# Patient Record
Sex: FEMALE | Race: WHITE | NOT HISPANIC OR LATINO | Employment: OTHER | URBAN - METROPOLITAN AREA
[De-identification: names, ages, dates, MRNs, and addresses within clinical notes are randomized per-mention and may not be internally consistent; named-entity substitution may affect disease eponyms.]

---

## 2017-09-18 ENCOUNTER — COMMUNICATION - HEALTHEAST (OUTPATIENT)
Dept: SURGERY | Facility: CLINIC | Age: 57
End: 2017-09-18

## 2017-09-18 DIAGNOSIS — Z72.0 TOBACCO USE: ICD-10-CM

## 2017-09-18 DIAGNOSIS — Z98.84 H/O LAPAROSCOPIC ADJUSTABLE GASTRIC BANDING: ICD-10-CM

## 2018-02-06 ENCOUNTER — COMMUNICATION - HEALTHEAST (OUTPATIENT)
Dept: INTERNAL MEDICINE | Facility: CLINIC | Age: 58
End: 2018-02-06

## 2018-02-06 ENCOUNTER — OFFICE VISIT - HEALTHEAST (OUTPATIENT)
Dept: INTERNAL MEDICINE | Facility: CLINIC | Age: 58
End: 2018-02-06

## 2018-02-06 DIAGNOSIS — R05.9 COUGH: ICD-10-CM

## 2018-02-06 DIAGNOSIS — M79.643 HAND PAIN: ICD-10-CM

## 2018-02-06 DIAGNOSIS — J44.9 COPD (CHRONIC OBSTRUCTIVE PULMONARY DISEASE) (H): ICD-10-CM

## 2018-02-06 DIAGNOSIS — Z23 IMMUNIZATION DUE: ICD-10-CM

## 2018-02-06 DIAGNOSIS — Z53.20 MAMMOGRAM DECLINED: ICD-10-CM

## 2018-02-06 DIAGNOSIS — E03.9 HYPOTHYROIDISM: ICD-10-CM

## 2018-02-06 DIAGNOSIS — Z72.0 TOBACCO ABUSE: ICD-10-CM

## 2018-02-06 DIAGNOSIS — Z91.038 HYMENOPTERA ALLERGY: ICD-10-CM

## 2018-02-06 DIAGNOSIS — I10 HTN (HYPERTENSION): ICD-10-CM

## 2018-02-06 DIAGNOSIS — Z12.11 SCREEN FOR COLON CANCER: ICD-10-CM

## 2018-02-06 DIAGNOSIS — Z53.20 PAPANICOLAOU SMEAR DECLINED: ICD-10-CM

## 2018-02-06 DIAGNOSIS — R05.3 CHRONIC COUGH: ICD-10-CM

## 2018-02-06 ASSESSMENT — MIFFLIN-ST. JEOR: SCORE: 1270.86

## 2018-02-09 ENCOUNTER — COMMUNICATION - HEALTHEAST (OUTPATIENT)
Dept: INTERNAL MEDICINE | Facility: CLINIC | Age: 58
End: 2018-02-09

## 2018-02-09 ENCOUNTER — HOSPITAL ENCOUNTER (OUTPATIENT)
Dept: RESPIRATORY THERAPY | Facility: HOSPITAL | Age: 58
Discharge: HOME | End: 2018-02-09
Attending: INTERNAL MEDICINE

## 2018-02-09 DIAGNOSIS — R05.3 CHRONIC COUGH: ICD-10-CM

## 2018-02-09 DIAGNOSIS — Z72.0 TOBACCO ABUSE: ICD-10-CM

## 2018-02-09 LAB — HGB BLD-MCNC: 15.6 G/DL (ref 12–16)

## 2018-02-11 ENCOUNTER — COMMUNICATION - HEALTHEAST (OUTPATIENT)
Dept: INTERNAL MEDICINE | Facility: CLINIC | Age: 58
End: 2018-02-11

## 2018-02-11 DIAGNOSIS — R05.3 CHRONIC COUGH: ICD-10-CM

## 2018-02-11 DIAGNOSIS — Z72.0 TOBACCO ABUSE: ICD-10-CM

## 2018-02-16 ENCOUNTER — AMBULATORY - HEALTHEAST (OUTPATIENT)
Dept: SURGERY | Facility: CLINIC | Age: 58
End: 2018-02-16

## 2018-02-16 DIAGNOSIS — Z72.0 TOBACCO USE: ICD-10-CM

## 2018-02-16 DIAGNOSIS — E78.5 HLD (HYPERLIPIDEMIA): ICD-10-CM

## 2018-02-16 DIAGNOSIS — Z46.51 ENCOUNTER FOR ADJUSTMENT OF GASTRIC LAP BAND: ICD-10-CM

## 2018-02-16 DIAGNOSIS — E55.9 VITAMIN D DEFICIENCY: ICD-10-CM

## 2018-02-16 DIAGNOSIS — Z13.1 SCREENING FOR DIABETES MELLITUS: ICD-10-CM

## 2018-02-16 DIAGNOSIS — Z98.84 HX OF LAPAROSCOPIC ADJUSTABLE GASTRIC BANDING: ICD-10-CM

## 2018-02-16 DIAGNOSIS — K21.9 GERD (GASTROESOPHAGEAL REFLUX DISEASE): ICD-10-CM

## 2018-02-16 ASSESSMENT — MIFFLIN-ST. JEOR: SCORE: 1270.86

## 2018-02-21 ENCOUNTER — AMBULATORY - HEALTHEAST (OUTPATIENT)
Dept: LAB | Facility: CLINIC | Age: 58
End: 2018-02-21

## 2018-02-21 DIAGNOSIS — I10 HTN (HYPERTENSION): ICD-10-CM

## 2018-02-21 DIAGNOSIS — Z13.1 SCREENING FOR DIABETES MELLITUS: ICD-10-CM

## 2018-02-21 DIAGNOSIS — M79.643 HAND PAIN: ICD-10-CM

## 2018-02-21 DIAGNOSIS — E03.9 HYPOTHYROIDISM: ICD-10-CM

## 2018-02-21 DIAGNOSIS — E55.9 VITAMIN D DEFICIENCY: ICD-10-CM

## 2018-02-21 DIAGNOSIS — Z98.84 HX OF LAPAROSCOPIC ADJUSTABLE GASTRIC BANDING: ICD-10-CM

## 2018-02-21 DIAGNOSIS — E78.5 HLD (HYPERLIPIDEMIA): ICD-10-CM

## 2018-02-21 LAB
ANION GAP SERPL CALCULATED.3IONS-SCNC: 8 MMOL/L (ref 5–18)
BUN SERPL-MCNC: 10 MG/DL (ref 8–22)
C REACTIVE PROTEIN LHE: 0.2 MG/DL (ref 0–0.8)
CALCIUM SERPL-MCNC: 9.4 MG/DL (ref 8.5–10.5)
CHLORIDE BLD-SCNC: 103 MMOL/L (ref 98–107)
CO2 SERPL-SCNC: 27 MMOL/L (ref 22–31)
CREAT SERPL-MCNC: 0.75 MG/DL (ref 0.6–1.1)
ERYTHROCYTE [DISTWIDTH] IN BLOOD BY AUTOMATED COUNT: 11.9 % (ref 11–14.5)
ERYTHROCYTE [SEDIMENTATION RATE] IN BLOOD BY WESTERGREN METHOD: 6 MM/HR (ref 0–20)
FASTING STATUS PATIENT QL REPORTED: YES
GFR SERPL CREATININE-BSD FRML MDRD: >60 ML/MIN/1.73M2
GLUCOSE BLD-MCNC: 100 MG/DL (ref 70–125)
HBA1C MFR BLD: 5.8 % (ref 3.5–6)
HCT VFR BLD AUTO: 43.8 % (ref 35–47)
HDLC SERPL-MCNC: 82 MG/DL
HGB BLD-MCNC: 14.7 G/DL (ref 12–16)
LDLC SERPL CALC-MCNC: 145 MG/DL
MCH RBC QN AUTO: 31.2 PG (ref 27–34)
MCHC RBC AUTO-ENTMCNC: 33.6 G/DL (ref 32–36)
MCV RBC AUTO: 93 FL (ref 80–100)
PLATELET # BLD AUTO: 184 THOU/UL (ref 140–440)
PMV BLD AUTO: 8.8 FL (ref 7–10)
POTASSIUM BLD-SCNC: 3.8 MMOL/L (ref 3.5–5)
PTH-INTACT SERPL-MCNC: 41 PG/ML (ref 10–86)
RBC # BLD AUTO: 4.72 MILL/UL (ref 3.8–5.4)
RHEUMATOID FACT SERPL-ACNC: 36.6 IU/ML (ref 0–30)
SODIUM SERPL-SCNC: 138 MMOL/L (ref 136–145)
TSH SERPL DL<=0.005 MIU/L-ACNC: 5.88 UIU/ML (ref 0.3–5)
WBC: 5.9 THOU/UL (ref 4–11)

## 2018-02-22 LAB — CCP AB SER IA-ACNC: <0.5 U/ML

## 2018-02-23 LAB
25(OH)D3 SERPL-MCNC: 28.9 NG/ML (ref 30–80)
ZINC SERPL-MCNC: 60 UG/DL (ref 60–120)

## 2018-02-26 ENCOUNTER — COMMUNICATION - HEALTHEAST (OUTPATIENT)
Dept: INTERNAL MEDICINE | Facility: CLINIC | Age: 58
End: 2018-02-26

## 2018-02-27 LAB — VIT B1 PYROPHOSHATE BLD-SCNC: 84 NMOL/L (ref 70–180)

## 2018-04-10 ENCOUNTER — AMBULATORY - HEALTHEAST (OUTPATIENT)
Dept: NURSING | Facility: CLINIC | Age: 58
End: 2018-04-10

## 2018-05-27 ENCOUNTER — RECORDS - HEALTHEAST (OUTPATIENT)
Dept: ADMINISTRATIVE | Facility: OTHER | Age: 58
End: 2018-05-27

## 2018-05-29 ENCOUNTER — OFFICE VISIT - HEALTHEAST (OUTPATIENT)
Dept: FAMILY MEDICINE | Facility: CLINIC | Age: 58
End: 2018-05-29

## 2018-05-29 DIAGNOSIS — H69.92 DYSFUNCTION OF LEFT EUSTACHIAN TUBE: ICD-10-CM

## 2018-05-29 RX ORDER — CETIRIZINE HYDROCHLORIDE 10 MG/1
10 TABLET ORAL DAILY
Qty: 30 TABLET | Refills: 0 | Status: SHIPPED | COMMUNITY
Start: 2018-05-29 | End: 2021-10-05

## 2018-09-25 ENCOUNTER — OFFICE VISIT - HEALTHEAST (OUTPATIENT)
Dept: FAMILY MEDICINE | Facility: CLINIC | Age: 58
End: 2018-09-25

## 2018-09-25 DIAGNOSIS — Z72.0 TOBACCO USE: ICD-10-CM

## 2018-09-25 DIAGNOSIS — J00 ACUTE NASOPHARYNGITIS (COMMON COLD): ICD-10-CM

## 2018-09-25 DIAGNOSIS — J02.9 SORE THROAT: ICD-10-CM

## 2018-09-25 DIAGNOSIS — R05.9 COUGH: ICD-10-CM

## 2018-09-25 DIAGNOSIS — J44.9 COPD (CHRONIC OBSTRUCTIVE PULMONARY DISEASE) (H): ICD-10-CM

## 2018-09-25 LAB — DEPRECATED S PYO AG THROAT QL EIA: NORMAL

## 2018-09-26 LAB — GROUP A STREP BY PCR: NORMAL

## 2018-11-12 ENCOUNTER — OFFICE VISIT - HEALTHEAST (OUTPATIENT)
Dept: FAMILY MEDICINE | Facility: CLINIC | Age: 58
End: 2018-11-12

## 2018-11-12 DIAGNOSIS — L08.9 WOUND INFECTION: ICD-10-CM

## 2018-11-12 DIAGNOSIS — T14.8XXA WOUND INFECTION: ICD-10-CM

## 2018-11-12 DIAGNOSIS — M79.662 PAIN OF LEFT LOWER LEG: ICD-10-CM

## 2018-11-15 ENCOUNTER — OFFICE VISIT - HEALTHEAST (OUTPATIENT)
Dept: INTERNAL MEDICINE | Facility: CLINIC | Age: 58
End: 2018-11-15

## 2018-11-15 DIAGNOSIS — S81.802D NON-HEALING WOUND OF LOWER EXTREMITY, LEFT, SUBSEQUENT ENCOUNTER: ICD-10-CM

## 2018-12-04 ENCOUNTER — OFFICE VISIT - HEALTHEAST (OUTPATIENT)
Dept: VASCULAR SURGERY | Facility: CLINIC | Age: 58
End: 2018-12-04

## 2018-12-04 DIAGNOSIS — Z72.0 TOBACCO ABUSE: ICD-10-CM

## 2018-12-04 DIAGNOSIS — E60 ZINC DEFICIENCY: ICD-10-CM

## 2018-12-04 DIAGNOSIS — E55.9 VITAMIN D DEFICIENCY: ICD-10-CM

## 2018-12-04 DIAGNOSIS — I15.9 SECONDARY HYPERTENSION: ICD-10-CM

## 2018-12-04 DIAGNOSIS — E66.811 OBESITY (BMI 30.0-34.9): ICD-10-CM

## 2018-12-04 DIAGNOSIS — L97.921 ULCER OF LEFT LOWER EXTREMITY, LIMITED TO BREAKDOWN OF SKIN (H): ICD-10-CM

## 2018-12-04 ASSESSMENT — MIFFLIN-ST. JEOR: SCORE: 1261.79

## 2018-12-08 ENCOUNTER — COMMUNICATION - HEALTHEAST (OUTPATIENT)
Dept: INTERNAL MEDICINE | Facility: CLINIC | Age: 58
End: 2018-12-08

## 2019-03-09 ENCOUNTER — COMMUNICATION - HEALTHEAST (OUTPATIENT)
Dept: SURGERY | Facility: CLINIC | Age: 59
End: 2019-03-09

## 2019-03-09 DIAGNOSIS — K21.9 GERD (GASTROESOPHAGEAL REFLUX DISEASE): ICD-10-CM

## 2019-04-17 ENCOUNTER — COMMUNICATION - HEALTHEAST (OUTPATIENT)
Dept: INTERNAL MEDICINE | Facility: CLINIC | Age: 59
End: 2019-04-17

## 2019-04-23 ENCOUNTER — COMMUNICATION - HEALTHEAST (OUTPATIENT)
Dept: SURGERY | Facility: CLINIC | Age: 59
End: 2019-04-23

## 2019-05-07 ENCOUNTER — OFFICE VISIT - HEALTHEAST (OUTPATIENT)
Dept: INTERNAL MEDICINE | Facility: CLINIC | Age: 59
End: 2019-05-07

## 2019-05-07 DIAGNOSIS — Z53.20 MAMMOGRAM DECLINED: ICD-10-CM

## 2019-05-07 DIAGNOSIS — Z53.20 PAP SMEAR OF CERVIX DECLINED: ICD-10-CM

## 2019-05-07 DIAGNOSIS — I10 ESSENTIAL HYPERTENSION: ICD-10-CM

## 2019-05-07 DIAGNOSIS — Z98.890 S/P COLONOSCOPY: ICD-10-CM

## 2019-05-07 DIAGNOSIS — Z11.59 SCREENING FOR MEASLES: ICD-10-CM

## 2019-05-07 DIAGNOSIS — Z91.038 HYMENOPTERA ALLERGY: ICD-10-CM

## 2019-05-07 DIAGNOSIS — E78.2 MIXED HYPERLIPIDEMIA: ICD-10-CM

## 2019-05-07 DIAGNOSIS — Z72.0 TOBACCO ABUSE: ICD-10-CM

## 2019-05-07 LAB
ANION GAP SERPL CALCULATED.3IONS-SCNC: 11 MMOL/L (ref 5–18)
BUN SERPL-MCNC: 10 MG/DL (ref 8–22)
CALCIUM SERPL-MCNC: 9.8 MG/DL (ref 8.5–10.5)
CHLORIDE BLD-SCNC: 104 MMOL/L (ref 98–107)
CO2 SERPL-SCNC: 25 MMOL/L (ref 22–31)
CREAT SERPL-MCNC: 0.8 MG/DL (ref 0.6–1.1)
GFR SERPL CREATININE-BSD FRML MDRD: >60 ML/MIN/1.73M2
GLUCOSE BLD-MCNC: 93 MG/DL (ref 70–125)
POTASSIUM BLD-SCNC: 4.4 MMOL/L (ref 3.5–5)
SODIUM SERPL-SCNC: 140 MMOL/L (ref 136–145)
TSH SERPL DL<=0.005 MIU/L-ACNC: 4.13 UIU/ML (ref 0.3–5)

## 2019-05-07 RX ORDER — EPINEPHRINE 0.3 MG/.3ML
0.3 INJECTION SUBCUTANEOUS ONCE
Qty: 2 | Refills: 2 | Status: SHIPPED | OUTPATIENT
Start: 2019-05-07 | End: 2019-05-07

## 2019-05-07 ASSESSMENT — MIFFLIN-ST. JEOR: SCORE: 1329.82

## 2019-05-08 ENCOUNTER — COMMUNICATION - HEALTHEAST (OUTPATIENT)
Dept: INTERNAL MEDICINE | Facility: CLINIC | Age: 59
End: 2019-05-08

## 2019-05-08 DIAGNOSIS — I10 ESSENTIAL HYPERTENSION: ICD-10-CM

## 2019-05-08 LAB — MEV IGG SER IA-ACNC: POSITIVE

## 2019-06-13 ENCOUNTER — OFFICE VISIT - HEALTHEAST (OUTPATIENT)
Dept: SURGERY | Facility: CLINIC | Age: 59
End: 2019-06-13

## 2019-06-13 ENCOUNTER — AMBULATORY - HEALTHEAST (OUTPATIENT)
Dept: LAB | Facility: CLINIC | Age: 59
End: 2019-06-13

## 2019-06-13 DIAGNOSIS — Z98.84 HX OF LAPAROSCOPIC ADJUSTABLE GASTRIC BANDING: ICD-10-CM

## 2019-06-13 DIAGNOSIS — Z78.0 POST-MENOPAUSAL: ICD-10-CM

## 2019-06-13 DIAGNOSIS — Z72.0 TOBACCO ABUSE: ICD-10-CM

## 2019-06-13 LAB
ALBUMIN SERPL-MCNC: 4 G/DL (ref 3.5–5)
ALP SERPL-CCNC: 64 U/L (ref 45–120)
ALT SERPL W P-5'-P-CCNC: 17 U/L (ref 0–45)
ANION GAP SERPL CALCULATED.3IONS-SCNC: 11 MMOL/L (ref 5–18)
AST SERPL W P-5'-P-CCNC: 24 U/L (ref 0–40)
BILIRUB SERPL-MCNC: 0.4 MG/DL (ref 0–1)
BUN SERPL-MCNC: 10 MG/DL (ref 8–22)
CALCIUM SERPL-MCNC: 10.2 MG/DL (ref 8.5–10.5)
CHLORIDE BLD-SCNC: 103 MMOL/L (ref 98–107)
CO2 SERPL-SCNC: 27 MMOL/L (ref 22–31)
CREAT SERPL-MCNC: 0.72 MG/DL (ref 0.6–1.1)
ERYTHROCYTE [DISTWIDTH] IN BLOOD BY AUTOMATED COUNT: 12 % (ref 11–14.5)
FASTING STATUS PATIENT QL REPORTED: YES
GFR SERPL CREATININE-BSD FRML MDRD: >60 ML/MIN/1.73M2
GLUCOSE BLD-MCNC: 93 MG/DL (ref 70–125)
HBA1C MFR BLD: 5.5 % (ref 3.5–6)
HCT VFR BLD AUTO: 45.5 % (ref 35–47)
HDLC SERPL-MCNC: 95 MG/DL
HGB BLD-MCNC: 15 G/DL (ref 12–16)
LDLC SERPL CALC-MCNC: 174 MG/DL
MCH RBC QN AUTO: 31.3 PG (ref 27–34)
MCHC RBC AUTO-ENTMCNC: 33 G/DL (ref 32–36)
MCV RBC AUTO: 95 FL (ref 80–100)
PLATELET # BLD AUTO: 196 THOU/UL (ref 140–440)
PMV BLD AUTO: 8.6 FL (ref 7–10)
POTASSIUM BLD-SCNC: 4.3 MMOL/L (ref 3.5–5)
PROT SERPL-MCNC: 7 G/DL (ref 6–8)
PTH-INTACT SERPL-MCNC: 42 PG/ML (ref 10–86)
RBC # BLD AUTO: 4.79 MILL/UL (ref 3.8–5.4)
SODIUM SERPL-SCNC: 141 MMOL/L (ref 136–145)
TSH SERPL DL<=0.005 MIU/L-ACNC: 3.83 UIU/ML (ref 0.3–5)
WBC: 4 THOU/UL (ref 4–11)

## 2019-06-13 ASSESSMENT — MIFFLIN-ST. JEOR: SCORE: 1311.68

## 2019-06-14 LAB — 25(OH)D3 SERPL-MCNC: 25.7 NG/ML (ref 30–80)

## 2019-06-16 LAB — VIT B1 PYROPHOSHATE BLD-SCNC: 79 NMOL/L (ref 70–180)

## 2019-07-18 ENCOUNTER — OFFICE VISIT - HEALTHEAST (OUTPATIENT)
Dept: SURGERY | Facility: CLINIC | Age: 59
End: 2019-07-18

## 2019-07-18 DIAGNOSIS — Z72.0 TOBACCO USE: ICD-10-CM

## 2019-07-18 DIAGNOSIS — R63.2 HYPERPHAGIA: ICD-10-CM

## 2019-07-18 DIAGNOSIS — Z98.84 HX OF LAPAROSCOPIC ADJUSTABLE GASTRIC BANDING: ICD-10-CM

## 2019-07-18 ASSESSMENT — MIFFLIN-ST. JEOR: SCORE: 1293.54

## 2019-08-15 ENCOUNTER — COMMUNICATION - HEALTHEAST (OUTPATIENT)
Dept: TELEHEALTH | Facility: CLINIC | Age: 59
End: 2019-08-15

## 2019-08-15 ENCOUNTER — RECORDS - HEALTHEAST (OUTPATIENT)
Dept: ADMINISTRATIVE | Facility: OTHER | Age: 59
End: 2019-08-15

## 2019-08-15 ENCOUNTER — RECORDS - HEALTHEAST (OUTPATIENT)
Dept: BONE DENSITY | Facility: CLINIC | Age: 59
End: 2019-08-15

## 2019-08-15 DIAGNOSIS — Z72.0 TOBACCO USE: ICD-10-CM

## 2019-08-15 DIAGNOSIS — Z78.0 ASYMPTOMATIC MENOPAUSAL STATE: ICD-10-CM

## 2019-08-15 DIAGNOSIS — Z98.84 BARIATRIC SURGERY STATUS: ICD-10-CM

## 2019-11-22 ENCOUNTER — COMMUNICATION - HEALTHEAST (OUTPATIENT)
Dept: SURGERY | Facility: CLINIC | Age: 59
End: 2019-11-22

## 2019-11-22 DIAGNOSIS — E78.5 HLD (HYPERLIPIDEMIA): ICD-10-CM

## 2019-12-16 ENCOUNTER — AMBULATORY - HEALTHEAST (OUTPATIENT)
Dept: LAB | Facility: CLINIC | Age: 59
End: 2019-12-16

## 2019-12-16 ENCOUNTER — AMBULATORY - HEALTHEAST (OUTPATIENT)
Dept: NURSING | Facility: CLINIC | Age: 59
End: 2019-12-16

## 2019-12-16 DIAGNOSIS — E78.5 HLD (HYPERLIPIDEMIA): ICD-10-CM

## 2019-12-16 LAB — LDLC SERPL CALC-MCNC: 165 MG/DL

## 2020-01-30 ENCOUNTER — COMMUNICATION - HEALTHEAST (OUTPATIENT)
Dept: SURGERY | Facility: CLINIC | Age: 60
End: 2020-01-30

## 2020-02-05 ENCOUNTER — OFFICE VISIT - HEALTHEAST (OUTPATIENT)
Dept: SURGERY | Facility: CLINIC | Age: 60
End: 2020-02-05

## 2020-02-05 ENCOUNTER — COMMUNICATION - HEALTHEAST (OUTPATIENT)
Dept: TELEHEALTH | Facility: CLINIC | Age: 60
End: 2020-02-05

## 2020-02-05 DIAGNOSIS — K21.9 GASTROESOPHAGEAL REFLUX DISEASE WITHOUT ESOPHAGITIS: ICD-10-CM

## 2020-02-05 DIAGNOSIS — Z98.84 BARIATRIC SURGERY STATUS: ICD-10-CM

## 2020-02-05 RX ORDER — VARENICLINE TARTRATE 1 MG/1
1 TABLET, FILM COATED ORAL 2 TIMES DAILY
Qty: 60 TABLET | Refills: 2 | Status: SHIPPED | OUTPATIENT
Start: 2020-02-05 | End: 2021-10-05

## 2020-02-07 ENCOUNTER — OFFICE VISIT - HEALTHEAST (OUTPATIENT)
Dept: FAMILY MEDICINE | Facility: CLINIC | Age: 60
End: 2020-02-07

## 2020-02-07 DIAGNOSIS — R05.9 COUGH: ICD-10-CM

## 2020-02-07 DIAGNOSIS — J20.9 ACUTE BRONCHITIS, UNSPECIFIED ORGANISM: ICD-10-CM

## 2020-04-24 ENCOUNTER — COMMUNICATION - HEALTHEAST (OUTPATIENT)
Dept: INTERNAL MEDICINE | Facility: CLINIC | Age: 60
End: 2020-04-24

## 2020-04-24 DIAGNOSIS — I10 ESSENTIAL HYPERTENSION: ICD-10-CM

## 2020-04-28 ENCOUNTER — COMMUNICATION - HEALTHEAST (OUTPATIENT)
Dept: INTERNAL MEDICINE | Facility: CLINIC | Age: 60
End: 2020-04-28

## 2020-04-30 ENCOUNTER — OFFICE VISIT - HEALTHEAST (OUTPATIENT)
Dept: INTERNAL MEDICINE | Facility: CLINIC | Age: 60
End: 2020-04-30

## 2020-04-30 DIAGNOSIS — I10 ESSENTIAL HYPERTENSION: ICD-10-CM

## 2020-04-30 DIAGNOSIS — Z98.84 HX OF LAPAROSCOPIC ADJUSTABLE GASTRIC BANDING: ICD-10-CM

## 2020-04-30 DIAGNOSIS — J44.9 CHRONIC OBSTRUCTIVE PULMONARY DISEASE, UNSPECIFIED COPD TYPE (H): ICD-10-CM

## 2020-04-30 DIAGNOSIS — Z72.0 TOBACCO ABUSE: ICD-10-CM

## 2020-07-01 ENCOUNTER — COMMUNICATION - HEALTHEAST (OUTPATIENT)
Dept: INTERNAL MEDICINE | Facility: CLINIC | Age: 60
End: 2020-07-01

## 2020-07-02 ENCOUNTER — OFFICE VISIT - HEALTHEAST (OUTPATIENT)
Dept: INTERNAL MEDICINE | Facility: CLINIC | Age: 60
End: 2020-07-02

## 2020-07-02 DIAGNOSIS — R05.9 COUGH: ICD-10-CM

## 2020-07-02 DIAGNOSIS — J02.9 SORE THROAT: ICD-10-CM

## 2020-07-02 DIAGNOSIS — M54.2 NECK PAIN: ICD-10-CM

## 2020-07-03 ENCOUNTER — AMBULATORY - HEALTHEAST (OUTPATIENT)
Dept: FAMILY MEDICINE | Facility: CLINIC | Age: 60
End: 2020-07-03

## 2020-07-03 DIAGNOSIS — R05.9 COUGH: ICD-10-CM

## 2020-07-06 ENCOUNTER — COMMUNICATION - HEALTHEAST (OUTPATIENT)
Dept: INTERNAL MEDICINE | Facility: CLINIC | Age: 60
End: 2020-07-06

## 2020-07-07 ENCOUNTER — AMBULATORY - HEALTHEAST (OUTPATIENT)
Dept: LAB | Facility: CLINIC | Age: 60
End: 2020-07-07

## 2020-07-07 ENCOUNTER — AMBULATORY - HEALTHEAST (OUTPATIENT)
Dept: INTERNAL MEDICINE | Facility: CLINIC | Age: 60
End: 2020-07-07

## 2020-07-07 DIAGNOSIS — Z01.84 IMMUNITY STATUS TESTING: ICD-10-CM

## 2020-07-09 ENCOUNTER — COMMUNICATION - HEALTHEAST (OUTPATIENT)
Dept: INTERNAL MEDICINE | Facility: CLINIC | Age: 60
End: 2020-07-09

## 2020-07-13 ENCOUNTER — COMMUNICATION - HEALTHEAST (OUTPATIENT)
Dept: INTERNAL MEDICINE | Facility: CLINIC | Age: 60
End: 2020-07-13

## 2020-07-15 ENCOUNTER — COMMUNICATION - HEALTHEAST (OUTPATIENT)
Dept: SURGERY | Facility: CLINIC | Age: 60
End: 2020-07-15

## 2020-07-15 DIAGNOSIS — K21.9 GERD (GASTROESOPHAGEAL REFLUX DISEASE): ICD-10-CM

## 2020-07-15 RX ORDER — OMEPRAZOLE 40 MG/1
CAPSULE, DELAYED RELEASE ORAL
Qty: 90 CAPSULE | Status: SHIPPED | OUTPATIENT
Start: 2020-07-15 | End: 2021-09-21

## 2020-07-28 ENCOUNTER — COMMUNICATION - HEALTHEAST (OUTPATIENT)
Dept: SURGERY | Facility: CLINIC | Age: 60
End: 2020-07-28

## 2020-07-28 ENCOUNTER — OFFICE VISIT - HEALTHEAST (OUTPATIENT)
Dept: FAMILY MEDICINE | Facility: CLINIC | Age: 60
End: 2020-07-28

## 2020-07-28 ENCOUNTER — AMBULATORY - HEALTHEAST (OUTPATIENT)
Dept: SURGERY | Facility: CLINIC | Age: 60
End: 2020-07-28

## 2020-07-28 DIAGNOSIS — Z98.84 HX OF LAPAROSCOPIC ADJUSTABLE GASTRIC BANDING: ICD-10-CM

## 2020-07-28 DIAGNOSIS — R10.13 EPIGASTRIC PAIN: ICD-10-CM

## 2020-07-28 DIAGNOSIS — Z72.0 TOBACCO USE: ICD-10-CM

## 2020-07-28 DIAGNOSIS — Z98.84 HISTORY OF LAPAROSCOPIC ADJUSTABLE GASTRIC BANDING: ICD-10-CM

## 2020-07-28 DIAGNOSIS — J02.9 SORE THROAT: ICD-10-CM

## 2020-07-28 DIAGNOSIS — Z72.0 TOBACCO ABUSE: ICD-10-CM

## 2020-07-28 LAB — DEPRECATED S PYO AG THROAT QL EIA: NORMAL

## 2020-07-28 RX ORDER — SUCRALFATE 1 G/1
1 TABLET ORAL 4 TIMES DAILY
Qty: 120 TABLET | Status: SHIPPED | OUTPATIENT
Start: 2020-07-28 | End: 2021-07-28

## 2020-07-29 LAB — GROUP A STREP BY PCR: NORMAL

## 2020-08-03 ENCOUNTER — SURGERY - HEALTHEAST (OUTPATIENT)
Dept: SURGERY | Facility: CLINIC | Age: 60
End: 2020-08-03

## 2020-08-03 DIAGNOSIS — Z72.0 TOBACCO ABUSE: ICD-10-CM

## 2020-08-03 DIAGNOSIS — Z98.84 BARIATRIC SURGERY STATUS: ICD-10-CM

## 2020-08-03 DIAGNOSIS — R10.13 ABDOMINAL PAIN, EPIGASTRIC: ICD-10-CM

## 2020-08-04 ENCOUNTER — AMBULATORY - HEALTHEAST (OUTPATIENT)
Dept: SURGERY | Facility: AMBULATORY SURGERY CENTER | Age: 60
End: 2020-08-04

## 2020-08-04 ENCOUNTER — COMMUNICATION - HEALTHEAST (OUTPATIENT)
Dept: SURGERY | Facility: CLINIC | Age: 60
End: 2020-08-04

## 2020-08-04 DIAGNOSIS — Z11.59 ENCOUNTER FOR SCREENING FOR OTHER VIRAL DISEASES: ICD-10-CM

## 2020-08-12 ENCOUNTER — AMBULATORY - HEALTHEAST (OUTPATIENT)
Dept: FAMILY MEDICINE | Facility: CLINIC | Age: 60
End: 2020-08-12

## 2020-08-12 DIAGNOSIS — Z11.59 ENCOUNTER FOR SCREENING FOR OTHER VIRAL DISEASES: ICD-10-CM

## 2020-08-13 ENCOUNTER — ANESTHESIA - HEALTHEAST (OUTPATIENT)
Dept: SURGERY | Facility: AMBULATORY SURGERY CENTER | Age: 60
End: 2020-08-13

## 2020-08-13 ASSESSMENT — MIFFLIN-ST. JEOR: SCORE: 1254.99

## 2020-08-14 ENCOUNTER — COMMUNICATION - HEALTHEAST (OUTPATIENT)
Dept: SCHEDULING | Facility: CLINIC | Age: 60
End: 2020-08-14

## 2020-08-14 ENCOUNTER — SURGERY - HEALTHEAST (OUTPATIENT)
Dept: SURGERY | Facility: AMBULATORY SURGERY CENTER | Age: 60
End: 2020-08-14

## 2020-08-14 ASSESSMENT — MIFFLIN-ST. JEOR: SCORE: 1254.99

## 2020-08-30 ENCOUNTER — COMMUNICATION - HEALTHEAST (OUTPATIENT)
Dept: INTERNAL MEDICINE | Facility: CLINIC | Age: 60
End: 2020-08-30

## 2020-08-30 DIAGNOSIS — I10 ESSENTIAL HYPERTENSION: ICD-10-CM

## 2020-09-01 RX ORDER — AMLODIPINE BESYLATE 5 MG/1
5 TABLET ORAL DAILY
Qty: 90 TABLET | Refills: 3 | Status: SHIPPED | OUTPATIENT
Start: 2020-09-01 | End: 2021-07-27

## 2021-01-04 ENCOUNTER — COMMUNICATION - HEALTHEAST (OUTPATIENT)
Dept: INTERNAL MEDICINE | Facility: CLINIC | Age: 61
End: 2021-01-04

## 2021-01-19 ENCOUNTER — RECORDS - HEALTHEAST (OUTPATIENT)
Dept: ADMINISTRATIVE | Facility: OTHER | Age: 61
End: 2021-01-19

## 2021-03-02 ENCOUNTER — COMMUNICATION - HEALTHEAST (OUTPATIENT)
Dept: INTERNAL MEDICINE | Facility: CLINIC | Age: 61
End: 2021-03-02

## 2021-05-23 ENCOUNTER — HEALTH MAINTENANCE LETTER (OUTPATIENT)
Age: 61
End: 2021-05-23

## 2021-05-25 ENCOUNTER — RECORDS - HEALTHEAST (OUTPATIENT)
Dept: ADMINISTRATIVE | Facility: CLINIC | Age: 61
End: 2021-05-25

## 2021-05-27 ENCOUNTER — OFFICE VISIT - HEALTHEAST (OUTPATIENT)
Dept: FAMILY MEDICINE | Facility: CLINIC | Age: 61
End: 2021-05-27

## 2021-05-27 VITALS
TEMPERATURE: 98.5 F | HEART RATE: 68 BPM | SYSTOLIC BLOOD PRESSURE: 152 MMHG | OXYGEN SATURATION: 98 % | RESPIRATION RATE: 14 BRPM | DIASTOLIC BLOOD PRESSURE: 84 MMHG

## 2021-05-27 DIAGNOSIS — J44.9 CHRONIC OBSTRUCTIVE PULMONARY DISEASE, UNSPECIFIED COPD TYPE (H): ICD-10-CM

## 2021-05-27 DIAGNOSIS — R05.9 COUGH: ICD-10-CM

## 2021-05-27 LAB
SARS-COV-2 PCR COMMENT: NORMAL
SARS-COV-2 RNA SPEC QL NAA+PROBE: NEGATIVE
SARS-COV-2 VIRUS SPECIMEN SOURCE: NORMAL

## 2021-05-27 RX ORDER — ALBUTEROL SULFATE 90 UG/1
2 AEROSOL, METERED RESPIRATORY (INHALATION) EVERY 6 HOURS PRN
Qty: 1 EACH | Refills: 0 | Status: SHIPPED | OUTPATIENT
Start: 2021-05-27 | End: 2021-10-05

## 2021-05-28 ENCOUNTER — COMMUNICATION - HEALTHEAST (OUTPATIENT)
Dept: SCHEDULING | Facility: CLINIC | Age: 61
End: 2021-05-28

## 2021-05-28 NOTE — TELEPHONE ENCOUNTER
PT IS OUT OF HER OMETRAZOLE 40 MG. SHE WOULD LIKE A REFILL UNTIL HER APPT ON 6/13.     [facility]

## 2021-05-28 NOTE — PROGRESS NOTES
Wt Readings from Last 20 Encounters:   05/07/19 175 lb (79.4 kg)   12/04/18 160 lb (72.6 kg)   11/15/18 163 lb (73.9 kg)   11/12/18 163 lb 6.4 oz (74.1 kg)   09/25/18 164 lb 12.8 oz (74.8 kg)   05/29/18 167 lb (75.8 kg)   02/16/18 162 lb (73.5 kg)   02/06/18 162 lb (73.5 kg)   09/15/16 180 lb (81.6 kg)   08/17/16 197 lb 8 oz (89.6 kg)   07/19/16 198 lb 8 oz (90 kg)   10/19/15 192 lb 6.4 oz (87.3 kg)   09/19/15 188 lb 9.6 oz (85.5 kg)   09/16/15 187 lb (84.8 kg)   08/09/15 150 lb (68 kg)

## 2021-05-28 NOTE — PATIENT INSTRUCTIONS - HE
The new shingles shot (Shingrix) is 2 separate shots  by 2-6 months.  It is recommended for people 50 years of age and older.    The cost out of pocket is around $390 for the 2 shots, so you might want to see if your insurance covers it or a portion of it prior to having it done.      It is estimated that any person's risk of shingles over their lifetime is around 10-20%.    The new shot is 90% effective, reducing your risk of shingles to about 1-2% over your lifetime.    Because the shot is strong, it is very common to have flu like symptoms for 2-3 days after the shot.  25-50% of patients will have fever, muscle aches or headache.    I believe that whether or not you have this vaccine is your own decision depending on your values and preferences.  The information above I give you to make an informed decision.    Ricardo Davila MD  [facility]

## 2021-05-28 NOTE — TELEPHONE ENCOUNTER
Patient Returning Call  Reason for call:  Patient is calling back  Information relayed to patient:   She is immune to measles.     Your thyroid tests are excellent.      Your kidney tests are normal.  Your electrolytes are also normal.  There is no signs of diabetes.      I would recommend adding in triamterene/hydrochlorothiazide 1 pill a day for her blood pressure.  The goal is to have this below 140/90.  This will gradually work in over 1 month.     I will send a prescription for this to   [id number] - [place] [address] AT [facility] &  [address]  [facility] [address]  Phone: [phone] Fax: [phone]      If she wants to come in for follow up on this you may schedule in 4-8 weeks, she could also just come in for a nurse only visit if needed.     If she does not want to come in, then follow up 1 year.  Patient has additional questions:  No  If YES, what are your questions/concerns:   No  Okay to leave a detailed message?: No call back needed

## 2021-05-28 NOTE — TELEPHONE ENCOUNTER
Called pt to let her know that her Omeprazole was refilled on 3/11/2019.  Pt says that she was never called by the pharmacy so I let her know that I will call to make sure it's ready for her.  Pt verbalized understanding.    Johnna Lobato RN, N  [facility]  P [phone]  F [phone]

## 2021-05-28 NOTE — PROGRESS NOTES
BP Readings from Last 20 Encounters:   05/07/19 144/90   12/04/18 (!) 148/100   11/15/18 134/80   11/12/18 136/80   09/25/18 102/88   05/29/18 138/82   04/10/18 156/80   02/16/18 172/87   02/06/18 138/86   09/15/16 (!) 168/105   08/17/16 178/78   07/19/16 122/78   10/19/15 136/80   09/19/15 126/66   09/16/15 (!) 150/98   08/09/15 152/70

## 2021-05-28 NOTE — TELEPHONE ENCOUNTER
Please call patient -    ______________________________________________________________________     Home phone:  [phone] (home)     Cell phone:   Telephone Information:   Mobile [phone]       Other contacts:  Name Home Phone Work Phone Mobile Phone Relationship Lgl GUERA Pablo [phone]   Friend      ______________________________________________________________________     She is immune to measles.    Your thyroid tests are excellent.     Your kidney tests are normal.  Your electrolytes are also normal.  There is no signs of diabetes.     I would recommend adding in triamterene/hydrochlorothiazide 1 pill a day for her blood pressure.  The goal is to have this below 140/90.  This will gradually work in over 1 month.    I will send a prescription for this to   [id number] - [place], [address] AT [facility] & [address]  [address] [place] [address]  Phone: [phone] Fax: [phone]      If she wants to come in for follow up on this you may schedule in 4-8 weeks, she could also just come in for a nurse only visit if needed.    If she does not want to come in, then follow up 1 year.    Ricardo Davila MD  [facility]  5/8/2019, 6:52 PM     ______________________________________________________________________    Recent Results (from the past 240 hour(s))   Rubeola Antibody, IgG   Result Value Ref Range    Rubeola Antibody, IgG Positive    Thyroid Stimulating Hormone (TSH)   Result Value Ref Range    TSH 4.13 0.30 - 5.00 uIU/mL   Basic Metabolic Panel   Result Value Ref Range    Sodium 140 136 - 145 mmol/L    Potassium 4.4 3.5 - 5.0 mmol/L    Chloride 104 98 - 107 mmol/L    CO2 25 22 - 31 mmol/L    Anion Gap, Calculation 11 5 - 18 mmol/L    Glucose 93 70 - 125 mg/dL    Calcium 9.8 8.5 - 10.5 mg/dL    BUN 10 8 - 22 mg/dL    Creatinine 0.80 0.60 - 1.10 mg/dL    GFR MDRD Af Amer >60 >60 mL/min/1.73m2    GFR MDRD Non Af Amer  >60 >60 mL/min/1.73m2       ______________________________________________________________________

## 2021-05-29 ENCOUNTER — RECORDS - HEALTHEAST (OUTPATIENT)
Dept: ADMINISTRATIVE | Facility: CLINIC | Age: 61
End: 2021-05-29

## 2021-05-29 NOTE — PATIENT INSTRUCTIONS - HE
[facility]  Nutritional Guidelines  Lap Band 18 Months Post Op and Beyond    General Guidelines and Helpful Hints:    Eat 3 meals per day + protein supplement(s). No snacks between meals.  o Do not skip meals.  This can cause overeating at the next meal and will prevent adequate protein and nutritional intake.    Aim for 60-80 grams of protein per day.  o Always eat your protein first. This assists with optimal nutrition and helps you stay full longer.  o Depending on your portion size, you may need to drink approved protein supplement between meals to achieve protein goals. Follow recommendations of your Dietitian.     Eat your protein first, and then follow with fiber.   o It is not necessary to count your fiber, but 15-20 grams per day is recommended.    o Add fiber by including fruits, vegetables, whole grains, and beans.     Portions should remain about 1 cup per meal. Use measuring cups to be accurate.    Continue to use saucer/salad plates, infant/toddler silverware to keep portion sizes small and take small bites.    Eat S-L-O-W-L-Y to make each meal last 20-30 minutes. Always stop eating when satisfied.    Continue to use caution with foods containing skins, peels or membranes. Chew well!    Aim for 64 oz. of calorie-free fluids daily.  o Continue to avoid caffeine and carbonation. If you choose to drink alcohol, do so in moderation.   o Remember to avoid drinking during meals, 15-30 minutes before and 30 minutes after.    Exercise is concepcion for continued weight loss and weight maintenance. Aim for 30-60 minutes of physical activity most days of the week. Include cardiovascular and strength training.    If having trouble tolerating meat, try using a crock-pot, tinfoil tent, steamer or other moist cooking method to create tender meats. Add broth or low-fat gravy to help meat stay moist.     Avoid high sugar and high fat foods to prevent high calorie intakes and a reduced rate of weight loss, or  weight regain.  o Check nutrition labels for less than 10 grams of sugar and less than 10 grams of fat per serving.    Continue Taking Vitamins/Minerals:  o 1483-1592 mcg of Sublingual B-12 daily  o 5000 IU Vitamin D3 daily    Sample Grocery List    Protein:    Fat free Greek or light yogurt (less than 10 grams sugar)    Fat free or low-fat cottage cheese    String cheese or reduced fat cheese slices    Tuna, salmon, crab, egg, or chicken salad made with light or fat free mayonnaise    Egg or Egg Substitute    Lean/extra lean turkey, beef, bison, venison (ground, sirloin, round, flank)    Pork loin or tenderloin (grilled, baked, broiled)    Fish such as salmon, tuna, trout, tilapia, etc. (grilled, baked, broiled)    Tender cuts of lean (skinless) turkey or chicken    Lean deli meats: turkey, lean ham, chicken, lean roast beef    Beans such as kidney, garbanzo, black, cooley, or low-fat/fat free refried beans    Peanut butter (natural preferred). Limit to 1 Tbsp. per day.    Low-fat meatloaf (made with lean ground beef or turkey)    Sloppy Joes made with low-sugar ketchup and lean ground beef or turkey    Soy or vegetable protein (i.e. vegan crumbles, soy/veggie burger, tofu)    Hummus    Vegetables:    Fresh: cooked or raw (as tolerated)    Frozen vegetables    Canned vegetables (low sodium or no salt added, rinse before cooking/eating)    (Ok to have skins/peels/membranes/seeds - just chew well)    Fruits:    Fresh fruit    Frozen fruit (no sugar added)    Canned fruit (packed in its own juice, NOT syrup)    (Ok to have skins/peels/membranes/seeds - just chew well)    Starch:    Unsweetened whole-grain hot cereal (or high fiber cold cereal, dry)    Toasted whole wheat bread or Coin Thins    Whole grain crackers    Baked /boiled/mashed potato/sweet potato    Cooked whole grain pasta, brown rice, or other cooked whole grains    Starchy vegetables: corn, peas, winter squash    Protein Supplement:     Ready to drink  protein shake with:  o 15-30 grams protein per serving  o Less than 10 grams total carbohydrate per serving     Protein powder mixed with:  o  Skim or 1% milk  o Low fat or fat free Lactaid milk, plain or no sugar added soymilk  o Water     Fats: (use in moderation)    1 teaspoon of soft tub margarine    1 teaspoon olive oil, canola oil, or peanut oil    1 tablespoon of low-fat mao or salad dressing     Sample Menu for 18+ months after Lap Band    You do NOT need to eat/drink the full portion sizes listed below  Always stop when you are satisfied    Breakfast   cup 1% cottage cheese     cup mixed berries   Lunch 2 oz lean roast beef on   [place] Thin with 1 tsp. light mao    small tomato, chopped, mixed with 1 tsp. light vinaigrette dressing   Supplement Approved protein supplement (if needed between meals)   Dinner 2 oz grilled salmon    cup salad greens with 1 tsp. light salad dressing and 1 tsp. ground flax seed    cup quinoa or brown rice     Breakfast   cup egg substitute with   cup sautéed chopped vegetables  2 light [place] Krisp crackers   Lunch Tuna Melt:   cup tuna mixed with 1 tsp. light mao over   [place] Thin. Top with 2-3 slices cucumber and 1 oz slice of low fat cheese   Supplement 1 cup skim milk (if needed between meals)   Dinner 3 oz  grilled, broiled, or baked seasoned skinless chicken breast    cup asparagus     Breakfast   cup plain oatmeal made with skim or 1% milk with 1 Tbsp. flavored/unflavored protein powder added  1 mozzarella string cheese   Lunch 2 oz deli turkey breast  1/3 cup salad with 1 tsp. light salad dressing, 1/8 of a whole avocado and 1 Tbsp. sunflower seeds   Dinner 3 oz. pork loin made in a crock pot, seasoned with a spice rub    cup cooked carrots   Supplement Approved protein supplement (if needed between meals)     Breakfast 1 cup breakfast casserole made with egg substitute, turkey sausage,  and steamed, chopped bell peppers   Supplement  1 cup light Greek yogurt (if  needed between meals)   Lunch 2 oz. teriyaki turkey    cup mashed sweet potato with 1-2 spritzes of spray butter (like [place])    cup fresh pineapple   Dinner 3 oz low fat meatloaf    cup roasted garlic zucchini     Breakfast   cup leftover breakfast casserole    cup no sugar added applesauce with 1 Tbsp. unflavored protein powder and a sprinkle of cinnamon    Lunch 3 oz shrimp with 1-2 Tbsp. low-sugar cocktail sauce for dipping    c. whole wheat pasta drizzled with   tsp. olive oil   Supplement 1 cup skim/1% milk with scoop of protein powder (if needed between meals)   Dinner Grilled, seasoned kebob with 2 oz lean beef and   cup vegetables     Breakfast Breakfast pizza:   [place] Thin spread with 1 Tbsp. low sugar spaghetti sauce,   cup shredded low fat cheese, melted and 1 slice of [place] walter     cup fresh fruit mixed with chopped almonds   Lunch   cup black bean soup  4-5 whole grain crackers   Dinner 3 oz  tilapia with lemon pepper seasoning    cup stewed tomatoes   Supplement 1 string cheese (if needed between meals)     Breakfast 2 hard boiled eggs (discard 1 egg yolk)    whole wheat English Muffin with 1 tsp. low sugar jelly   Lunch   cup leftover black bean soup topped with 1-2 Tbsp. low fat cheese  2-3 light [place] Krisp crackers   Supplement Approved protein supplement (if needed between meals)   Dinner 3 oz sirloin steak    cup steamed broccoli     Call [phone] to schedule your bone density scan.  Let us know if you are ready for tobacco cessation

## 2021-05-29 NOTE — PROGRESS NOTES
Bariatric Follow Up Visit with a History of Previous Bariatric Surgery     Date of visit: 6/13/2019  Physician: Julissa Espinal MD  Primary Care Provider:  Ricardo Davila MD  Marlen BURNS Piter   59 y.o.  female    Date of Surgery: 10/1/2008  Initial Weight: 295#  Initial BMI: 50.4  Today's Weight:   Wt Readings from Last 1 Encounters:   06/13/19 171 lb (77.6 kg)     Body mass index is 29.58 kg/m .      Assessment and Plan     Assessment: Marlen is a 59 y.o. year old female who is almost 11 yrs s/p  Lap Band with Dr. Rowell  Marlen KATY Dumas feels as if she has achieved the goals she hoped to accomplish through bariatric surgery and weight loss.  She has regained 9# over 15 months    Encounter Diagnoses   Name Primary?     Tobacco abuse Yes     Hx of laparoscopic adjustable gastric banding          Current Outpatient Medications:      amLODIPine (NORVASC) 5 MG tablet, Take 1 tablet (5 mg total) by mouth daily., Disp: 90 tablet, Rfl: 3     cetirizine (ZYRTEC) 10 MG tablet, Take 1 tablet (10 mg total) by mouth daily., Disp: 30 tablet, Rfl: 0     fluticasone (FLONASE ALLERGY RELIEF) 50 mcg/actuation nasal spray, 1 spray into each nostril daily., Disp: 16 g, Rfl: 0     omeprazole (PRILOSEC) 40 MG capsule, TAKE 1 CAPSULE(40 MG) BY MOUTH DAILY BEFORE BREAKFAST, Disp: 90 capsule, Rfl: PRN     triamterene-hydrochlorothiazide (DYAZIDE) 37.5-25 mg per capsule, Take 1 capsule by mouth daily., Disp: 90 capsule, Rfl: 3     EPINEPHrine (EPIPEN/ADRENACLICK/AUVI-Q) 0.3 mg/0.3 mL injection, Inject 0.3 mL (0.3 mg total) into the shoulder, thigh, or buttocks once for 1 dose., Disp: 2 Pre-filled Pen Syringe, Rfl: 2    Current Facility-Administered Medications:      lidocaine 2 % jelly (XYLOCAINE), , Topical, PRN, Lilliam, Marlen Mathew, CNP, 1 application at 12/04/18 1344    Plan:    No follow-ups on file.    Bariatric Surgery Review     Interim History/LifeChanges: Retired. Pre-contemplative on tobacco cessation. Keeping busy. Went  to [place] for 1.5 months this winter. Golfing. She has been taking B-12 and vitamin D.    Patient Concerns: follow up Would like a fill but out of marrufo needles so is OK to wait.   Appetite (1-10): OK  GERD: not as long as she takes her PPI    Medication changes: no    Vitamin Intake:   B-12   SL   MVI  none   Vitamin D  on and of 5,000 and [place] in the winter, gardening   Calcium   no     Other  no              LABS: ordered  DEXA ordered  Nausea no  Vomiting no  Constipation no  Diarrhea no  Rashes no  Hair Loss no  Calf tenderness no  Breathing difficulty no  Reactive Hypoglycemia no  Light Headedness no   Moods OK    12 point ROS as above and otherwise negative      Habits:  Alcohol: 7-14  Tobacco: 1ppd  Caffeine 2-3c/d  NSAIDS no  Exercise Routine: gardening, busy, moving, golfing a lot  3 meals/day yes  Protein first yes  60+grams/day  Water Separate from meals typically  Calorie Containing Beverages diet pepsi  Restaurant eating/wk 1-2 sit down  Sleeping ok-9-3-4  Stress low  CPAP: NA  Contraception: PM    Social History     Social History     Socioeconomic History     Marital status: Single     Spouse name: Not on file     Number of children: 0     Years of education: Not on file     Highest education level: Not on file   Occupational History     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     Employer: [employer]   Social Needs     Financial resource strain: Not on file     Food insecurity:     Worry: Not on file     Inability: Not on file     Transportation needs:     Medical: Not on file     Non-medical: Not on file   Tobacco Use     Smoking status: Current Every Day Smoker     Packs/day: 1.00     Years: 30.00     Pack years: 30.00     Types: Cigarettes     Smokeless tobacco: Never Used     Tobacco comment: age 14 to present -6 yrs    Substance and Sexual Activity     Alcohol use: Yes     Alcohol/week: 4.2 oz     Types: 7 Glasses of wine per week     Drug use: No     Sexual activity: Never     Birth control/protection:  Abstinence, Post-menopausal   Lifestyle     Physical activity:     Days per week: Not on file     Minutes per session: Not on file     Stress: Not on file   Relationships     Social connections:     Talks on phone: Not on file     Gets together: Not on file     Attends [employer]ism service: Not on file     Active member of club or organization: Not on file     Attends meetings of clubs or organizations: Not on file     Relationship status: Not on file     Intimate partner violence:     Fear of current or ex partner: Not on file     Emotionally abused: Not on file     Physically abused: Not on file     Forced sexual activity: Not on file   Other Topics Concern     Not on file   Social History Narrative    Lives with a friend.12/4/2018       Past Medical History     Past Medical History:   Diagnosis Date     COPD (chronic obstructive pulmonary disease) (H)      HLD (hyperlipidemia)      HTN (hypertension)      Hx of laparoscopic adjustable gastric banding 10/01/2008    Initial weight 295.5 BMI 50.4 Dr. Rowell     Hymenoptera allergy      Hypothyroidism      Normal colonoscopy - 2018 - Average risk 5/5/2019     Tobacco abuse      Vitamin D deficiency 8/24/2016     Problem List     Patient Active Problem List   Diagnosis     Tobacco abuse     COPD (chronic obstructive pulmonary disease) (H)     HTN (hypertension)     HLD (hyperlipidemia)     Hx of laparoscopic adjustable gastric banding     Obesity (BMI 30.0-34.9)     Zinc deficiency     Vitamin D deficiency     Normal colonoscopy - 2018 - Average risk     Hymenoptera allergy     Medications     Current Outpatient Medications   Medication Sig     amLODIPine (NORVASC) 5 MG tablet Take 1 tablet (5 mg total) by mouth daily.     cetirizine (ZYRTEC) 10 MG tablet Take 1 tablet (10 mg total) by mouth daily.     fluticasone (FLONASE ALLERGY RELIEF) 50 mcg/actuation nasal spray 1 spray into each nostril daily.     omeprazole (PRILOSEC) 40 MG capsule TAKE 1 CAPSULE(40 MG) BY MOUTH  "DAILY BEFORE BREAKFAST     triamterene-hydrochlorothiazide (DYAZIDE) 37.5-25 mg per capsule Take 1 capsule by mouth daily.     EPINEPHrine (EPIPEN/ADRENACLICK/AUVI-Q) 0.3 mg/0.3 mL injection Inject 0.3 mL (0.3 mg total) into the shoulder, thigh, or buttocks once for 1 dose.     Surgical History     Past Surgical History  She has a past surgical history that includes Laparoscopic gastric banding (2008); Lumbar disc surgery; and Foot surgery.    Objective-Exam     Constitutional:  /79   Pulse 69   Resp 18   Ht 5' 3.75\" (1.619 m)   Wt 171 lb (77.6 kg)   SpO2 96%   BMI 29.58 kg/m    Height: 5' 3.75\" (1.619 m) (6/13/2019  8:50 AM)  Weight: 171 lb (77.6 kg) (6/13/2019  8:50 AM)  BMI (Calculated): 29.6 (6/13/2019  8:50 AM)  SpO2: 96 % (6/13/2019  8:50 AM)    General:  Pleasant and in no acute distress   Eyes:  EOMI  ENT:  Airway 2+  Moist mucous membranes  Neck:  Supple, No LAD, No thyromegaly, No carotid bruits appreciated  Respiratory: Normal respiratory effort, no cough, wheezes or crackles  CV:  Regular rate and Rhythm,no murmurs, pulses 2+, no calf tenderness, no LE edema  Gastrointestinal: Abdomen NT/ND, BS+  Musculoskeletal: muscle mass WNL  Skin: color tan hair full, incisions nicely healed  Neurological: No tremor, normal gait  Psychiatric: alert and oriented X3, mood and affect normal    Counseling     We reviewed the important post op bariatric recommendations:  -eating 3 meals daily  -eating protein first, getting >60gm protein daily  -eating slowly, chewing food well  -avoiding/limiting calorie containing beverages  -drinking water 15-30 minutes before or after meals  -choosing wheat, not white with breads, crackers, pastas, dandre, bagels, tortillas, rice  -limiting restaurant or cafeteria eating to twice a week or less    We discussed the importance of restorative sleep and stress management in maintaining a healthy weight.  We discussed the National Weight Control Registry healthy weight " maintenance strategies and ways to optimize metabolism.  We discussed the importance of physical activity including cardiovascular and strength training in maintaining a healthier weight.    We discussed the importance of life-long vitamin supplementation and life-long  follow-up.    Marlen was reminded that, to avoid marginal ulcers she should avoid tobacco at all, alcohol in excess, caffeine in excess, and NSAIDS (unless indicated for cardioprotection or othewise and opposed by a PPI).    Julissa Espinal MD, [facility].  6/13/2019  9:18 AM      No images are attached to the encounter.   30 minutes spent with patient. >50% in counseling and coordination of care.

## 2021-05-30 NOTE — PROGRESS NOTES
Lap Band Follow Up  [facility]      Adjustment  Adjustment done by Johnna Lobato RN under direct supervision of Julissa Espinal M.D..  Sterile technique used for the procedure.  Patient was able to drink a full glass of water following the procedure without any issues.  1% Lidocaine, 1 ml, Exp: 11/21, Lot#: [id number] was used to numb the site prior to the procedure.      Johnna Lobato RN, CBN  [facility]  P [phone]  F [phone]

## 2021-05-30 NOTE — PROGRESS NOTES
Lap Band Fill      Date of visit: 7/18/2019  Physician: Julissa Espinal MD  Primary Care Provider:  Ricardo Davila MD  Marlen Dumas   59 y.o.  female    Date of Surgery: 11/1/2008  Initial Weight: 295#  Initial BMI: 50.4  Today's Weight:   Wt Readings from Last 1 Encounters:   07/18/19 167 lb (75.8 kg)     Body mass index is 28.89 kg/m .      Assessment and Plan     Assessment: Marlen is a 59 y.o. year old female who is 11 yrs s/p  Lap Band with Dr. Rowell  Marlen Dumas feels as if she has achieved the goals she hoped to accomplish through bariatric surgery and weight loss.  She feels well at 150#. Her weight has gone up after correction. She presented in June and no Dang needles were available.  She presents today for a fill. Last adjustment 2/2018 .5ml saline was removed d/t overfill. She takes omeprazole for alcohol, tobacco, and caffeine use.  Her June labs look good. LDL has gone up but HDL is remarkably high at 95. She has scheduled her DEXA    Encounter Diagnoses   Name Primary?     Hyperphagia Yes     Tobacco use      Hx of laparoscopic adjustable gastric banding          Current Outpatient Medications:      amLODIPine (NORVASC) 5 MG tablet, Take 1 tablet (5 mg total) by mouth daily., Disp: 90 tablet, Rfl: 3     cetirizine (ZYRTEC) 10 MG tablet, Take 1 tablet (10 mg total) by mouth daily., Disp: 30 tablet, Rfl: 0     fluticasone (FLONASE ALLERGY RELIEF) 50 mcg/actuation nasal spray, 1 spray into each nostril daily., Disp: 16 g, Rfl: 0     omeprazole (PRILOSEC) 40 MG capsule, TAKE 1 CAPSULE(40 MG) BY MOUTH DAILY BEFORE BREAKFAST, Disp: 90 capsule, Rfl: PRN     triamterene-hydrochlorothiazide (DYAZIDE) 37.5-25 mg per capsule, Take 1 capsule by mouth daily., Disp: 90 capsule, Rfl: 3     EPINEPHrine (EPIPEN/ADRENACLICK/AUVI-Q) 0.3 mg/0.3 mL injection, Inject 0.3 mL (0.3 mg total) into the shoulder, thigh, or buttocks once for 1 dose., Disp: 2 Pre-filled Pen Syringe, Rfl: 2    Current  "Facility-Administered Medications:      lidocaine 2 % jelly (XYLOCAINE), , Topical, PRN, O'Cristhian, Marlen Mathew, CNP, 1 application at 12/04/18 1344    Plan: PROCEDURE: Lap Band Adjustment  Physician: SAMANTHA Espinal MD  RN: Johnna Lobato RN     The patient's abdomen was prepped and draped in the usual sterile fashion. Her lap band port was accessed using a Dang needle and 0.25ml saline was added leaving a virtual 3.75ml saline in the lap band system. Marlen tolerated the procedure well and was advised to follow a full liquid diet for 48 hours following today's adjustment. Encouraged continued prilosec 40mg daily for alcohol, tobacco and caffeine use. Tobacco cessation encouraged.   Encouraged considering limiting to 7 alcoholic beverages weekly.  Return in about 1 month (around 3/16/2018).for lap band adjustment if needed, sooner if problems or concerns. Dietitian visit offered.    Add .25ml saline to lap band. Full liquids for 2 days following fill    Return in about 1 year (around 7/18/2020).    Bariatric Surgery Review     Interim History/LifeChanges: maintaining 130# weight loss    Patient Concerns: has gained some weight. Likes to be 150#  Appetite (1-10): up a little  GERD: no night time symptoms, no GERD. Chronic omeprazole use to oppose insults.    Medication changes: none    Vitamin Intake:   B-12   SL   MVI  none   Vitamin D  5,000   Calcium   dietary     Other                LABS: reviewed  LDL up but HDL excellent    Nausea no  Vomiting no  Constipation no  Diarrhea no  Rashes no  Hair Loss no  Calf tenderness no  Breathing difficulty no  Reactive Hypoglycemia no  Light Headedness no   Moods OK    12 point ROS as above and otherwise negative      Habits:  Alcohol: 7-14  Tobacco: 3/4 ppd  Caffeine 4c/d black  NSAIDS no  Exercise Routine: \"I just do things.\" Stocking shelves now at [employer]  3 meals/day B: ham and egg or yogurt and granola, L: grabs it on the run-hot dog yesterday D: pizza   Protein first " sometimes  ?grams/day  Water Separate from meals yes  Calorie Containing Beverages no  Restaurant eating/wk 0-1  Sleeping OK  Stress low  CPAP: NA  Contraception: PM    Social History     Social History     Socioeconomic History     Marital status: Single     Spouse name: Not on file     Number of children: 0     Years of education: Not on file     Highest education level: Not on file   Occupational History     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     Employer: [employer]   Social Needs     Financial resource strain: Not on file     Food insecurity:     Worry: Not on file     Inability: Not on file     Transportation needs:     Medical: Not on file     Non-medical: Not on file   Tobacco Use     Smoking status: Current Every Day Smoker     Packs/day: 1.00     Years: 30.00     Pack years: 30.00     Types: Cigarettes     Smokeless tobacco: Never Used     Tobacco comment: age 14 to present -6 yrs    Substance and Sexual Activity     Alcohol use: Yes     Alcohol/week: 4.2 oz     Types: 7 Glasses of wine per week     Drug use: No     Sexual activity: Never     Birth control/protection: Abstinence, Post-menopausal   Lifestyle     Physical activity:     Days per week: Not on file     Minutes per session: Not on file     Stress: Not on file   Relationships     Social connections:     Talks on phone: Not on file     Gets together: Not on file     Attends [employer] service: Not on file     Active member of club or organization: Not on file     Attends meetings of clubs or organizations: Not on file     Relationship status: Not on file     Intimate partner violence:     Fear of current or ex partner: Not on file     Emotionally abused: Not on file     Physically abused: Not on file     Forced sexual activity: Not on file   Other Topics Concern     Not on file   Social History Narrative    Lives with a friend.12/4/2018       Past Medical History     Past Medical History:   Diagnosis Date     COPD (chronic obstructive pulmonary  "disease) (H)      HLD (hyperlipidemia)      HTN (hypertension)      Hx of laparoscopic adjustable gastric banding 10/01/2008    Initial weight 295.5 BMI 50.4 Dr. Rowell     Hymenoptera allergy      Hypothyroidism      Normal colonoscopy - 2018 - Average risk 5/5/2019     Tobacco abuse      Vitamin D deficiency 8/24/2016     Problem List     Patient Active Problem List   Diagnosis     Tobacco abuse     COPD (chronic obstructive pulmonary disease) (H)     HTN (hypertension)     HLD (hyperlipidemia)     Hx of laparoscopic adjustable gastric banding     Obesity (BMI 30.0-34.9)     Zinc deficiency     Vitamin D deficiency     Normal colonoscopy - 2018 - Average risk     Hymenoptera allergy     Medications     Current Outpatient Medications   Medication Sig     amLODIPine (NORVASC) 5 MG tablet Take 1 tablet (5 mg total) by mouth daily.     cetirizine (ZYRTEC) 10 MG tablet Take 1 tablet (10 mg total) by mouth daily.     fluticasone (FLONASE ALLERGY RELIEF) 50 mcg/actuation nasal spray 1 spray into each nostril daily.     omeprazole (PRILOSEC) 40 MG capsule TAKE 1 CAPSULE(40 MG) BY MOUTH DAILY BEFORE BREAKFAST     triamterene-hydrochlorothiazide (DYAZIDE) 37.5-25 mg per capsule Take 1 capsule by mouth daily.     EPINEPHrine (EPIPEN/ADRENACLICK/AUVI-Q) 0.3 mg/0.3 mL injection Inject 0.3 mL (0.3 mg total) into the shoulder, thigh, or buttocks once for 1 dose.     Surgical History     Past Surgical History  She has a past surgical history that includes Laparoscopic gastric banding (2008); Lumbar disc surgery; and Foot surgery.    Objective-Exam     Constitutional:  /86 (Patient Site: Right Arm, Patient Position: Sitting, Cuff Size: Adult Large)   Pulse 87   Ht 5' 3.75\" (1.619 m)   Wt 167 lb (75.8 kg)   SpO2 97%   Breastfeeding? No   BMI 28.89 kg/m    Height: 5' 3.75\" (1.619 m) (7/18/2019  9:35 AM)  Weight: 167 lb (75.8 kg) (7/18/2019  9:35 AM)  BMI (Calculated): 28.9 (7/18/2019  9:35 AM)  SpO2: 97 % (7/18/2019  " 9:35 AM)    General:  Pleasant and in no acute distress   Eyes:  EOMI  ENT:  Airway 1+  Moist mucous membranes  Neck:  Supple, No LAD, No thyromegaly, No carotid bruits appreciated  Respiratory: Normal respiratory effort, no cough, wheezes or crackles, bronchitic breath sounds with forced expiration  CV:  Regular rate and Rhythm,no murmurs, pulses 2+, no calf tenderness, no LE edema  Gastrointestinal: Abdomen NT/ND, BS+. Lap band port palpable in LMQ  Musculoskeletal: muscle mass WNL  Skin: color tan hair full, incisions nicely healed  Neurological: No tremor, normal gait  Psychiatric: alert and oriented X3, mood and affect normal    Counseling     We reviewed the important post op bariatric recommendations:  -eating 3 meals daily  -eating protein first, getting >60gm protein daily  -eating slowly, chewing food well  -avoiding/limiting calorie containing beverages  -drinking water 15-30 minutes before or after meals  -choosing wheat, not white with breads, crackers, pastas, dandre, bagels, tortillas, rice  -limiting restaurant or cafeteria eating to twice a week or less    We discussed the importance of restorative sleep and stress management in maintaining a healthy weight.  We discussed the National Weight Control Registry healthy weight maintenance strategies and ways to optimize metabolism.  We discussed the importance of physical activity including cardiovascular and strength training in maintaining a healthier weight.    We discussed the importance of life-long vitamin supplementation and life-long  follow-up.    Marlen was reminded that, to avoid marginal ulcers she should avoid tobacco at all, alcohol in excess, caffeine in excess, and NSAIDS (unless indicated for cardioprotection or othewise and opposed by a PPI).    Julissa Espinal MD, FAAFP  [facility].  7/18/2019  9:52 AM      No images are attached to the encounter.   30 minutes spent with patient. >50% in counseling and  coordination of care.

## 2021-05-31 ENCOUNTER — RECORDS - HEALTHEAST (OUTPATIENT)
Dept: ADMINISTRATIVE | Facility: CLINIC | Age: 61
End: 2021-05-31

## 2021-05-31 VITALS — HEIGHT: 64 IN | WEIGHT: 162 LBS | BODY MASS INDEX: 27.66 KG/M2

## 2021-06-01 ENCOUNTER — RECORDS - HEALTHEAST (OUTPATIENT)
Dept: ADMINISTRATIVE | Facility: CLINIC | Age: 61
End: 2021-06-01

## 2021-06-01 VITALS — WEIGHT: 164.8 LBS | BODY MASS INDEX: 28.51 KG/M2

## 2021-06-01 VITALS — BODY MASS INDEX: 28.89 KG/M2 | WEIGHT: 167 LBS

## 2021-06-02 VITALS — BODY MASS INDEX: 28.27 KG/M2 | WEIGHT: 163.4 LBS

## 2021-06-02 VITALS — BODY MASS INDEX: 27.31 KG/M2 | WEIGHT: 160 LBS | HEIGHT: 64 IN

## 2021-06-02 VITALS — HEIGHT: 64 IN | WEIGHT: 175 LBS | BODY MASS INDEX: 29.88 KG/M2

## 2021-06-02 VITALS — BODY MASS INDEX: 28.2 KG/M2 | WEIGHT: 163 LBS

## 2021-06-03 VITALS — WEIGHT: 167 LBS | HEIGHT: 64 IN | BODY MASS INDEX: 28.51 KG/M2

## 2021-06-03 VITALS — HEIGHT: 64 IN | BODY MASS INDEX: 29.19 KG/M2 | WEIGHT: 171 LBS

## 2021-06-03 NOTE — TELEPHONE ENCOUNTER
Orders placed for LDL level to be drawn next month and pt called to remind her to have it done.  Pt verbalized understanding.    Johnna Lobato RN, CBN  [facility]  P [phone]  F [phone]

## 2021-06-03 NOTE — TELEPHONE ENCOUNTER
----- Message from Johnna Lobato RN sent at 6/20/2019  9:53 AM CDT -----  Regarding: Recheck LDL  Recheck in 6 mos per

## 2021-06-04 VITALS
DIASTOLIC BLOOD PRESSURE: 88 MMHG | BODY MASS INDEX: 25.02 KG/M2 | RESPIRATION RATE: 14 BRPM | OXYGEN SATURATION: 93 % | SYSTOLIC BLOOD PRESSURE: 161 MMHG | HEART RATE: 76 BPM | WEIGHT: 144.6 LBS | TEMPERATURE: 98.3 F

## 2021-06-04 VITALS — HEIGHT: 65 IN | WEIGHT: 155 LBS | BODY MASS INDEX: 25.83 KG/M2

## 2021-06-04 VITALS
DIASTOLIC BLOOD PRESSURE: 76 MMHG | BODY MASS INDEX: 25.08 KG/M2 | WEIGHT: 145 LBS | HEART RATE: 74 BPM | OXYGEN SATURATION: 94 % | SYSTOLIC BLOOD PRESSURE: 138 MMHG

## 2021-06-05 NOTE — TELEPHONE ENCOUNTER
Called pt to reassure her that we will have the needle needed to do her adjustment.  Pt verbalized understanding.    Johnna Lobato RN, CBN  [facility]  P [phone]  F [phone]

## 2021-06-05 NOTE — TELEPHONE ENCOUNTER
Pt wants to make sure there are needles for her appointment. She claims the last couple of times they weren't able to adjust lap band.    [phone]  **ok to leave detailed message

## 2021-06-05 NOTE — PROGRESS NOTES
HPI:  Primary Care Provider:  Ricardo Davila MD  Marlen Dumas   59 y.o.  female     Date of Surgery: 11/1/2008  Initial Weight: 295#  Initial BMI: 50.4  Today's Weight:       Wt Readings from Last 1 Encounters:   07/18/19 145 lb (75.8 kg)      Body mass index is 25.08 kg/m .        Assessment and Plan      Assessment: Marlen is a 59 y.o. year old female who is 11 yrs s/p  Lap Band with Dr. Rowell  Marlen Dumas feels as if she has achieved the goals she hoped to accomplish through bariatric surgery and weight loss.  She feels well at 150#. Her weight has gone up after snf. She presented in June and no Dang needles were available.  She presents today for a fill. Last adjustment 2/2018 .5ml saline was removed d/t overfill. She takes omeprazole for alcohol, tobacco, and caffeine use.      she has been doing well and  she denies any issues with vomiting or dysphagia. She has a chronic cough but she blames that on her smoking.   Is feeling too small and would like some fluid removed from her band to help her gain about 10 lbs.  .     CURRENT MEDS:      amLODIPine (NORVASC) 5 MG tablet, Take 1 tablet (5 mg total) by mouth daily., Disp: 90 tablet, Rfl: 3     cetirizine (ZYRTEC) 10 MG tablet, Take 1 tablet (10 mg total) by mouth daily., Disp: 30 tablet, Rfl: 0     fluticasone (FLONASE ALLERGY RELIEF) 50 mcg/actuation nasal spray, 1 spray into each nostril daily., Disp: 16 g, Rfl: 0     omeprazole (PRILOSEC) 40 MG capsule, TAKE 1 CAPSULE(40 MG) BY MOUTH DAILY BEFORE BREAKFAST, Disp: 90 capsule, Rfl: PRN     triamterene-hydrochlorothiazide (DYAZIDE) 37.5-25 mg per capsule, Take 1 capsule by mouth daily., Disp: 90 capsule, Rfl: 3    /76 (Patient Site: Right Arm, Patient Position: Sitting, Cuff Size: Adult Regular)   Pulse 74   Wt 145 lb (65.8 kg)   SpO2 94%   BMI 25.08 kg/m    Wt Readings from Last 3 Encounters:   02/05/20 145 lb (65.8 kg)   07/18/19 167 lb (75.8 kg)   06/13/19 171 lb (77.6 kg)      Body mass index is 25.08 kg/m .    EXAM:  GENERAL: Appears well  ABDOMEN: Nontender.  Port site OK.    Assessment/Plan: Pt with a lap adjustable band. After discussion with the her it was elected to do an adjustment. The area over the port was prepped then injected with lido with epi.  Then 0.3 cc of saline was subtracted for a new total of 3.45 cc in the band.     She is to follow up as needed        Eriberto Waller MD  [facility] Department of Surgery      Called pt to reassure her that we will have the needle needed to do her adjustment.  Pt verbalized understanding.    Eriberto Waller  [facility]  [phone]

## 2021-06-05 NOTE — PROGRESS NOTES
Chief Complaint   Patient presents with     Cough     chest congestion, coughing up yellow mucus, fatigued x few weeks          HPI    Patient is here for 3 wks of cough productive with yellow sputum. The last few days she had some shortness of breath more than usual. No fever, chills, chest pain, nasal discharge.    ROS: Pertinent ROS noted in HPI.     Allergies   Allergen Reactions     Other Environmental Allergy      Bee sting     Sulfa (Sulfonamide Antibiotics)        Patient Active Problem List   Diagnosis     Tobacco abuse     COPD (chronic obstructive pulmonary disease) (H)     HTN (hypertension)     HLD (hyperlipidemia)     Hx of laparoscopic adjustable gastric banding     Obesity (BMI 30.0-34.9)     Zinc deficiency     Vitamin D deficiency     Normal colonoscopy - 2018 - Average risk     Hymenoptera allergy       Family History   Problem Relation Age of Onset     Heart attack Mother 58     Hypertension Mother      COPD Father      Hypertension Father      Anxiety disorder Father      Hypertension Brother        Social History     Socioeconomic History     Marital status: Single     Spouse name: Not on file     Number of children: 0     Years of education: Not on file     Highest education level: Not on file   Occupational History     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     Employer: [employer]   Social Needs     Financial resource strain: Not on file     Food insecurity:     Worry: Not on file     Inability: Not on file     Transportation needs:     Medical: Not on file     Non-medical: Not on file   Tobacco Use     Smoking status: Current Every Day Smoker     Packs/day: 0.75     Years: 37.00     Pack years: 27.75     Types: Cigarettes     Smokeless tobacco: Never Used     Tobacco comment: age 14 to present -6 yrs    Substance and Sexual Activity     Alcohol use: Yes     Alcohol/week: 7.0 - 14.0 standard drinks     Types: 7 - 14 Glasses of wine per week     Drug use: No     Sexual activity: Never     Birth  control/protection: Abstinence, Post-menopausal   Lifestyle     Physical activity:     Days per week: Not on file     Minutes per session: Not on file     Stress: Not on file   Relationships     Social connections:     Talks on phone: Not on file     Gets together: Not on file     Attends Christianity service: Not on file     Active member of club or organization: Not on file     Attends meetings of clubs or organizations: Not on file     Relationship status: Not on file     Intimate partner violence:     Fear of current or ex partner: Not on file     Emotionally abused: Not on file     Physically abused: Not on file     Forced sexual activity: Not on file   Other Topics Concern     Not on file   Social History Narrative    Lives with a friend.12/4/2018         Objective:    Vitals:    02/07/20 0959   BP: 161/88   Pulse: 76   Resp: 14   Temp: 98.3  F (36.8  C)   SpO2: 93%       Gen:NAD  CV: RRR, normal S1S2, no M, R, G  Pulm: CTAB, normal effort    CXR - no focal pneumonia per my interpretation, discussed during visit.      Acute bronchitis, unspecified organism  -     predniSONE (DELTASONE) 20 MG tablet; Take 40 mg by mouth daily for 5 days.  -     azithromycin (ZITHROMAX Z-ALYSSA) 250 MG tablet; Take 2 tablets (500 mg) on  Day 1,  followed by 1 tablet (250 mg) once daily on Days 2 through 5.    Cough  -     XR Chest 2 Views

## 2021-06-07 ENCOUNTER — COMMUNICATION - HEALTHEAST (OUTPATIENT)
Dept: SCHEDULING | Facility: CLINIC | Age: 61
End: 2021-06-07

## 2021-06-07 ENCOUNTER — OFFICE VISIT - HEALTHEAST (OUTPATIENT)
Dept: INTERNAL MEDICINE | Facility: CLINIC | Age: 61
End: 2021-06-07

## 2021-06-07 DIAGNOSIS — R05.9 COUGH: ICD-10-CM

## 2021-06-07 ASSESSMENT — MIFFLIN-ST. JEOR: SCORE: 1245.91

## 2021-06-07 NOTE — PATIENT INSTRUCTIONS - HE
Please follow up if you have any further issues.    You may contact me by phone or [employer] if you are worsening or if things are not improving.    Thank you for coming in today.

## 2021-06-07 NOTE — TELEPHONE ENCOUNTER
Needs visit this THURSDAY or FRIDAY with me to discuss this and other health issues.    Please help her to schedule.

## 2021-06-07 NOTE — TELEPHONE ENCOUNTER
Pt due to be seen.    If pt would like a face to face visit please schedule the week of 5/18/20.    If the patient has a smartphone with a camera, we can schedule a video visit using DOXIMITY without having the patient signing up for AMWELL.      If the patient wants to use and IPAD, tablet or computer, then the patient should sign up for AMWELL prior to the visit.  Pt will need to dound load AW touchpoint from Google Play or [employer] store.      If patient has no device with a webcam, then please schedule a telephone visit for their follow up.     Please note TELEPHONE OR DOXIMITY OR AMWELL in the reason for visit when scheduling the appointment.    _________________________________________________________________________    Received fax from pharmacy requesting refill of Amlodipine.    Pt last seen 5/7/19    Plan:     1. Declines pap and mammogram at this time.  2. Discussed other providers who could do pap if she wishes.  3. Will likely start triamterene/hydrochlorothiazide for her blood pressure/  4. Check blood work today.  See relevant orders and diagnosis associations at the bottom of this note.   5. Refilled medications.  6. Pneumovax given today.  7. Will consider Shingrix.  8. Consider cholesterol check again next year.     Ricardo Davila MD  General Internal Medicine  [facility]      Return in about 1 year (around 5/7/2020).

## 2021-06-07 NOTE — TELEPHONE ENCOUNTER
Refill for 90 days done.  Please go ahead and schedule the patient for follow up.    Ricardo Davila MD  General Internal Medicine  [facility]  4/24/2020, 2:02 PM

## 2021-06-07 NOTE — TELEPHONE ENCOUNTER
Left message for pt to call back.    If pt calls back please inform her she is due to be seen.     If pt would like a face to face visit please schedule the week of 5/18/20.     If the patient has a smartphone with a camera, we can schedule a video visit using DOXIMITY without having the patient signing up for AMWELL.      If the patient wants to use and IPAD, tablet or computer, then the patient should sign up for AMWELL prior to the visit.  Pt will need to dound load AW touchpoint from [employer] or [employer] store.      If patient has no device with a webcam, then please schedule a telephone visit for their follow up.     Please note TELEPHONE OR DOXIMITY OR AMWELL in the reason for visit when scheduling the appointment.

## 2021-06-09 NOTE — TELEPHONE ENCOUNTER
Please schedule virtual visit with me on Friday or with another provider today or tomorrow.    She needs assessment before ordering the test to order appropriately.    Ricardo Davila MD  General Internal Medicine  [facility]  7/1/2020, 2:49 PM

## 2021-06-09 NOTE — PROGRESS NOTES
"Marlen Dumas is a 60 y.o. female who is being evaluated via a billable video visit.      The patient has been notified of following:     \"This video visit will be conducted via a call between you and your physician/provider. We have found that certain health care needs can be provided without the need for an in-person physical exam.  This service lets us provide the care you need with a video conversation.  If a prescription is necessary we can send it directly to your pharmacy.  If lab work is needed we can place an order for that and you can then stop by our lab to have the test done at a later time.    Video visits are billed at different rates depending on your insurance coverage. Please reach out to your insurance provider with any questions.    If during the course of the call the physician/provider feels a video visit is not appropriate, you will not be charged for this service.\"    Patient has given verbal consent to a Video visit? Yes  How would you like to obtain your AVS? AVS Preference: [employer].  Patient would like the video invitation sent by:[employer]   Will anyone else be joining your video visit? No        Video Start Time: 9:30am    Additional provider notes:     Internal Medicine Tele Visit  [facility]  Patient Name: Marlen Dumas  Patient Age: 60 y.o.  YOB: 1960  MRN: [id number]    Date of Visit: 2020  Reason for Tele Visit:   Chief Complaint   Patient presents with     Covid Testing     having a stiff neck and a sore throat. x 2 days. Unsure if covid or from the AC. Does have a cough, no fever or SOB. Patient is a smoker. Has been having a slight headache for the last 2 weeks.            Assessment / Plan / Medical Decision Makin. Cough  - Symptomatic COVID-19 Virus (CORONAVIRUS) PCR; Future    Given symptoms, recommend testing.  Advised patient she may complete antibody testing at a later date once symptoms resolved.     2. Neck " "pain  3. Sore throat  Recommend symptom relief with utilization of OTC pain reliever, throat lozenges.     Patient advised if symptoms persist, worsen or new symptoms arise they are to seek medical care.  All patients questions addressed. Patient verbalized understanding and agreement with plan.     Orders Placed This Encounter   Procedures     Symptomatic COVID-19 Virus (CORONAVIRUS) PCR   Followup: Return in about 1 week (around 7/9/2020). earlier if needed.    Health Maintenance Review  Health Maintenance   Topic Date Due     PREVENTIVE CARE VISIT  1960     MAMMOGRAM  1960     PAP SMEAR  05/27/1979     ZOSTER VACCINES (2 of 3) 04/03/2018     ADVANCE CARE PLANNING  02/08/2021 (Originally 5/27/1976)     INFLUENZA VACCINE RULE BASED (1) 08/01/2020     LIPID  10/19/2020     TD 18+ HE  02/06/2028     COLORECTAL CANCER SCREENING  05/27/2028     SPIROMETRY  Completed     PNEUMOCOCCAL IMMUNIZATION 19-64 MEDIUM RISK  Completed     TDAP ADULT ONE TIME DOSE  Completed     COPD ACTION PLAN  Addressed     HEPATITIS C SCREENING  Discontinued     HIV SCREENING  Discontinued         I am having Marlen Dumas maintain her fluticasone propionate, cetirizine, omeprazole, EPINEPHrine, varenicline, and amLODIPine. We will continue to administer lidocaine.      HPI:  Marlen Dumas is a 60 y.o. year old who presents to the office today with chronic conditions including COPD, HLD, HTN, hymenoptera allergy, hypothyroidism, tobacco abuse, vitamin d deficiency.    Patient present for virtual visit today requesting Covid 19 testings.       Slight headache with sore throat and pain on right side of neck.  She reports no fever or feeling flushed, but reports she has checked her temp and it is elevated from her \"normal\".  [place] who they had not been around in some time and she and her the individual whom she resides with are both considered high risk and she is concerned for Covid 19. "     She reports a cough which she feels is chronic in nature secondary to smoking,  denies any shortness of breath, wheezing.      She states they were in [place], [place] when Covid19 pandemic was first dx in the [place]. She states when she returned home she was dx with Bronchitis and was given antibiotics and did undergo chest xray and symptoms eventually resolved. She state the other individual she traveled with lost sense of smell/taste at that time. She is also wondering about antibody testing.     Review of Systems- pertinent positive in bold:  Constitutional: Fever, chills, night sweats, fainting, weight change, fatigue, dizziness, sleeping difficulties, loud snoring/pauses in breathing  Eyes: change in vision, blurred or double vision, redness/eye pain  Ears, nose, mouth, throat: change in hearing, ear pain, hoarseness, difficulty swallowing, sores in the mouth or throat  Respiratory: shortness of breath, cough, bloody sputum, wheezing  Cardiovascular: chest pain, palpitations   Gastrointestinal: abdominal pain, heartburn/indigestion, nausea/vomiting, change in appetite, change in bowel habits, constipation or diarrhea, rectal bleeding/dark stools, difficulty swallowing  Urinary: painful urination, frequent urination, urinary urgency/incontinence, blood in urine/dark urine, nocturia (new or increasing), muscle cramps, swelling of hands, feet, ankles, leg pain/redness  Skin: change in moles/freckles, rash, nodules  Hematologic/lymphatic: swollen lymph glands, abnormal bruising/bleeding  Endocrine: excessive thirst/urination, cold or heat intolerance  Neurologic/emotional: worrisome memory change, numbness/tingling, anxiety, mood swings      Current Scheduled Meds:  Outpatient Encounter Medications as of 7/2/2020   Medication Sig Dispense Refill     amLODIPine (NORVASC) 5 MG tablet Take 1 tablet (5 mg total) by mouth daily. 90 tablet 0     cetirizine (ZYRTEC) 10 MG tablet Take 1 tablet (10 mg total) by mouth daily. 30  tablet 0     omeprazole (PRILOSEC) 40 MG capsule TAKE 1 CAPSULE(40 MG) BY MOUTH DAILY BEFORE BREAKFAST 90 capsule PRN     EPINEPHrine (EPIPEN/ADRENACLICK/AUVI-Q) 0.3 mg/0.3 mL injection Inject 0.3 mL (0.3 mg total) into the shoulder, thigh, or buttocks once for 1 dose. 2 Pre-filled Pen Syringe 2     fluticasone (FLONASE ALLERGY RELIEF) 50 mcg/actuation nasal spray 1 spray into each nostril daily. 16 g 0     varenicline (CHANTIX) 1 mg tablet Take 1 tablet (1 mg total) by mouth 2 (two) times a day. 60 tablet 2     Facility-Administered Encounter Medications as of 7/2/2020   Medication Dose Route Frequency Provider Last Rate Last Dose     lidocaine 2 % jelly (XYLOCAINE)   Topical PRN Marlen Vyas, CNP   1 application at 12/04/18 1344     Past Medical History:   Diagnosis Date     COPD (chronic obstructive pulmonary disease) (H)      HLD (hyperlipidemia)      HTN (hypertension)      Hx of laparoscopic adjustable gastric banding 10/01/2008    Initial weight 295.5 BMI 50.4 Dr. Rowell     Hymenoptera allergy      Hypothyroidism      Normal colonoscopy - 2018 - Average risk 5/5/2019     Tobacco abuse      Vitamin D deficiency 8/24/2016     Past Surgical History:   Procedure Laterality Date     FOOT SURGERY      [facility]     LAPAROSCOPIC GASTRIC BANDING  2008     LUMBAR DISC SURGERY       Social History     Tobacco Use     Smoking status: Current Every Day Smoker     Packs/day: 0.75     Years: 37.00     Pack years: 27.75     Types: Cigarettes     Smokeless tobacco: Never Used     Tobacco comment: age 14 to present -6 yrs    Substance Use Topics     Alcohol use: Yes     Alcohol/week: 7.0 - 14.0 standard drinks     Types: 7 - 14 Glasses of wine per week     Drug use: No     Family History   Problem Relation Age of Onset     Heart attack Mother 58     Hypertension Mother      COPD Father      Hypertension Father      Anxiety disorder Father      Hypertension Brother      Social History     Social History  Narrative    Lives with a friend.12/4/2018       Objective / Physical Examination:  There were no vitals filed for this visit.  Wt Readings from Last 3 Encounters:   02/07/20 144 lb 9.6 oz (65.6 kg)   02/05/20 145 lb (65.8 kg)   07/18/19 167 lb (75.8 kg)     There is no height or weight on file to calculate BMI.     General Appearance: Alert and oriented, cooperative, affect appropriate, speech clear, in no apparent distress  Head: Normocephalic, atraumatic  Eyes: Conjunctivae clear and sclerae non-icteric  Neck: patient points to right sided neck pain   Lungs:  Normal inspiratory and expiratory effort  Neuro: Alert and oriented, follows commands appropriately.     No results found.  No results found for this or any previous visit (from the past 240 hour(s)).        Carla Espinoza CNP        Video-Visit Details    Type of service:  Video Visit    Video End Time (time video stopped): 9:41 AM  Originating Location (pt. Location): Home    Distant Location (provider location):  [facility]     Platform used for Video Visit: Ni Espinoza CNP

## 2021-06-10 NOTE — PATIENT INSTRUCTIONS - HE
1. Follow up with Bariatrics.   2. You will only notified of confirmatory strep test if it is positive.

## 2021-06-10 NOTE — PROGRESS NOTES
Chief Complaint   Patient presents with     Sore Throat     x1 month, not improving       HPI:  Marlen Dumas is a 60 y.o. female with PMHx lap band surgery, hypothyroidism, tobacco abuse, COPD who presents today complaining of ST x 1 month. Patient tested negative for COVID-19 on 7/7/20. Patient feels like her esopagus is also painful and she is swallowing certain foods especially spicy ones.  She has previously been on 40 mg of omeprazole, but is currently only taking 20.  She typically waxes and wanes with this dosage based on what she is eating.  In addition to throat pain she is also experienced very mild intermittent rhinorrhea or nasal congestion when she is gardening.  She has not noted any postnasal drainage.  Patient has a chronic cough, but she has not noted any changes in this.  Patient denies any fever, chills, shortness of breath, chest pain, abdominal pain, nausea, vomiting.    History obtained from the patient.    Problem List:  2019-05: Hymenoptera allergy  2019-05: Normal colonoscopy - 2018 - Average risk  2018-12: Non-healing wound of lower extremity, left, subsequent   encounter  2018-06: Encounter for screening colonoscopy  2016-08: Zinc deficiency  2016-08: Vitamin D deficiency  2016-08: Obesity (BMI 30.0-34.9)  2008-10: Hx of laparoscopic adjustable gastric banding  Hypothyroidism  Hypercholesterolemia  Nicotine Dependence  Hypertension  Transient Ischemic Attack  Swollen Glands In The Neck  Bite of nonvenomous arthropod  Tobacco abuse  COPD (chronic obstructive pulmonary disease) (H)  HTN (hypertension)  HLD (hyperlipidemia)  Hypothyroidism  Tobacco use      Past Medical History:   Diagnosis Date     COPD (chronic obstructive pulmonary disease) (H)      HLD (hyperlipidemia)      HTN (hypertension)      Hx of laparoscopic adjustable gastric banding 10/01/2008    Initial weight 295.5 BMI 50.4 Dr. Rowell     Hymenoptera allergy      Hypothyroidism      Normal colonoscopy - 2018 - Average  risk 5/5/2019     Tobacco abuse      Vitamin D deficiency 8/24/2016       Social History     Tobacco Use     Smoking status: Current Every Day Smoker     Packs/day: 0.75     Years: 37.00     Pack years: 27.75     Types: Cigarettes     Smokeless tobacco: Never Used     Tobacco comment: age 14 to present -6 yrs    Substance Use Topics     Alcohol use: Yes     Alcohol/week: 7.0 - 14.0 standard drinks     Types: 7 - 14 Glasses of wine per week       Review of Systems   Constitutional: Negative for chills and fever.   HENT: Positive for rhinorrhea and sore throat. Negative for congestion and postnasal drip.    Respiratory: Positive for cough (normal smokers cough). Negative for shortness of breath.    Cardiovascular: Negative for chest pain.   Gastrointestinal: Negative for abdominal pain, nausea and vomiting.   Musculoskeletal: Positive for neck stiffness.   Allergic/Immunologic: Positive for environmental allergies (mild, worse with air conditioning or in the garden).       Vitals:    07/28/20 1154   BP: 152/84   Pulse: 68   Resp: 14   Temp: 98.5  F (36.9  C)   TempSrc: Oral   SpO2: 98%       Physical Exam  Vitals signs and nursing note reviewed.   Constitutional:       General: She is not in acute distress.     Appearance: She is well-developed. She is not diaphoretic.   HENT:      Head: Normocephalic and atraumatic.      Right Ear: External ear normal.      Left Ear: External ear normal.      Nose: Nose normal. No congestion.      Mouth/Throat:      Mouth: Mucous membranes are moist.      Pharynx: Posterior oropharyngeal erythema present. No oropharyngeal exudate.   Eyes:      General:         Right eye: No discharge.         Left eye: No discharge.      Conjunctiva/sclera: Conjunctivae normal.   Cardiovascular:      Rate and Rhythm: Normal rate and regular rhythm.      Heart sounds: Normal heart sounds.   Pulmonary:      Effort: Pulmonary effort is normal. No respiratory distress.      Breath sounds: Normal breath  sounds. No wheezing, rhonchi or rales.   Lymphadenopathy:      Cervical: No cervical adenopathy.   Neurological:      Mental Status: She is alert.   Psychiatric:         Behavior: Behavior normal.         Thought Content: Thought content normal.         Judgment: Judgment normal.            Labs:  Recent Results (from the past 72 hour(s))   Rapid Strep A Screen-Throat swab    Specimen: Throat   Result Value Ref Range    Rapid Strep A Antigen No Group A Strep detected, presumptive negative No Group A Strep detected, presumptive negative       Clinical Decision Making:  Patient has previously had a history of esophagitis, and was previously scoped by her geriatrician.  RST was negative, confirmatory strep test pending.  Patient is not exhibiting significant number of sick symptoms.  Throat appears erythematous, which may be from GERD.  Recommend increasing omeprazole to 40 mg daily again.  Patient is agreeable to this plan.  At the end of the encounter, I discussed results, diagnosis, medications. Discussed red flags for immediate return to clinic/ER, as well as indications for follow up if no improvement. Patient understood and agreed to plan. Patient was stable for discharge.    1. Sore throat  Rapid Strep A Screen-Throat swab    Group A Strep, RNA Direct Detection, Throat         Patient Instructions   1. Follow up with Bariatrics.   2. You will only notified of confirmatory strep test if it is positive.

## 2021-06-10 NOTE — ANESTHESIA POSTPROCEDURE EVALUATION
Patient: Marlen Dumas  Procedure(s):  ESOPHAGOGASTRODUODENOSCOPY (EGD)  Anesthesia type: MAC    Patient location: B-7  Last vitals:   Vitals Value Taken Time   /70 8/14/2020 11:56 AM   Temp 36  C (96.8  F) 8/14/2020 11:56 AM   Pulse 76 8/14/2020 11:56 AM   Resp 16 8/14/2020 11:56 AM   SpO2 97 % 8/14/2020 11:56 AM     Post vital signs: stable  Level of consciousness: very alert, dressed and ready to leave  Post-anesthesia pain: pain controlled  Post-anesthesia nausea and vomiting: no  Pulmonary: room air  Cardiovascular: stable and blood pressure at baseline  Hydration: adequate  Anesthetic events: no    QCDR Measures:  ASA# 11 - Elly-op Cardiac Arrest: ASA11B - Patient did NOT experience unanticipated cardiac arrest  ASA# 12 - Elly-op Mortality Rate: ASA12B - Patient did NOT die  ASA# 13 - PACU Re-Intubation Rate: ASA13B - Patient did NOT require a new airway mgmt  ASA# 10 - Composite Anes Safety: ASA10A - No serious adverse event    Additional Notes:

## 2021-06-10 NOTE — ANESTHESIA PREPROCEDURE EVALUATION
Anesthesia Evaluation      Patient summary reviewed   No history of anesthetic complications     Airway   Mallampati: I  Neck ROM: full   Pulmonary - normal exam   (+) a smoker  (-) COPD                         Cardiovascular - normal exam  Exercise tolerance: > or = 4 METS  (+) hypertension, , hypercholesterolemia,      Neuro/Psych      Endo/Other    (-) hypothyroidism     GI/Hepatic/Renal       Other findings: Pt denies COPD and hypothyroidism. Has been off thyroid med for 10 years.  Occasionally gets bronchitis and has a smoker's cough.  TIA per H & P.  S/p lap banding.  Here due to pain with swallowing food.  Covid negative 8/12/20.      Dental - normal exam                        Anesthesia Plan  Planned anesthetic: MAC    ASA 3     Anesthetic plan and risks discussed with: patient  Anesthesia plan special considerations: antiemetics,   Post-op plan: routine recovery

## 2021-06-10 NOTE — TELEPHONE ENCOUNTER
Patient has been having a tender esophagus. Patient went to the walk in clinic and was directed to make an appointment for an upper endoscopy. Patient is wondering if that needs to be ordered and completed, and then schedule a follow up with MD Espinal, or if she should schedule with MD Espinal first and then complete the endoscopy. She is also unsure if his should be a in clinic visit or a video visit. Please reach out to the patient to discuss how she needs to be schedule and in what order. Best number is the mobile on file and it is ok to leave a detailed voicemail.     At the end of June she had ST. Coronavirus neg. Hurts to swallow. Went to [facility]. She is still smoking, drinking wine, refilled omeprazole 40mg recently from our office. No rash at port site to suggest lap band erosion. Encouraged Marlen to double her PPI to 40mg two times a day, add carafate and limit (preferably stop) the insults (tobacco, alcohol) and will set her up with endoscopy with Dr. Howard or Dr. Waller.

## 2021-06-10 NOTE — ANESTHESIA CARE TRANSFER NOTE
Last vitals:   Vitals:    08/14/20 1156   BP: (P) 116/70   Pulse: (P) 76   Resp: (P) 16   Temp: (P) 36  C (96.8  F)   SpO2: (P) 97%     Patient's level of consciousness is awake  Spontaneous respirations: yes  Maintains airway independently: yes  Dentition unchanged: yes  Oropharynx: oropharynx clear of all foreign objects    QCDR Measures:  ASA# 20 - Surgical Safety Checklist: WHO surgical safety checklist completed prior to induction    PQRS# 430 - Adult PONV Prevention: 4558F-8P - Pt did NOT receive => 2 anti-emetic agents  ASA# 8 - Peds PONV Prevention: NA - Not pediatric patient, not GA or 2 or more risk factors NOT present  PQRS# 424 - Elly-op Temp Management: 4559F - At least one body temp DOCUMENTED => 35.5C or 95.9F within required timeframe  PQRS# 426 - PACU Transfer Protocol: - Transfer of care checklist used  ASA# 14 - Acute Post-op Pain: ASA14B - Patient did NOT experience pain >= 7 out of 10   clear

## 2021-06-11 NOTE — TELEPHONE ENCOUNTER
Refill Approved    Rx renewed per Medication Renewal Policy. Medication was last renewed on 4/24/20.    Mansi Olivares, [facility] Triage/Med Refill 9/1/2020     Requested Prescriptions   Pending Prescriptions Disp Refills     amLODIPine (NORVASC) 5 MG tablet 90 tablet 0     Sig: Take 1 tablet (5 mg total) by mouth daily.       Calcium-Channel Blockers Protocol Passed - 8/30/2020  1:48 PM        Passed - PCP or prescribing provider visit in past 12 months or next 3 months     Last office visit with prescriber/PCP: 5/7/2019 Ricardo Davila MD OR same dept: Visit date not found OR same specialty: 5/7/2019 Ricardo Davila MD  Last physical: Visit date not found Last MTM visit: Visit date not found   Next visit within 3 mo: Visit date not found  Next physical within 3 mo: Visit date not found  Prescriber OR PCP: Ricardo Davila MD  Last diagnosis associated with med order: 1. Essential hypertension  - amLODIPine (NORVASC) 5 MG tablet; Take 1 tablet (5 mg total) by mouth daily.  Dispense: 90 tablet; Refill: 0    If protocol passes may refill for 12 months if within 3 months of last provider visit (or a total of 15 months).             Passed - Blood pressure filed in past 12 months     BP Readings from Last 1 Encounters:   08/14/20 116/70

## 2021-06-15 NOTE — PROGRESS NOTES
BP Readings from Last 20 Encounters:   02/06/18 (!) 158/96   09/15/16 (!) 168/105   08/17/16 178/78   07/19/16 122/78   10/19/15 136/80   09/19/15 126/66   09/16/15 (!) 150/98   08/09/15 152/70

## 2021-06-15 NOTE — PROGRESS NOTES
RESPIRATORY CARE NOTE     Patient Name: Marlen Dumas  Today's Date: 2/9/2018       Complete Pulmonary Function test performed by pt with good effort. Albuterol 2.5 mg neb given. Pt left in no distress. Results were scanned into Epic.      Marlen Maria

## 2021-06-16 ENCOUNTER — COMMUNICATION - HEALTHEAST (OUTPATIENT)
Dept: INTERNAL MEDICINE | Facility: CLINIC | Age: 61
End: 2021-06-16

## 2021-06-16 DIAGNOSIS — R05.9 COUGH: ICD-10-CM

## 2021-06-16 PROBLEM — R10.13 ABDOMINAL PAIN, EPIGASTRIC: Status: ACTIVE | Noted: 2020-08-04

## 2021-06-16 PROBLEM — Z98.84 BARIATRIC SURGERY STATUS: Status: ACTIVE | Noted: 2020-08-04

## 2021-06-16 PROBLEM — Z91.038 HYMENOPTERA ALLERGY: Status: ACTIVE | Noted: 2019-05-08

## 2021-06-16 PROBLEM — Z98.890 S/P COLONOSCOPY: Status: ACTIVE | Noted: 2019-05-05

## 2021-06-16 NOTE — PROGRESS NOTES
Bariatric Follow Up Visit with a History of Previous Bariatric Surgery     Date of visit: 2/16/2018  Physician: Julissa Espinal MD  Primary Care Provider:  Ricardo Davila MD  Marlen BURNS Piter   57 y.o.  female    Date of Surgery: 10/2008  Initial Weight: 295#  Initial BMI: 50.4  Today's Weight:   Wt Readings from Last 1 Encounters:   02/16/18 162 lb (73.5 kg)     Body mass index is 28.03 kg/(m^2).      Assessment and Plan     Assessment: Marlen is a 57 y.o. year old female who is 9 1/2 yrs s/p  Lap Band with Dr. Rowell  Marlen BURNS Piter feels as if she has achieved the goals she hoped to accomplish through bariatric surgery and weight loss.    Encounter Diagnoses   Name Primary?     Hx of laparoscopic adjustable gastric banding Yes     Tobacco use      Encounter for adjustment of gastric lap band      Screening for diabetes mellitus      HLD (hyperlipidemia)      GERD (gastroesophageal reflux disease)      Vitamin D deficiency          Current Outpatient Prescriptions:      albuterol (PROVENTIL HFA;VENTOLIN HFA) 90 mcg/actuation inhaler, Inhale 2 puffs every 4 (four) hours as needed for wheezing., Disp: 1 Inhaler, Rfl: 1     nicotine (NICODERM CQ) 14 mg/24 hr, Place 1 patch on the skin daily. Then start 7 mg patch., Disp: 60 patch, Rfl: 0     nicotine (NICODERM CQ) 21 mg/24 hr, Place 1 patch on the skin daily. Then start 14 mg patch., Disp: 60 patch, Rfl: 0     nicotine (NICODERM CQ) 7 mg/24 hr, Place 1 patch on the skin daily. Then stop, Disp: 60 patch, Rfl: 0     omeprazole (PRILOSEC) 20 MG capsule, TAKE 1 CAPSULE BY MOUTH DAILY., Disp: 90 capsule, Rfl: 3     varenicline (CHANTIX) 1 mg tablet, Take 1 tablet (1 mg total) by mouth 2 (two) times a day. Take 0.5 tab a day for 3 days, then 0.5 mg BID for 4 days before starting full dose, Disp: 60 tablet, Rfl: 4     amLODIPine (NORVASC) 5 MG tablet, Take 1 tablet (5 mg total) by mouth daily., Disp: 90 tablet, Rfl: 3     cholecalciferol, vitamin D3, (VITAMIN D3)  "5,000 unit Tab, Take 1 tablet (5,000 Units total) by mouth daily., Disp: 90 tablet, Rfl: prn     EPINEPHrine (EPIPEN) 0.3 mg/0.3 mL atIn, Inject 0.3 mL (0.3 mg total) into the shoulder, thigh, or buttocks once for 1 dose., Disp: 4 Pre-filled Pen Syringe, Rfl: 2     omeprazole (PRILOSEC) 40 MG capsule, Take 1 capsule (40 mg total) by mouth daily before breakfast., Disp: 90 capsule, Rfl: prn    Plan: Removal of 0.5ml Lap band fluid  in APS with 10ml capacity. Full liquid diet for 48 hours. Support smoking cessation efforts and encouraged to follow Blood pressures outside of clinic.  PROCEDURE: Lap Band Adjustment  Physician: SAMANTHA Espinal MD  RN: Johnna Lobato RN    The patient's abdomen was prepped and draped in the usual sterile fashion. Her lap band port was accessed using a Dang needle and 0.5ml saline was removed leaving a virtual 3.5ml saline in the lap band system. Marlen tolerated the procedure well and was advised to follow a full liquid diet for 48 hours following today's adjustment. RX for prilosec 40mg daily was faxed to her pharmacy. Annual labs were ordered. She declined \"call it quits\" plan support. She was encouraged to follow her blood pressure outside of clinic and to follow up with Dr. Davila.  Return in about 1 month (around 3/16/2018).for lap band adjustment if needed, sooner if problems or concerns. Dietitian visit offered.    Bariatric Surgery Review     Interim History/LifeChanges: zinc was low in 2016 and she remains on zinc supplement, B-12 SL, and vitamin D. She does not tolerate MVIs. Going to [place] in March. Has Chantix for tobacco cessation.  Recent diagnosis of COPD. BP is high. She was prescribed amlodipine but hasn't started it yet. She will start it on the weekend d/t not wanting to start a new med as she takes the bus to work. Last fill 2013 virtual 4.0ml. Presented in 2016 with port pain. Her AXR showed normal lap band position. Recent CXR for cough shows lap band and port to be unchanged " "from 2016 X-ray.  She needs BID PPI so would like a RX for 40mg instead of taking 20mg 2/d. She is ready to quit smoking but not until after she returns from [place].  She will be leaving for [place] in March and staying 2 weeks.    Patient Concerns: would like some fluid removed. She is taking 20mg omeprazole and is needing BID dosing for the past couple of months. Recent CXR shows band in same position as 2016.    Medication changes: none    Vitamin Intake:   B-12   SL   MVI  none   Vitamin D  5,000U   Calcium   no     Other                LABS: ordered    Nausea no  Vomiting no  Constipation no  Diarrhea no  Rashes no  Hair Loss no  Calf tenderness no  Breathing difficulty no  Reactive Hypoglycemia no  Light Headedness no   Moods OK    12 point ROS as above and otherwise negative      Habits:  Alcohol: 7  Tobacco: 1 ppd  Caffeine 1 pot  NSAIDS ibuprofen prn 2X/wk  Exercise Routine: just busy-\"walks a lot\"  3 meals/day B: eggs and walter L: soup D: pizza or meat, rice, veggie  Protein first typically  60 grams/day  Water Separate from meals yes  Calorie Containing Beverages no  Restaurant eating/wk 1  Sleeping 9-3:30-6.5 hours  Stress OK  CPAP: NA  Contraception: NA/PM    Social History     Social History     Social History     Marital status: Single     Spouse name: N/A     Number of children: 0     Years of education: N/A     Occupational History     /IT [employer]     Social History Main Topics     Smoking status: Current Every Day Smoker     Packs/day: 1.00     Years: 30.00     Types: Cigarettes     Smokeless tobacco: Never Used      Comment: age 14 to present -6 yrs      Alcohol use 4.2 oz/week     7 Glasses of wine per week     Drug use: No     Sexual activity: No     Other Topics Concern     Not on file     Social History Narrative       Past Medical History     Past Medical History:   Diagnosis Date     COPD (chronic obstructive pulmonary disease)      HLD (hyperlipidemia)      HTN " "(hypertension)      Hx of laparoscopic adjustable gastric banding 10/01/2008    Initial weight 295.5 BMI 50.4 Dr. Rowell     Hymenoptera allergy      Hypothyroidism      Tobacco abuse      Tobacco use      Vitamin D deficiency 8/24/2016     Problem List     Patient Active Problem List   Diagnosis     Tobacco abuse     COPD (chronic obstructive pulmonary disease)     HTN (hypertension)     HLD (hyperlipidemia)     Hx of laparoscopic adjustable gastric banding     Obesity (BMI 30.0-34.9)     Zinc deficiency     Vitamin D deficiency     Tobacco use     Medications     Current Outpatient Prescriptions   Medication Sig     albuterol (PROVENTIL HFA;VENTOLIN HFA) 90 mcg/actuation inhaler Inhale 2 puffs every 4 (four) hours as needed for wheezing.     nicotine (NICODERM CQ) 14 mg/24 hr Place 1 patch on the skin daily. Then start 7 mg patch.     nicotine (NICODERM CQ) 21 mg/24 hr Place 1 patch on the skin daily. Then start 14 mg patch.     nicotine (NICODERM CQ) 7 mg/24 hr Place 1 patch on the skin daily. Then stop     omeprazole (PRILOSEC) 20 MG capsule TAKE 1 CAPSULE BY MOUTH DAILY.     varenicline (CHANTIX) 1 mg tablet Take 1 tablet (1 mg total) by mouth 2 (two) times a day. Take 0.5 tab a day for 3 days, then 0.5 mg BID for 4 days before starting full dose     amLODIPine (NORVASC) 5 MG tablet Take 1 tablet (5 mg total) by mouth daily.     cholecalciferol, vitamin D3, (VITAMIN D3) 5,000 unit Tab Take 1 tablet (5,000 Units total) by mouth daily.     EPINEPHrine (EPIPEN) 0.3 mg/0.3 mL atIn Inject 0.3 mL (0.3 mg total) into the shoulder, thigh, or buttocks once for 1 dose.     omeprazole (PRILOSEC) 40 MG capsule Take 1 capsule (40 mg total) by mouth daily before breakfast.     Surgical History     Past Surgical History  She has a past surgical history that includes Laparoscopic gastric banding (2008); Lumbar disc surgery; and Foot surgery.    Objective-Exam     Constitutional:  /87  Pulse 73  Resp 18  Ht 5' 3.75\" " "(1.619 m)  Wt 162 lb (73.5 kg)  SpO2 100%  BMI 28.03 kg/m2  Height: 5' 3.75\" (1.619 m) (2/16/2018 10:32 AM)  Weight: 162 lb (73.5 kg) (2/16/2018 10:32 AM)  BMI (Calculated): 28 (2/16/2018 10:32 AM)  SpO2: 100 % (2/16/2018 10:32 AM)  General:  Pleasant and in no acute distress   Eyes:  EOMI  ENT:  Airway +  Moist mucous membranes  Neck:  Supple, No LAD, No thyromegaly, No carotid bruits appreciated  Respiratory: Normal respiratory effort, no cough, wheezes or crackles  CV:  Regular rate and Rhythm,1/6 murmurs, pulses 2+, no calf tenderness, no LE edema  Gastrointestinal: Abdomen NT/ND, BS+  Musculoskeletal: muscle mass WNL  Skin: color tan hair full, incisions nicely healed  Neurological: No tremor, normal gait  Psychiatric: alert and oriented X3, mood and affect normal    Counseling     We reviewed the important post op bariatric recommendations:  -eating 3 meals daily  -eating protein first, getting >60gm protein daily  -eating slowly, chewing food well  -avoiding/limiting calorie containing beverages  -drinking water 15-30 minutes before or after meals  -choosing wheat, not white with breads, crackers, pastas, dandre, bagels, tortillas, rice  -limiting restaurant or cafeteria eating to twice a week or less    We discussed the importance of restorative sleep and stress management in maintaining a healthy weight.  We discussed the National Weight Control Registry healthy weight maintenance strategies and ways to optimize metabolism.  We discussed the importance of physical activity including cardiovascular and strength training in maintaining a healthier weight.    We discussed the importance of life-long vitamin supplementation and life-long  follow-up.    Marlen was reminded that, to avoid marginal ulcers she should avoid tobacco at all, alcohol in excess, caffeine in excess, and NSAIDS (unless indicated for cardioprotection or othewise and opposed by a PPI).    Julissa Espinal MD, FAAFP  [facility].  2/16/2018  10:52 AM      No images are attached to the encounter.   30 minutes spent with patient. >50% in counseling and coordination of care.

## 2021-06-18 ENCOUNTER — COMMUNICATION - HEALTHEAST (OUTPATIENT)
Dept: FAMILY MEDICINE | Facility: CLINIC | Age: 61
End: 2021-06-18

## 2021-06-18 DIAGNOSIS — J44.1 COPD EXACERBATION (H): ICD-10-CM

## 2021-06-19 NOTE — LETTER
Letter by Ricardo Davila MD at      Author: Ricardo Davila MD Service: -- Author Type: --    Filed:  Encounter Date: 4/17/2019 Status: (Other)             Marlen Dumas  [address]  [facility] [address]      April 17, 2019      Dear Marlen,    As a valued [facility] patient, your healthcare needs are our priority.  Your health care team has determined that you are due for an appointment regarding your Blood Pressure.    To help prevent delays in your care, please call the [facility] at [phone].    We look forward to partnering with you to achieve optimal health and wellbeing.    Sincerely,    Your care team at [facility]   [address] [address]

## 2021-06-19 NOTE — LETTER
Letter by Ricardo Davila MD at      Author: Ricardo Davila MD Service: -- Author Type: --    Filed:  Encounter Date: 4/17/2019 Status: (Other)             Marlen Dumas  [address]  [facility] [address]      April 17, 2019      Dear Marlen,    As a valued [facility] patient, your healthcare needs are our priority.  Your health care team has determined that you are due for an appointment regarding your mammogram.    To help prevent delays in your care, please call the [facility] at [phone].    We look forward to partnering with you to achieve optimal health and wellbeing.    Sincerely,  Your care team at [facility]   [address] [address]

## 2021-06-20 NOTE — LETTER
Letter by Severson, Tammie F, LPN at      Author: Severson, Tammie F, LPN Service: -- Author Type: --    Filed:  Encounter Date: 7/9/2020 Status: (Other)       7/9/2020        Marlen Dumas  [address]  [facility] [address]    This letter provides a written record that you were tested for COVID-19 on 7/3/.     Your result was negative. This means that we didnt find the virus that causes COVID-19 in your sample. A test may show negative when you do actually have the virus. This can happen when the virus is in the early stages of infection, before you feel illness symptoms.    If you have symptoms   Stay home and away from others (self-isolate) until you meet ALL of the guidelines below:    Youve had no fever--and no medicine that reduces fever--for 3 full days (72 hours). And ?    Your other symptoms have gotten better. For example, your cough or breathing has improved. And?    At least 10 days have passed since your symptoms started.    During this time:    Stay home. Dont go to work, school or anywhere else.     Stay in your own room, including for meals. Use your own bathroom if you can.    Stay away from others in your home. No hugging, kissing or shaking hands. No visitors.    Clean high touch surfaces often (doorknobs, counters, handles, etc.). Use a household cleaning spray or wipes. You can find a full list on the EPA website at www.epa.gov/pesticide-registration/list-n-disinfectants-use-against-sars-cov-2.    Cover your mouth and nose with a mask, tissue or washcloth to avoid spreading germs.    Wash your hands and face often with soap and water.    Going back to work  Check with your employer for any guidelines to follow for going back to work.    Employers: This document serves as formal notice that your employee tested negative for COVID-19, as of the testing date shown above.

## 2021-06-20 NOTE — LETTER
Letter by Ricardo Davila MD at      Author: Ricardo Davila MD Service: -- Author Type: --    Filed:  Encounter Date: 2020 Status: (Other)         2020    Marlen Dumas   [address] [facility] [address]         To whom it may concern:    I am sending you this letter in relationship to a patient of mine, Marlen Dumas ( 1960).    Marlen Dumas has been a patient of mine for 5 years.    Due to her medical history which includes a history of lung disease, it is my medical opinion that the patient is a high risk for subsequent complications if she were to get coronavirus.    I have recommended at this time that she remain off of work at [employer] from , through 2020.    If you have any questions regarding the above, feel free to contact me at [phone].         Sincerely,     Ricardo Davila MD  General Internal Medicine  [facility]

## 2021-06-20 NOTE — LETTER
Letter by Severson, Tammie F, LPN at      Author: Severson, Tammie F, LPN Service: -- Author Type: --    Filed:  Encounter Date: 7/13/2020 Status: (Other)       7/13/2020        Marlen Dumas  [address]  [facility] [address]    COVID-19 Antibody Screen   Date Value Ref Range Status   07/07/2020 Negative  Final     Comment:     No COVID-19 antibodies detected.  Patients within 10 days of symptom onset for  COVID-19 may not produce sufficient levels of detectable antibodies.  Immunocompromised COVID-19 patients may take longer to develop antibodies.     COVID-19 IgG Titer   Date Value Ref Range Status   07/07/2020 Not Applicable  Final     Comment:     Qualitative screen for total antibodies to COVID-19 (SARS-CoV-2) with  semi-quantitative measurement of IgG COVID-19 antibodies by endpoint titer.  COVID-19 antibodies may be elevated due to a past or current infection.  Negative results do not rule out COVID-19 infection.  Results from antibody  testing should not be used as the sole basis to diagnose or exclude SARS-CoV-2  infection or to inform infection status.  COVID-19 PCR test should be ordered  if current infection is suspected.  False positive results may occur in rare  cases due to cross-reacting antibodies.  This test was developed and its performance characteristics determined by the  [facility] ([facility]),  which is regulated under CLIA as qualified to perform high-complexity testing.  This test has not been reviewed by the FDA.  Testing performed by [facility], [facility], [address], [address], [place] [address]       No results found for: [id number]    You have tested NEGATIVE for COVID-19 antibodies. This suggests you have not had or been exposed to COVID-19. But it does not mean that for sure.    The test finds antibodies in most people 10 days after they get sick. For some people,  it takes longer than 10 days for antibodies to show up. Others may never show antibodies against COVID-19, especially if they have weak immune systems.    If you have COVID-19 symptoms now, please stay home and away from others.     Your current symptoms may or may not be COVID-19.     What is antibody testing?  This is a kind of blood test. We take a small sample of your blood, and then test it for something called antibodies.   Your body makes antibodies to fight infection. If your blood has antibodies for a certain germ, it means youve been infected with that germ in the past.   Sometimes, antibodies stay in your body for years after youve had the infection. They can be there even if the germ didnt make you sick. They are a sign that your body fought off the infection.  Will this test find antibodies in everyone whos had COVID-19?  No. The test finds antibodies in most people 10 days after they get sick. For some people, it takes longer than 10 days for antibodies to show up. Others may never show antibodies against COVID-19, especially if they have weak immune systems.  What are the signs of COVID-19?  Signs of COVID-19 can appear from 2 to 14 days (up to 2 weeks) after youre infected. Some people have no symptoms or only mild symptoms. Others get very sick. The most common symptoms are:      Cough    Shortness of breath or trouble breathing    Or at least 2 of these symptoms:      Fever    Chills    Repeated shaking with chills    Muscle pain    Headache    Sore throat    Losing your sense of taste or smell    You may have other symptoms. Please contact your doctor or clinic for any symptoms that worry you.    Where can I get more information?     To learn the [facility] guidelines for staying home, please visit the [facility] of Health website at [email or web address]    To learn more about COVID-19 and how to care for yourself at home, please visit the CDC  website at https://www.cdc.gov/coronavirus/2019-ncov/about/steps-when-sick.html    For more options for care at [facility], please visit our website at [email or web address]    [place] [facility] ([facility]) COVID-19 Hotline:  [phone]

## 2021-06-20 NOTE — PROGRESS NOTES
Chief Complaint   Patient presents with     Sore Throat     4 days         HPI      Patient is here for 4 days of productive cough, with nasal discharge and moderate sore throat. No fever, chills, chest pain, shortness of breath.     ROS: Pertinent ROS noted in HPI.     Allergies   Allergen Reactions     Other Environmental Allergy      Bee sting     Sulfa (Sulfonamide Antibiotics)        Patient Active Problem List   Diagnosis     Tobacco abuse     COPD (chronic obstructive pulmonary disease) (H)     HTN (hypertension)     HLD (hyperlipidemia)     Hx of laparoscopic adjustable gastric banding     Obesity (BMI 30.0-34.9)     Zinc deficiency     Vitamin D deficiency     Tobacco use       Family History   Problem Relation Age of Onset     Heart attack Mother 58     Hypertension Mother      COPD Father      Hypertension Father      Anxiety disorder Father      Hypertension Brother        Social History     Social History     Marital status: Single     Spouse name: N/A     Number of children: 0     Years of education: N/A     Occupational History     /IT [employer]     Social History Main Topics     Smoking status: Current Every Day Smoker     Packs/day: 1.00     Years: 30.00     Types: Cigarettes     Smokeless tobacco: Never Used      Comment: age 14 to present -6 yrs      Alcohol use 4.2 oz/week     7 Glasses of wine per week     Drug use: No     Sexual activity: No     Other Topics Concern     Not on file     Social History Narrative         Objective:    Vitals:    09/25/18 0721   BP: 102/88   Pulse: 72   Resp: 16   Temp: 98  F (36.7  C)   SpO2: 97%       Gen:NAD  Throat: mild erythema of soft palate, tonsils normal   Ears: TMs clear, ear canals normal bilaterally  Nose: no discharge  Neck: NAD  CV: RRR, normal S1S2, no M, R, G  Pulm: CTAB, normal effort      Recent Results (from the past 24 hour(s))   Rapid Strep A Screen-Throat   Result Value Ref Range    Rapid Strep A Antigen No Group A Strep  detected, presumptive negative No Group A Strep detected, presumptive negative         Acute nasopharyngitis (common cold)  -     azithromycin (ZITHROMAX Z-ALYSSA) 250 MG tablet; Take 2 tablets (500 mg) on  Day 1,  followed by 1 tablet (250 mg) once daily on Days 2 through 5.    Sore throat  -     Rapid Strep A Screen-Throat  -     Group A Strep, RNA Direct Detection, Throat    COPD (chronic obstructive pulmonary disease) (H)  -     azithromycin (ZITHROMAX Z-ALYSSA) 250 MG tablet; Take 2 tablets (500 mg) on  Day 1,  followed by 1 tablet (250 mg) once daily on Days 2 through 5.    Tobacco use    Cough  -     benzonatate (TESSALON) 200 MG capsule; Take 1 capsule (200 mg total) by mouth 3 (three) times a day as needed for cough.  -     azithromycin (ZITHROMAX Z-ALYSSA) 250 MG tablet; Take 2 tablets (500 mg) on  Day 1,  followed by 1 tablet (250 mg) once daily on Days 2 through 5.      Currently exam overall is reassuring, and I think now she just has a viral process going on. Patient appeared concerned, however, and said that she has COPD with hx of recurrent bronchitis needing two courses of Z-ALYSSA, and that she is going out of town in a few days. Z-ALYSSA given to be taken only if symptoms fail to improve I 5-7 days or rapidly escalating.

## 2021-06-21 NOTE — LETTER
Letter by Ricardo Davila MD at      Author: Ricardo Davila MD Service: -- Author Type: --    Filed:  Encounter Date: 3/2/2021 Status: (Other)         Marlen Dumas   [address]  [facility] [address]         Dear Marlen,    I am sending you this letter to remind you that you are due for a follow up visit in April or May.    Please call to schedule this visit as soon as possible as our schedule fills up quickly.    If you already have an appointment scheduled with me for around this time, please ignore this notification.    I look forward to seeing you soon.      If you have any questions regarding the above, please feel free to contact me at [phone].      Sincerely,       Ricardo Davila MD  General Internal Medicine  [facility]

## 2021-06-21 NOTE — PROGRESS NOTES
Internal Medicine Office Visit  [facility]  Patient Name: Marlen Dumas  Patient Age: 58 y.o.  YOB: 1960  MRN: [id number]    Date of Visit: 11/15/2018  Reason for Office Visit:   Chief Complaint   Patient presents with     Follow-up           Assessment / Plan / Medical Decision Makin. Non-healing wound of lower extremity, left, subsequent encounter  - Complete antibiotic but I suspect that she will need debridement for wound to heal  - Monitor for signs of cellulitis, symptoms reviewed today   - Ambulatory referral to Wound Clinic        Health Maintenance Review  Health Maintenance   Topic Date Due     ZOSTER VACCINES (2 of 3) 2018     INFLUENZA VACCINE RULE BASED (1) 2018     MAMMOGRAM  2019 (Originally 1960)     PAP SMEAR  2019 (Originally 1988)     ADVANCE DIRECTIVES DISCUSSED WITH PATIENT  2021 (Originally 1976)     TD 18+ HE  2028     COLONOSCOPY  2028     TDAP ADULT ONE TIME DOSE  Completed         I am having Marlen Dumas maintain her albuterol, omeprazole, varenicline, nicotine, nicotine, nicotine, EPINEPHrine, amLODIPine, cholecalciferol (vitamin D3), fluticasone, cetirizine, and cephalexin.      HPI:  Marlen Dumas is a 58 y.o. year old who presents to the office today for follow up of a [facility] visit for a wound on the left shin which she sustained after tripping into [facility] on Oct. 20. She was treated for a cellulitis skin infection. It does not seem to have changed- no improvement or worsening. 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       Review of Systems- pertinent positive in bold:  Negative for fevers, chills, malaise     Current Scheduled Meds:  Outpatient Encounter Medications as of 11/15/2018   Medication Sig Dispense Refill     albuterol (PROVENTIL HFA;VENTOLIN HFA) 90 mcg/actuation  inhaler Inhale 2 puffs every 4 (four) hours as needed for wheezing. 1 Inhaler 1     amLODIPine (NORVASC) 5 MG tablet Take 1 tablet (5 mg total) by mouth daily. 90 tablet 3     cephalexin 500 mg tablet Take 500 mg by mouth 4 (four) times a day for 10 days. 40 tablet 0     cetirizine (ZYRTEC) 10 MG tablet Take 1 tablet (10 mg total) by mouth daily. 30 tablet 0     cholecalciferol, vitamin D3, (VITAMIN D3) 5,000 unit Tab Take 1 tablet (5,000 Units total) by mouth daily. 90 tablet prn     fluticasone (FLONASE ALLERGY RELIEF) 50 mcg/actuation nasal spray 1 spray into each nostril daily. 16 g 0     omeprazole (PRILOSEC) 20 MG capsule TAKE 1 CAPSULE BY MOUTH DAILY. 90 capsule 3     varenicline (CHANTIX) 1 mg tablet Take 1 tablet (1 mg total) by mouth 2 (two) times a day. Take 0.5 tab a day for 3 days, then 0.5 mg BID for 4 days before starting full dose 60 tablet 4     EPINEPHrine (EPIPEN) 0.3 mg/0.3 mL atIn Inject 0.3 mL (0.3 mg total) into the shoulder, thigh, or buttocks once for 1 dose. 4 Pre-filled Pen Syringe 2     nicotine (NICODERM CQ) 14 mg/24 hr Place 1 patch on the skin daily. Then start 7 mg patch. 60 patch 0     nicotine (NICODERM CQ) 21 mg/24 hr Place 1 patch on the skin daily. Then start 14 mg patch. 60 patch 0     nicotine (NICODERM CQ) 7 mg/24 hr Place 1 patch on the skin daily. Then stop 60 patch 0     No facility-administered encounter medications on file as of 11/15/2018.      Past Medical History:   Diagnosis Date     COPD (chronic obstructive pulmonary disease) (H)      HLD (hyperlipidemia)      HTN (hypertension)      Hx of laparoscopic adjustable gastric banding 10/01/2008    Initial weight 295.5 BMI 50.4 Dr. Rowell     Hymenoptera allergy      Hypothyroidism      Tobacco abuse      Tobacco use      Vitamin D deficiency 8/24/2016     Past Surgical History:   Procedure Laterality Date     FOOT SURGERY      Sting ray stacia     LAPAROSCOPIC GASTRIC BANDING  2008     LUMBAR DISC SURGERY       Social  History     Tobacco Use     Smoking status: Current Every Day Smoker     Packs/day: 1.00     Years: 30.00     Pack years: 30.00     Types: Cigarettes     Smokeless tobacco: Never Used     Tobacco comment: age 14 to present -6 yrs    Substance Use Topics     Alcohol use: Yes     Alcohol/week: 4.2 oz     Types: 7 Glasses of wine per week     Drug use: No       Objective / Physical Examination:  Vitals:    11/15/18 1335   BP: 134/80   Pulse: 76   Weight: 163 lb (73.9 kg)     Wt Readings from Last 3 Encounters:   11/15/18 163 lb (73.9 kg)   11/12/18 163 lb 6.4 oz (74.1 kg)   09/25/18 164 lb 12.8 oz (74.8 kg)     Body mass index is 28.2 kg/m .     General Appearance: Alert and oriented, cooperative, affect appropriate, speech clear, in no apparent distress  Extremities: No edema.  Integumentary: Warm and dry. Left mid-shin area with a 1.5 cm x 1.5 cm dry scabbed wound. There is a scant amount of serosanguinous blood expressed from the center with pressure. No surrounding erythema or warmth.     Orders Placed This Encounter   Procedures     Ambulatory referral to Wound Clinic   Followup: Return if symptoms worsen or fail to improve. earlier if needed.        Vivi Corrales, CNP

## 2021-06-22 NOTE — PROGRESS NOTES
[facility] Consult Note    Date of Service:  12/4/2018    Requesting Provider: Vivi Corrales FNP    History of Present Illness:     Vivi Corrales FNP referred Marlen Dumas for left leg ulcer.   The patient presents to clinic alone.   She sustained a left leg ulcer after tripping into scaffolding on Oct. 20.  She was treated for an infection, but it is not improving.  The last couple of days she is not putting any bandage on it because it stopped draining.  She denies any pain in the ulcer and is not having any drainage.      Review of Symptoms:    GI: Appetite is good.    Weight:  no change  Hx of Lap band surgery 8 years ago.    Cardiovascular:  denies Chest pain or discomfort;         Edema: There is no edema noted in bilateral LE.      Respiratory:  denies unusual shortness of breath    Bowels: No concerns     : No concerns.     MSK: Patient is ambulatory; does not use an assistive device :      Neurological:   denies numbness in left leg from below knee secondary to a significant leg injury    Endocrine: is not diabetic     All other systems were reviewed are not pertinent to the presenting problem.    Past Medical History:    Past Medical History:   Diagnosis Date     COPD (chronic obstructive pulmonary disease) (H)      HLD (hyperlipidemia)      HTN (hypertension)      Hx of laparoscopic adjustable gastric banding 10/01/2008    Initial weight 295.5 BMI 50.4 Dr. Rowell     Hymenoptera allergy      Hypothyroidism      Tobacco abuse      Tobacco use      Vitamin D deficiency 8/24/2016        Surgical History:   Past Surgical History:   Procedure Laterality Date     FOOT SURGERY      Sting burton palomo     LAPAROSCOPIC GASTRIC BANDING  2008     LUMBAR DISC SURGERY         Allergies:   Allergies   Allergen Reactions     Other Environmental Allergy      Bee sting     Sulfa (Sulfonamide Antibiotics)           Medications:    Current Outpatient Medications:      amLODIPine (NORVASC) 5  MG tablet, Take 1 tablet (5 mg total) by mouth daily., Disp: 90 tablet, Rfl: 3     cetirizine (ZYRTEC) 10 MG tablet, Take 1 tablet (10 mg total) by mouth daily., Disp: 30 tablet, Rfl: 0     cholecalciferol, vitamin D3, (VITAMIN D3) 5,000 unit Tab, Take 1 tablet (5,000 Units total) by mouth daily., Disp: 90 tablet, Rfl: prn     fluticasone (FLONASE ALLERGY RELIEF) 50 mcg/actuation nasal spray, 1 spray into each nostril daily., Disp: 16 g, Rfl: 0     omeprazole (PRILOSEC) 20 MG capsule, TAKE 1 CAPSULE BY MOUTH DAILY., Disp: 90 capsule, Rfl: 3     EPINEPHrine (EPIPEN) 0.3 mg/0.3 mL atIn, Inject 0.3 mL (0.3 mg total) into the shoulder, thigh, or buttocks once for 1 dose., Disp: 4 Pre-filled Pen Syringe, Rfl: 2     nicotine (NICODERM CQ) 14 mg/24 hr, Place 1 patch on the skin daily. Then start 7 mg patch., Disp: 60 patch, Rfl: 0     nicotine (NICODERM CQ) 21 mg/24 hr, Place 1 patch on the skin daily. Then start 14 mg patch., Disp: 60 patch, Rfl: 0     nicotine (NICODERM CQ) 7 mg/24 hr, Place 1 patch on the skin daily. Then stop, Disp: 60 patch, Rfl: 0     varenicline (CHANTIX) 1 mg tablet, Take 1 tablet (1 mg total) by mouth 2 (two) times a day. Take 0.5 tab a day for 3 days, then 0.5 mg BID for 4 days before starting full dose, Disp: 60 tablet, Rfl: 4    Current Facility-Administered Medications:      lidocaine 2 % jelly (XYLOCAINE), , Topical, PRN, Marlen Vyas, CNP, 1 application at 12/04/18 1344    Family history:   Family History   Problem Relation Age of Onset     Heart attack Mother 58     Hypertension Mother      COPD Father      Hypertension Father      Anxiety disorder Father      Hypertension Brother          Social History:   Social History     Socioeconomic History     Marital status: Single     Spouse name: Not on file     Number of children: 0     Years of education: Not on file     Highest education level: Not on file   Social Needs     Financial resource strain: Not on file     Food insecurity -  "worry: Not on file     Food insecurity - inability: Not on file     Transportation needs - medical: Not on file     Transportation needs - non-medical: Not on file   Occupational History     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     Employer: [employer]   Tobacco Use     Smoking status: Current Every Day Smoker     Packs/day: 1.00     Years: 30.00     Pack years: 30.00     Types: Cigarettes     Smokeless tobacco: Never Used     Tobacco comment: age 14 to present -6 yrs    Substance and Sexual Activity     Alcohol use: Yes     Alcohol/week: 4.2 oz     Types: 7 Glasses of wine per week     Drug use: No     Sexual activity: No     Birth control/protection: Abstinence, Post-menopausal   Other Topics Concern     Not on file   Social History Narrative    Lives with a friend.12/4/2018        Physical Exam    General: This is a 58 y.o. female who appears their reported age, not in acute distress    Constitution:  Vital Signs: BP (!) 148/100   Pulse 80   Temp 97.7  F (36.5  C) (Oral)   Resp 16   Ht 5' 3.75\" (1.619 m) Comment: per pt report  Wt 160 lb (72.6 kg) Comment: per pt report  BMI 27.68 kg/m   Body mass index is 27.68 kg/m .    Psychiatric: Alert and oriented X 3, in no apparent distress. Calm and cooperative throughout exam    Head/Neck:  Normocephalic, atraumatic    Cardiovascular:  Rate and Rhythm is regular    Respiratory: wheezing noted bilateral    Abdomen:  Normal bowel sounds. Soft, symmetric, no guarding or rigidity, non tender with palpation.  No organomegaly or masses palpated.     Integumentary/Hair/Nails:  Turgor and texture are normal.  Nails are normal.    MSK    Neurological:  Grossly intact.     Vascular:    Arterial:    Left PT and DP: palpable and strong.          Venous:  There is no venous distention on theBilateral leg(s).  There is telangiectasias on the Bilateral lower extremity    There is no  edema noted in bilateral LE.  Stemmers sign negative.      Ulceration(s)/Wound(s):     Left leg " Ulceration(s):     Wound bed: %100 eschar          Undermining no, Tunneling no   Wound Edge: attached   Periwound:  There is no sean wound erythema, induration, maceration, denudement fluctuance or warmth surrounding the ulcer(s).  Exudate: none     Odor:  absent   Wound Shape irregular    VASC Wound 12/04/18 left leg (Active)   Pre Size Length 0.5 12/4/2018  1:00 PM   Pre Size Width 0.4 12/4/2018  1:00 PM   Pre Total Sq cm 0.2 12/4/2018  1:00 PM   Post Size Length 0.2 12/4/2018  1:00 PM   Post Size Width 0.2 12/4/2018  1:00 PM   Post Size Depth 0 12/4/2018  1:00 PM   Description scab 12/4/2018  1:00 PM       Photo  None taken    Laboratory studies:     Lab Results   Component Value Date    WBC 5.9 02/21/2018    HGB 14.7 02/21/2018    HCT 43.8 02/21/2018    MCV 93 02/21/2018     02/21/2018     Lab Results   Component Value Date    CREATININE 0.75 02/21/2018     Lab Results   Component Value Date    BUN 10 02/21/2018     Lab Results   Component Value Date    CRP 0.2 02/21/2018     Lab Results   Component Value Date    SEDRATE 6 02/21/2018     Lab Results   Component Value Date    ALBUMIN 4.0 09/20/2012     Lab Results   Component Value Date    HGBA1C 5.8 02/21/2018       Lab Results   Component Value Date    TSH 5.88 (H) 02/21/2018         No results found for: BNP  Results for orders placed or performed during the hospital encounter of 09/04/12   Comprehensive Metabolic Panel   Result Value Ref Range    Glucose 80 70 - 125 mg/dL    BUN 12 8 - 22 mg/dL    Creatinine 0.76 0.60 - 1.10 mg/dL    GFR MDRD Non Af Amer >60 >60 ml/min/1.73m2    GFR MDRD Af Amer >60 >60 ml/min/1.73m2    Bilirubin, Total 0.7 <1.1 mg/dL    AST 22 <41 IU/L    ALT 20 <46 IU/L    Alkaline Phosphatase 51 45 - 120 IU/L    Sodium 142 136 - 145 mmol/L    Potassium 3.9 3.5 - 5.0 mmol/L    CO2 27 22 - 31 mmol/L    Chloride 105 98 - 107 mmol/L    Anion Gap, Calculation 9 5 - 18 mmol/L    Calcium 9.7 8.5 - 10.5 mg/dL    Protein, Total 6.9 6.0 -  8.0 g/dL    Albumin 4.0 3.5 - 5.0 g/dL    Globulin 3.0 1.5 - 3.5 g/dL    A/G Ratio 1.3 1.0 - 2.2     Vitamin D, Total (25-Hydroxy)   Date Value Ref Range Status   02/21/2018 28.9 (L) 30.0 - 80.0 ng/mL Final       Imaging:  None pertinent     Assessment:  1. Ulcer of left lower extremity, limited to breakdown of skin (H)     2. Vitamin D deficiency  Change dressing   3. Zinc deficiency     4. Obesity (BMI 30.0-34.9)     5. Tobacco abuse     6. Secondary hypertension         Assessment/Plan:  1.  Debridement of the left leg ulcer was recommended.  After consent was obtained and topical 2% Xylocaine was applied under clean conditions, and using a #15 blade,the devitalized dermal tissue was debrided for a total square centimeters of .2. Following debridement, there was a decrease in the nonviable tissue. The patient tolerated the procedure without any difficulty.     2.  Left leg ulcer, almost healed.    3.  Hypertension, she has taken her BP med today and refuses a recheck. She plans to go home after her visit to take her medication.    3. Treatment: Wound treatment will include irrigation and dressings to promote autolytic debridement.   Will apply Tegaderm + pad and change it twice per week.     4.   Patient will follow up with me in 4 weeks  for reevaluation.         Marlen Vyas, APRN, CNP, [facility]  [phone]

## 2021-06-25 NOTE — TELEPHONE ENCOUNTER
Please call patient -    ______________________________________________________________________     Home phone:  [phone] (home)     Cell phone:   Telephone Information:   Mobile [phone]       Other contacts:  Name Home Phone Work Phone Mobile Phone Relationship Lgl Mariella   GUERA MAIER [phone]   Friend      ______________________________________________________________________     We got a request for refills of the prednisone and the levofloxacin (Levaquin).    If she is not improving, she should be scheduled for a follow up.  Dr. Cueto today still has openings.    Ricardo Davila MD  [facility]  6/16/2021, 9:02 AM

## 2021-06-25 NOTE — TELEPHONE ENCOUNTER
Marlen reports ongoing wheezing and cough > 2 weeks.  Cough has somehow improved but now has brownish sputum.    Seen on 5/27 at the clinic and completed doxycycline and prednisone.    No shortness of breath.    Says inhalers help a little, but would prefer nebulizers in the future.    PLAN:  - clinic visit today per protocol.  - care advice reviewed  - call back for further concerns  - patient verbalized understanding  - transferred to     Lisa Dawson RN/[place] Nurse Advisor            Reason for Disposition    [1] MILD difficulty breathing  (e.g., minimal/no SOB at rest, SOB with walking) AND [2] worse than normal    Coughing up minor-colored (reddish-brown) sputum    Additional Information    Negative: SEVERE difficulty breathing (e.g., struggling for each breath, speaks in single words, pulse > 120)    Negative: Bluish (or gray) lips or face now    Negative: Difficult to awaken or acting confused (e.g., disoriented, slurred speech)    Negative: Sounds like a life-threatening emergency to the triager    Negative: [1] MODERATE difficulty breathing (e.g., speaks in phrases, SOB even at rest) AND [2] worse than normal    Negative: Patient sounds very sick or weak to the triager    Negative: Fever > 100.4 F (38.0 C)    Negative: Nurse judgment    Negative: [1] Monitoring oxygen level (e.g., pulse oximetry) AND [2] fall in oxygen level 4% or more (below known patient baseline, while awake and resting) AND [3] new difficulty breathing or worse than when discharged from hospital    Negative: [1] Monitoring respiratory rate AND [2] rise in respiratory rate 4 or more breaths per minute (above known patient baseline, while awake and resting) AND [3] new difficulty breathing or worse than when discharged from hospital    Negative: [1] Monitoring heart rate AND [2] heart rate > 120 beats per minute    Negative: [1] Caller has URGENT question AND [2] triager unable to answer question    Protocols used: COPD  OXYGEN MONITORING AND [id number]

## 2021-06-25 NOTE — TELEPHONE ENCOUNTER
RN cannot approve Refill Request    RN can NOT refill this medication med is not covered by policy/route to provider. Last office visit: 6/7/2021 Oumar Morin MD Last Physical: Visit date not found Last MTM visit: Visit date not found Last visit same specialty: 6/7/2021 Oumar Morin MD.  Next visit within 3 mo: Visit date not found  Next physical within 3 mo: Visit date not found      Rachana Correia, [facility] Triage/Med Refill 6/16/2021    Requested Prescriptions   Pending Prescriptions Disp Refills     levoFLOXacin (LEVAQUIN) 750 MG tablet 5 tablet 0     Sig: Take 1 tablet (750 mg total) by mouth daily for 5 days.       There is no refill protocol information for this order        predniSONE (DELTASONE) 20 MG tablet 10 tablet 0     Sig: Take 40 mg by mouth daily for 5 days.       There is no refill protocol information for this order

## 2021-06-26 NOTE — PROGRESS NOTES
Progress Notes by Ricardo Davila MD at 2/6/2018  2:15 PM     Author: Ricardo Davila MD Service: -- Author Type: Physician    Filed: 2/8/2018  3:14 PM Encounter Date: 2/6/2018 Status: Signed    : Ricardo Davila MD (Physician)              [facility]    Comprehensive and complex medical care - Chronic disease management - Shared decision making - Care coordination - Compassionate care    Patient advocacy - Rational deprescribing - Minimally disruptive medicine - Ethical focus - Customized care    ______________________________________________________________________     Date of Service: 2/6/2018  Primary Provider: Ricardo Davila MD    Patient Care Team:  Ricardo Davila MD as PCP - General (Internal Medicine)     ______________________________________________________________________     Patient's Pharmacy:    [id number] - [place], [place] - [address] AT [facility] [address]  [address] [facility] [address]  Phone: [phone] Fax: [phone]     Patient's Contacts:  Name Home Phone Work Phone Mobile Phone Relationship Lgl GUERA Pablo [phone]   Friend      Patient's Insurance:    Payor: PREFERRED ONE / Plan: PREFERED ONE INDIVIDUAL PLANS / Product Type: ACO /     ______________________________________________________________________     Marlen BURNS Skmarisa is 57 y.o. female who comes in today for:     Chief Complaint   Patient presents with   ? Nicotine Dependence     chantex, patches   ? Immunizations     pneumo, zoster   ? Referral     colonscopy   ? Follow-up     has start of COPD and would like chest x-ray has been coughing more       Patient Active Problem List   Diagnosis   ? Tobacco abuse   ? COPD (chronic obstructive pulmonary disease)   ? HTN (hypertension)   ? HLD (hyperlipidemia)   ? Hx of laparoscopic adjustable gastric banding   ? Obesity (BMI 30.0-34.9)   ? Zinc deficiency   ? Vitamin D  deficiency     Past Medical History:   Diagnosis Date   ? COPD (chronic obstructive pulmonary disease)    ? HLD (hyperlipidemia)    ? HTN (hypertension)    ? Hx of laparoscopic adjustable gastric banding 10/01/2008    Initial weight 295.5 BMI 50.4 Dr. Rowell   ? Hymenoptera allergy    ? Hypothyroidism    ? Tobacco abuse    ? Vitamin D deficiency 8/24/2016      Current Outpatient Prescriptions   Medication Sig   ? albuterol (PROVENTIL HFA;VENTOLIN HFA) 90 mcg/actuation inhaler Inhale 2 puffs every 4 (four) hours as needed for wheezing.   ? EPINEPHrine (EPIPEN) 0.3 mg/0.3 mL atIn Inject 0.3 mL (0.3 mg total) into the shoulder, thigh, or buttocks once for 1 dose.   ? omeprazole (PRILOSEC) 20 MG capsule TAKE 1 CAPSULE BY MOUTH DAILY.   ? nicotine (NICODERM CQ) 14 mg/24 hr Place 1 patch on the skin daily. Then start 7 mg patch.   ? nicotine (NICODERM CQ) 21 mg/24 hr Place 1 patch on the skin daily. Then start 14 mg patch.   ? nicotine (NICODERM CQ) 7 mg/24 hr Place 1 patch on the skin daily. Then stop   ? triamterene-hydrochlorothiazide (DYAZIDE) 37.5-25 mg per capsule Take 1 capsule by mouth daily.   ? varenicline (CHANTIX) 1 mg tablet Take 1 tablet (1 mg total) by mouth 2 (two) times a day. Take 0.5 tab a day for 3 days, then 0.5 mg BID for 4 days before starting full dose     Social History     Social History   ? Marital status: Single     Spouse name: N/A   ? Number of children: 0   ? Years of education: N/A     Occupational History   ? /IT [employer]     Social History Main Topics   ? Smoking status: Current Every Day Smoker     Packs/day: 1.00     Types: Cigarettes   ? Smokeless tobacco: Never Used   ? Alcohol use None   ? Drug use: None   ? Sexual activity: Not Asked     Other Topics Concern   ? None     Social History Narrative     Family History   Problem Relation Age of Onset   ? Heart attack Mother 58   ? Hypertension Mother    ? COPD Father    ? Hypertension Father    ? Anxiety disorder  "Father    ? Hypertension Brother       Family history is otherwise noncontributory.    ______________________________________________________________________     History of present illness:    Have not seen the patient for over 2 years.  Lots of ground to cover.    Wishes to go forward with colonoscopy.  Declines mammo and pap.   Wants to update immunizations.    Has had elevated blood pressure.  Multiple times angela when seeing dentist.  No alysa or SOB with this.  No chest pain.  Would like to go ahead on medication.    Wants to stop smoking at this time and would like to try chantix and patches.    Chronic cough worsening in the last year.  Has previously been told she has some early COPD.  This is why she wants to stop smoking.  No severe SOB.  Discussed that we can be aggressive in looking at this.  No gastroesophageal reflux disease and no PND.    Having joint pain in the MCPs and PIPs of the hands but mostly the mcps.  Worse with use rather than with rest.  Seems to be worse in weather change.    Reviewed the patient's thyroid and she has no current concerns in relationship to this. Off medication and TSH okay.    We reviewed her other issues noted in the assessment but not specifically addressed in the HPI above.     We reviewed her other issues noted in the assessment but not specifically addressed in the HPI above.     10 point review of systems is negative unless noted above.  Weight loss is intentional according to patient.  ______________________________________________________________________     Exam:    Wt Readings from Last 3 Encounters:   02/06/18 162 lb (73.5 kg)   09/15/16 180 lb (81.6 kg)   08/17/16 197 lb 8 oz (89.6 kg)     BP Readings from Last 3 Encounters:   02/06/18 138/86   09/15/16 (!) 168/105   08/17/16 178/78      /86  Pulse 97  Ht 5' 3.75\" (1.619 m)  Wt 162 lb (73.5 kg)  SpO2 98%  BMI 28.03 kg/m2   The patient is comfortable, no acute distress.  Mood good.  Insight is good.  No " skin lesions or nodules of concern.  Ears clear.  Eyes are nonicteric.  Pupils equal and reactive.  Throat is clear.  Neck is supple without mass, no thyromegaly.  Carotids are clear.  No cervical or epitrochlear adenopathy.  Heart regular rate and rhythm.  Lungs clear to auscultation bilaterally.  Respiratory effort good.  Abdomen soft and nontender.  No hepatosplenomegaly.  Extremities show no edema.      ______________________________________________________________________    Diagnostics:    Results for orders placed or performed in visit on 08/17/16   Vitamin D, Total (25-Hydroxy)   Result Value Ref Range    Vitamin D, Total (25-Hydroxy) 17.1 (L) 30.0 - 80.0 ng/mL   Vitamin A   Result Value Ref Range    Free Retinol (Vit A) 40.0 32.5 - 78.0 mcg/dL   Zinc, Serum   Result Value Ref Range    Zinc 0.55 (L) 0.66 - 1.10 mcg/mL   Parathyroid Hormone Intact   Result Value Ref Range    PTH 67 10 - 86 pg/mL   Thiamin (Vitamin B1), Whole Blood   Result Value Ref Range    Thiamin (Vitamin B1), Whole Blood 80 70 - 180 nmol/L   HM2(CBC w/o Differential)   Result Value Ref Range    WBC 5.2 4.0 - 11.0 thou/uL    RBC 4.51 3.80 - 5.40 mill/uL    Hemoglobin 14.1 12.0 - 16.0 g/dL    Hematocrit 42.6 35.0 - 47.0 %    MCV 94 80 - 100 fL    MCH 31.2 27.0 - 34.0 pg    MCHC 33.1 32.0 - 36.0 g/dL    RDW 14.5 11.0 - 14.5 %    Platelets 162 140 - 440 thou/uL    MPV 9.4 7.0 - 10.0 fL   Ferritin   Result Value Ref Range    Ferritin 158 (H) 10 - 130 ng/mL   Thyroid Stimulating Hormone (TSH)   Result Value Ref Range    TSH 3.65 0.30 - 5.00 uIU/mL      ______________________________________________________________________    Pertinent radiology for this visit includes the following:    Xr Chest 2 Views    Result Date: 2/6/2018  EXAM DATE:         02/06/2018 [facility] X-RAY CHEST, 2 VIEWS, FRONTAL AND LATERAL 2/6/2018 4:15 PM INDICATION: Bilateral hand pain COMPARISON: Cough. FINDINGS: The lungs are clear. The heart size and  pulmonary vascularity are normal. There is no pneumothorax. There is minimal blunting of the posterior costophrenic angles no large pleural effusion. This may represent pleural scarring. There is a lap band in place over the stomach and left upper quadrant of the abdomen.     Xr Hands Bilateral Pa Vw    Result Date: 2/6/2018  EXAM DATE:         02/06/2018 [facility] X-RAY HANDS BILATERAL, TWO VIEWS 2/6/2018 3:45 PM INDICATION: Bilateral hand pain COMPARISON: None. FINDINGS: Frontal view of the hands show polyarticular degenerative changes involving the MCP and IP joints bilaterally, right greater than left. No fracture or dislocation. No erosive change.       ______________________________________________________________________    ______________________________________________________________________     Assessment:    1. Cough    2. Immunization due    3. Tobacco abuse    4. Hand pain    5. Screen for colon cancer    6. Mammogram declined    7. HTN (hypertension)    8. COPD (chronic obstructive pulmonary disease)    9. Hypothyroidism    10. Papanicolaou smear declined       ______________________________________________________________________     PHQ-2 Total Score: 0 (2/6/2018  2:00 PM)  No Data Recorded   ______________________________________________________________________     Plan:    1. Check XRAYS today.  BW in the near future.  2. Immunizations done.  Pneumovax in 1 year.  3. Consider PFTs and HRCT.  4. Refilled epi.  5. Order colonoscopy.  6. Start of triamterene/hydrochlorothiazide for blood pressure.  7. Chantix and Patches for smoking cessation.    Ricardo Davila MD  General Internal Medicine  [facility]    Return in about 1 year (around 2/6/2019), or if symptoms worsen or fail to improve.    Future Appointments  Date Time Provider Department Center   2/9/2018 10:30 AM JN PFT RM 1 JN RT JN   2/16/2018 10:30 AM Julissa Espinal MD [facility]

## 2021-06-26 NOTE — TELEPHONE ENCOUNTER
Medication pended from fax copy:         Medication: prednisone   Last appointment: 6.17.21  Last 3 blood pressures:  BP Readings from Last 3 Encounters:   06/17/21 120/74   06/07/21 138/76   05/27/21 150/86     Pharmacy left message stating the prescription should be for 30 tablets instead of 27 tabs.     Shahid'd up medication as 30 tablets.

## 2021-06-26 NOTE — PROGRESS NOTES
"Progress Notes by Cyndi Eugene CNP at 11/12/2018  7:00 AM     Author: Cyndi Eugene CNP Service: -- Author Type: Nurse Practitioner    Filed: 11/12/2018  1:59 PM Encounter Date: 11/12/2018 Status: Signed    : Cyndi Eugene CNP (Nurse Practitioner)       Subjective:      Patient ID: Marlen Dumas is a 58 y.o. female.    Chief Complaint:   Chief Complaint   Patient presents with   ? sore on front of left shin x 1 month      hit leg on some scaffeling.  Scabbed and not healing.         HPI : States knocked left shin onto scaffolding at home and had a \"deep\" wound lt shin on Oct 20th. Was putting neosporin on it, now is dry. Overlies tibia on shin.  Bone started hurting approximately 2 inches above and below wound approx 1 week ago.      Was draining pus at one point, but not now.  \"I think it went all the way to the bone, but after it happened, I went on vacation to [place] and forgot about it for awhile.\" Has not been evaluated for this nor had treatment for this.     No fever/chills.     Tdap was done in 2018.    Past Medical History:   Diagnosis Date   ? COPD (chronic obstructive pulmonary disease) (H)    ? HLD (hyperlipidemia)    ? HTN (hypertension)    ? Hx of laparoscopic adjustable gastric banding 10/01/2008    Initial weight 295.5 BMI 50.4 Dr. Rowell   ? Hymenoptera allergy    ? Hypothyroidism    ? Tobacco abuse    ? Tobacco use    ? Vitamin D deficiency 8/24/2016       Past Surgical History:   Procedure Laterality Date   ? FOOT SURGERY      Sting ray stacia   ? LAPAROSCOPIC GASTRIC BANDING  2008   ? LUMBAR DISC SURGERY         Family History   Problem Relation Age of Onset   ? Heart attack Mother 58   ? Hypertension Mother    ? COPD Father    ? Hypertension Father    ? Anxiety disorder Father    ? Hypertension Brother        Social History     Tobacco Use   ? Smoking status: Current Every Day Smoker     Packs/day: 1.00     Years: 30.00     Pack years: 30.00     Types: Cigarettes   ? Smokeless " tobacco: Never Used   ? Tobacco comment: age 14 to present -8 yrs    Substance Use Topics   ? Alcohol use: Yes     Alcohol/week: 4.2 oz     Types: 7 Glasses of wine per week   ? Drug use: No       Review of Systems   Constitutional: Negative for chills and fever.   Cardiovascular: Negative for leg swelling.   Musculoskeletal: Negative for gait problem.       Objective:     /80 (Patient Site: Left Arm, Patient Position: Sitting, Cuff Size: Adult Regular)   Pulse 68   Temp 98.1  F (36.7  C) (Oral)   Resp 20   Wt 163 lb 6.4 oz (74.1 kg)   SpO2 96%   BMI 28.27 kg/m      Physical Exam   Constitutional: She is oriented to person, place, and time. She appears well-developed and well-nourished.   Eyes: Conjunctivae are normal.   Cardiovascular: Intact distal pulses.   Pulmonary/Chest: Effort normal.   Musculoskeletal: She exhibits no edema or tenderness (Area of pain on bone).   Normal gait    Neurological: She is alert and oriented to person, place, and time.   Skin: Skin is warm and dry.   Dry, scabbed wound located left mid-shin 1.5x1.5cm overlying tibia. Has central, pencil-eraser sized central deep red scabbing that appears to have been deeper.  No drainage.  No noticeable soft tissue swelling nor erythema expanding.   Psychiatric: She has a normal mood and affect. Her behavior is normal. Judgment and thought content normal.     No results found for this or any previous visit (from the past 24 hour(s)).  Xr Tibia And Fibula Left    Result Date: 11/12/2018  EXAM DATE:         11/12/2018 [facility] X-RAY TIBIA FIBULA LEFT, AP AND LATERAL 11/12/2018 7:30 AM INDICATION: Wound left shin, bone pain.  check for osteomyelitis. COMPARISON: None. FINDINGS: Negative left tibia and fibula. No fracture. No evidence of osteomyelitis. Few small calcifications along the anterior margin of the distal tibia.           Assessment:     Procedures    The primary encounter diagnosis was Pain of left lower leg.  A diagnosis of Wound infection was also pertinent to this visit.    Plan:     Diagnoses and all orders for this visit:    Pain of left lower leg  -     XR Tibia and Fibula Left; Future; Expected date: 11/12/2018  -     XR Tibia and Fibula Left    Wound infection    Other orders  -     cephalexin 500 mg tablet; Take 500 mg by mouth 4 (four) times a day for 10 days.  Dispense: 40 tablet; Refill: 0    Differential includes osteomyelitis, fracture, soft tissue infection that is spreading from point of initial wound.  Concern for osteomyelitis due to patient statement that she believes the initial wound went down to the bone and was never treated.  Currently wound is scabbed over, there is a obvious central area of overall wound that appears like it was deeper.    We will treat as a increasing soft tissue infection even though there are no other signs of cellulitis noted as there is no fracture or osteomyelitis noted on x-ray.  We will not further pursue diagnostic imaging for osteomyelitis at this time given lack of systemic symptoms.      At the end of the encounter, I discussed results, diagnosis, medications. Discussed red flags for immediate return to clinic/ER, as well as indications for follow up if no improvement. Patient and/or caregiver  understood and agreed to plan. Patient was stable for discharge.    Patient Instructions   We will treat this as if there is an expanding skin infection from the point of the initial wound.  No evidence of bone infection on your x-ray.      You can take Tylenol 1000 mg 3-4 times daily for pain if needed.    Recheck 3-4 days, return to walk-in clinic if wound appearance visibly worsens.    Patient Education     Wound Check, Infection  You have a wound that has become infected. The wound will not heal properly unless the infection is cleared. Infection in a wound may also spread if it is not treated. In most cases, antibiotic medicines are prescribed to treat a wound  infection.   Symptoms of a wound infection include:    Redness or swelling around the wound    Warmth coming from the wound    New or worsening pain    Red streaks around the wound    Draining pus    Fever  Home care  Follow all directions you are given to treat the infection.  Medicines  Take all medicines as prescribed.     If you were given antibiotics, take them until they are gone or your healthcare provider tells you to stop. It is vital to finish the antibiotics even if you feel better. If you do not finish them, the infection may come back and be harder to treat.    If your infection is not responding to the medicines you are taking, you may be prescribed new medicines.    Take medicine for pain as directed by your healthcare provider.  Wound care  Care for your wound as directed by your healthcare provider.    Apply a warm compress (clean cloth soaked in hot water) to the infected area for about 5 to 10 minutes at a time. Be very careful not to burn yourself. Test the cloth on a non-infected area to make sure it is not too hot.    Continue to change the dressing daily. If it becomes wet, stained with wound fluid, or dirty, change it sooner. To change it:  ? Wash your hands with soap and water before changing the dressing.  ? Carefully remove the dressing and tape. If it sticks to the wound, you may need to wet it a little to remove it. (Do not do this if your healthcare provider has told you not to.)  ? Gently clean the wound with clean water (or saline) using gauze, a clean washcloth, or cotton swab.  ? Do not use soap, alcohol, peroxide or other cleansers.  ? If you were told to dry the wound before putting on a new dressing, gently pat. Do not rub.  ? Throw out the old dressing.  ? Wash your hands again before opening the new, clean dressing.  ? Wash your hands again when you are done.  Follow-up care  Follow up with your healthcare provider as advised. If a culture was done, you will be notified if your  treatment needs to change. Call as directed for the results.  When to seek medical advice  Call your health care provider right away if any of these occur:    Symptoms of infection don't start to improve within 2 days of starting antibiotics    Symptoms of infection get worse    New symptoms, such as red streaks around the wound    Fever of 100.4 F (38.0 C) or higher for more than 2 days after starting the antibiotics  Date Last Reviewed: 8/10/2015    [id number] [employer]. [address] [place], [place] [id number]. All rights reserved. This information is not intended as a substitute for professional medical care. Always follow your healthcare professional's instructions.

## 2021-06-26 NOTE — PROGRESS NOTES
"    Assessment & Plan     Cough secondary to COPD smoking-related 40+ pack years.  Check CT scan of the chest today plus prednisone 40 mg daily for 5 days plus levofloxacin 750 mg daily for 5 days plus stop smoking plus see PCP Dr. FAJARDO.    Review of external notes as documented in note  25 minutes spent on the date of the encounter doing chart review, review of test results, interpretation of tests, patient visit and documentation        Tobacco Cessation:   reports that she has been smoking cigarettes. She has a 27.75 pack-year smoking history. She has never used smokeless tobacco.  I have had an Advance Directives discussion with the patient.  BMI:   Estimated body mass index is 25.66 kg/m  as calculated from the following:    Height as of this encounter: 5' 4.75\" (1.645 m).    Weight as of this encounter: 153 lb (69.4 kg).   I have had an Advance Directives discussion with the patient.    No follow-ups on file.    Oumar Morin MD  [facility]    Subjective   Marlen Dumas is 61 y.o. and presents today for the following health issues   HPI         Smoked for a long time quit for 8 years.  Cough is persistent.  Trying to stop smoking smokes now three-quarter pack to 1 pack/day.    Wheezes brownish sputum that is yellow at x2 weeks duration lack of energy.    [facility] those reports and records reviewed in detail.  Says she is slightly across the phobic but with MRI.  CT in the past okay.    Review of Systems  No blood in sputum no blood in stool or urine medical [place] list reviewed.  Smokes three-quarter pack to a pack per day no excess alcohol.      Objective    /76 (Patient Site: Right Arm, Patient Position: Sitting)   Pulse 80   Temp 97.4  F (36.3  C)   Ht 5' 4.75\" (1.645 m)   Wt 153 lb (69.4 kg)   SpO2 95%   BMI 25.66 kg/m    Body mass index is 25.66 kg/m .  Physical Exam  Scattered musical rales rhonchi posteriorly diminished breath " sounds heart tones distant abdomen benign extremities free of edema neck veins nondistended no thyromegaly no scleral icterus no clubbing or cyanosis not toxic.  O2 sats 95% respiratory rate 20 pulse 80 afebrile at 97.4.    Not in acute distress not toxic no central acrocyanosis no tachypnea.  Does not appear acutely or chronically ill.  Short cropped hair pleasant and wears glasses.

## 2021-06-26 NOTE — PROGRESS NOTES
"Progress Notes by Monroe Garcia PA-C at 5/29/2018  9:20 AM     Author: Monroe Garcia PA-C Service: -- Author Type: Physician Assistant    Filed: 5/29/2018 10:00 AM Encounter Date: 5/29/2018 Status: Signed    : Monroe Garcia PA-C (Physician Assistant)       Subjective:      Patient ID: Marlen Dumas is a 58 y.o. female.    Chief Complaint:    HPI  Marlen Dumas is a 58 y.o. female smoker who presents today complaining of painful left plugged ear.  Patient is a current smoker.  She has had no otorrhea balance but has had decreased hearing on the left side but no overt hearing loss.  She does not have any rhinorrhea vomiting diarrhea skin rash abdominal pain or urinary symptoms.  She does have a dry nonproductive cough and she is a current smoker.  He has tried over-the-counter Sudafed for decongestion that she says usually fixes it in a \"day or 2\" has not had relief.  She is also in known hypertensive and her blood pressure is 138/82 in the office today.      Past Medical History:   Diagnosis Date   ? COPD (chronic obstructive pulmonary disease)    ? HLD (hyperlipidemia)    ? HTN (hypertension)    ? Hx of laparoscopic adjustable gastric banding 10/01/2008    Initial weight 295.5 BMI 50.4 Dr. Rowell   ? Hymenoptera allergy    ? Hypothyroidism    ? Tobacco abuse    ? Tobacco use    ? Vitamin D deficiency 8/24/2016       Past Surgical History:   Procedure Laterality Date   ? FOOT SURGERY      Sting ray stacia   ? LAPAROSCOPIC GASTRIC BANDING  2008   ? LUMBAR DISC SURGERY         Family History   Problem Relation Age of Onset   ? Heart attack Mother 58   ? Hypertension Mother    ? COPD Father    ? Hypertension Father    ? Anxiety disorder Father    ? Hypertension Brother        Social History   Substance Use Topics   ? Smoking status: Current Every Day Smoker     Packs/day: 1.00     Years: 30.00     Types: Cigarettes   ? Smokeless tobacco: Never Used      Comment: age 14 to present -8 yrs    ? Alcohol use 4.2 " oz/week     7 Glasses of wine per week       Review of Systems  As above in HPI, otherwise negative.    Objective:     /82 (Patient Site: Right Arm, Patient Position: Sitting, Cuff Size: Adult Regular)  Pulse 73  Temp 98.3  F (36.8  C) (Oral)   Resp 16  Wt 167 lb (75.8 kg)  SpO2 96%  BMI 28.89 kg/m2    Physical Exam  General: Patient is resting comfortably no acute distress is afebrile  HEENT: Head is normocephalic atraumatic eyes are PERRL EOMI sclera anicteric   TMs are with fluid in the middle ear and dull cone of light reflex bilaterally  Throat is with mild pharyngeal wall erythema and mild exudate  No cervical lymphadenopathy present  Lungs: Clear to auscultation bilaterally  Heart: Regular rate and rhythm  Skin: Without rash non-diaphoretic      Assessment:     Procedures    The encounter diagnosis was Dysfunction of left eustachian tube.    Plan:     1. Dysfunction of left eustachian tube  fluticasone (FLONASE ALLERGY RELIEF) 50 mcg/actuation nasal spray    cetirizine (ZYRTEC) 10 MG tablet       I had a conversation with the patient regarding over-the-counter decongestant such as Sudafed and hypertension as well as cardiovascular risk. Advised her that there is increased risk with less benefit of treatment, I would like her to discontinue the Sudafed and supplant that with Zyrtec and Flonase as written.  She is also told that this can persist for several weeks.  Follow-up with primary care provider if not getting good resolution or if other complications or new symptoms present.    Patient Instructions     1) Increase fluids and rest  2) Try Mucinex and Neti pot over the counter for congestion  3) Continue taking Tylenol for fever relief  4) Return to primary care provider for reevaluation treatment if any complication or sequela should present.      As a result of our visit today, here are the action plans for you:    1. Medication(s) to stop: There are no discontinued medications.    2.  Medication(s) to start or change:   Medications Ordered   Medications   ? cetirizine (ZYRTEC) 10 MG tablet     Sig: Take 1 tablet (10 mg total) by mouth daily.     Dispense:  30 tablet     Refill:  0   ? fluticasone (FLONASE ALLERGY RELIEF) 50 mcg/actuation nasal spray     Si spray into each nostril daily.     Dispense:  16 g     Refill:  0       3. Other instructions: Yes         Earache, No Infection (Adult)  Earaches can happen without an infection. This occurs when air and fluid build up behind the eardrum causing a feeling of fullness and discomfort and reduced hearing. This is called otitis media with effusion (OME) or serous otitis media. It means there is fluid in the middle ear. It is not the same as acute otitis media, which is typically from infection.  OME can happen when you have a cold if congestion blocks the passage that drains the middle ear. This passage is called the eustachian tube. OME may also occur with nasal allergies or after a bacterial middle ear infection.    The pain or discomfort may come and go. You may hear clicking or popping sounds when you chew or swallow. You may feel that your balance is off. Or you may hear ringing in the ear.  It often takes from several weeks up to 3 months for the fluid to clear on its own. Oral pain relievers and ear drops help if there is pain. Decongestants and antihistamines sometimes help. Antibiotics don't help since there is no infection. Your doctor may prescribe a nasal spray to help reduce swelling in the nose and eustachian tube. This can allow the ear to drain.  If your OME doesn't improve after 3 months, surgery may be used to drain the fluid and insert a small tube in the eardrum to allow continued drainage.  Because the middle ear fluid can become infected, it is important to watch for signs of an ear infection which may develop later. These signs include increased ear pain, fever, or drainage from the ear.  Home care  The following  guidelines will help you care for yourself at home:    You may use over-the-counter medicine as directed to control pain, unless another medicine was prescribed. If you have chronic liver or kidney disease or ever had a stomach ulcer or GI bleeding, talk with your doctor before using these medicines. Aspirin should never be used in anyone under 18 years of age who is ill with a fever. It may cause severe liver damage.    You may use over-the-counter decongestants such as phenylephrine or pseudoephedrine. But they are not always helpful. Don't use nasal spray decongestants more than 3 days. Longer use can make congestion worse. Prescription nasal sprays from your doctor don't typically have those restrictions.    Antihistamines may help if you are also having allergy symptoms.    You may use medicines such as guaifenesin to thin mucus and promote drainage.  Follow-up care  Follow up with your healthcare provider or as advised if you are not feeling better after 3 days.  When to seek medical advice  Call your healthcare provider right away if any of the following occur:    Your ear pain gets worse or does not start to improve     Fever of 100.4 F (38 C) or higher, or as directed by your healthcare provider    Fluid or blood draining from the ear    Headache or sinus pain    Stiff neck    Unusual drowsiness or confusion  Date Last Reviewed: 10/1/2016    5050-6628 [employer]. [address] [place] [address], [place] [address]. All rights reserved. This information is not intended as a substitute for professional medical care. Always follow your healthcare professional's instructions.

## 2021-06-27 NOTE — PROGRESS NOTES
Progress Notes by Ricardo Davila MD at 5/7/2019  3:05 PM     Author: Ricardo Davila MD Service: -- Author Type: Physician    Filed: 5/8/2019  7:27 PM Encounter Date: 5/7/2019 Status: Signed    : Ricardo Davila MD (Physician)              [facility]    Comprehensive and complex medical care - Chronic disease management - Shared decision making - Care coordination - Compassionate care    Patient advocacy - Rational deprescribing - Minimally disruptive medicine - Ethical focus - Customized care    ______________________________________________________________________     Date of Service: 5/7/2019  Primary Provider: Ricardo Davila MD    Patient Care Team:  Ricardo Davila MD as PCP - General (Internal Medicine)  Julissa Hager MD as Physician (Family Medicine)     ______________________________________________________________________     Patient's Pharmacy:    [id number] - [facility] [address] AT [facility] &  [address] [facility] [address]  Phone: [phone] Fax: [phone]     Patient's Contacts:  Name Home Phone Work Phone Mobile Phone Relationship Lgl GUERA Pablo [phone]   Friend      Patient's Insurance:    Payor: PREFERRED ONE / Plan: PREFERED ONE INDIVIDUAL PLANS / Product Type: ACO /     ______________________________________________________________________     Marlen Dumas is 58 y.o. female who comes in today for:    Chief Complaint   Patient presents with   ? Medication Management     BP       Active Problem List:  Problem List as of 5/7/2019 Reviewed: 5/7/2019  5:15 PM by Ricardo Davila MD       High    Tobacco abuse    COPD (chronic obstructive pulmonary disease) (H)       Medium    HTN (hypertension)    Hx of laparoscopic adjustable gastric banding       Low    HLD (hyperlipidemia)    Obesity (BMI 30.0-34.9)       Unprioritized    Non-healing wound of lower  extremity, left, subsequent encounter    Normal colonoscopy - 2018 - Average risk    Vitamin D deficiency    Zinc deficiency           Current Outpatient Medications   Medication Sig   ? cetirizine (ZYRTEC) 10 MG tablet Take 1 tablet (10 mg total) by mouth daily.   ? fluticasone (FLONASE ALLERGY RELIEF) 50 mcg/actuation nasal spray 1 spray into each nostril daily.   ? omeprazole (PRILOSEC) 40 MG capsule TAKE 1 CAPSULE(40 MG) BY MOUTH DAILY BEFORE BREAKFAST   ? amLODIPine (NORVASC) 5 MG tablet Take 1 tablet (5 mg total) by mouth daily.   ? EPINEPHrine (EPIPEN/ADRENACLICK/AUVI-Q) 0.3 mg/0.3 mL injection Inject 0.3 mL (0.3 mg total) into the shoulder, thigh, or buttocks once for 1 dose.   ? triamterene-hydrochlorothiazide (DYAZIDE) 37.5-25 mg per capsule Take 1 capsule by mouth daily.     Social History     Social History Narrative    Lives with a friend.12/4/2018     ______________________________________________________________________     History of present illness:    In for follow up multiple issues.    Concerned about measles immunity due to works at [employer] and exposed to multiple people from all walks of life.  Would like a check for immunity.    Reviewed her hypertension today.  Blood pressure has NOT been in the goal range.  Denies any excessive dizziness from the medication with this.  I disucssed adding an additonal medication for this and she would consider this.    Reviewed chronic obstructive lung disease and there are no new issues with this.     Needs EPI pen for hymenoptera allergy.    Reviewed cholesterol as well.    Patient will get pneumovax and consider Shingrix.  Not interested in pap nor mammogram at this time.    We reviewed her other issues noted in the assessment but not specifically addressed in the HPI above.     On review of systems, the patient denies any chest pain or shortness of breath.    ______________________________________________________________________    Exam:    Wt  "Readings from Last 3 Encounters:   05/07/19 175 lb (79.4 kg)   12/04/18 160 lb (72.6 kg)   11/15/18 163 lb (73.9 kg)     BP Readings from Last 3 Encounters:   05/07/19 144/90   12/04/18 (!) 148/100   11/15/18 134/80     /90   Pulse 84   Ht 5' 3.75\" (1.619 m)   Wt 175 lb (79.4 kg)   BMI 30.27 kg/m     The patient is comfortable, no acute distress.  Mood good.  Insight good.  Eyes are nonicteric.  Neck is supple without mass.  No cervical adenopathy.  No thyromegaly. Heart regular rate and rhythm.  Lungs clear to auscultation bilaterally.  Respiratory effort is good.  Abdomen soft and nontender.  No hepatosplenomegaly.  Extremities no edema.    ______________________________________________________________________    Diagnostics:    Results for orders placed or performed in visit on 05/07/19   Rubeola Antibody, IgG   Result Value Ref Range    Rubeola Antibody, IgG Positive    Thyroid Stimulating Hormone (TSH)   Result Value Ref Range    TSH 4.13 0.30 - 5.00 uIU/mL   Basic Metabolic Panel   Result Value Ref Range    Sodium 140 136 - 145 mmol/L    Potassium 4.4 3.5 - 5.0 mmol/L    Chloride 104 98 - 107 mmol/L    CO2 25 22 - 31 mmol/L    Anion Gap, Calculation 11 5 - 18 mmol/L    Glucose 93 70 - 125 mg/dL    Calcium 9.8 8.5 - 10.5 mg/dL    BUN 10 8 - 22 mg/dL    Creatinine 0.80 0.60 - 1.10 mg/dL    GFR MDRD Af Amer >60 >60 mL/min/1.73m2    GFR MDRD Non Af Amer >60 >60 mL/min/1.73m2     ______________________________________________________________________    Assessment:    1. Essential hypertension    2. Normal colonoscopy - 2018 - Average risk    3. Hymenoptera allergy    4. Screening for measles    5. Tobacco abuse    6. Mixed hyperlipidemia    7. Mammogram declined    8. Pap smear of cervix declined      ______________________________________________________________________     PHQ-2 Total Score: 0 (5/7/2019  3:26 PM)    No data " recorded  ______________________________________________________________________    Plan:    1. Declines pap and mammogram at this time.  2. Discussed other providers who could do pap if she wishes.  3. Will likely start triamterene/hydrochlorothiazide for her blood pressure/  4. Check blood work today.  See relevant orders and diagnosis associations at the bottom of this note.   5. Refilled medications.  6. Pneumovax given today.  7. Will consider Shingrix.  8. Consider cholesterol check again next year.    Ricardo Davila MD  General Internal Medicine  [facility]     Return in about 1 year (around 5/7/2020).     Future Appointments   Date Time Provider Department Center   6/13/2019  9:00 AM Julissa Hager MD [facility]         ______________________________________________________________________     Relevant ICD-10 codes and order associations:      ICD-10-CM    1. Essential hypertension I10 amLODIPine (NORVASC) 5 MG tablet     Thyroid Stimulating Hormone (TSH)     Basic Metabolic Panel   2. Normal colonoscopy - 2018 - Average risk Z98.890    3. Hymenoptera allergy Z91.030 EPINEPHrine (EPIPEN/ADRENACLICK/AUVI-Q) 0.3 mg/0.3 mL injection   4. Screening for measles Z11.59 Rubeola Antibody, IgG   5. Tobacco abuse Z72.0    6. Mixed hyperlipidemia E78.2    7. Mammogram declined Z53.20    8. Pap smear of cervix declined Z53.20

## 2021-06-29 NOTE — PROGRESS NOTES
"Progress Notes by Ricardo Davila MD at 4/30/2020  9:00 AM     Author: Ricardo Davila MD Service: -- Author Type: Physician    Filed: 4/30/2020 10:11 AM Encounter Date: 4/30/2020 Status: Signed    : Ricardo Davila MD (Physician)            [facility]     TELEPHONE VISIT     Comprehensive and complex medical care - Chronic disease management - Shared decision making - Care coordination - Compassionate care    Patient advocacy - Rational deprescribing - Minimally disruptive medicine - Ethical focus - Customized care         Marlen Dumas is a 59 y.o. female who is being evaluated via a billable telephone visit.      The patient has been notified of following:     \"This telephone visit will be conducted via a call between you and your physician/provider. We have found that certain health care needs can be provided without the need for a physical exam.  This service lets us provide the care you need with a short phone conversation.  If a prescription is necessary we can send it directly to your pharmacy.  If lab work is needed we can place an order for that and you can then stop by our lab to have the test done at a later time.    If during the course of the call the physician/provider feels a telephone visit is not appropriate, you will not be charged for this service.\"          Active Problem List:  Problem List as of 4/30/2020 Reviewed: 4/30/2020 10:06 AM by Ricardo Davila MD       High    Tobacco abuse    COPD (chronic obstructive pulmonary disease) (H)       Medium    HTN (hypertension)    Hx of laparoscopic adjustable gastric banding       Low    HLD (hyperlipidemia)    Obesity (BMI 30.0-34.9)       Unprioritized    Hymenoptera allergy    Normal colonoscopy - 2018 - Average risk    Vitamin D deficiency    Zinc deficiency           Current Outpatient Medications   Medication Sig   ? amLODIPine (NORVASC) 5 MG tablet Take 1 tablet (5 mg total) by mouth " daily.   ? cetirizine (ZYRTEC) 10 MG tablet Take 1 tablet (10 mg total) by mouth daily.   ? fluticasone (FLONASE ALLERGY RELIEF) 50 mcg/actuation nasal spray 1 spray into each nostril daily.   ? omeprazole (PRILOSEC) 40 MG capsule TAKE 1 CAPSULE(40 MG) BY MOUTH DAILY BEFORE BREAKFAST   ? varenicline (CHANTIX) 1 mg tablet Take 1 tablet (1 mg total) by mouth 2 (two) times a day.   ? EPINEPHrine (EPIPEN/ADRENACLICK/AUVI-Q) 0.3 mg/0.3 mL injection Inject 0.3 mL (0.3 mg total) into the shoulder, thigh, or buttocks once for 1 dose.       Subjective:     Marlen Dumas presents with:      Chief Complaint   Patient presents with   ? Letter for School/Work     FOR WORK DUE TO COVID     The patient has a phone visit today which is done in light of the current coronavirus outbreak.    Patient comes in today for follow-up of a few issues.    We first reviewed her blood pressure.  Her blood pressure has been doing well without issues.  She has no major concerns.  Her blood pressure generally is in the 120s over 60s.  She takes the amlodipine regularly.  She is not taking the triamterene hydrochlorothiazide at this point.    Reviewed her COPD.  She did have a respiratory infection in [place] which required antibiotics (VIGNESH-Jaden).  She is doing well at this time.  Her PFTs in 2012 were good overall.    Reviewed her tobacco abuse and she is working on stopping smoking.  She is taking Chantix still at this time and this seems to be helping her.    She needs a note for work.  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  She is concerned about her risk for coronavirus.  She has been on leave of absence since March 19.  We talked about reasonable options related to this.  She has had bad lung issues in the past.    We reviewed her other issues noted in the assessment but not specifically addressed in the HPI above.     On ROS, the patient denies any chest pain or pressure.  No shortness of breath.      Objective:     Limited phone exam reveals:  Patient in no distress.  Mood is good.  Insight is good.  Her speech is fluent throughout the visit and there is no signs of respiratory distress.    Diagnostics:     Results for orders placed or performed in visit on 12/16/19   LDL Cholesterol, Direct   Result Value Ref Range    Direct  (H) <=129 mg/dl       Quality review:     PHQ-2 Total Score: 0 (4/30/2020  9:00 AM)      No data recorded  ______________________________________________________________________     BMI Readings from Last 1 Encounters:   02/07/20 25.02 kg/m        Assessment:     1. Chronic obstructive pulmonary disease, unspecified COPD type (H)    2. Essential hypertension    3. Tobacco abuse    4. Hx of laparoscopic adjustable gastric banding        Plan:     1. She will get a letter for work to be off at this point.  This was through the end of June.  2. She is not interested in updating her preventive health as noted in the past.  This was discussed with her today.  3. She will continue amlodipine.  4. She will work on stopping smoking.  5. She will follow-up sooner if issues.  6. If she wants to do a phone visit in the future, we can send a link via her email according to the patient.    Phone call duration:  12 minutes       Ricardo Davila MD  General Internal Medicine  [facility]       Return in about 1 year (around 4/30/2021), or if symptoms worsen or fail to improve, for visit and blood work.     No future appointments.

## 2021-07-01 ENCOUNTER — COMMUNICATION - HEALTHEAST (OUTPATIENT)
Dept: SURGERY | Facility: CLINIC | Age: 61
End: 2021-07-01

## 2021-07-01 DIAGNOSIS — E78.5 HLD (HYPERLIPIDEMIA): ICD-10-CM

## 2021-07-01 DIAGNOSIS — Z98.84 HISTORY OF LAPAROSCOPIC ADJUSTABLE GASTRIC BANDING: ICD-10-CM

## 2021-07-03 NOTE — ADDENDUM NOTE
Addendum Note by Trudy Hall MD at 2/16/2018 12:21 PM     Author: Trudy Hall MD Service: -- Author Type: Physician    Filed: 2/16/2018 12:21 PM Encounter Date: 2/11/2018 Status: Signed    : Trudy Hall MD (Physician)    Addended by: TRUDY HALL on: 2/16/2018 12:21 PM        Modules accepted: Orders

## 2021-07-04 NOTE — PROGRESS NOTES
Progress Notes by Cyndi Eugene CNP at 5/27/2021 10:10 AM     Author: Cyndi Eugene CNP Service: -- Author Type: Nurse Practitioner    Filed: 5/27/2021 10:42 AM Encounter Date: 5/27/2021 Status: Signed    : Cyndi Eugene CNP (Nurse Practitioner)       Chief Complaint   Patient presents with   ? Cough     X3 DAYS. NO FEVERS. CHEST CONGESTION W/MUCUS. HX OF BRONCHITIS       ASSESSMENT & PLAN:   Diagnoses and all orders for this visit:    Chronic obstructive pulmonary disease, unspecified COPD type (H)  -     albuterol (PROAIR HFA;PROVENTIL HFA;VENTOLIN HFA) 90 mcg/actuation inhaler; Inhale 2 puffs every 6 (six) hours as needed for wheezing.  Dispense: 1 each; Refill: 0  -     predniSONE (DELTASONE) 20 MG tablet; Take 40 mg by mouth daily for 5 days.  Dispense: 10 tablet; Refill: 0  -     doxycycline (VIBRA-TABS) 100 MG tablet; Take 1 tablet (100 mg total) by mouth 2 (two) times a day for 7 days.  Dispense: 14 tablet; Refill: 0  -     Symptomatic COVID-19 Virus (CORONAVIRUS) PCR    Cough  -     Symptomatic COVID-19 Virus (CORONAVIRUS) PCR      Patient with questionable diagnosis of COPD with longstanding smoking history with diffuse coarse wheezing today associated with change in sputum color and amount.  We will treat as if she does have COPD.    Patient is otherwise not visibly in distress, no history of fevers nor myalgias with this.  Normal vital signs including oxygen saturation 96% on room air.    Patient amenable to Covid screening.  Is fully vaccinated.    Patient is going out of town and she will follow-up soon after she returns in about 8 to 9 days.  She will come back with fevers, worsening shortness of breath.    Educated on above medications.    Recommended and discussed low-dose chest CT lung cancer screenings and to discuss with PCP when she is rechecked.    Supportive care discussed.  See discharge instructions below for specific recommendations given.    At the end of the encounter, I  "discussed results, diagnosis, medications with the patient and/or caregivers. Discussed red flags for immediate return to clinic/ER, as well as indications for follow up if no improvement. Patient and/or caregiver understood and agreed to plan. Patient was stable for discharge.    20 minutes spent on the date of the encounter doing chart review, review of outside records, patient visit and documentation       SUBJECTIVE    HPI:  HPI  Marlen Dumas presents to the walk-in clinic with   Chief Complaint   Patient presents with   ? Cough     X3 DAYS. NO FEVERS. CHEST CONGESTION W/MUCUS. HX OF BRONCHITIS     Patient with history of COPD presents to walk-in clinic with cough for 3 days, congestion.  Patient states she got this dx related to a dx of bronchitis.  Had breathing tests that were \"okay\" after this.  Not getting tx for COPD.      Smokes 1 PPD.  Will occasionally cough up yellow sputum, but sputum amount in color has changed.      Does not have any medications at home related to COPD nor asthma including inhalers.    Symptoms started: Worsening cough about 3 days ago.    Denies: Fevers, chills, myalgias, Covid exposure.  Is fully vaccinated.    See ROS for additional symptoms and/or pertinent negatives.       History obtained from the patient.      Review of Systems   Constitutional: Negative for chills and fever.   HENT: Positive for congestion (mild ). Negative for ear pain and sore throat.    Respiratory: Positive for cough, chest tightness, shortness of breath and wheezing.    Musculoskeletal: Negative for myalgias.       OBJECTIVE    Vitals:    05/27/21 1006   BP: 150/86   Patient Site: Right Arm   Patient Position: Sitting   Cuff Size: Adult Regular   Pulse: 78   Resp: 18   Temp: 98  F (36.7  C)   TempSrc: Oral   SpO2: 96%       Physical Exam  Constitutional:       General: She is not in acute distress.     Appearance: She is well-developed.   HENT:      Right Ear: External ear normal.      Left Ear: " External ear normal.   Eyes:      General:         Right eye: No discharge.         Left eye: No discharge.      Conjunctiva/sclera: Conjunctivae normal.   Cardiovascular:      Rate and Rhythm: Normal rate and regular rhythm.   Pulmonary:      Effort: Pulmonary effort is normal.      Breath sounds: Wheezing (coarse expiratory wheezing ) present.   Musculoskeletal: Normal range of motion.   Skin:     General: Skin is warm and dry.      Capillary Refill: Capillary refill takes less than 2 seconds.   Neurological:      Mental Status: She is alert and oriented to person, place, and time.   Psychiatric:         Behavior: Behavior normal.         Thought Content: Thought content normal.         Judgment: Judgment normal.         Labs/EKG:          Radiology:      PATIENT INSTRUCTIONS:   Patient Instructions   See Dr. Davila next week Friday or following Monday for recheck     Suggest highly the chest CT for screening for lung cancer.     Maybe recheck breathings tests.      Reduce smoking     Patient Education     COPD Flare    You have had a flare-up of your COPD.  COPD, or chronic obstructive pulmonary disease, is a common lung disease. It causes your airways to become irritated and narrower. This makes it harder for you to breathe. Emphysema and chronic bronchitis are both types of COPD. This is a chronic condition, which means you always have it. Sometimes it gets worse. When this happens, it is called a flare-up.  Symptoms of COPD  People with COPD may have symptoms most of the time. In a flare-up, your symptoms get worse. These symptoms may mean you are having a flare-up:    Shortness of breath, shallow or rapid breathing, or wheezing that gets worse    Lung infection    Cough that gets worse    More mucus, thicker mucus or mucus of a different color    Tiredness, decreased energy, or trouble doing your usual activities    Fever    Chest tightness    Your symptoms dont get better even when you use your usual  medicines, inhalers, and nebulizer    Trouble talking    You feel confused  Causes of flare-ups  Unfortunately, a flare-up can happen even though you did everything right, and you followed your doctors instructions. Some causes of flare-ups are:    Smoking or secondhand smoke    Colds, the flu, or respiratory infections    Air pollution    Sudden change in the weather    Dust, irritating chemicals, or strong fumes    Not taking your medicines as prescribed  Home care  Here are some things you can do at home to treat a flare-up:    Try not to panic. This makes it harder to breathe, and keeps you from doing the right things.    Dont smoke or be around others who are smoking.    Try to drink more fluids than usual during a flare-up, unless your doctor has told you not to because of heart and kidney problems. More fluids can help loosen the mucus.    Use your inhalers and nebulizer, if you have one, as you have been told to.    If you were given antibiotics, take them until they are used up or your doctor tells you to stop. Its important to finish the antibiotics, even though you feel better. This will make sure the infection has cleared.    If you were given prednisone or another steroid, finish it even if you feel better.  Preventing a flare-up  Even though flare-ups happen, the best way to treat one is to prevent it before it starts. Here are some pointers:    Dont smoke or be around others who are smoking.    Take your medicines as you have been told.    Talk with your doctor about getting a flu shot every year. Also find out if you need a pneumonia shot.    If there is a weather advisory warning to stay indoors, try to stay inside when possible.    Try to eat healthy and get plenty of sleep.    Try to avoid things that usually set you off, like dust, chemical fumes, hairsprays, or strong perfumes.  Follow-up care  Follow up with your healthcare provider, or as advised.  If a culture was done, you will be told if  your treatment needs to be changed. You can call as directed for the results.  If X-rays were done, you will be notified of any new findings that may affect your care.  Call 911  Call 911 if any of these occur:    You have trouble breathing    You feel confused or its difficult to wake you up    You faint or lose consciousness    You have a rapid heart rate    You have new pain in your chest, arm, shoulder, neck or upper back  When to seek medical advice  Call your healthcare provider right away if any of these occur:    Wheezing or shortness of breath gets worse    You need to use your inhalers more often than usual without relief    Fever of 100.4 F (38 C) or higher, or as directed by your healthcare provider    Coughing up lots of dark-colored or bloody mucus (sputum)    Chest pain with each breath    You do not start to get better within 24 hours    Swelling of your ankles gets worse    Dizziness or weakness  Date Last Reviewed: 9/1/2016 2000-2017 The "[employer] [address] [place] [address], [place] [address]. All rights reserved. This information is not intended as a substitute for professional medical care. Always follow your healthcare professional's instructions.

## 2021-07-04 NOTE — PATIENT INSTRUCTIONS - HE
Patient Instructions by Cyndi Eugene CNP at 5/27/2021 10:10 AM     Author: Cyndi Euegne CNP Service: -- Author Type: Nurse Practitioner    Filed: 5/27/2021 10:34 AM Encounter Date: 5/27/2021 Status: Signed    : Cyndi Eugene CNP (Nurse Practitioner)       See Dr. Davila next week Friday or following Monday for recheck     Suggest highly the chest CT for screening for lung cancer.     Maybe recheck breathings tests.      Reduce smoking     Patient Education     COPD Flare    You have had a flare-up of your COPD.  COPD, or chronic obstructive pulmonary disease, is a common lung disease. It causes your airways to become irritated and narrower. This makes it harder for you to breathe. Emphysema and chronic bronchitis are both types of COPD. This is a chronic condition, which means you always have it. Sometimes it gets worse. When this happens, it is called a flare-up.  Symptoms of COPD  People with COPD may have symptoms most of the time. In a flare-up, your symptoms get worse. These symptoms may mean you are having a flare-up:    Shortness of breath, shallow or rapid breathing, or wheezing that gets worse    Lung infection    Cough that gets worse    More mucus, thicker mucus or mucus of a different color    Tiredness, decreased energy, or trouble doing your usual activities    Fever    Chest tightness    Your symptoms dont get better even when you use your usual medicines, inhalers, and nebulizer    Trouble talking    You feel confused  Causes of flare-ups  Unfortunately, a flare-up can happen even though you did everything right, and you followed your doctors instructions. Some causes of flare-ups are:    Smoking or secondhand smoke    Colds, the flu, or respiratory infections    Air pollution    Sudden change in the weather    Dust, irritating chemicals, or strong fumes    Not taking your medicines as prescribed  Home care  Here are some things you can do at home to treat a flare-up:    Try not  to panic. This makes it harder to breathe, and keeps you from doing the right things.    Dont smoke or be around others who are smoking.    Try to drink more fluids than usual during a flare-up, unless your doctor has told you not to because of heart and kidney problems. More fluids can help loosen the mucus.    Use your inhalers and nebulizer, if you have one, as you have been told to.    If you were given antibiotics, take them until they are used up or your doctor tells you to stop. Its important to finish the antibiotics, even though you feel better. This will make sure the infection has cleared.    If you were given prednisone or another steroid, finish it even if you feel better.  Preventing a flare-up  Even though flare-ups happen, the best way to treat one is to prevent it before it starts. Here are some pointers:    Dont smoke or be around others who are smoking.    Take your medicines as you have been told.    Talk with your doctor about getting a flu shot every year. Also find out if you need a pneumonia shot.    If there is a weather advisory warning to stay indoors, try to stay inside when possible.    Try to eat healthy and get plenty of sleep.    Try to avoid things that usually set you off, like dust, chemical fumes, hairsprays, or strong perfumes.  Follow-up care  Follow up with your healthcare provider, or as advised.  If a culture was done, you will be told if your treatment needs to be changed. You can call as directed for the results.  If X-rays were done, you will be notified of any new findings that may affect your care.  Call 911  Call 911 if any of these occur:    You have trouble breathing    You feel confused or its difficult to wake you up    You faint or lose consciousness    You have a rapid heart rate    You have new pain in your chest, arm, shoulder, neck or upper back  When to seek medical advice  Call your healthcare provider right away if any of these occur:    Wheezing or  shortness of breath gets worse    You need to use your inhalers more often than usual without relief    Fever of 100.4 F (38 C) or higher, or as directed by your healthcare provider    Coughing up lots of dark-colored or bloody mucus (sputum)    Chest pain with each breath    You do not start to get better within 24 hours    Swelling of your ankles gets worse    Dizziness or weakness  Date Last Reviewed: 9/1/2016 2000-2017 [employer]. [address] [place] [address], [place] [id number]. All rights reserved. This information is not intended as a substitute for professional medical care. Always follow your healthcare professional's instructions.

## 2021-07-04 NOTE — ADDENDUM NOTE
Addendum Note by Quin Mann MD at 6/7/2021 11:40 AM     Author: Quin Mann MD Service: -- Author Type: Physician    Filed: 6/7/2021 11:46 AM Encounter Date: 6/7/2021 Status: Signed    : Quin Mann MD (Physician)    Addended by: QUIN MANN on: 6/7/2021 11:46 AM        Modules accepted: Orders

## 2021-07-06 VITALS
OXYGEN SATURATION: 95 % | HEIGHT: 65 IN | SYSTOLIC BLOOD PRESSURE: 138 MMHG | HEART RATE: 80 BPM | BODY MASS INDEX: 25.49 KG/M2 | WEIGHT: 153 LBS | DIASTOLIC BLOOD PRESSURE: 76 MMHG | TEMPERATURE: 97.4 F

## 2021-07-06 VITALS
HEART RATE: 78 BPM | DIASTOLIC BLOOD PRESSURE: 86 MMHG | SYSTOLIC BLOOD PRESSURE: 150 MMHG | OXYGEN SATURATION: 96 % | RESPIRATION RATE: 18 BRPM | TEMPERATURE: 98 F

## 2021-07-13 ENCOUNTER — LAB (OUTPATIENT)
Dept: LAB | Facility: CLINIC | Age: 61
End: 2021-07-13
Payer: COMMERCIAL

## 2021-07-13 DIAGNOSIS — Z98.84 BARIATRIC SURGERY STATUS: ICD-10-CM

## 2021-07-13 DIAGNOSIS — Z98.84 HISTORY OF LAPAROSCOPIC ADJUSTABLE GASTRIC BANDING: ICD-10-CM

## 2021-07-13 DIAGNOSIS — K21.9 GASTROESOPHAGEAL REFLUX DISEASE WITHOUT ESOPHAGITIS: ICD-10-CM

## 2021-07-13 DIAGNOSIS — E55.9 VITAMIN D DEFICIENCY: ICD-10-CM

## 2021-07-13 DIAGNOSIS — I10 ESSENTIAL HYPERTENSION: ICD-10-CM

## 2021-07-13 DIAGNOSIS — Z13.220 LIPID SCREENING: ICD-10-CM

## 2021-07-13 LAB
ERYTHROCYTE [DISTWIDTH] IN BLOOD BY AUTOMATED COUNT: 14.4 % (ref 10–15)
FOLATE SERPL-MCNC: 7.3 NG/ML
HBA1C MFR BLD: 5.9 % (ref 0–5.6)
HCT VFR BLD AUTO: 44.5 % (ref 35–47)
HGB BLD-MCNC: 14.6 G/DL (ref 11.7–15.7)
MCH RBC QN AUTO: 31.2 PG (ref 26.5–33)
MCHC RBC AUTO-ENTMCNC: 32.8 G/DL (ref 31.5–36.5)
MCV RBC AUTO: 95 FL (ref 78–100)
PLATELET # BLD AUTO: 212 10E3/UL (ref 150–450)
PTH-INTACT SERPL-MCNC: 52 PG/ML (ref 10–86)
RBC # BLD AUTO: 4.68 10E6/UL (ref 3.8–5.2)
WBC # BLD AUTO: 5.2 10E3/UL (ref 4–11)

## 2021-07-13 PROCEDURE — 82746 ASSAY OF FOLIC ACID SERUM: CPT

## 2021-07-13 PROCEDURE — 84425 ASSAY OF VITAMIN B-1: CPT | Mod: 90

## 2021-07-13 PROCEDURE — 83970 ASSAY OF PARATHORMONE: CPT

## 2021-07-13 PROCEDURE — 80053 COMPREHEN METABOLIC PANEL: CPT

## 2021-07-13 PROCEDURE — 83540 ASSAY OF IRON: CPT

## 2021-07-13 PROCEDURE — 99000 SPECIMEN HANDLING OFFICE-LAB: CPT

## 2021-07-13 PROCEDURE — 84590 ASSAY OF VITAMIN A: CPT | Mod: 90

## 2021-07-13 PROCEDURE — 85027 COMPLETE CBC AUTOMATED: CPT

## 2021-07-13 PROCEDURE — 84466 ASSAY OF TRANSFERRIN: CPT

## 2021-07-13 PROCEDURE — 83036 HEMOGLOBIN GLYCOSYLATED A1C: CPT

## 2021-07-13 PROCEDURE — 84630 ASSAY OF ZINC: CPT | Mod: 90

## 2021-07-13 PROCEDURE — 82728 ASSAY OF FERRITIN: CPT

## 2021-07-13 PROCEDURE — 80061 LIPID PANEL: CPT

## 2021-07-13 PROCEDURE — 82607 VITAMIN B-12: CPT

## 2021-07-13 PROCEDURE — 36415 COLL VENOUS BLD VENIPUNCTURE: CPT

## 2021-07-13 PROCEDURE — 82306 VITAMIN D 25 HYDROXY: CPT

## 2021-07-13 PROCEDURE — 82248 BILIRUBIN DIRECT: CPT

## 2021-07-14 LAB
ALBUMIN SERPL-MCNC: 3.7 G/DL (ref 3.5–5)
ALP SERPL-CCNC: 75 U/L (ref 45–120)
ALT SERPL W P-5'-P-CCNC: 15 U/L (ref 0–45)
ANION GAP SERPL CALCULATED.3IONS-SCNC: 19 MMOL/L (ref 5–18)
AST SERPL W P-5'-P-CCNC: 22 U/L (ref 0–40)
BILIRUB DIRECT SERPL-MCNC: 0.2 MG/DL
BILIRUB SERPL-MCNC: 0.3 MG/DL (ref 0–1)
BUN SERPL-MCNC: 9 MG/DL (ref 8–22)
CALCIUM SERPL-MCNC: 10 MG/DL (ref 8.5–10.5)
CHLORIDE BLD-SCNC: 105 MMOL/L (ref 98–107)
CHOLEST SERPL-MCNC: 269 MG/DL
CO2 SERPL-SCNC: 18 MMOL/L (ref 22–31)
CREAT SERPL-MCNC: 0.81 MG/DL (ref 0.6–1.1)
DEPRECATED CALCIDIOL+CALCIFEROL SERPL-MC: 36 UG/L (ref 30–80)
FASTING STATUS PATIENT QL REPORTED: YES
FERRITIN SERPL-MCNC: 165 NG/ML (ref 10–130)
GFR SERPL CREATININE-BSD FRML MDRD: 78 ML/MIN/1.73M2
GLUCOSE BLD-MCNC: 86 MG/DL (ref 70–125)
HDLC SERPL-MCNC: 94 MG/DL
IRON SATN MFR SERPL: 31 % (ref 20–50)
IRON SERPL-MCNC: 89 UG/DL (ref 42–175)
LDLC SERPL CALC-MCNC: 163 MG/DL
POTASSIUM BLD-SCNC: 4.8 MMOL/L (ref 3.5–5)
PROT SERPL-MCNC: 7.1 G/DL (ref 6–8)
SODIUM SERPL-SCNC: 142 MMOL/L (ref 136–145)
TIBC SERPL-MCNC: 286 UG/DL (ref 313–563)
TRANSFERRIN SERPL-MCNC: 229 MG/DL (ref 212–360)
TRIGL SERPL-MCNC: 59 MG/DL
VIT B12 SERPL-MCNC: 1063 PG/ML (ref 213–816)

## 2021-07-16 LAB — ZINC SERPL-MCNC: 73.7 UG/DL

## 2021-07-17 LAB
ANNOTATION COMMENT IMP: NORMAL
RETINYL PALMITATE SERPL-MCNC: <0.02 MG/L
VIT A SERPL-MCNC: 0.55 MG/L

## 2021-07-19 LAB — VIT B1 PYROPHOSHATE BLD-SCNC: 98 NMOL/L

## 2021-07-27 DIAGNOSIS — I10 ESSENTIAL HYPERTENSION: ICD-10-CM

## 2021-07-27 RX ORDER — AMLODIPINE BESYLATE 5 MG/1
TABLET ORAL
Qty: 90 TABLET | Refills: 3 | Status: SHIPPED | OUTPATIENT
Start: 2021-07-27 | End: 2022-07-14

## 2021-09-12 ENCOUNTER — HEALTH MAINTENANCE LETTER (OUTPATIENT)
Age: 61
End: 2021-09-12

## 2021-09-21 DIAGNOSIS — K21.9 GERD (GASTROESOPHAGEAL REFLUX DISEASE): ICD-10-CM

## 2021-09-21 RX ORDER — OMEPRAZOLE 40 MG/1
CAPSULE, DELAYED RELEASE ORAL
Qty: 90 CAPSULE | Status: SHIPPED | OUTPATIENT
Start: 2021-09-21 | End: 2022-09-02

## 2021-09-28 ENCOUNTER — OFFICE VISIT (OUTPATIENT)
Dept: INTERNAL MEDICINE | Facility: CLINIC | Age: 61
End: 2021-09-28
Payer: COMMERCIAL

## 2021-09-28 VITALS
BODY MASS INDEX: 25.27 KG/M2 | WEIGHT: 148 LBS | OXYGEN SATURATION: 97 % | HEART RATE: 79 BPM | HEIGHT: 64 IN | DIASTOLIC BLOOD PRESSURE: 74 MMHG | SYSTOLIC BLOOD PRESSURE: 138 MMHG

## 2021-09-28 DIAGNOSIS — M54.16 LUMBAR RADICULOPATHY: Primary | ICD-10-CM

## 2021-09-28 DIAGNOSIS — M25.461 EFFUSION OF RIGHT KNEE: ICD-10-CM

## 2021-09-28 PROCEDURE — 99213 OFFICE O/P EST LOW 20 MIN: CPT | Performed by: NURSE PRACTITIONER

## 2021-09-28 ASSESSMENT — MIFFLIN-ST. JEOR: SCORE: 1203.38

## 2021-09-28 NOTE — PROGRESS NOTES
Internal Medicine Office Visit  [facility]   Patient Name: Marlen Dumas  Patient Age: 61 year old  YOB: 1960  MRN: [id number]    Date of Visit: 9/28/2021  Patient presents with:  Mass: right knee noticed lump x2 week now lower leg and foot feel numb           Assessment / Plan / Medical Decision Making:    Problem List Items Addressed This Visit        Nervous and Auditory    Lumbar radiculopathy - Primary     - L5/S1 pattern. She will be referred to PT.  If not making any progress or worsening, consider MRI. I can order this for her, she would need an anxiolytic due to claustrophobia  - Follow up with PCP if worsening or no improvement          Relevant Orders    Physical Therapy Referral      Other Visit Diagnoses     Effusion of right knee        Start using elastic knee brace. NSAIDs contraindicated due to h/o gastric bypass. Could be seen for cortisone injection if painful              I am having Marlen Dumas maintain her cetirizine, varenicline, albuterol, EPINEPHrine, inhalational spacing device (E-Z SPACER) Spcr, nicotine, amLODIPine, and omeprazole.          Orders Placed This Encounter   Procedures     Physical Therapy Referral   Followup: Return in about 6 weeks (around 11/9/2021), or if symptoms worsen or fail to improve, for Follow up. earlier if needed.        Vivi Corrales, GORDON, CNP        HPI:  Marlen Dumas is a 61 year old year old who presents to the office today for right knee pain. She noticed a bump on the medial aspect of the knee a couple weeks and intermittent numbness that extends from the right lateral lower leg to digits 3-5 which occurs when she sits for a long period.         Health Maintenance Review  Health Maintenance   Topic Date Due     PREVENTIVE CARE VISIT  Never done     ANNUAL REVIEW OF HM ORDERS  Never done     COPD ACTION PLAN  Never done     MAMMO SCREENING  Never done     PAP  Never done     ZOSTER IMMUNIZATION (2 of 3)  04/03/2018     INFLUENZA VACCINE (1) 09/01/2021     Pneumococcal Vaccine: Pediatrics (0 to 5 Years) and At-Risk Patients (6 to 64 Years) (2 of 2 - PPSV23) 05/07/2024     ADVANCE CARE PLANNING  06/07/2026     LIPID  07/13/2026     DTAP/TDAP/TD IMMUNIZATION (2 - Td or Tdap) 02/06/2028     COLORECTAL CANCER SCREENING  05/27/2028     SPIROMETRY  Completed     PHQ-2  Completed     COVID-19 Vaccine  Completed     IPV IMMUNIZATION  Aged Out     MENINGITIS IMMUNIZATION  Aged Out     HEPATITIS B IMMUNIZATION  Aged Out     HEPATITIS C SCREENING  Discontinued     HIV SCREENING  Discontinued       Current Scheduled Meds:  Outpatient Encounter Medications as of 9/28/2021   Medication Sig Dispense Refill     amLODIPine (NORVASC) 5 MG tablet TAKE 1 TABLET(5 MG) BY MOUTH DAILY 90 tablet 3     omeprazole (PRILOSEC) 40 MG DR capsule TAKE 1 CAPSULE(40 MG) BY MOUTH DAILY BEFORE BREAKFAST 90 capsule PRN     albuterol (PROAIR HFA;PROVENTIL HFA;VENTOLIN HFA) 90 mcg/actuation inhaler [ALBUTEROL (PROAIR HFA;PROVENTIL HFA;VENTOLIN HFA) 90 MCG/ACTUATION INHALER] Inhale 2 puffs every 6 (six) hours as needed for wheezing. (Patient not taking: Reported on 9/28/2021) 1 each 0     cetirizine (ZYRTEC) 10 MG tablet [CETIRIZINE (ZYRTEC) 10 MG TABLET] Take 1 tablet (10 mg total) by mouth daily. (Patient not taking: Reported on 9/28/2021) 30 tablet 0     EPINEPHrine (EPIPEN/ADRENACLICK/AUVI-Q) 0.3 mg/0.3 mL injection [EPINEPHRINE (EPIPEN/ADRENACLICK/AUVI-Q) 0.3 MG/0.3 ML INJECTION] Inject 0.3 mL (0.3 mg total) into the shoulder, thigh, or buttocks once for 1 dose. 2 2     inhalational spacing device (E-Z SPACER) Spcr [INHALATIONAL SPACING DEVICE (E-Z SPACER) SPCR] One spacer for the albuterol inhaler.  Instruct on use. 1 each 0     nicotine (NICODERM CQ) 7 mg/24 hr [NICOTINE (NICODERM CQ) 7 MG/24 HR] Place 1 patch on the skin daily. Start after the 14 mg dose (Patient not taking: Reported on 9/28/2021) 30 patch 3     varenicline (CHANTIX) 1 mg tablet  "[VARENICLINE (CHANTIX) 1 MG TABLET] Take 1 tablet (1 mg total) by mouth 2 (two) times a day. (Patient not taking: Reported on 9/28/2021) 60 tablet 2     [DISCONTINUED] albuterol (PROAIR HFA;PROVENTIL HFA;VENTOLIN HFA) 90 mcg/actuation inhaler [ALBUTEROL (PROAIR HFA;PROVENTIL HFA;VENTOLIN HFA) 90 MCG/ACTUATION INHALER] Inhale 2 puffs every 6 (six) hours as needed for wheezing. 1 each 5     [DISCONTINUED] varenicline (CHANTIX) 1 mg tablet [VARENICLINE (CHANTIX) 1 MG TABLET] Take 1 tablet (1 mg total) by mouth 2 (two) times a day. 60 tablet 5     No facility-administered encounter medications on file as of 9/28/2021.         Objective / Physical Examination:  Vitals:    09/28/21 1327   BP: 138/74   BP Location: Right arm   Patient Position: Sitting   Pulse: 79   SpO2: 97%   Weight: 67.1 kg (148 lb)   Height: 1.613 m (5' 3.5\")     Wt Readings from Last 3 Encounters:   09/28/21 67.1 kg (148 lb)   07/02/21 67.1 kg (148 lb)   06/17/21 69.4 kg (152 lb 14.4 oz)     Body mass index is 25.81 kg/m .     Constitutional: In no apparent distress  MSk: +right straight leg raise. Knee effusion apparent, no warmth or redness. Right knee crepitus with extension. No joint laxity.     "

## 2021-09-28 NOTE — H&P (VIEW-ONLY)
Internal Medicine Office Visit  [facility]   Patient Name: Marlen Dumas  Patient Age: 61 year old  YOB: 1960  MRN: [id number]    Date of Visit: 9/28/2021  Patient presents with:  Mass: right knee noticed lump x2 week now lower leg and foot feel numb           Assessment / Plan / Medical Decision Making:    Problem List Items Addressed This Visit        Nervous and Auditory    Lumbar radiculopathy - Primary     - L5/S1 pattern. She will be referred to PT.  If not making any progress or worsening, consider MRI. I can order this for her, she would need an anxiolytic due to claustrophobia  - Follow up with PCP if worsening or no improvement          Relevant Orders    Physical Therapy Referral      Other Visit Diagnoses     Effusion of right knee        Start using elastic knee brace. NSAIDs contraindicated due to h/o gastric bypass. Could be seen for cortisone injection if painful              I am having Marlen Dumas maintain her cetirizine, varenicline, albuterol, EPINEPHrine, inhalational spacing device (E-Z SPACER) Spcr, nicotine, amLODIPine, and omeprazole.          Orders Placed This Encounter   Procedures     Physical Therapy Referral   Followup: Return in about 6 weeks (around 11/9/2021), or if symptoms worsen or fail to improve, for Follow up. earlier if needed.        Vivi Corrales, GORDON, CNP        HPI:  Marlen Dumas is a 61 year old year old who presents to the office today for right knee pain. She noticed a bump on the medial aspect of the knee a couple weeks and intermittent numbness that extends from the right lateral lower leg to digits 3-5 which occurs when she sits for a long period.         Health Maintenance Review  Health Maintenance   Topic Date Due     PREVENTIVE CARE VISIT  Never done     ANNUAL REVIEW OF HM ORDERS  Never done     COPD ACTION PLAN  Never done     MAMMO SCREENING  Never done     PAP  Never done     ZOSTER IMMUNIZATION (2 of 3)  04/03/2018     INFLUENZA VACCINE (1) 09/01/2021     Pneumococcal Vaccine: Pediatrics (0 to 5 Years) and At-Risk Patients (6 to 64 Years) (2 of 2 - PPSV23) 05/07/2024     ADVANCE CARE PLANNING  06/07/2026     LIPID  07/13/2026     DTAP/TDAP/TD IMMUNIZATION (2 - Td or Tdap) 02/06/2028     COLORECTAL CANCER SCREENING  05/27/2028     SPIROMETRY  Completed     PHQ-2  Completed     COVID-19 Vaccine  Completed     IPV IMMUNIZATION  Aged Out     MENINGITIS IMMUNIZATION  Aged Out     HEPATITIS B IMMUNIZATION  Aged Out     HEPATITIS C SCREENING  Discontinued     HIV SCREENING  Discontinued       Current Scheduled Meds:  Outpatient Encounter Medications as of 9/28/2021   Medication Sig Dispense Refill     amLODIPine (NORVASC) 5 MG tablet TAKE 1 TABLET(5 MG) BY MOUTH DAILY 90 tablet 3     omeprazole (PRILOSEC) 40 MG DR capsule TAKE 1 CAPSULE(40 MG) BY MOUTH DAILY BEFORE BREAKFAST 90 capsule PRN     albuterol (PROAIR HFA;PROVENTIL HFA;VENTOLIN HFA) 90 mcg/actuation inhaler [ALBUTEROL (PROAIR HFA;PROVENTIL HFA;VENTOLIN HFA) 90 MCG/ACTUATION INHALER] Inhale 2 puffs every 6 (six) hours as needed for wheezing. (Patient not taking: Reported on 9/28/2021) 1 each 0     cetirizine (ZYRTEC) 10 MG tablet [CETIRIZINE (ZYRTEC) 10 MG TABLET] Take 1 tablet (10 mg total) by mouth daily. (Patient not taking: Reported on 9/28/2021) 30 tablet 0     EPINEPHrine (EPIPEN/ADRENACLICK/AUVI-Q) 0.3 mg/0.3 mL injection [EPINEPHRINE (EPIPEN/ADRENACLICK/AUVI-Q) 0.3 MG/0.3 ML INJECTION] Inject 0.3 mL (0.3 mg total) into the shoulder, thigh, or buttocks once for 1 dose. 2 2     inhalational spacing device (E-Z SPACER) Spcr [INHALATIONAL SPACING DEVICE (E-Z SPACER) SPCR] One spacer for the albuterol inhaler.  Instruct on use. 1 each 0     nicotine (NICODERM CQ) 7 mg/24 hr [NICOTINE (NICODERM CQ) 7 MG/24 HR] Place 1 patch on the skin daily. Start after the 14 mg dose (Patient not taking: Reported on 9/28/2021) 30 patch 3     varenicline (CHANTIX) 1 mg tablet  "[VARENICLINE (CHANTIX) 1 MG TABLET] Take 1 tablet (1 mg total) by mouth 2 (two) times a day. (Patient not taking: Reported on 9/28/2021) 60 tablet 2     [DISCONTINUED] albuterol (PROAIR HFA;PROVENTIL HFA;VENTOLIN HFA) 90 mcg/actuation inhaler [ALBUTEROL (PROAIR HFA;PROVENTIL HFA;VENTOLIN HFA) 90 MCG/ACTUATION INHALER] Inhale 2 puffs every 6 (six) hours as needed for wheezing. 1 each 5     [DISCONTINUED] varenicline (CHANTIX) 1 mg tablet [VARENICLINE (CHANTIX) 1 MG TABLET] Take 1 tablet (1 mg total) by mouth 2 (two) times a day. 60 tablet 5     No facility-administered encounter medications on file as of 9/28/2021.         Objective / Physical Examination:  Vitals:    09/28/21 1327   BP: 138/74   BP Location: Right arm   Patient Position: Sitting   Pulse: 79   SpO2: 97%   Weight: 67.1 kg (148 lb)   Height: 1.613 m (5' 3.5\")     Wt Readings from Last 3 Encounters:   09/28/21 67.1 kg (148 lb)   07/02/21 67.1 kg (148 lb)   06/17/21 69.4 kg (152 lb 14.4 oz)     Body mass index is 25.81 kg/m .     Constitutional: In no apparent distress  MSk: +right straight leg raise. Knee effusion apparent, no warmth or redness. Right knee crepitus with extension. No joint laxity.     "

## 2021-09-30 ENCOUNTER — TELEPHONE (OUTPATIENT)
Dept: INTERNAL MEDICINE | Facility: CLINIC | Age: 61
End: 2021-09-30

## 2021-09-30 NOTE — ASSESSMENT & PLAN NOTE
- L5/S1 pattern. She will be referred to PT.  If not making any progress or worsening, consider MRI. I can order this for her, she would need an anxiolytic due to claustrophobia  - Follow up with PCP if worsening or no improvement

## 2021-09-30 NOTE — TELEPHONE ENCOUNTER
Please schedule this patient for an appointment with me on:    ______________________________________________________________________     Monday, October 4th  Time of appointment:  2:45 pm    Reason for appointment:  Back pain issues?    ______________________________________________________________________     Patient already notified.  No need to notify the patient.    Thank you.    Ricardo Davila MD  General Internal Medicine  [facility]   9/30/2021 3:35 PM

## 2021-10-04 ENCOUNTER — OFFICE VISIT (OUTPATIENT)
Dept: INTERNAL MEDICINE | Facility: CLINIC | Age: 61
End: 2021-10-04
Payer: COMMERCIAL

## 2021-10-04 VITALS
BODY MASS INDEX: 26.15 KG/M2 | SYSTOLIC BLOOD PRESSURE: 134 MMHG | HEART RATE: 76 BPM | WEIGHT: 150 LBS | DIASTOLIC BLOOD PRESSURE: 72 MMHG | OXYGEN SATURATION: 97 %

## 2021-10-04 DIAGNOSIS — R20.0 RIGHT LEG NUMBNESS: ICD-10-CM

## 2021-10-04 DIAGNOSIS — G89.29 CHRONIC RIGHT-SIDED LOW BACK PAIN WITH RIGHT-SIDED SCIATICA: ICD-10-CM

## 2021-10-04 DIAGNOSIS — M54.41 CHRONIC RIGHT-SIDED LOW BACK PAIN WITH RIGHT-SIDED SCIATICA: ICD-10-CM

## 2021-10-04 DIAGNOSIS — M25.461 EFFUSION OF RIGHT KNEE: ICD-10-CM

## 2021-10-04 DIAGNOSIS — Z98.890 H/O LUMBOSACRAL SPINE SURGERY: ICD-10-CM

## 2021-10-04 DIAGNOSIS — Z01.818 PREOPERATIVE EXAMINATION: Primary | ICD-10-CM

## 2021-10-04 PROCEDURE — 99214 OFFICE O/P EST MOD 30 MIN: CPT | Mod: 25 | Performed by: INTERNAL MEDICINE

## 2021-10-04 PROCEDURE — 90471 IMMUNIZATION ADMIN: CPT | Performed by: INTERNAL MEDICINE

## 2021-10-04 PROCEDURE — 90682 RIV4 VACC RECOMBINANT DNA IM: CPT | Performed by: INTERNAL MEDICINE

## 2021-10-04 PROCEDURE — 0003A COVID-19,PF,PFIZER (12+ YRS): CPT | Performed by: INTERNAL MEDICINE

## 2021-10-04 PROCEDURE — 91300 COVID-19,PF,PFIZER (12+ YRS): CPT | Performed by: INTERNAL MEDICINE

## 2021-10-04 NOTE — PROGRESS NOTES
[facility]    Comprehensive and complex medical care - Chronic disease management - Shared decision making - Care coordination - Compassionate care    Patient advocacy - Rational deprescribing - Minimally disruptive medicine - Ethical focus - Customized care         Date of Service: 10/4/2021  Primary Provider: Ricardo Davila    Patient Care Team:  Ricardo Davila MD as PCP - General (Internal Medicine)  Ricardo Davila MD as Assigned PCP  Julissa Hager MD as MD (Family Medicine)          Patient's Pharmacy:    [id number] - [place], [place] - [address] AT [facility] [address]  [address]  [facility] [address]  Phone: [phone] Fax: [phone]     Patient's Contacts:  Name Home Phone Work Phone Mobile Phone Relationship Lgl GUERA Pablo [phone]   Friend      Patient's Insurance:    Payor: PREFERREDONE / Plan: PREFERREDONE HMO / Product Type: PPO /            Active Problem List:  Problem List as of 10/4/2021 Reviewed: 9/30/2021  8:48 AM by Vivi Corrales NP       Other    Tobacco abuse    HTN (hypertension)    COPD (chronic obstructive pulmonary disease) (H)    HLD (hyperlipidemia)    Hx of laparoscopic adjustable gastric banding    Obesity (BMI 30.0-34.9)    Zinc deficiency    Vitamin D deficiency    S/P colonoscopy    Hymenoptera allergy    Bariatric surgery status    Abdominal pain, epigastric       Other    Lumbar radiculopathy    Last Assessment & Plan 9/28/2021 Office Visit Written 9/30/2021  8:48 AM by Vivi Corrales NP     - L5/S1 pattern. She will be referred to PT.  If not making any progress or worsening, consider MRI. I can order this for her, she would need an anxiolytic due to claustrophobia  - Follow up with PCP if worsening or no improvement                 Current Outpatient Medications   Medication Instructions     amLODIPine (NORVASC) 5 MG tablet TAKE 1 TABLET(5 MG) BY MOUTH  DAILY     EPINEPHrine (ANY BX GENERIC EQUIV) 0.3 mg, Intramuscular, ONCE     inhalational spacing device (E-Z SPACER) Spcr [INHALATIONAL SPACING DEVICE (E-Z SPACER) SPCR] One spacer for the albuterol inhaler.  Instruct on use.     omeprazole (PRILOSEC) 40 MG DR capsule TAKE 1 CAPSULE(40 MG) BY MOUTH DAILY BEFORE BREAKFAST      Social History     Social History Narrative    Lives with a friend.12/4/2018       Allergies   Allergen Reactions     Other Environmental Allergy Unknown     Bee sting     Sulfa (Sulfonamide Antibiotics) [Sulfa Drugs] Unknown      Subjective:     Marlen Dumas is a 61 year old female who comes in today for:    Chief Complaint   Patient presents with     RECHECK     back pain, right leg and foot is numb      Patient comes in today with a few issues.    She first comes in today for swelling in her right knee.  She noticed this for about 4 to 5 weeks.  She occasionally notes some mild discomfort in the medial knee.  Does not bother her much.  She did have this evaluated in part by Vivi Corrales recently.    She would like to move forward with the Covid booster and the flu shot.  We reviewed shingles vaccination with her and standing order was placed.    Her main reason to come in is numbness over the lower leg from the knee down to the top of the foot.  The top of the foot feels cold to her as well.  Its been going on for about 5 weeks.  She has had previous lumbar surgery in 2002.  She had pain going down this leg at that time.  She has not noticed a lot of pain in this leg.  She has not noticed the leg giving out or any obvious strength affect.  She does get leg cramps at night.  It is not getting better and she feels it is a little bit worse as of late.  We reviewed this with her.    We reviewed her other issues noted in the assessment but not specifically addressed in the HPI above.     Objective:     Wt Readings from Last 3 Encounters:   10/04/21 68 kg (150 lb)   09/28/21 67.1 kg (148 lb)    07/02/21 67.1 kg (148 lb)     BP Readings from Last 3 Encounters:   10/04/21 134/72   09/28/21 138/74   07/04/21 136/80     /72   Pulse 76   Wt 68 kg (150 lb)   SpO2 97%   BMI 26.15 kg/m     The patient is comfortable, no acute distress.  Mood good.  Insight good.  Eyes are nonicteric.  Neck is supple without mass.  No cervical adenopathy.  No thyromegaly. Heart regular rate and rhythm.  Lungs clear to auscultation bilaterally.  Respiratory effort is good.  Extremities no edema.  There is a mild effusion in the medial joint line without significant tenderness.  Her straight leg raise is negative.  Her strength in the right lower extremity is good and her gait is good overall.      Diagnostics:     Lab on 07/13/2021   Component Date Value Ref Range Status     Iron 07/13/2021 89  42 - 175 ug/dL Final     Transferrin 07/13/2021 229  212 - 360 mg/dL Final     Transferrin Saturation, Calculated 07/13/2021 31  20 - 50 % Final     Transferrin IBC, Calculated 07/13/2021 286* 313 - 563 ug/dL Final     Vitamin B12 07/13/2021 1,063* 213 - 816 pg/mL Final     WBC Count 07/13/2021 5.2  4.0 - 11.0 10e3/uL Final     RBC Count 07/13/2021 4.68  3.80 - 5.20 10e6/uL Final     Hemoglobin 07/13/2021 14.6  11.7 - 15.7 g/dL Final     Hematocrit 07/13/2021 44.5  35.0 - 47.0 % Final     MCV 07/13/2021 95  78 - 100 fL Final     MCH 07/13/2021 31.2  26.5 - 33.0 pg Final     MCHC 07/13/2021 32.8  31.5 - 36.5 g/dL Final     RDW 07/13/2021 14.4  10.0 - 15.0 % Final     Platelet Count 07/13/2021 212  150 - 450 10e3/uL Final     Vitamin D, Total (25-Hydroxy) 07/13/2021 36  30 - 80 ug/L Final     Vitamin A 07/13/2021 0.55  0.30 - 1.20 mg/L Final     Retinol Palmitate 07/13/2021 <0.02  0.00 - 0.10 mg/L Final     Vitamin A Interp 07/13/2021 Normal   Final      This test was developed and its performance characteristics   determined by [facility]. It has not been cleared or   approved by the US Food and Drug Administration. This  test   was performed in a CLIA certified laboratory and is   intended for clinical purposes.     Folic Acid 07/13/2021 7.3  >=3.5 ng/mL Final    Bariatric patients do NOT need to fast for ANY vit     Zinc, Serum/Plasma 07/13/2021 73.7  60.0 - 120.0 ug/dL Final    INTERPRETIVE INFORMATION: Zinc, Serum or Plasma    Elevated results may be due to skin or collection-related   contamination, including the use of a noncertified   metal-free collection/transport tube. If contamination   concerns exist due to elevated levels of serum/plasma zinc,   confirmation with a second specimen collected in a   certified metal-free tube is recommended.    Circulating zinc concentrations are dependent on albumin   status and are depressed with malnutrition.  Zinc may also   be lowered with infection, inflammation, stress, oral   contraceptives, and pregnancy.  Zinc may be elevated with   zinc supplementation or fasting.  Elevated zinc   concentrations may interfere with copper absorption.     This test was developed and its performance characteristics   determined by [facility]. It has not been cleared or   approved by the US Food and Drug Administration. This test   was performed in a CLIA certified laboratory and is   intended for clinical purposes.     Parathyroid Hormone Intact 07/13/2021 52  10 - 86 pg/mL Final    Bariatric patients do NOT need to fast for ANY vit     Ferritin 07/13/2021 165* 10 - 130 ng/mL Final    Bariatric patients do NOT need to fast for ANY vit     Vitamin B1 Whole Blood Level 07/13/2021 98  70 - 180 nmol/L Final    INTERPRETIVE INFORMATION: Vitamin B1, Whole Blood    This assay measures the concentration of thiamine   diphosphate (TDP), the primary active form of vitamin B1.   Approximately 90 percent of vitamin B1 present in whole   blood is TDP. Thiamine and thiamine monophosphate, which   comprise the remaining 10 percent, are not measured.    This test was developed and its performance  characteristics   determined by [facility]. It has not been cleared or   approved by the US Food and Drug Administration. This test   was performed in a CLIA certified laboratory and is   intended for clinical purposes.     Cholesterol 07/13/2021 269* <=199 mg/dL Final     Triglycerides 07/13/2021 59  <=149 mg/dL Final     Direct Measure HDL 07/13/2021 94  >=50 mg/dL Final    HDL Cholesterol Reference Range:     0-2 years:   No reference ranges established for patients under 2 years old  at [facility] for lipid analytes.    2-8 years:  Greater than 45 mg/dL     18 years and older:   Female: Greater than or equal to 50 mg/dL   Male:   Greater than or equal to 40 mg/dL     LDL Cholesterol Calculated 07/13/2021 163* <=129 mg/dL Final     Patient Fasting > 8hrs? 07/13/2021 Yes   Final     Sodium 07/13/2021 142  136 - 145 mmol/L Final     Potassium 07/13/2021 4.8  3.5 - 5.0 mmol/L Final     Chloride 07/13/2021 105  98 - 107 mmol/L Final     Carbon Dioxide (CO2) 07/13/2021 18* 22 - 31 mmol/L Final     Anion Gap 07/13/2021 19* 5 - 18 mmol/L Final     Urea Nitrogen 07/13/2021 9  8 - 22 mg/dL Final     Creatinine 07/13/2021 0.81  0.60 - 1.10 mg/dL Final     Calcium 07/13/2021 10.0  8.5 - 10.5 mg/dL Final     Glucose 07/13/2021 86  70 - 125 mg/dL Final     GFR Estimate 07/13/2021 78  >60 mL/min/1.73m2 Final    As of July 11, 2021, eGFR is calculated by the CKD-EPI creatinine equation, without race adjustment. eGFR can be influenced by muscle mass, exercise, and diet. The reported eGFR is an estimation only and is only applicable if the renal function is stable.     Hemoglobin A1C 07/13/2021 5.9* 0.0 - 5.6 % Final    Normal <5.7%   Prediabetes 5.7-6.4%    Diabetes 6.5% or higher     Note: Adopted from ADA consensus guidelines.     Bilirubin Total 07/13/2021 0.3  0.0 - 1.0 mg/dL Final     Bilirubin Direct 07/13/2021 0.2  <=0.5 mg/dL Final     Protein Total 07/13/2021 7.1  6.0 - 8.0 g/dL Final     Albumin  07/13/2021 3.7  3.5 - 5.0 g/dL Final     Alkaline Phosphatase 07/13/2021 75  45 - 120 U/L Final     AST 07/13/2021 22  0 - 40 U/L Final     ALT 07/13/2021 15  0 - 45 U/L Final       Results for orders placed or performed in visit on 10/04/21   XR Knee Right 3 Views     Status: None    Narrative    EXAM DATE:         10/04/2021    EXAM: X-RAY KNEE, RIGHT, 3 VIEWS  LOCATION: [facility]  DATE/TIME: 10/4/2021 4:00 PM    INDICATION: Right knee effusion, knee pain.  COMPARISON: None.    IMPRESSION: No fracture or joint effusion. There are moderate tricompartmental degenerative osteoarthritic changes present.                  Assessment:     1. Preoperative examination    2. Chronic right-sided low back pain with right-sided sciatica    3. Effusion of right knee    4. Right leg numbness    5. H/O lumbosacral spine surgery        Plan:     1. Patient had x-ray of her knee today which looked pretty good overall.  I reviewed them myself.  2. MRI of the lumbosacral spine to evaluate the right leg numbness and change in neurologic function.  She has had previous surgery.  This was ordered today.  3. Okay for conscious sedation by RN in relationship to her claustrophobia with MRI scans.  4. Can take usual blood pressure medications on the am of the MRI scan.  5. Standing order for shingles vaccine.  6. Covid vaccine and flu shot done today.  7. Continue current medications otherwise.  8. Follow up sooner if issues.    35 minutes or greater was spent today on the patient's care on the day of service.      This includes time for chart preparation, reviewing medical tests done before or during the visit, talking with the patient, review of quality indicators, required documentation, and other elements of care.        Ricardo Davila MD  General Internal Medicine  [facility]    Return in about 9 months (around 7/4/2022), or if symptoms worsen or fail to improve, for  physical.     Future Appointments   Date Time Provider Department Center   10/28/2021  9:00 AM Glenda Rose Pt, PT [facility]       Wt Readings from Last 20 Encounters:   10/04/21 68 kg (150 lb)   09/28/21 67.1 kg (148 lb)   07/02/21 67.1 kg (148 lb)   06/17/21 69.4 kg (152 lb 14.4 oz)   06/07/21 69.4 kg (153 lb)   08/14/20 70.3 kg (155 lb)   02/07/20 65.6 kg (144 lb 9.6 oz)   02/05/20 65.8 kg (145 lb)   07/18/19 75.8 kg (167 lb)   06/13/19 77.6 kg (171 lb)   05/07/19 79.4 kg (175 lb)   12/04/18 72.6 kg (160 lb)   11/15/18 73.9 kg (163 lb)   11/12/18 74.1 kg (163 lb 6.4 oz)   09/25/18 74.8 kg (164 lb 12.8 oz)   05/29/18 75.8 kg (167 lb)   02/16/18 73.5 kg (162 lb)   02/06/18 73.5 kg (162 lb)   08/17/16 89.6 kg (197 lb 8 oz)   07/19/16 90 kg (198 lb 8 oz)     BP Readings from Last 20 Encounters:   10/04/21 134/72   09/28/21 138/74   07/04/21 136/80   06/17/21 120/74   06/07/21 138/76   05/27/21 (!) 150/86   07/28/20 (!) 152/84   02/07/20 (!) 161/88   02/05/20 138/76      Pulse Readings from Last 20 Encounters:   10/04/21 76   09/28/21 79   07/02/21 77   06/17/21 82   06/07/21 80   05/27/21 78   07/28/20 68   02/07/20 76   02/05/20 74     SpO2 Readings from Last 20 Encounters:   10/04/21 97%   09/28/21 97%   07/02/21 98%   06/17/21 96%   06/07/21 95%   05/27/21 96%   07/28/20 98%   02/07/20 93%   02/05/20 94%

## 2021-10-05 PROBLEM — R10.13 ABDOMINAL PAIN, EPIGASTRIC: Status: RESOLVED | Noted: 2020-08-04 | Resolved: 2021-10-05

## 2021-10-05 PROBLEM — Z98.84 BARIATRIC SURGERY STATUS: Status: RESOLVED | Noted: 2020-08-04 | Resolved: 2021-10-05

## 2021-10-05 NOTE — PATIENT INSTRUCTIONS
Please follow up if you have any further issues.    You may contact me by phone or MyChart if you are worsening or if things are not improving.    ______________________________________________________________________     Please remember that you can call [phone] to schedule an appointment.     You can schedule appointments 24 hours a day, 7 days a week.  Sometimes the best time to schedule an appointment is after clinic hours when less people are calling in.  Weekends are another option for calling in to schedule appointments.

## 2021-10-07 DIAGNOSIS — Z01.812 ENCOUNTER FOR PREPROCEDURE SCREENING LABORATORY TESTING FOR COVID-19: Primary | ICD-10-CM

## 2021-10-07 DIAGNOSIS — Z11.52 ENCOUNTER FOR PREPROCEDURE SCREENING LABORATORY TESTING FOR COVID-19: Primary | ICD-10-CM

## 2021-10-12 ENCOUNTER — TELEPHONE (OUTPATIENT)
Dept: INTERVENTIONAL RADIOLOGY/VASCULAR | Facility: CLINIC | Age: 61
End: 2021-10-12

## 2021-10-14 ENCOUNTER — LAB (OUTPATIENT)
Dept: LAB | Facility: CLINIC | Age: 61
End: 2021-10-14
Payer: COMMERCIAL

## 2021-10-14 DIAGNOSIS — Z11.52 ENCOUNTER FOR PREPROCEDURE SCREENING LABORATORY TESTING FOR COVID-19: ICD-10-CM

## 2021-10-14 DIAGNOSIS — Z01.812 ENCOUNTER FOR PREPROCEDURE SCREENING LABORATORY TESTING FOR COVID-19: ICD-10-CM

## 2021-10-14 LAB — SARS-COV-2 RNA RESP QL NAA+PROBE: NEGATIVE

## 2021-10-14 PROCEDURE — U0003 INFECTIOUS AGENT DETECTION BY NUCLEIC ACID (DNA OR RNA); SEVERE ACUTE RESPIRATORY SYNDROME CORONAVIRUS 2 (SARS-COV-2) (CORONAVIRUS DISEASE [COVID-19]), AMPLIFIED PROBE TECHNIQUE, MAKING USE OF HIGH THROUGHPUT TECHNOLOGIES AS DESCRIBED BY CMS-2020-01-R: HCPCS

## 2021-10-14 PROCEDURE — U0005 INFEC AGEN DETEC AMPLI PROBE: HCPCS

## 2021-10-18 ENCOUNTER — HOSPITAL ENCOUNTER (OUTPATIENT)
Dept: MRI IMAGING | Facility: HOSPITAL | Age: 61
Discharge: HOME | End: 2021-10-18
Attending: INTERNAL MEDICINE | Admitting: INTERNAL MEDICINE
Payer: COMMERCIAL

## 2021-10-18 VITALS
TEMPERATURE: 98.5 F | HEART RATE: 68 BPM | DIASTOLIC BLOOD PRESSURE: 74 MMHG | SYSTOLIC BLOOD PRESSURE: 128 MMHG | RESPIRATION RATE: 20 BRPM | OXYGEN SATURATION: 96 %

## 2021-10-18 DIAGNOSIS — R20.0 RIGHT LEG NUMBNESS: ICD-10-CM

## 2021-10-18 DIAGNOSIS — M54.41 CHRONIC RIGHT-SIDED LOW BACK PAIN WITH RIGHT-SIDED SCIATICA: ICD-10-CM

## 2021-10-18 DIAGNOSIS — Z98.890 H/O LUMBOSACRAL SPINE SURGERY: ICD-10-CM

## 2021-10-18 DIAGNOSIS — G89.29 CHRONIC RIGHT-SIDED LOW BACK PAIN WITH RIGHT-SIDED SCIATICA: ICD-10-CM

## 2021-10-18 PROCEDURE — 72148 MRI LUMBAR SPINE W/O DYE: CPT

## 2021-10-18 PROCEDURE — 250N000011 HC RX IP 250 OP 636: Performed by: RADIOLOGY

## 2021-10-18 RX ORDER — FENTANYL CITRATE 50 UG/ML
25-50 INJECTION, SOLUTION INTRAMUSCULAR; INTRAVENOUS EVERY 5 MIN PRN
Status: DISCONTINUED | OUTPATIENT
Start: 2021-10-18 | End: 2021-10-19 | Stop reason: HOSPADM

## 2021-10-18 RX ORDER — NALOXONE HYDROCHLORIDE 0.4 MG/ML
0.2 INJECTION, SOLUTION INTRAMUSCULAR; INTRAVENOUS; SUBCUTANEOUS
Status: DISCONTINUED | OUTPATIENT
Start: 2021-10-18 | End: 2021-10-19 | Stop reason: HOSPADM

## 2021-10-18 RX ORDER — FLUMAZENIL 0.1 MG/ML
0.2 INJECTION, SOLUTION INTRAVENOUS
Status: DISCONTINUED | OUTPATIENT
Start: 2021-10-18 | End: 2021-10-19 | Stop reason: HOSPADM

## 2021-10-18 RX ORDER — NALOXONE HYDROCHLORIDE 0.4 MG/ML
0.4 INJECTION, SOLUTION INTRAMUSCULAR; INTRAVENOUS; SUBCUTANEOUS
Status: DISCONTINUED | OUTPATIENT
Start: 2021-10-18 | End: 2021-10-19 | Stop reason: HOSPADM

## 2021-10-18 RX ORDER — DIAZEPAM 5 MG
5 TABLET ORAL EVERY 30 MIN PRN
Status: DISCONTINUED | OUTPATIENT
Start: 2021-10-18 | End: 2021-10-19 | Stop reason: HOSPADM

## 2021-10-18 RX ADMIN — MIDAZOLAM HYDROCHLORIDE 0.5 MG: 1 INJECTION, SOLUTION INTRAMUSCULAR; INTRAVENOUS at 08:28

## 2021-10-18 RX ADMIN — FENTANYL CITRATE 25 MCG: 50 INJECTION, SOLUTION INTRAMUSCULAR; INTRAVENOUS at 08:30

## 2021-10-18 RX ADMIN — MIDAZOLAM HYDROCHLORIDE 0.5 MG: 1 INJECTION, SOLUTION INTRAMUSCULAR; INTRAVENOUS at 08:38

## 2021-10-18 RX ADMIN — FENTANYL CITRATE 50 MCG: 50 INJECTION, SOLUTION INTRAMUSCULAR; INTRAVENOUS at 08:20

## 2021-10-18 RX ADMIN — FENTANYL CITRATE 25 MCG: 50 INJECTION, SOLUTION INTRAMUSCULAR; INTRAVENOUS at 08:40

## 2021-10-18 RX ADMIN — MIDAZOLAM HYDROCHLORIDE 1 MG: 1 INJECTION, SOLUTION INTRAMUSCULAR; INTRAVENOUS at 08:18

## 2021-10-18 NOTE — IP AVS SNAPSHOT
After Visit Summary Template Not Found    This Print Group is only intended to be used in the After Visit Summary and can only be used in a report that uses a released After Visit Summary Template.                       MRN:[id number]                      After Visit Summary   10/18/2021    Marlen Dumas    MRN: [id number]           Visit Information        Provider Department      10/18/2021  8:00 AM [facility] RADIOLOGIST 2; [facility]IOLOGY NURSE; [facility] MR 1 [facility]           Review of your medicines      UNREVIEWED medicines. Ask your doctor about these medicines       Dose / Directions   amLODIPine 5 MG tablet  Commonly known as: NORVASC  Used for: Essential hypertension      TAKE 1 TABLET(5 MG) BY MOUTH DAILY  Quantity: 90 tablet  Refills: 3     EPINEPHrine 0.3 MG/0.3ML injection 2-pack  Commonly known as: ANY BX GENERIC EQUIV  Used for: Hymenoptera allergy      Dose: 0.3 mg  [EPINEPHRINE (EPIPEN/ADRENACLICK/AUVI-Q) 0.3 MG/0.3 ML INJECTION] Inject 0.3 mL (0.3 mg total) into the shoulder, thigh, or buttocks once for 1 dose.  Quantity: 2  Refills: 2     omeprazole 40 MG DR capsule  Commonly known as: priLOSEC  Used for: GERD (gastroesophageal reflux disease)      TAKE 1 CAPSULE(40 MG) BY MOUTH DAILY BEFORE BREAKFAST  Quantity: 90 capsule  Refills: PRN     UNABLE TO FIND  Used for: Chronic obstructive pulmonary disease, unspecified COPD type (H)      [INHALATIONAL SPACING DEVICE (E-Z SPACER) SPCR] One spacer for the albuterol inhaler.  Instruct on use.  Quantity: 1 each  Refills: 0              Protect others around you: Learn how to safely use, store and throw away your medicines at www.disposemymeds.org.       Follow-ups after your visit       Your next 10 appointments already scheduled    Oct 28, 2021  9:00 AM  Andrez Meeks with Glenda Rose PT  [facility] ([facility] ) [address] [place] [address] [phone]          Care Instructions       Additional Information About Your Visit       [facility] Information    [facility] gives you secure access to your electronic health record. If you see a primary care provider, you can also send messages to your care team and make appointments. If you have questions, please call your primary care clinic.  If you do not have a primary care provider, please call [phone] and they will assist you.       Care EveryWhere ID    This is your Care EveryWhere ID. This could be used by other organizations to access your [facility] medical records  [id number]       Your Vitals Were  Most recent update: 10/18/2021  9:16 AM    Blood Pressure   139/63 (BP Location: Left arm)    Pulse   63    Temperature   98.5  F (36.9  C) (Oral)    Respirations   16    Pulse Oximetry   97%          Primary Care Provider Office Phone # Fax #    Ricardo Davila [phone]      Equal Access to Services    [facility]: Hadii aad ku hadasho Soomaali, waaxda luqadaha, qaybta kaalmada adeegyada, jerrod wakefield . So [facility] [phone].    ATENCIÓN: Si habla español, tiene a mulligan disposición servicios gratuitos de asistencia lingüística. Izzy al [phone].    We comply with applicable federal and state civil rights laws, including the [place] Human Rights Act. We do not discriminate on the basis of race, color, creed, Congregational, national origin, marital status, age, disability, sex, sexual orientation, or gender identity.    If you would like an itemization of your charges they will now be available in [facility] 30 days after discharge. To access the itemized statements in [employer] go to billing/billing summary. From there select view account. There will be multiple tabs showing an overview of your account, detail, payments, and communications. From the communications tab you can see your monthly statements, your itemized statements, and any billing letters generated for your account. If  you do not have a [employer] account and need help getting access please contact [employer] support at [phone].  If you would prefer to have your itemized statements mailed please contact our automated itemized bill request line at [phone] option  2.       Thank you!    Thank you for choosing [facility] for your care. Our goal is always to provide you with excellent care. Hearing back from our patients is one way we can continue to improve our services. Please take a few minutes to complete the written survey that you may receive in the mail after you visit with us. Thank you!            Medication List      ASK your doctor about these medications          Morning Afternoon Evening Bedtime As Needed    amLODIPine 5 MG tablet  Also known as: NORVASC  INSTRUCTIONS: TAKE 1 TABLET(5 MG) BY MOUTH DAILY                     EPINEPHrine 0.3 MG/0.3ML injection 2-pack  Also known as: ANY BX GENERIC EQUIV  INSTRUCTIONS: [EPINEPHRINE (EPIPEN/ADRENACLICK/AUVI-Q) 0.3 MG/0.3 ML INJECTION] Inject 0.3 mL (0.3 mg total) into the shoulder, thigh, or buttocks once for 1 dose.  Doctor's comments: Please dispense product with lowest cost to patient                     omeprazole 40 MG DR capsule  Also known as: priLOSEC  INSTRUCTIONS: TAKE 1 CAPSULE(40 MG) BY MOUTH DAILY BEFORE BREAKFAST                     UNABLE TO FIND  INSTRUCTIONS: [INHALATIONAL SPACING DEVICE (E-Z SPACER) SPCR] One spacer for the albuterol inhaler.  Instruct on use.

## 2021-10-18 NOTE — IP AVS SNAPSHOT
[address] [address]  Phone: [phone]  Fax: [phone]                                    After Visit Summary   10/18/2021    Marlen Dumas    MRN: [id number]           After Visit Summary Signature Page    I have received my discharge instructions, and my questions have been answered. I have discussed any challenges I see with this plan with the nurse or doctor.    ..........................................................................................................................................  Patient/Patient Representative Signature      ..........................................................................................................................................  Patient Representative Print Name and Relationship to Patient    ..................................................               ................................................  Date                                   Time    ..........................................................................................................................................  Reviewed by Signature/Title    ...................................................              ..............................................  Date                                               Time          22EPIC Rev 08/18

## 2021-10-18 NOTE — PRE-PROCEDURE
GENERAL PRE-PROCEDURE:   Procedure:  MRI with moderate sedation  Date/Time:  10/18/2021 7:50 AM    Risks and benefits: Risks, benefits and alternatives were discussed    Consent given by:  Parent  Patient states understanding of procedure being performed: Yes    Patient's understanding of procedure matches consent: Yes    Procedure consent matches procedure scheduled: Yes    Expected level of sedation:  Moderate  Appropriately NPO:  Yes  ASA Class:  2  Lungs:  Lungs clear with good breath sounds bilaterally  Heart:  Normal heart sounds and rate  History & Physical reviewed:  History and physical reviewed and no updates needed  Statement of review:  I have reviewed the lab findings, diagnostic data, medications, and the plan for sedation

## 2021-10-18 NOTE — PROGRESS NOTES
Discharging ambulatory to home per friend Mariaa's transportation after receiving both verbal and written discharge instructions.

## 2021-10-20 ENCOUNTER — MYC MEDICAL ADVICE (OUTPATIENT)
Dept: INTERNAL MEDICINE | Facility: CLINIC | Age: 61
End: 2021-10-20

## 2021-10-20 DIAGNOSIS — R20.0 NUMBNESS AND TINGLING OF RIGHT LEG: Primary | ICD-10-CM

## 2021-10-20 DIAGNOSIS — R20.2 NUMBNESS AND TINGLING OF RIGHT LEG: Primary | ICD-10-CM

## 2021-11-04 ENCOUNTER — OFFICE VISIT (OUTPATIENT)
Dept: PHYSICAL MEDICINE AND REHAB | Facility: CLINIC | Age: 61
End: 2021-11-04
Attending: INTERNAL MEDICINE
Payer: COMMERCIAL

## 2021-11-04 DIAGNOSIS — R20.0 NUMBNESS AND TINGLING OF RIGHT LEG: ICD-10-CM

## 2021-11-04 DIAGNOSIS — R20.2 NUMBNESS AND TINGLING OF RIGHT LEG: ICD-10-CM

## 2021-11-04 PROCEDURE — 95886 MUSC TEST DONE W/N TEST COMP: CPT | Mod: RT | Performed by: PHYSICAL MEDICINE & REHABILITATION

## 2021-11-04 PROCEDURE — 95909 NRV CNDJ TST 5-6 STUDIES: CPT | Performed by: PHYSICAL MEDICINE & REHABILITATION

## 2021-11-04 NOTE — PROGRESS NOTES
Patient presents at the request of Dr. Ricardo Davila for a right lower extremity EMG.  She has a 2-month history of right lateral thigh anterior shin to dorsal right foot numbness and tingling intermittently worse with prolonged sitting.  No weakness.  Has had some minimal low back pain with sitting.  On exam she has slight decrease sensation to light touch right dorsal first webspace of the right lower extremity intact throughout the remainder of the lower extremities.  Normal patellar and Achilles reflexes with downgoing toes and normal strength throughout the major muscle groups of the lower extremities.    EMG/NCS  results: Please see scanned full report.    Comment NCS: Normal study  1.  Borderline bilateral sural SNAP amplitudes normal for patient's age.  2.  Normal right peroneal and tibial CMAPs and symmetric tibial H reflexes.    Comment EMG: Normal study  1.  Normal needle EMG right lower extremity.    Interpretation: Normal study    1. There is no electrodiagnostic evidence of lumbosacral radiculopathy, lumbosacral plexopathy, or focal neuropathy in the right lower extremity.  Specifically, there is no electrodiagnostic evidence of a right L5 radiculopathy.    The testing was completed in its entirety by the physician.      It was our pleasure caring for your patient today, if there any questions or concerns please do not hesitate to contact us.

## 2021-11-04 NOTE — LETTER
11/4/2021         RE: Marlen Dumas  [address] [facility] [address]        Dear Colleague,    Thank you for referring your patient, Marlen Dumas, to the [facility] [place]. Please see a copy of my visit note below.    Patient presents at the request of Dr. Ricardo Davila for a right lower extremity EMG.  She has a 2-month history of right lateral thigh anterior shin to dorsal right foot numbness and tingling intermittently worse with prolonged sitting.  No weakness.  Has had some minimal low back pain with sitting.  On exam she has slight decrease sensation to light touch right dorsal first webspace of the right lower extremity intact throughout the remainder of the lower extremities.  Normal patellar and Achilles reflexes with downgoing toes and normal strength throughout the major muscle groups of the lower extremities.    EMG/NCS  results: Please see scanned full report.    Comment NCS: Normal study  1.  Borderline bilateral sural SNAP amplitudes normal for patient's age.  2.  Normal right peroneal and tibial CMAPs and symmetric tibial H reflexes.    Comment EMG: Normal study  1.  Normal needle EMG right lower extremity.    Interpretation: Normal study    1. There is no electrodiagnostic evidence of lumbosacral radiculopathy, lumbosacral plexopathy, or focal neuropathy in the right lower extremity.  Specifically, there is no electrodiagnostic evidence of a right L5 radiculopathy.    The testing was completed in its entirety by the physician.      It was our pleasure caring for your patient today, if there any questions or concerns please do not hesitate to contact us.            Again, thank you for allowing me to participate in the care of your patient.        Sincerely,        Wesley Rios, DO

## 2021-11-04 NOTE — PATIENT INSTRUCTIONS
Thank you for choosing the [facility] for your EMG testing.    The ordering provider will receive your final EMG results within the next few days.  Please follow up with your provider for the results and further treatment recommendations.

## 2022-01-18 ENCOUNTER — MYC MEDICAL ADVICE (OUTPATIENT)
Dept: INTERNAL MEDICINE | Facility: CLINIC | Age: 62
End: 2022-01-18
Payer: COMMERCIAL

## 2022-01-18 DIAGNOSIS — K21.9 GASTROESOPHAGEAL REFLUX DISEASE WITHOUT ESOPHAGITIS: ICD-10-CM

## 2022-01-18 DIAGNOSIS — T75.3XXA SEA SICKNESS, INITIAL ENCOUNTER: ICD-10-CM

## 2022-01-18 DIAGNOSIS — Z98.84 BARIATRIC SURGERY STATUS: ICD-10-CM

## 2022-01-18 DIAGNOSIS — Z72.0 TOBACCO ABUSE: ICD-10-CM

## 2022-01-18 DIAGNOSIS — J44.9 CHRONIC OBSTRUCTIVE PULMONARY DISEASE, UNSPECIFIED COPD TYPE (H): Primary | ICD-10-CM

## 2022-01-18 RX ORDER — VARENICLINE TARTRATE 1 MG/1
1 TABLET, FILM COATED ORAL 2 TIMES DAILY
Qty: 60 TABLET | Refills: 5 | Status: SHIPPED | OUTPATIENT
Start: 2022-01-18 | End: 2022-09-02

## 2022-01-18 RX ORDER — AZITHROMYCIN 250 MG/1
TABLET, FILM COATED ORAL
Qty: 6 TABLET | Refills: 0 | Status: SHIPPED | OUTPATIENT
Start: 2022-01-18 | End: 2022-01-23

## 2022-01-18 RX ORDER — NICOTINE 21 MG/24HR
1 PATCH, TRANSDERMAL 24 HOURS TRANSDERMAL EVERY 24 HOURS
Qty: 30 PATCH | Refills: 1 | Status: SHIPPED | OUTPATIENT
Start: 2022-01-18 | End: 2022-09-02

## 2022-01-18 RX ORDER — SCOLOPAMINE TRANSDERMAL SYSTEM 1 MG/1
1 PATCH, EXTENDED RELEASE TRANSDERMAL
Qty: 4 PATCH | Refills: 0 | Status: SHIPPED | OUTPATIENT
Start: 2022-01-18 | End: 2022-09-02

## 2022-06-19 ENCOUNTER — HEALTH MAINTENANCE LETTER (OUTPATIENT)
Age: 62
End: 2022-06-19

## 2022-06-20 ENCOUNTER — MYC MEDICAL ADVICE (OUTPATIENT)
Dept: SURGERY | Facility: CLINIC | Age: 62
End: 2022-06-20
Payer: COMMERCIAL

## 2022-06-21 DIAGNOSIS — Z98.84 HX OF LAPAROSCOPIC ADJUSTABLE GASTRIC BANDING: ICD-10-CM

## 2022-06-21 DIAGNOSIS — Z72.0 TOBACCO ABUSE: Primary | ICD-10-CM

## 2022-06-21 RX ORDER — SUCRALFATE 1 G/1
1 TABLET ORAL 4 TIMES DAILY
Qty: 120 TABLET | Refills: 1 | Status: SHIPPED | OUTPATIENT
Start: 2022-06-21 | End: 2022-06-22

## 2022-06-22 DIAGNOSIS — Z72.0 TOBACCO ABUSE: ICD-10-CM

## 2022-06-22 DIAGNOSIS — Z98.84 HX OF LAPAROSCOPIC ADJUSTABLE GASTRIC BANDING: ICD-10-CM

## 2022-06-22 RX ORDER — SUCRALFATE 1 G/1
TABLET ORAL
Qty: 360 TABLET | Refills: 1 | Status: SHIPPED | OUTPATIENT
Start: 2022-06-22 | End: 2023-11-30

## 2022-07-05 ENCOUNTER — TRANSFERRED RECORDS (OUTPATIENT)
Dept: HEALTH INFORMATION MANAGEMENT | Facility: CLINIC | Age: 62
End: 2022-07-05

## 2022-07-14 DIAGNOSIS — I10 ESSENTIAL HYPERTENSION: ICD-10-CM

## 2022-07-14 RX ORDER — AMLODIPINE BESYLATE 5 MG/1
TABLET ORAL
Qty: 90 TABLET | Refills: 0 | Status: SHIPPED | OUTPATIENT
Start: 2022-07-14 | End: 2022-09-02

## 2022-07-14 NOTE — LETTER
Marlen BURNS Skronyny   [address]  [facility] [address]         Dear Marlen,    I am sending you this letter to remind you that you are due for a follow up visit in September or October.    Please call to schedule this visit as soon as possible as our schedule fills up quickly.  This could be scheduled a physical if you wish or a follow up office visit.    This is to follow up on your blood pressure and other health issues.    If you already have an appointment scheduled with me for around this time, please ignore this notification.    I look forward to seeing you soon.      If you have any questions regarding the above, please feel free to contact me at [phone].        Sincerely,       Ricardo Davila MD  General Internal Medicine  [facility]

## 2022-07-14 NOTE — TELEPHONE ENCOUNTER
"Routing refill request to provider for review/approval because:  Labs not current:  Creatinine    Last Written Prescription Date:  7/27/21  Last Fill Quantity: 90,  # refills: 3   Last office visit provider:  10/4/21     Requested Prescriptions   Pending Prescriptions Disp Refills     amLODIPine (NORVASC) 5 MG tablet [Pharmacy Med Name: AMLODIPINE BESYLATE 5MG TABLETS] 90 tablet 3     Sig: TAKE 1 TABLET(5 MG) BY MOUTH DAILY       Calcium Channel Blockers Protocol  Failed - 7/14/2022  1:30 PM        Failed - Normal serum creatinine on file in past 12 months     Recent Labs   Lab Test 07/13/21  0722   CR 0.81       Ok to refill medication if creatinine is low          Passed - Blood pressure under 140/90 in past 12 months     BP Readings from Last 3 Encounters:   10/18/21 128/74   10/04/21 134/72   09/28/21 138/74                 Passed - Recent (12 mo) or future (30 days) visit within the authorizing provider's specialty     Patient has had an office visit with the authorizing provider or a provider within the authorizing providers department within the previous 12 mos or has a future within next 30 days. See \"Patient Info\" tab in inbasket, or \"Choose Columns\" in Meds & Orders section of the refill encounter.              Passed - Medication is active on med list        Passed - Patient is age 18 or older        Passed - No active pregnancy on record        Passed - No positive pregnancy test in past 12 months             Harpreet Mason RN 07/14/22 1:30 PM  "

## 2022-09-02 ENCOUNTER — OFFICE VISIT (OUTPATIENT)
Dept: INTERNAL MEDICINE | Facility: CLINIC | Age: 62
End: 2022-09-02
Payer: COMMERCIAL

## 2022-09-02 VITALS
HEART RATE: 77 BPM | BODY MASS INDEX: 25.61 KG/M2 | OXYGEN SATURATION: 96 % | RESPIRATION RATE: 18 BRPM | TEMPERATURE: 98.5 F | DIASTOLIC BLOOD PRESSURE: 70 MMHG | HEIGHT: 64 IN | WEIGHT: 150 LBS | SYSTOLIC BLOOD PRESSURE: 110 MMHG

## 2022-09-02 DIAGNOSIS — J44.9 CHRONIC OBSTRUCTIVE PULMONARY DISEASE, UNSPECIFIED COPD TYPE (H): Primary | ICD-10-CM

## 2022-09-02 DIAGNOSIS — Z98.84 HX OF LAPAROSCOPIC ADJUSTABLE GASTRIC BANDING: ICD-10-CM

## 2022-09-02 DIAGNOSIS — Z12.2 SCREENING FOR LUNG CANCER: ICD-10-CM

## 2022-09-02 DIAGNOSIS — T75.3XXA SEA SICKNESS, INITIAL ENCOUNTER: ICD-10-CM

## 2022-09-02 DIAGNOSIS — Z91.038 HYMENOPTERA ALLERGY: ICD-10-CM

## 2022-09-02 DIAGNOSIS — K21.9 GASTROESOPHAGEAL REFLUX DISEASE WITHOUT ESOPHAGITIS: ICD-10-CM

## 2022-09-02 DIAGNOSIS — K21.9 GASTROESOPHAGEAL REFLUX DISEASE, UNSPECIFIED WHETHER ESOPHAGITIS PRESENT: ICD-10-CM

## 2022-09-02 DIAGNOSIS — I10 PRIMARY HYPERTENSION: Chronic | ICD-10-CM

## 2022-09-02 DIAGNOSIS — I10 ESSENTIAL HYPERTENSION: ICD-10-CM

## 2022-09-02 DIAGNOSIS — Z72.0 TOBACCO ABUSE: ICD-10-CM

## 2022-09-02 DIAGNOSIS — Z98.84 BARIATRIC SURGERY STATUS: ICD-10-CM

## 2022-09-02 PROCEDURE — 99214 OFFICE O/P EST MOD 30 MIN: CPT | Performed by: INTERNAL MEDICINE

## 2022-09-02 RX ORDER — NICOTINE 21 MG/24HR
1 PATCH, TRANSDERMAL 24 HOURS TRANSDERMAL EVERY 24 HOURS
Qty: 30 PATCH | Refills: 3 | Status: SHIPPED | OUTPATIENT
Start: 2022-09-02 | End: 2023-04-27

## 2022-09-02 RX ORDER — EPINEPHRINE 0.3 MG/.3ML
0.3 INJECTION SUBCUTANEOUS ONCE
Qty: 2 EACH | Refills: 2 | Status: ON HOLD | OUTPATIENT
Start: 2022-09-02 | End: 2022-10-12

## 2022-09-02 RX ORDER — SCOLOPAMINE TRANSDERMAL SYSTEM 1 MG/1
1 PATCH, EXTENDED RELEASE TRANSDERMAL
Qty: 4 PATCH | Refills: 3 | Status: SHIPPED | OUTPATIENT
Start: 2022-09-02 | End: 2022-11-02

## 2022-09-02 RX ORDER — OMEPRAZOLE 40 MG/1
40 CAPSULE, DELAYED RELEASE ORAL DAILY
Qty: 90 CAPSULE | Refills: 3 | Status: SHIPPED | OUTPATIENT
Start: 2022-09-02 | End: 2023-01-02

## 2022-09-02 RX ORDER — SUCRALFATE 1 G/1
TABLET ORAL
Qty: 360 TABLET | Refills: 1 | Status: CANCELLED | OUTPATIENT
Start: 2022-09-02

## 2022-09-02 RX ORDER — AMLODIPINE BESYLATE 5 MG/1
TABLET ORAL
Qty: 90 TABLET | Refills: 3 | Status: SHIPPED | OUTPATIENT
Start: 2022-09-02 | End: 2023-10-03

## 2022-09-02 RX ORDER — VARENICLINE TARTRATE 1 MG/1
1 TABLET, FILM COATED ORAL 2 TIMES DAILY
Qty: 60 TABLET | Refills: 5 | Status: SHIPPED | OUTPATIENT
Start: 2022-09-02 | End: 2023-11-30

## 2022-09-02 RX ORDER — NICOTINE 21 MG/24HR
1 PATCH, TRANSDERMAL 24 HOURS TRANSDERMAL EVERY 24 HOURS
Qty: 30 PATCH | Refills: 3 | Status: SHIPPED | OUTPATIENT
Start: 2022-09-02 | End: 2023-07-23

## 2022-09-02 ASSESSMENT — PAIN SCALES - GENERAL: PAINLEVEL: NO PAIN (0)

## 2022-09-02 NOTE — PROGRESS NOTES
[facility] - Primary [facility] Specialists    Comprehensive and complex medical care - Chronic disease management - Shared decision making - Care coordination - Compassionate care    Patient advocacy - Rational deprescribing - Minimally disruptive medicine - Ethical focus - Customized care         Date of Service: 9/2/2022  Primary Provider: Ricardo Davila    Patient Care Team:  Ricardo Davila MD as PCP - General (Internal Medicine)  Ricardo Davila MD as Assigned PCP  Julissa Hager MD as MD (Family Medicine)  Wesley Rios DO as Assigned Neuroscience Provider          Patient's Pharmacy:    [id number] - [place], [place] - [address] AT [facility] [address]  [address]  [facility] [address]  Phone: [phone] Fax: [phone]     Patient's Contacts:  Name Home Phone Work Phone Mobile Phone Relationship Lgl GUERA Pablo [phone]   Friend      Patient's Insurance:    Payor: PREFERREDONE / Plan: PREFERREDONE HMO / Product Type: PPO /            Active Problem List:  Problem List as of 9/2/2022 Reviewed: 10/5/2021  4:33 PM by Ricardo Davila MD       High    Tobacco abuse    COPD (chronic obstructive pulmonary disease) (H)       Unprioritized    Lumbar radiculopathy    Last Assessment & Plan 9/28/2021 Office Visit Written 9/30/2021  8:48 AM by Vivi Corrales NP     - L5/S1 pattern. She will be referred to PT.  If not making any progress or worsening, consider MRI. I can order this for her, she would need an anxiolytic due to claustrophobia  - Follow up with PCP if worsening or no improvement               Low    HLD (hyperlipidemia)    Obesity (BMI 30.0-34.9)    Zinc deficiency    Vitamin D deficiency    Normal colonoscopy - 2018 - Average risk    Hymenoptera allergy       Other    HTN (hypertension)    Hx of laparoscopic adjustable gastric banding           Current Outpatient Medications   Medication Instructions     amLODIPine  (NORVASC) 5 MG tablet TAKE 1 TABLET(5 MG) BY MOUTH DAILY     EPINEPHrine (ANY BX GENERIC EQUIV) 0.3 mg, Intramuscular, ONCE     inhalational spacing device (E-Z SPACER) Spcr [INHALATIONAL SPACING DEVICE (E-Z SPACER) SPCR] One spacer for the albuterol inhaler.  Instruct on use.     nicotine (NICODERM CQ) 14 MG/24HR 24 hr patch 1 patch, Transdermal, EVERY 24 HOURS     nicotine (NICODERM CQ) 21 MG/24HR 24 hr patch 1 patch, Transdermal, EVERY 24 HOURS     nicotine (NICODERM CQ) 7 MG/24HR 24 hr patch 1 patch, Transdermal, EVERY 24 HOURS     omeprazole (PRILOSEC) 40 mg, Oral, DAILY     scopolamine (TRANSDERM) 1 MG/3DAYS 72 hr patch 1 patch, Transdermal, EVERY 72 HOURS     sucralfate (CARAFATE) 1 GM tablet TAKE 1 TABLET(1 GRAM) BY MOUTH FOUR TIMES DAILY     varenicline (CHANTIX) 1 mg, Oral, 2 TIMES DAILY     Social History     Social History Narrative    Lives with a friend.12/4/2018         Subjective:     Marlen Dumas is a 62 year old female who comes in today for:    Chief Complaint   Patient presents with     Recheck Medication     Needs refills on everything, would like a refill of the 21 mg patch, no longer on her list.       Generally doing well without issues.    Does continue to smoke and this was reviewed.  She does still want to work on stopping.  Did stop for 8 years previous.  Would like lung cancer screening.    Reviewed her hypertension today.  Blood pressure has been in the goal range.  Denies any excessive dizziness from the medication with this.     Reviewed chronic obstructive lung disease and there are no new issues with this.     Will be following up with bariatrics in the future.    Back issues are doing well.    Refills done today.    Will be going deep sea fishing in [place] again.      We reviewed her other issues noted in the assessment but not specifically addressed in the HPI above.     Objective:     Wt Readings from Last 3 Encounters:   09/02/22 68 kg (150 lb)   10/04/21 68 kg (150 lb)  "  09/28/21 67.1 kg (148 lb)     BP Readings from Last 3 Encounters:   09/02/22 110/70   10/18/21 128/74   10/04/21 134/72     /70 (BP Location: Right arm, Patient Position: Sitting, Cuff Size: Adult Regular)   Pulse 77   Temp 98.5  F (36.9  C)   Resp 18   Ht 1.613 m (5' 3.5\")   Wt 68 kg (150 lb)   SpO2 96%   Breastfeeding No   BMI 26.15 kg/m     The patient is comfortable, no acute distress.  Mood good.  Insight good.  Eyes are nonicteric.  Neck is supple without mass.  No cervical adenopathy.  No thyromegaly. Heart regular rate and rhythm.  Lungs clear to auscultation bilaterally.  Respiratory effort is good.  Extremities no edema.    Diagnostics:     Lab on 10/14/2021   Component Date Value Ref Range Status     SARS CoV2 PCR 10/14/2021 Negative  Negative Final    NEGATIVE: SARS-CoV-2 (COVID-19) RNA not detected, presumed negative.        No results found for any visits on 09/02/22.    Assessment:     1. Chronic obstructive pulmonary disease, unspecified COPD type (H)     - - Continue to monitor - - no change in treatment.  Work on stopping smoking.    2. Essential hypertension    3. Hymenoptera allergy    4. Tobacco abuse    5. Gastroesophageal reflux disease, unspecified whether esophagitis present    6. Sea sickness, initial encounter    7. Hx of laparoscopic adjustable gastric banding    8. Bariatric surgery status    9. Gastroesophageal reflux disease without esophagitis    10. Visit for screening mammogram    11. Cervical cancer screening    12. Screening for lung cancer    13. Primary hypertension         Plan:     1. Blood work to be done through bariatric clinic.  2. Work on stopping smoking.  3. Screening for lung cancer desired.  Ordered.  4. Declined pap or mammogram.  Vaccination otherwise refused.  5. Continue current medications otherwise.  6. Follow up sooner if issues.    Orders Placed This Encounter   Procedures     CT Chest Lung Cancer Scrn Low Dose darryl Davila, " MD  General Internal Medicine  [facility]    Return in about 1 year (around 9/2/2023), or if symptoms worsen or fail to improve, for follow up visit, annual wellness visit.     No future appointments.      Lung Cancer Screening Shared Decision Making Visit     Marlen Dumas is eligible for lung cancer screening on the basis of:   has not not experienced symptoms suggestive of lung cancer.   born on 1960, 62 year old years old.   smoked 1 packs of cigarettes for 42 years for a total of 42 pack-years   has not quit smoking is currently smoking.      I have discussed with patient the risks and benefits of screening for lung cancer with low-dose CT.     The risks include:   radiation exposure    false positives     over-diagnosis    The benefit of early detection of lung cancer is contingent upon adherence to annual screening or more frequent follow up if indicated.     Furthermore, reaping the benefits of screening requires Marlen Dumas to be willing and able to undergo diagnostic procedures, if indicated.     We did discuss that the only way to prevent lung cancer is to not smoke. Smoking cessation assistance was offered.

## 2022-09-03 NOTE — PATIENT INSTRUCTIONS
Please follow up if you have any further issues.    You may contact me by phone or MyChart if you are worsening or if things are not improving.    ______________________________________________________________________     You can call [phone] during weekday hours to get a hold of our team coordinators if you need to get a message to me.    There are 3 people in our building taking phone calls for me.  Be sure to listen to the prompts to get a hold of them.    You first press 1 for English (press another number for a different language) and then press 2 to get the .    They can then take your message and forward it onto me.    ______________________________________________________________________     Please remember that you can call [phone] ANYTIME to schedule an appointment.     You can schedule appointments 24 hours a day, 7 days a week.      Sometimes the best time to schedule an appointment is after clinic hours when less people are calling in.      Weekends are another option for calling in to schedule appointments.

## 2022-09-09 ENCOUNTER — HOSPITAL ENCOUNTER (OUTPATIENT)
Dept: CT IMAGING | Facility: CLINIC | Age: 62
Discharge: HOME | End: 2022-09-09
Attending: INTERNAL MEDICINE | Admitting: INTERNAL MEDICINE
Payer: COMMERCIAL

## 2022-09-09 DIAGNOSIS — Z12.2 SCREENING FOR LUNG CANCER: ICD-10-CM

## 2022-09-09 PROCEDURE — 71271 CT THORAX LUNG CANCER SCR C-: CPT

## 2022-09-12 LAB — RADIOLOGIST FLAGS: ABNORMAL

## 2022-09-13 ENCOUNTER — TELEPHONE (OUTPATIENT)
Dept: INTERNAL MEDICINE | Facility: CLINIC | Age: 62
End: 2022-09-13

## 2022-09-13 DIAGNOSIS — J44.9 CHRONIC OBSTRUCTIVE PULMONARY DISEASE, UNSPECIFIED COPD TYPE (H): ICD-10-CM

## 2022-09-13 DIAGNOSIS — Z72.0 TOBACCO ABUSE: ICD-10-CM

## 2022-09-13 DIAGNOSIS — R91.8 ABNORMAL CT LUNG SCREENING: ICD-10-CM

## 2022-09-13 DIAGNOSIS — R91.1 RIGHT UPPER LOBE PULMONARY NODULE: Primary | ICD-10-CM

## 2022-09-13 NOTE — TELEPHONE ENCOUNTER
Please call patient -    ______________________________________________________________________     Home phone:  [phone] (home)     Cell phone:   Telephone Information:   Mobile [phone]       Other contacts:  Name Home Phone Work Phone Mobile Phone Relationship Lgl GUERA Pablo [phone]   Friend      ______________________________________________________________________     She has seen the results in MyChart.    There is a concerning lung nodule in the right lung concerning for potential lung cancer.  The esophagus is also abnormal likely related to her gastric band surgery.    I am recommending that she see a pulmonary specialist as a next step to address this.  Please see if she agrees to proceed and I will place a referral for her to be seen as soon as possible.    She could be scheduled with me for a virtual visit or FACE TO FACE visit in the near future to review this further if she wishes.    Ricardo Davila MD  [facility]  9/13/2022, 8:29 AM     ______________________________________________________________________    Recent Results (from the past 240 hour(s))   CT Chest Lung Cancer Scrn Low Dose wo    Collection Time: 09/09/22  6:39 AM   Result Value Ref Range    Radiologist flags Positive lung cancer screening study (Urgent)       ______________________________________________________________________  ______________________________________________________________________     Pertinent radiology for this visit includes the following:    CT Chest Lung Cancer Scrn Low Dose wo  Narrative: EXAM: LOW DOSE LUNG CANCER SCREENING CT CHEST  LOCATION: [facility]  DATE/TIME: 9/9/2022 6:39 AM    INDICATION: Lung cancer screening. History of smoking. High risk patient.  COMPARISON: CT chest without contrast 06/07/2021    TECHNIQUE: Low-dose lung cancer screening non-contrast CT chest. Dose reduction techniques were used.     FINDINGS:  NODULES:  There is a new cavitary nodule with lobulated borders in the right apex measuring 1.5 x 2.9 cm (series 5, image 62). There is associated nodular thickening along bronchovascular structures around the lesion and multiple satellite nodules are   present most of which measure less than a centimeter. A representative nodule measures 6 x 11 mm (series 5, image 70).    A solid nodule has developed with polygonal borders in the right middle lobe near the major fissure measuring 4 mm (series 5, image 150). A small cluster of nodules is also developed within the posterior basal left lower lobe each measuring less than 5   mm (series 5, image 157).    LUNGS AND PLEURA: Central airways are patent. Variant segmentation of the right upper lobe. The apical and anterior segmental branches arise at the right tracheobronchial angle with posterior segmental branch arising 1.6 cm distally. Segmentation of the   left lung bronchi is normal. No lung consolidation. No pleural effusion.    MEDIASTINUM: Cardiac chambers are normal in size. No pericardial effusion. No thoracic aortic aneurysm. Mild aortic and great vessel atheromatous calcifications.    Diffusely abnormal esophagus which is dilated and filled with endoluminal material to the level of the thoracic inlet.    CORONARY ARTERY CALCIFICATION: Moderate.    LIMITED UPPER ABDOMEN: There is a gastric band in the left upper quadrant. Unchanged position of the gastric band with mild dilation of the stomach proximal to the band.    MUSCULOSKELETAL: Diffuse thoracic spine degenerative disc disease with marginal osteophytes and loss of disc space height. No aggressive or destructive bone lesions.  Impression: IMPRESSION:    1.  New nodular cavity in the right apex with multiple surrounding satellite nodules most of which measure under a centimeter. Additional new cluster of 4 mm nodules in the left lower lobe and a single new nodule in the right middle lobe adjacent to the   major  fissure. While neoplasm is possible, the differential diagnosis granulomatous infection can also produce this pattern.  2.  Gastric band with mild dilation of the proximal stomach above the band and diffuse, mild dilation of the esophagus to the thoracic inlet. This pattern of findings can predispose to aspiration events.    LungRADS CATEGORY: 4B: Suspicious. (>15% risk of malignancy)    RADIOLOGIST RECOMMENDATION: Pulmonary medicine referral. PET/CT and/or (bronchoscopic) tissue sampling depending on the probability of malignancy and comorbidities. Lesions are of sufficient size for assessment with PET/CT, but will not help distinguish   malignancy from inflammatory nodules.    [Access Center: Positive lung cancer screening study]    This report will be copied to the [facility] to ensure a provider acknowledges the finding. [facility] Center is available Monday through Friday 8am-3:30 pm.        ______________________________________________________________________

## 2022-09-13 NOTE — TELEPHONE ENCOUNTER
Contacted pt.     She would like to see the pulmonary specialist. Of note, she is seeing [facility] on 9/15 and asked about esophagus interventions from them. Writer stated that she can bring up esophagus concern at that appt if she would like and they will have access to the imaging results.

## 2022-09-14 ENCOUNTER — OFFICE VISIT (OUTPATIENT)
Dept: PULMONOLOGY | Facility: OTHER | Age: 62
End: 2022-09-14
Payer: COMMERCIAL

## 2022-09-14 VITALS
SYSTOLIC BLOOD PRESSURE: 122 MMHG | OXYGEN SATURATION: 98 % | HEIGHT: 64 IN | DIASTOLIC BLOOD PRESSURE: 68 MMHG | HEART RATE: 72 BPM | WEIGHT: 156.4 LBS | BODY MASS INDEX: 26.7 KG/M2

## 2022-09-14 DIAGNOSIS — Z72.0 TOBACCO ABUSE: ICD-10-CM

## 2022-09-14 DIAGNOSIS — R91.1 RIGHT UPPER LOBE PULMONARY NODULE: ICD-10-CM

## 2022-09-14 DIAGNOSIS — J44.9 CHRONIC OBSTRUCTIVE PULMONARY DISEASE, UNSPECIFIED COPD TYPE (H): ICD-10-CM

## 2022-09-14 DIAGNOSIS — R91.8 ABNORMAL CT LUNG SCREENING: ICD-10-CM

## 2022-09-14 PROCEDURE — 99204 OFFICE O/P NEW MOD 45 MIN: CPT | Performed by: INTERNAL MEDICINE

## 2022-09-14 NOTE — PATIENT INSTRUCTIONS
COPD is short for chronic obstructive pulmonary disease and is a condition in which air becomes stuck in your lungs.   There are 2 different types of abnormalities that are described. You can have one or the other, OR both.   Abnormal pictures--->Emphysema (lung tissue is destroyed which causes the air sacs to stretch out and appears as holes on your CT scan).  Abnormal symptoms--->Chronic cough (which is referred to as chronic bronchitis).    The medications used to treat this condition help to relax your airways and help air escape your lungs.  Please use your Arnuity inhaler once a day every day whether or not you are short of breath, this is not a rescue inhaler so do not use this if you are acutely short of breath.  Please be sure to gargle your mouth after using your Arnuity inhaler.  Please use your albuterol inhaler 2 puffs up to 6 times a day for rescue. If you are not short of breath, you do not need to use this.

## 2022-09-14 NOTE — PROGRESS NOTES
CCx:RUL pulmonary nodules    HPI:Ms. Montez is a 62 year old lady with a past medical history significant for significant ongoing tobacco abuse, bronchiectasis, hyperlipidemia, hypertension, hypothyroidism and a history of morbid obesity status post lap band procedure in 2008 who presents to clinic for the aforementioned chief complaint.  She underwent a routine low-dose lung cancer screening CT scan last week which revealed multiple abnormalities.  She tells me today that for the last 3 or 4 months she has been experiencing an increasing cough that is yellow/green in color and worse in the morning and sometimes awakens her at night also.  While she does not have any chest tightness she does experience wheezing in her chest every now and then and in the last year has been prescribed prednisone and antibiotics for presumed bronchitis flares 2-3 times.  She is also been prescribed short acting bronchodilator which she uses 2-3 times every few weeks.  She denies episodes of hemoptysis and does state that she has been losing a little bit of weight but has also previously undergone a lap band surgery and is not sure if her weight loss is related to this.  She finds that her breathing is harder in the cold and humid weather but has no dyspnea on exertion.    ROS:  Pertinent positives alluded to in the HPI. Remainder of 10 point ROS is negative.     PMH:  1. Ongoing tobacco abuse  2. Bronchiectasis  3. Hyperlipidemia  4. HTN  5. Hypothyroidism    PSH:  1. Laparoscopic gastric banding  2. Lumbar disc surgery    Allergies:  Reviewed in epic.    Family HX:  1. Mother: CAD, obesity  2. Father: COPD, tobacco abuse    Social Hx:  Marital status: Single and lives with a roommate  Number of children: 0  Resides in a house built in the 1970's, no concern for mold.  Occupational history: Retired  for [employer]   service: No  Pets: No  Smoking history: 40+ pack year history, currently smokes 1 pack a  day  Alcohol use: 4-5 drinks per week  Recreational drug use: No  Hobbies: Gardening, carpentry  Recent Travel: None. Was in [place] in February earlier this year    Current Meds:  Current Outpatient Medications   Medication Sig Dispense Refill     amLODIPine (NORVASC) 5 MG tablet TAKE 1 TABLET(5 MG) BY MOUTH DAILY 90 tablet 3     fluticasone (ARNUITY ELLIPTA) 100 MCG/ACT inhaler Inhale 1 puff into the lungs daily 1 each 11     inhalational spacing device (E-Z SPACER) Spcr [INHALATIONAL SPACING DEVICE (E-Z SPACER) SPCR] One spacer for the albuterol inhaler.  Instruct on use. 1 each 0     nicotine (NICODERM CQ) 14 MG/24HR 24 hr patch Place 1 patch onto the skin every 24 hours 30 patch 3     nicotine (NICODERM CQ) 21 MG/24HR 24 hr patch Place 1 patch onto the skin every 24 hours 30 patch 3     nicotine (NICODERM CQ) 7 MG/24HR 24 hr patch Place 1 patch onto the skin every 24 hours 30 patch 3     omeprazole (PRILOSEC) 40 MG DR capsule Take 1 capsule (40 mg) by mouth daily 90 capsule 3     scopolamine (TRANSDERM) 1 MG/3DAYS 72 hr patch Place 1 patch onto the skin every 72 hours 4 patch 3     sucralfate (CARAFATE) 1 GM tablet TAKE 1 TABLET(1 GRAM) BY MOUTH FOUR TIMES DAILY 360 tablet 1     varenicline (CHANTIX) 1 MG tablet Take 1 tablet (1 mg) by mouth 2 times daily 60 tablet 5     EPINEPHrine (ANY BX GENERIC EQUIV) 0.3 MG/0.3ML injection 2-pack Inject 0.3 mLs (0.3 mg) into the muscle once for 1 dose 2 each 2     Labs:  Reviewed.     Imaging studies:  LDCT 9/9/22:  1.  New nodular cavity in the right apex with multiple surrounding satellite nodules most of which measure under a centimeter. Additional new cluster of 4 mm nodules in the left lower lobe and a single new nodule in the right middle lobe adjacent to the   major fissure. While neoplasm is possible, the differential diagnosis granulomatous infection can also produce this pattern.  2.  Gastric band with mild dilation of the proximal stomach above the band and  "diffuse, mild dilation of the esophagus to the thoracic inlet. This pattern of findings can predispose to aspiration events.       PFT's 2/9/18  FEV1/FVC is 70% and is normal.  FEV1 is (89%) predicted and is normal.  FVC is (99%) predicted and normal.  There was improvement in spirometry after a single inhaled dose of bronchodilator.  TLC is 104% predicted and is normal.  RV is (110%) predicted and is normal.  DLCO is (128%) predicted and is normal when it is corrected for hemoglobin.    Impression:  Full Pulmonary Function Test is abnormal.  PFTs are consistent with no obstructive disease.  Spirometry is consistent with reversibility.  There is no hyperinflation.  There is no air-trapping.  Diffusion capacity when corrected for hemoglobin is normal.    Physical Exam:  /68 (BP Location: Left arm)   Pulse 72   Ht 1.613 m (5' 3.5\")   Wt 70.9 kg (156 lb 6.4 oz)   SpO2 98%   BMI 27.27 kg/m    Physical Exam  Constitutional:       Appearance: Normal appearance.   HENT:      Head: Normocephalic.      Mouth/Throat:      Mouth: Mucous membranes are moist.   Cardiovascular:      Rate and Rhythm: Normal rate and regular rhythm.      Pulses: Normal pulses.      Heart sounds: Normal heart sounds.   Pulmonary:      Effort: Pulmonary effort is normal.      Breath sounds: Normal breath sounds.   Abdominal:      General: Abdomen is flat.      Palpations: Abdomen is soft.   Skin:     General: Skin is warm and dry.   Neurological:      General: No focal deficit present.      Mental Status: She is alert.         Assessment and Plan:Marlen Dumas is a 62 year old with a past medical history significant for ongoing tobacco abuse, bronchiectasis, hyperlipidemia, hypertension, hypothyroidism and a history of morbid obesity status post lap band procedure who presents to clinic today for establishment of care for a 4B low-dose lung cancer screening CT scan and has previously undergone pulmonary function testing that has revealed " reversibility in her spirometry.  Additionally, her CT scan also demonstrates evidence of bronchiectasis. For the present we would recommend;    1. Asthma/COPD (chronic bronchitis) overlap syndrome: As noted based on her pulmonary function testing and symptom complex.  She is also noted to have bronchiectasis on her imaging with some traction evident in her mid zones.  Her present symptom complex appears to be consistent with poorly controlled airway obstruction.    Initiate Arnuity Ellipta 1 puff daily.  Patient was instructed to rinse her mouth after using this.    As needed albuterol for rescue up to 4 times a day.    Counseled the patient about the importance of discontinuing tobacco products.    Repeat pulmonary function testing.  2. Right upper lobe pulmonary nodules: Most concerning for malignant process especially given the patient's significant history of tobacco abuse.  We did discuss this at length and options for evaluation.    We will proceed with a PET CT scan I have explained to her that in the absence of any lymphadenopathy in the chest, we may proceed with resection of her right upper lobe for both diagnosis and treatment.  I will however discuss her case with our tumor board once her PET scan has been completed.  3. HCM: Counseled her about tobacco cessation for more than 5 and less than 10 minutes.  We will address immunizations at the next clinic visit.  4. Follow-up: Dependent on the results of her PET scan.      Loni MARTINEZ [facility]  Pulmonary and Critical Care  [id number]

## 2022-09-15 ENCOUNTER — LAB (OUTPATIENT)
Dept: LAB | Facility: CLINIC | Age: 62
End: 2022-09-15
Payer: COMMERCIAL

## 2022-09-15 ENCOUNTER — OFFICE VISIT (OUTPATIENT)
Dept: SURGERY | Facility: CLINIC | Age: 62
End: 2022-09-15

## 2022-09-15 VITALS
DIASTOLIC BLOOD PRESSURE: 78 MMHG | BODY MASS INDEX: 26.46 KG/M2 | HEIGHT: 64 IN | SYSTOLIC BLOOD PRESSURE: 124 MMHG | WEIGHT: 155 LBS

## 2022-09-15 DIAGNOSIS — Z98.84 HISTORY OF LAPAROSCOPIC ADJUSTABLE GASTRIC BANDING: ICD-10-CM

## 2022-09-15 DIAGNOSIS — R63.4 WEIGHT LOSS: ICD-10-CM

## 2022-09-15 DIAGNOSIS — K21.9 GASTROESOPHAGEAL REFLUX DISEASE WITHOUT ESOPHAGITIS: ICD-10-CM

## 2022-09-15 DIAGNOSIS — Z46.51 ENCOUNTER FOR ADJUSTMENT OF GASTRIC LAP BAND: Primary | ICD-10-CM

## 2022-09-15 LAB
ALBUMIN SERPL BCG-MCNC: 4.1 G/DL (ref 3.5–5.2)
ALP SERPL-CCNC: 73 U/L (ref 35–104)
ALT SERPL W P-5'-P-CCNC: 11 U/L (ref 10–35)
ANION GAP SERPL CALCULATED.3IONS-SCNC: 10 MMOL/L (ref 7–15)
AST SERPL W P-5'-P-CCNC: 25 U/L (ref 10–35)
BILIRUB SERPL-MCNC: 0.3 MG/DL
BUN SERPL-MCNC: 11.4 MG/DL (ref 8–23)
CALCIUM SERPL-MCNC: 9.8 MG/DL (ref 8.8–10.2)
CHLORIDE SERPL-SCNC: 99 MMOL/L (ref 98–107)
CREAT SERPL-MCNC: 0.91 MG/DL (ref 0.51–0.95)
DEPRECATED HCO3 PLAS-SCNC: 28 MMOL/L (ref 22–29)
ERYTHROCYTE [DISTWIDTH] IN BLOOD BY AUTOMATED COUNT: 13.9 % (ref 10–15)
FERRITIN SERPL-MCNC: 114 NG/ML (ref 11–328)
FOLATE SERPL-MCNC: 6.9 NG/ML (ref 4.6–34.8)
GFR SERPL CREATININE-BSD FRML MDRD: 70 ML/MIN/1.73M2
GLUCOSE SERPL-MCNC: 105 MG/DL (ref 70–99)
HBA1C MFR BLD: 5.8 % (ref 0–5.6)
HCT VFR BLD AUTO: 43.3 % (ref 35–47)
HDLC SERPL-MCNC: 86 MG/DL
HGB BLD-MCNC: 14.1 G/DL (ref 11.7–15.7)
LDLC SERPL DIRECT ASSAY-MCNC: 135 MG/DL
MCH RBC QN AUTO: 30.3 PG (ref 26.5–33)
MCHC RBC AUTO-ENTMCNC: 32.6 G/DL (ref 31.5–36.5)
MCV RBC AUTO: 93 FL (ref 78–100)
PLATELET # BLD AUTO: 202 10E3/UL (ref 150–450)
POTASSIUM SERPL-SCNC: 3.7 MMOL/L (ref 3.4–5.3)
PROT SERPL-MCNC: 7.1 G/DL (ref 6.4–8.3)
PTH-INTACT SERPL-MCNC: 31 PG/ML (ref 15–65)
RBC # BLD AUTO: 4.66 10E6/UL (ref 3.8–5.2)
SODIUM SERPL-SCNC: 137 MMOL/L (ref 136–145)
TSH SERPL DL<=0.005 MIU/L-ACNC: 3.91 UIU/ML (ref 0.3–4.2)
VIT B12 SERPL-MCNC: 654 PG/ML (ref 232–1245)
WBC # BLD AUTO: 8.1 10E3/UL (ref 4–11)

## 2022-09-15 PROCEDURE — 82746 ASSAY OF FOLIC ACID SERUM: CPT

## 2022-09-15 PROCEDURE — 83721 ASSAY OF BLOOD LIPOPROTEIN: CPT

## 2022-09-15 PROCEDURE — 83036 HEMOGLOBIN GLYCOSYLATED A1C: CPT

## 2022-09-15 PROCEDURE — 83970 ASSAY OF PARATHORMONE: CPT

## 2022-09-15 PROCEDURE — 82607 VITAMIN B-12: CPT

## 2022-09-15 PROCEDURE — 84590 ASSAY OF VITAMIN A: CPT | Mod: 90

## 2022-09-15 PROCEDURE — 84425 ASSAY OF VITAMIN B-1: CPT | Mod: 90

## 2022-09-15 PROCEDURE — S2083 ADJUSTMENT GASTRIC BAND: HCPCS | Performed by: FAMILY MEDICINE

## 2022-09-15 PROCEDURE — 36415 COLL VENOUS BLD VENIPUNCTURE: CPT

## 2022-09-15 PROCEDURE — 99000 SPECIMEN HANDLING OFFICE-LAB: CPT

## 2022-09-15 PROCEDURE — 85027 COMPLETE CBC AUTOMATED: CPT

## 2022-09-15 PROCEDURE — 83718 ASSAY OF LIPOPROTEIN: CPT

## 2022-09-15 PROCEDURE — 84443 ASSAY THYROID STIM HORMONE: CPT

## 2022-09-15 PROCEDURE — 82728 ASSAY OF FERRITIN: CPT

## 2022-09-15 PROCEDURE — 82306 VITAMIN D 25 HYDROXY: CPT

## 2022-09-15 PROCEDURE — 84630 ASSAY OF ZINC: CPT | Mod: 90

## 2022-09-15 PROCEDURE — 80053 COMPREHEN METABOLIC PANEL: CPT

## 2022-09-15 NOTE — PROGRESS NOTES
Lap Band adjustment for APR band placed 10/1/2008 by Dr. Rowell     Date of visit: 9/15/2022  Physician: Julissa Espinal MD, MD  Primary Care Provider:  Ricardo Davila  Marlen Dumas   62 year old  female    Date of Surgery: 10/1/1008  Initial Weight: 295#  Initial BMI: 50.4  Today's Weight:   Wt Readings from Last 1 Encounters:   09/15/22 70.3 kg (155 lb)     Body mass index is 27.03 kg/m .      Assessment and Plan     Assessment: Marlen is a 62 year old year old female who is 14 yrs s/p  Lap Band with Dr. Rowell  Marlen Dumas feels as if she has achieved the goals she hoped to accomplish through bariatric surgery and weight loss.  She has had recent unintentional weight loss, GERD symptoms and CT showed esophageal dilatation above the lap band.    Encounter Diagnoses   Name Primary?     Encounter for adjustment of gastric lap band Yes     History of laparoscopic adjustable gastric banding      Weight loss      Gastroesophageal reflux disease without esophagitis        Current Outpatient Medications:      amLODIPine (NORVASC) 5 MG tablet, TAKE 1 TABLET(5 MG) BY MOUTH DAILY, Disp: 90 tablet, Rfl: 3     EPINEPHrine (ANY BX GENERIC EQUIV) 0.3 MG/0.3ML injection 2-pack, Inject 0.3 mLs (0.3 mg) into the muscle once for 1 dose, Disp: 2 each, Rfl: 2     fluticasone (ARNUITY ELLIPTA) 100 MCG/ACT inhaler, Inhale 1 puff into the lungs daily, Disp: 1 each, Rfl: 11     inhalational spacing device (E-Z SPACER) Spcr, [INHALATIONAL SPACING DEVICE (E-Z SPACER) SPCR] One spacer for the albuterol inhaler.  Instruct on use., Disp: 1 each, Rfl: 0     nicotine (NICODERM CQ) 14 MG/24HR 24 hr patch, Place 1 patch onto the skin every 24 hours, Disp: 30 patch, Rfl: 3     nicotine (NICODERM CQ) 21 MG/24HR 24 hr patch, Place 1 patch onto the skin every 24 hours, Disp: 30 patch, Rfl: 3     nicotine (NICODERM CQ) 7 MG/24HR 24 hr patch, Place 1 patch onto the skin every 24 hours, Disp: 30 patch, Rfl: 3     omeprazole (PRILOSEC)  40 MG DR capsule, Take 1 capsule (40 mg) by mouth daily, Disp: 90 capsule, Rfl: 3     scopolamine (TRANSDERM) 1 MG/3DAYS 72 hr patch, Place 1 patch onto the skin every 72 hours, Disp: 4 patch, Rfl: 3     sucralfate (CARAFATE) 1 GM tablet, TAKE 1 TABLET(1 GRAM) BY MOUTH FOUR TIMES DAILY, Disp: 360 tablet, Rfl: 1     varenicline (CHANTIX) 1 MG tablet, Take 1 tablet (1 mg) by mouth 2 times daily, Disp: 60 tablet, Rfl: 5     Plan: Removal of all fluid from lap band.   Provider: SAMANTHA Espinal MD  PROCEDURE: LAP BAND ADJUSTMENT-REMOVAL OF ALL SALINE FROM LAP BAND  Area above lap band port was cleansed and 1.0% lidocaine with epi (LOT -DK) exp 1May 2023 (9/29) for local anesthesia used. Lap Band Port easily accessed with marrufo needle and 2.2ml fluid removed and bandaid applied. Marlen tolerated the procedure well and will follow a full liquid diet for 48 hours following today's adjustment. She will have her routine annual labs drawn today.    Fluid in lap band before adjustment: 2.2ml  Fluid in lap band after adjustment: 0 ml      Return in about 1 year (around 9/15/2023). Sooner if problems or concerns.    Bariatric Surgery Review     Interim History/LifeChanges: Marlen has had some unintentional weight loss and made this appointment to have some fluid removed from her lap band system. In the interim, she developed a cough that became worse and a CT scan was done and showed lung nodules that weren't there on a previous CT. She has quit smoking with the help of Chantix and the patch (just starting Chantix). She has a PET scan ordered. Her recent CT also showed esophageal dilatation. She would like to do annual post op bariatric labs. She takes her omeprazole with consistency. She takes carafate PRN. Last year's labs looked good though her LDL and A1c were increasing. (5.9 and 163).    Patient Concerns: weight loss, annual labs  Appetite (1-10): down  GERD: yes    Medication changes: chantix and nicotine patch    Vitamin  Intake:   B-12     MVI     Vitamin D     Calcium        Other                LABS: ordered    Nausea no  Vomiting no  Constipation no  Diarrhea no  Rashes no  Hair Loss no  Calf tenderness no  Breathing difficulty coughing more  Reactive Hypoglycemia no  Light Headedness no   Moods euthymic    12 point ROS as above and otherwise negative      Habits:  Alcohol: no  Tobacco: quit 3 days ago  Caffeine   NSAIDS no  Exercise Routine: Active and ADLs  3 meals/day tries  Protein first yes  60 when ablegrams/day  Water Separate from meals yes  Calorie Containing Beverages no  Restaurant eating/wk   Sleeping   Stress typically low/moderate  CPAP: NA  Contraception: PM  DEXA:normal 2019. Due this year    Social History     Social History     Socioeconomic History     Marital status: Single     Spouse name: Not on file     Number of children: 0     Years of education: Not on file     Highest education level: Not on file   Occupational History     Not on file   Tobacco Use     Smoking status: Former Smoker     Packs/day: 0.50     Years: 37.00     Pack years: 18.50     Types: Cigarettes     Quit date: 9/12/2022     Smokeless tobacco: Never Used     Tobacco comment: quit 9/12/22   Vaping Use     Vaping Use: Never used   Substance and Sexual Activity     Alcohol use: Yes     Alcohol/week: 4.0 standard drinks     Types: 4 Standard drinks or equivalent per week     Comment: 4-5 beer/wine weekly     Drug use: Never     Sexual activity: Never     Birth control/protection: Abstinence, Post-menopausal   Other Topics Concern     Parent/sibling w/ CABG, MI or angioplasty before 65F 55M? Not Asked   Social History Narrative    Lives with a friend.12/4/2018  Retired after working for [employer].     Social Determinants of Health     Financial Resource Strain: Not on file   Food Insecurity: Not on file   Transportation Needs: Not on file   Physical Activity: Not on file   Stress: Not on file   Social Connections: Not on file   Intimate Partner  Violence: Not on file   Housing Stability: Not on file       Past Medical History     Past Medical History:   Diagnosis Date     COPD (chronic obstructive pulmonary disease) (H)      HLD (hyperlipidemia)      HTN (hypertension)      Hx of laparoscopic adjustable gastric banding 10/01/2008    Initial weight 295.5 BMI 50.4 Dr. Rowell     Hymenoptera allergy      Hypothyroidism      S/P colonoscopy 5/5/2019     Tobacco abuse      Vitamin D deficiency 8/24/2016     Problem List     Patient Active Problem List   Diagnosis     Tobacco abuse     COPD (chronic obstructive pulmonary disease) (H)     HTN (hypertension)     HLD (hyperlipidemia)     Hx of laparoscopic adjustable gastric banding     Obesity (BMI 30.0-34.9)     Zinc deficiency     Vitamin D deficiency     Normal colonoscopy - 2018 - Average risk     Hymenoptera allergy     Lumbar radiculopathy     Medications       Current Outpatient Medications:      amLODIPine (NORVASC) 5 MG tablet, TAKE 1 TABLET(5 MG) BY MOUTH DAILY, Disp: 90 tablet, Rfl: 3     EPINEPHrine (ANY BX GENERIC EQUIV) 0.3 MG/0.3ML injection 2-pack, Inject 0.3 mLs (0.3 mg) into the muscle once for 1 dose, Disp: 2 each, Rfl: 2     fluticasone (ARNUITY ELLIPTA) 100 MCG/ACT inhaler, Inhale 1 puff into the lungs daily, Disp: 1 each, Rfl: 11     inhalational spacing device (E-Z SPACER) Spcr, [INHALATIONAL SPACING DEVICE (E-Z SPACER) SPCR] One spacer for the albuterol inhaler.  Instruct on use., Disp: 1 each, Rfl: 0     nicotine (NICODERM CQ) 14 MG/24HR 24 hr patch, Place 1 patch onto the skin every 24 hours, Disp: 30 patch, Rfl: 3     nicotine (NICODERM CQ) 21 MG/24HR 24 hr patch, Place 1 patch onto the skin every 24 hours, Disp: 30 patch, Rfl: 3     nicotine (NICODERM CQ) 7 MG/24HR 24 hr patch, Place 1 patch onto the skin every 24 hours, Disp: 30 patch, Rfl: 3     omeprazole (PRILOSEC) 40 MG DR capsule, Take 1 capsule (40 mg) by mouth daily, Disp: 90 capsule, Rfl: 3     scopolamine (TRANSDERM) 1  "MG/3DAYS 72 hr patch, Place 1 patch onto the skin every 72 hours, Disp: 4 patch, Rfl: 3     sucralfate (CARAFATE) 1 GM tablet, TAKE 1 TABLET(1 GRAM) BY MOUTH FOUR TIMES DAILY, Disp: 360 tablet, Rfl: 1     varenicline (CHANTIX) 1 MG tablet, Take 1 tablet (1 mg) by mouth 2 times daily, Disp: 60 tablet, Rfl: 5   Surgical History     Past Surgical History  She has a past surgical history that includes Laparoscopic Gastric Banding (2008); Lumbar Disc Surgery; Foot surgery; and Pr Esophagogastroduodenoscopy Transoral Diagnostic (N/A, 8/14/2020).    Objective-Exam     Constitutional:  /78   Ht 1.613 m (5' 3.5\")   Wt 70.3 kg (155 lb)   BMI 27.03 kg/m      General:  Pleasant and in no acute distress   Eyes:  EOMI  ENT:  Airway 1+  Moist mucous membranes  Neck:  Supple, No LAD, No thyromegaly, No carotid bruits appreciated  Respiratory: Normal respiratory effort, no cough, wheezes or crackles  CV:  Regular rate and Rhythm,no murmurs, pulses 2+, no calf tenderness, no LE edema  Gastrointestinal: Abdomen NT/ND, BS+, lap band port easily palpaged in Lmid abdomen. Tilted slightly infero/medial  Musculoskeletal: muscle mass WNL  Skin: color tan hair full, incisions nicely healed  Neurological: No tremor, normal gait  Psychiatric: alert and oriented X3, mood and affect normal    Counseling     We reviewed the important post op bariatric recommendations:  -eating 3 meals daily  -eating protein first, getting >60gm protein daily  -eating slowly, chewing food well  -avoiding/limiting calorie containing beverages  -drinking water 15-30 minutes before or after meals  -choosing wheat, not white with breads, crackers, pastas, dandre, bagels, tortillas, rice  -limiting restaurant or cafeteria eating to twice a week or less    We discussed the importance of restorative sleep and stress management in maintaining a healthy weight.  We discussed the National Weight Control Registry healthy weight maintenance strategies and ways to " optimize metabolism.  We discussed the importance of physical activity including cardiovascular and strength training in maintaining a healthier weight.    We discussed the importance of life-long vitamin supplementation and life-long  follow-up.    Marlen was reminded that, to avoid marginal ulcers she should avoid tobacco at all, alcohol in excess, caffeine in excess, and NSAIDS (unless indicated for cardioprotection or othewise and opposed by a PPI).    Julissa Espinal MD, MD, [facility].  9/15/2022  10:42 AM      No images are attached to the encounter.  Total time spent on the date of this encounter doing: chart review, review of test results, patient visit, physical exam, education, counseling, developing plan of care, and documenting = 25 minutes.

## 2022-09-15 NOTE — LETTER
9/15/2022         RE: Marlen Dumas  [address]  [place] [place] [address]        Dear Colleague,    Thank you for referring your patient, Marlen Dumas, to the [facility] [place]. Please see a copy of my visit note below.    Lap Band adjustment for APR band placed 10/1/2008 by Dr. Rowell     Date of visit: 9/15/2022  Physician: Julissa Espinal MD, MD  Primary Care Provider:  Ricardo Davila  Marlen Dumas   62 year old  female    Date of Surgery: 10/1/1008  Initial Weight: 295#  Initial BMI: 50.4  Today's Weight:   Wt Readings from Last 1 Encounters:   09/15/22 70.3 kg (155 lb)     Body mass index is 27.03 kg/m .      Assessment and Plan     Assessment: aMrlen is a 62 year old year old female who is 14 yrs s/p  Lap Band with Dr. Rowell  Marlen Dumas feels as if she has achieved the goals she hoped to accomplish through bariatric surgery and weight loss.  She has had recent unintentional weight loss, GERD symptoms and CT showed esophageal dilatation above the lap band.    Encounter Diagnoses   Name Primary?     Encounter for adjustment of gastric lap band Yes     History of laparoscopic adjustable gastric banding      Weight loss      Gastroesophageal reflux disease without esophagitis        Current Outpatient Medications:      amLODIPine (NORVASC) 5 MG tablet, TAKE 1 TABLET(5 MG) BY MOUTH DAILY, Disp: 90 tablet, Rfl: 3     EPINEPHrine (ANY BX GENERIC EQUIV) 0.3 MG/0.3ML injection 2-pack, Inject 0.3 mLs (0.3 mg) into the muscle once for 1 dose, Disp: 2 each, Rfl: 2     fluticasone (ARNUITY ELLIPTA) 100 MCG/ACT inhaler, Inhale 1 puff into the lungs daily, Disp: 1 each, Rfl: 11     inhalational spacing device (E-Z SPACER) Spcr, [INHALATIONAL SPACING DEVICE (E-Z SPACER) SPCR] One spacer for the albuterol inhaler.  Instruct on use., Disp: 1 each, Rfl: 0     nicotine (NICODERM CQ) 14 MG/24HR 24 hr patch, Place 1 patch onto the skin every 24 hours, Disp:  30 patch, Rfl: 3     nicotine (NICODERM CQ) 21 MG/24HR 24 hr patch, Place 1 patch onto the skin every 24 hours, Disp: 30 patch, Rfl: 3     nicotine (NICODERM CQ) 7 MG/24HR 24 hr patch, Place 1 patch onto the skin every 24 hours, Disp: 30 patch, Rfl: 3     omeprazole (PRILOSEC) 40 MG DR capsule, Take 1 capsule (40 mg) by mouth daily, Disp: 90 capsule, Rfl: 3     scopolamine (TRANSDERM) 1 MG/3DAYS 72 hr patch, Place 1 patch onto the skin every 72 hours, Disp: 4 patch, Rfl: 3     sucralfate (CARAFATE) 1 GM tablet, TAKE 1 TABLET(1 GRAM) BY MOUTH FOUR TIMES DAILY, Disp: 360 tablet, Rfl: 1     varenicline (CHANTIX) 1 MG tablet, Take 1 tablet (1 mg) by mouth 2 times daily, Disp: 60 tablet, Rfl: 5     Plan: Removal of all fluid from lap band.   Provider: SAMANTHA Espinal MD  PROCEDURE: LAP BAND ADJUSTMENT-REMOVAL OF ALL SALINE FROM LAP BAND  Area above lap band port was cleansed and 1.0% lidocaine with epi (LOT -DK) exp 1May 2023 (9/29) for local anesthesia used. Lap Band Port easily accessed with marrufo needle and 2.2ml fluid removed and bandaid applied. Marlen tolerated the procedure well and will follow a full liquid diet for 48 hours following today's adjustment. She will have her routine annual labs drawn today.    Fluid in lap band before adjustment: 2.2ml  Fluid in lap band after adjustment: 0 ml      Return in about 1 year (around 9/15/2023). Sooner if problems or concerns.    Bariatric Surgery Review     Interim History/LifeChanges: Marlen has had some unintentional weight loss and made this appointment to have some fluid removed from her lap band system. In the interim, she developed a cough that became worse and a CT scan was done and showed lung nodules that weren't there on a previous CT. She has quit smoking with the help of Chantix and the patch (just starting Chantix). She has a PET scan ordered. Her recent CT also showed esophageal dilatation. She would like to do annual post op bariatric labs. She takes her  omeprazole with consistency. She takes carafate PRN. Last year's labs looked good though her LDL and A1c were increasing. (5.9 and 163).    Patient Concerns: weight loss, annual labs  Appetite (1-10): down  GERD: yes    Medication changes: chantix and nicotine patch    Vitamin Intake:   B-12     MVI     Vitamin D     Calcium        Other                LABS: ordered    Nausea no  Vomiting no  Constipation no  Diarrhea no  Rashes no  Hair Loss no  Calf tenderness no  Breathing difficulty coughing more  Reactive Hypoglycemia no  Light Headedness no   Moods euthymic    12 point ROS as above and otherwise negative      Habits:  Alcohol: no  Tobacco: quit 3 days ago  Caffeine   NSAIDS no  Exercise Routine: Active and ADLs  3 meals/day tries  Protein first yes  60 when ablegrams/day  Water Separate from meals yes  Calorie Containing Beverages no  Restaurant eating/wk   Sleeping   Stress typically low/moderate  CPAP: NA  Contraception: PM  DEXA:normal 2019. Due this year    Social History     Social History     Socioeconomic History     Marital status: Single     Spouse name: Not on file     Number of children: 0     Years of education: Not on file     Highest education level: Not on file   Occupational History     Not on file   Tobacco Use     Smoking status: Former Smoker     Packs/day: 0.50     Years: 37.00     Pack years: 18.50     Types: Cigarettes     Quit date: 9/12/2022     Smokeless tobacco: Never Used     Tobacco comment: quit 9/12/22   Vaping Use     Vaping Use: Never used   Substance and Sexual Activity     Alcohol use: Yes     Alcohol/week: 4.0 standard drinks     Types: 4 Standard drinks or equivalent per week     Comment: 4-5 beer/wine weekly     Drug use: Never     Sexual activity: Never     Birth control/protection: Abstinence, Post-menopausal   Other Topics Concern     Parent/sibling w/ CABG, MI or angioplasty before 65F 55M? Not Asked   Social History Narrative    Lives with a friend.12/4/2018  Retired  after working for [employer].     Social Determinants of Health     Financial Resource Strain: Not on file   Food Insecurity: Not on file   Transportation Needs: Not on file   Physical Activity: Not on file   Stress: Not on file   Social Connections: Not on file   Intimate Partner Violence: Not on file   Housing Stability: Not on file       Past Medical History     Past Medical History:   Diagnosis Date     COPD (chronic obstructive pulmonary disease) (H)      HLD (hyperlipidemia)      HTN (hypertension)      Hx of laparoscopic adjustable gastric banding 10/01/2008    Initial weight 295.5 BMI 50.4 Dr. Rowell     Hymenoptera allergy      Hypothyroidism      S/P colonoscopy 5/5/2019     Tobacco abuse      Vitamin D deficiency 8/24/2016     Problem List     Patient Active Problem List   Diagnosis     Tobacco abuse     COPD (chronic obstructive pulmonary disease) (H)     HTN (hypertension)     HLD (hyperlipidemia)     Hx of laparoscopic adjustable gastric banding     Obesity (BMI 30.0-34.9)     Zinc deficiency     Vitamin D deficiency     Normal colonoscopy - 2018 - Average risk     Hymenoptera allergy     Lumbar radiculopathy     Medications       Current Outpatient Medications:      amLODIPine (NORVASC) 5 MG tablet, TAKE 1 TABLET(5 MG) BY MOUTH DAILY, Disp: 90 tablet, Rfl: 3     EPINEPHrine (ANY BX GENERIC EQUIV) 0.3 MG/0.3ML injection 2-pack, Inject 0.3 mLs (0.3 mg) into the muscle once for 1 dose, Disp: 2 each, Rfl: 2     fluticasone (ARNUITY ELLIPTA) 100 MCG/ACT inhaler, Inhale 1 puff into the lungs daily, Disp: 1 each, Rfl: 11     inhalational spacing device (E-Z SPACER) Spcr, [INHALATIONAL SPACING DEVICE (E-Z SPACER) SPCR] One spacer for the albuterol inhaler.  Instruct on use., Disp: 1 each, Rfl: 0     nicotine (NICODERM CQ) 14 MG/24HR 24 hr patch, Place 1 patch onto the skin every 24 hours, Disp: 30 patch, Rfl: 3     nicotine (NICODERM CQ) 21 MG/24HR 24 hr patch, Place 1 patch onto the skin every 24 hours, Disp:  "30 patch, Rfl: 3     nicotine (NICODERM CQ) 7 MG/24HR 24 hr patch, Place 1 patch onto the skin every 24 hours, Disp: 30 patch, Rfl: 3     omeprazole (PRILOSEC) 40 MG DR capsule, Take 1 capsule (40 mg) by mouth daily, Disp: 90 capsule, Rfl: 3     scopolamine (TRANSDERM) 1 MG/3DAYS 72 hr patch, Place 1 patch onto the skin every 72 hours, Disp: 4 patch, Rfl: 3     sucralfate (CARAFATE) 1 GM tablet, TAKE 1 TABLET(1 GRAM) BY MOUTH FOUR TIMES DAILY, Disp: 360 tablet, Rfl: 1     varenicline (CHANTIX) 1 MG tablet, Take 1 tablet (1 mg) by mouth 2 times daily, Disp: 60 tablet, Rfl: 5   Surgical History     Past Surgical History  She has a past surgical history that includes Laparoscopic Gastric Banding (2008); Lumbar Disc Surgery; Foot surgery; and Pr Esophagogastroduodenoscopy Transoral Diagnostic (N/A, 8/14/2020).    Objective-Exam     Constitutional:  /78   Ht 1.613 m (5' 3.5\")   Wt 70.3 kg (155 lb)   BMI 27.03 kg/m      General:  Pleasant and in no acute distress   Eyes:  EOMI  ENT:  Airway 1+  Moist mucous membranes  Neck:  Supple, No LAD, No thyromegaly, No carotid bruits appreciated  Respiratory: Normal respiratory effort, no cough, wheezes or crackles  CV:  Regular rate and Rhythm,no murmurs, pulses 2+, no calf tenderness, no LE edema  Gastrointestinal: Abdomen NT/ND, BS+, lap band port easily palpaged in Lmid abdomen. Tilted slightly infero/medial  Musculoskeletal: muscle mass WNL  Skin: color tan hair full, incisions nicely healed  Neurological: No tremor, normal gait  Psychiatric: alert and oriented X3, mood and affect normal    Counseling     We reviewed the important post op bariatric recommendations:  -eating 3 meals daily  -eating protein first, getting >60gm protein daily  -eating slowly, chewing food well  -avoiding/limiting calorie containing beverages  -drinking water 15-30 minutes before or after meals  -choosing wheat, not white with breads, crackers, pastas, dandre, bagels, tortillas, " rice  -limiting restaurant or cafeteria eating to twice a week or less    We discussed the importance of restorative sleep and stress management in maintaining a healthy weight.  We discussed the National Weight Control Registry healthy weight maintenance strategies and ways to optimize metabolism.  We discussed the importance of physical activity including cardiovascular and strength training in maintaining a healthier weight.    We discussed the importance of life-long vitamin supplementation and life-long  follow-up.    Marlen was reminded that, to avoid marginal ulcers she should avoid tobacco at all, alcohol in excess, caffeine in excess, and NSAIDS (unless indicated for cardioprotection or othewise and opposed by a PPI).    Julissa Espinal MD, MD, [facility].  9/15/2022  10:42 AM      No images are attached to the encounter.  Total time spent on the date of this encounter doing: chart review, review of test results, patient visit, physical exam, education, counseling, developing plan of care, and documenting = 25 minutes.      Again, thank you for allowing me to participate in the care of your patient.        Sincerely,        Julissa Espinal MD

## 2022-09-16 ENCOUNTER — HOSPITAL ENCOUNTER (OUTPATIENT)
Dept: RESPIRATORY THERAPY | Facility: CLINIC | Age: 62
Discharge: HOME | End: 2022-09-16
Admitting: INTERNAL MEDICINE
Payer: COMMERCIAL

## 2022-09-16 DIAGNOSIS — R91.1 RIGHT UPPER LOBE PULMONARY NODULE: ICD-10-CM

## 2022-09-16 DIAGNOSIS — Z72.0 TOBACCO ABUSE: ICD-10-CM

## 2022-09-16 LAB — DEPRECATED CALCIDIOL+CALCIFEROL SERPL-MC: 46 UG/L (ref 20–75)

## 2022-09-16 PROCEDURE — 94726 PLETHYSMOGRAPHY LUNG VOLUMES: CPT | Performed by: INTERNAL MEDICINE

## 2022-09-16 PROCEDURE — 94726 PLETHYSMOGRAPHY LUNG VOLUMES: CPT

## 2022-09-16 PROCEDURE — 94060 EVALUATION OF WHEEZING: CPT | Performed by: INTERNAL MEDICINE

## 2022-09-16 PROCEDURE — 94060 EVALUATION OF WHEEZING: CPT

## 2022-09-16 PROCEDURE — 94729 DIFFUSING CAPACITY: CPT

## 2022-09-16 PROCEDURE — 94640 AIRWAY INHALATION TREATMENT: CPT | Mod: 76

## 2022-09-16 PROCEDURE — 999N000157 HC STATISTIC RCP TIME EA 10 MIN

## 2022-09-16 PROCEDURE — 94729 DIFFUSING CAPACITY: CPT | Performed by: INTERNAL MEDICINE

## 2022-09-16 PROCEDURE — 250N000009 HC RX 250: Performed by: INTERNAL MEDICINE

## 2022-09-16 RX ORDER — ALBUTEROL SULFATE 0.83 MG/ML
2.5 SOLUTION RESPIRATORY (INHALATION) ONCE
Status: COMPLETED | OUTPATIENT
Start: 2022-09-16 | End: 2022-09-16

## 2022-09-16 RX ADMIN — ALBUTEROL SULFATE 2.5 MG: 2.5 SOLUTION RESPIRATORY (INHALATION) at 13:38

## 2022-09-16 NOTE — PROGRESS NOTES
RESPIRATORY CARE NOTE    Complete Pulmonary Function Test completed by patient.  Good patient effort and cooperation. Albuterol 2.5 mg neb given for bronchodilation.  The results of this test meet the ATS standards for acceptability and repeatability. PT returned to baseline and left in no distress.    Jes Jessica, RT  9/16/2022

## 2022-09-17 LAB
ANNOTATION COMMENT IMP: NORMAL
RETINYL PALMITATE SERPL-MCNC: <0.02 MG/L
VIT A SERPL-MCNC: 0.5 MG/L
ZINC SERPL-MCNC: 57.6 UG/DL

## 2022-09-20 ENCOUNTER — HOSPITAL ENCOUNTER (OUTPATIENT)
Dept: PET IMAGING | Facility: HOSPITAL | Age: 62
Discharge: HOME | End: 2022-09-20
Attending: INTERNAL MEDICINE
Payer: COMMERCIAL

## 2022-09-20 DIAGNOSIS — R91.1 RIGHT UPPER LOBE PULMONARY NODULE: ICD-10-CM

## 2022-09-20 LAB
GLUCOSE BLDC GLUCOMTR-MCNC: 92 MG/DL (ref 70–99)
VIT B1 PYROPHOSHATE BLD-SCNC: 90 NMOL/L

## 2022-09-20 PROCEDURE — 78815 PET IMAGE W/CT SKULL-THIGH: CPT | Mod: PI

## 2022-09-20 PROCEDURE — 343N000001 HC RX 343: Performed by: INTERNAL MEDICINE

## 2022-09-20 PROCEDURE — 74177 CT ABD & PELVIS W/CONTRAST: CPT

## 2022-09-20 PROCEDURE — A9552 F18 FDG: HCPCS | Performed by: INTERNAL MEDICINE

## 2022-09-20 PROCEDURE — 250N000011 HC RX IP 250 OP 636: Performed by: INTERNAL MEDICINE

## 2022-09-20 PROCEDURE — 82962 GLUCOSE BLOOD TEST: CPT

## 2022-09-20 RX ORDER — IOPAMIDOL 755 MG/ML
76 INJECTION, SOLUTION INTRAVASCULAR ONCE
Status: COMPLETED | OUTPATIENT
Start: 2022-09-20 | End: 2022-09-20

## 2022-09-20 RX ADMIN — IOPAMIDOL 76 ML: 755 INJECTION, SOLUTION INTRAVENOUS at 08:14

## 2022-09-20 RX ADMIN — FLUDEOXYGLUCOSE F-18 10.18 MCI.: 500 INJECTION, SOLUTION INTRAVENOUS at 07:12

## 2022-09-21 DIAGNOSIS — E60 LOW ZINC LEVEL: Primary | ICD-10-CM

## 2022-09-21 RX ORDER — ZINC GLUCONATE 50 MG
50 TABLET ORAL DAILY
Qty: 90 TABLET | Refills: 1 | Status: SHIPPED | OUTPATIENT
Start: 2022-09-21 | End: 2023-07-23

## 2022-09-22 LAB
DLCOCOR-%PRED-PRE: 109 %
DLCOCOR-PRE: 21.53 ML/MIN/MMHG
DLCOUNC-%PRED-PRE: 111 %
DLCOUNC-PRE: 21.98 ML/MIN/MMHG
DLCOUNC-PRED: 19.74 ML/MIN/MMHG
ERV-%PRED-PRE: 124 %
ERV-PRE: 0.86 L
ERV-PRED: 0.69 L
EXPTIME-PRE: 6.62 SEC
FEF2575-%PRED-POST: 85 %
FEF2575-%PRED-PRE: 73 %
FEF2575-POST: 1.79 L/SEC
FEF2575-PRE: 1.53 L/SEC
FEF2575-PRED: 2.09 L/SEC
FEFMAX-%PRED-PRE: 71 %
FEFMAX-PRE: 4.3 L/SEC
FEFMAX-PRED: 6.02 L/SEC
FEV1-%PRED-PRE: 88 %
FEV1-PRE: 2.08 L
FEV1FEV6-PRE: 73 %
FEV1FEV6-PRED: 80 %
FEV1FVC-PRE: 73 %
FEV1FVC-PRED: 79 %
FEV1SVC-PRE: 79 %
FEV1SVC-PRED: 75 %
FIFMAX-PRE: 3.61 L/SEC
FRCPLETH-%PRED-PRE: 131 %
FRCPLETH-PRE: 3.54 L
FRCPLETH-PRED: 2.69 L
FVC-%PRED-PRE: 95 %
FVC-PRE: 2.87 L
FVC-PRED: 3.02 L
IC-%PRED-PRE: 71 %
IC-PRE: 1.77 L
IC-PRED: 2.46 L
RVPLETH-%PRED-PRE: 137 %
RVPLETH-PRE: 2.68 L
RVPLETH-PRED: 1.95 L
TLCPLETH-%PRED-PRE: 108 %
TLCPLETH-PRE: 5.31 L
TLCPLETH-PRED: 4.9 L
VA-%PRED-PRE: 88 %
VA-PRE: 4.18 L
VC-%PRED-PRE: 83 %
VC-PRE: 2.63 L
VC-PRED: 3.16 L

## 2022-09-27 DIAGNOSIS — Z72.0 TOBACCO ABUSE: ICD-10-CM

## 2022-09-27 DIAGNOSIS — R91.1 RIGHT UPPER LOBE PULMONARY NODULE: Primary | ICD-10-CM

## 2022-09-28 NOTE — CONFIDENTIAL NOTE
I called Ms. Dumas to review the results of her PET scan and our next steps. I have ordered a CT guided biopsy per the recommendations and will followup with the results. If this is non-diagnostic, she understands that we would pursue a more invasive biopsy; if negative, I will continue radiographic surveillance.    She knows to call Zenaida Sharpe RN at [phone] if she does not hear from radiology in the next two days.    Loni Jaquez MD  Pulmonary and Critical Care  (P) [phone]

## 2022-10-03 ENCOUNTER — MYC MEDICAL ADVICE (OUTPATIENT)
Dept: INTERVENTIONAL RADIOLOGY/VASCULAR | Facility: CLINIC | Age: 62
End: 2022-10-03

## 2022-10-07 ENCOUNTER — TELEPHONE (OUTPATIENT)
Dept: MEDSURG UNIT | Facility: CLINIC | Age: 62
End: 2022-10-07

## 2022-10-07 NOTE — PROGRESS NOTES
I called Ms. Dumas to review the results of her PET scan and our next steps. I have ordered a CT guided biopsy per the recommendations and will followup with the results. If this is non-diagnostic, she understands that we would pursue a more invasive biopsy; if negative, I will continue radiographic surveillance.    She knows to call Zenaida Sharpe RN at [phone] if she does not hear from radiology in the next two days.    Loni Jaquez MD  Pulmonary and Critical Care  (P) [phone]

## 2022-10-07 NOTE — PROGRESS NOTES
Chart reviewed for upcoming procedure.  H&P from 9-14-22 sufficient.  COVID testing will be done at home.

## 2022-10-12 ENCOUNTER — APPOINTMENT (OUTPATIENT)
Dept: CT IMAGING | Facility: CLINIC | Age: 62
End: 2022-10-12
Attending: INTERNAL MEDICINE
Payer: COMMERCIAL

## 2022-10-12 ENCOUNTER — APPOINTMENT (OUTPATIENT)
Dept: GENERAL RADIOLOGY | Facility: CLINIC | Age: 62
End: 2022-10-12
Attending: RADIOLOGY
Payer: COMMERCIAL

## 2022-10-12 ENCOUNTER — HOSPITAL ENCOUNTER (OUTPATIENT)
Facility: CLINIC | Age: 62
Discharge: HOME | End: 2022-10-12
Admitting: RADIOLOGY
Payer: COMMERCIAL

## 2022-10-12 VITALS
RESPIRATION RATE: 16 BRPM | OXYGEN SATURATION: 98 % | SYSTOLIC BLOOD PRESSURE: 153 MMHG | HEART RATE: 62 BPM | DIASTOLIC BLOOD PRESSURE: 77 MMHG | TEMPERATURE: 97.4 F

## 2022-10-12 DIAGNOSIS — R91.1 RIGHT UPPER LOBE PULMONARY NODULE: ICD-10-CM

## 2022-10-12 DIAGNOSIS — Z72.0 TOBACCO ABUSE: ICD-10-CM

## 2022-10-12 LAB — INR PPP: 0.97 (ref 0.85–1.15)

## 2022-10-12 PROCEDURE — 272N000431 CT LUNG MEDIASTINUM BIOPSY

## 2022-10-12 PROCEDURE — 36591 DRAW BLOOD OFF VENOUS DEVICE: CPT

## 2022-10-12 PROCEDURE — 88342 IMHCHEM/IMCYTCHM 1ST ANTB: CPT | Mod: 26 | Performed by: PATHOLOGY

## 2022-10-12 PROCEDURE — 96375 TX/PRO/DX INJ NEW DRUG ADDON: CPT

## 2022-10-12 PROCEDURE — 99152 MOD SED SAME PHYS/QHP 5/>YRS: CPT

## 2022-10-12 PROCEDURE — 85610 PROTHROMBIN TIME: CPT | Performed by: RADIOLOGY

## 2022-10-12 PROCEDURE — 272N000221 CT LUNG MEDIASTINUM BIOPSY

## 2022-10-12 PROCEDURE — 250N000011 HC RX IP 250 OP 636: Performed by: NURSE PRACTITIONER

## 2022-10-12 PROCEDURE — 96374 THER/PROPH/DIAG INJ IV PUSH: CPT

## 2022-10-12 PROCEDURE — 999N000065 XR CHEST 1 VIEW

## 2022-10-12 PROCEDURE — 88305 TISSUE EXAM BY PATHOLOGIST: CPT | Mod: 26 | Performed by: PATHOLOGY

## 2022-10-12 PROCEDURE — 88312 SPECIAL STAINS GROUP 1: CPT | Mod: 26 | Performed by: PATHOLOGY

## 2022-10-12 PROCEDURE — 88305 TISSUE EXAM BY PATHOLOGIST: CPT | Mod: TC | Performed by: INTERNAL MEDICINE

## 2022-10-12 PROCEDURE — 32408 CORE NDL BX LNG/MED PERQ: CPT

## 2022-10-12 PROCEDURE — 36415 COLL VENOUS BLD VENIPUNCTURE: CPT | Performed by: RADIOLOGY

## 2022-10-12 PROCEDURE — 999N000154 HC STATISTIC RADIOLOGY XRAY, US, CT, MAR, NM

## 2022-10-12 RX ORDER — ACETAMINOPHEN 325 MG/1
650 TABLET ORAL EVERY 4 HOURS PRN
Status: DISCONTINUED | OUTPATIENT
Start: 2022-10-12 | End: 2022-10-12 | Stop reason: HOSPADM

## 2022-10-12 RX ORDER — NALOXONE HYDROCHLORIDE 0.4 MG/ML
0.4 INJECTION, SOLUTION INTRAMUSCULAR; INTRAVENOUS; SUBCUTANEOUS
Status: DISCONTINUED | OUTPATIENT
Start: 2022-10-12 | End: 2022-10-12 | Stop reason: HOSPADM

## 2022-10-12 RX ORDER — FLUMAZENIL 0.1 MG/ML
0.2 INJECTION, SOLUTION INTRAVENOUS
Status: DISCONTINUED | OUTPATIENT
Start: 2022-10-12 | End: 2022-10-12 | Stop reason: HOSPADM

## 2022-10-12 RX ORDER — NALOXONE HYDROCHLORIDE 0.4 MG/ML
0.2 INJECTION, SOLUTION INTRAMUSCULAR; INTRAVENOUS; SUBCUTANEOUS
Status: DISCONTINUED | OUTPATIENT
Start: 2022-10-12 | End: 2022-10-12 | Stop reason: HOSPADM

## 2022-10-12 RX ORDER — FENTANYL CITRATE 50 UG/ML
25-50 INJECTION, SOLUTION INTRAMUSCULAR; INTRAVENOUS EVERY 5 MIN PRN
Status: DISCONTINUED | OUTPATIENT
Start: 2022-10-12 | End: 2022-10-12 | Stop reason: HOSPADM

## 2022-10-12 RX ORDER — IBUPROFEN 200 MG
400 TABLET ORAL EVERY 6 HOURS PRN
Status: DISCONTINUED | OUTPATIENT
Start: 2022-10-12 | End: 2022-10-12 | Stop reason: HOSPADM

## 2022-10-12 RX ADMIN — MIDAZOLAM HYDROCHLORIDE 1 MG: 1 INJECTION, SOLUTION INTRAMUSCULAR; INTRAVENOUS at 09:37

## 2022-10-12 RX ADMIN — FENTANYL CITRATE 50 MCG: 50 INJECTION, SOLUTION INTRAMUSCULAR; INTRAVENOUS at 09:37

## 2022-10-12 ASSESSMENT — ACTIVITIES OF DAILY LIVING (ADL)
ADLS_ACUITY_SCORE: 35
ADLS_ACUITY_SCORE: 35

## 2022-10-12 NOTE — PROGRESS NOTES
RADIOLOGY PROCEDURE NOTE  Patient name: Marlen Dumas  MRN: [id number]  : 1960    Pre-procedure diagnosis: RUL nodules  Post-procedure diagnosis: Same    Procedure Date/Time: 2022  9:56 AM  Procedure: Biopsy.  Estimated blood loss: None  Specimen(s) collected with description: Core biopsy samples.  The patient tolerated the procedure well with no immediate complications.  I determined this patient to be an appropriate candidate for the planned sedation and procedure and reassessed the patient IMMEDIATELY PRIOR to sedation and procedure.    See imaging dictation for procedural details and findings.    Provider name: Monroe Thibodeaux MD  Assistant(s):None

## 2022-10-12 NOTE — PROGRESS NOTES
Care Suites Post Procedure Note    Patient Information  Name: Marlen Dumas  Age: 62 year old    Post Procedure  Time patient returned to Care Suites: 1015  Concerns/abnormal assessment: none  If abnormal assessment, provider notified: N/A  Plan/Other: CXR at 1115    Kd Krueger RN

## 2022-10-12 NOTE — PROGRESS NOTES
Care Suites Discharge Nursing Note    Patient Information  Name: Marlen Dumas  Age: 62 year old    Discharge Education:  Discharge instructions reviewed: Yes  Additional education/resources provided: no  Patient/patient representative verbalizes understanding: Yes  Patient discharging on new medications: No  Medication education completed: N/A    Discharge Plans:   Discharge location: home  Discharge ride contacted: Yes  Approximate discharge time: 1145    Discharge Criteria:  Discharge criteria met and vital signs stable: Yes    Patient Belongs:  Patient belongings returned to patient: Yes    Kd Krueger RN

## 2022-10-12 NOTE — PROGRESS NOTES
Care Suites Admission Nursing Note    Patient Information  Name: Marlen Dumas  Age: 62 year old  Reason for admission: lung biopsy  Care Suites arrival time: 0810    Patient Admission/Assessment   Pre-procedure assessment complete: Yes  If abnormal assessment/labs, provider notified: Yes  NPO: Yes  Medications held per instructions/orders: Yes  Consent: deferred  If applicable, pregnancy test status: deferred  Patient oriented to room: Yes  Education/questions answered: Yes  Plan/other: lung biopsy    Discharge Planning  Discharge name/phone number: flavio [phone]   Overnight post sedation caregiver: flavio   Discharge location: [place]    Kd Krueger RN

## 2022-10-12 NOTE — DISCHARGE INSTRUCTIONS
Lung Biopsy Discharge Instructions     After you go home:    You may resume your normal diet  Have an adult stay with you for 6 hours if you received sedation       For 24 hours - due to the sedation you received:  Relax and take it easy  Do NOT make any important or legal decisions  Do NOT drive or operate machines at home or at work  Do NOT drink alcohol    Care of Puncture Site:    For the first 48 hrs, check your puncture site every couple hours while you are awake   You may remove/change the bandaid tomorrow  You may shower tomorrow  No tub baths, whirlpools or swimming until your puncture site has fully healed    Activity     You may go back to normal activity in 24 hours   Wait 48 hours before lifting, straining, exercise or other strenuous activity    Medicines:    You may resume all medications  Resume your Warfarin/Coumadin at your regular dose today. Follow up with your provider to have your INR rechecked  Resume your Platelet Inhibitors and Aspirin tomorrow at your regular dose  For minor pain, you may take Acetaminophen (Tylenol) or Ibuprofen (Advil)            Call the provider who ordered this test if:    Increased pain or a large or growing hard lump around the site  Blood or fluid is draining from the site  The site is red, swollen, hot or tender  Chills or a fever greater than 101 F (38 C)  Pain that is getting worse  Any questions or concerns    Call  911 or go to the Emergency Room if:    Severe pain or trouble breathing  Bleeding that you cannot control        If you have questions call:          [facility] @ [phone]      The provider who performed your procedure was _________________.

## 2022-10-14 LAB
PATH REPORT.COMMENTS IMP SPEC: NORMAL
PATH REPORT.COMMENTS IMP SPEC: NORMAL
PATH REPORT.FINAL DX SPEC: NORMAL
PATH REPORT.GROSS SPEC: NORMAL
PATH REPORT.MICROSCOPIC SPEC OTHER STN: NORMAL
PATH REPORT.RELEVANT HX SPEC: NORMAL
PHOTO IMAGE: NORMAL

## 2022-10-17 ENCOUNTER — TELEPHONE (OUTPATIENT)
Dept: PULMONOLOGY | Facility: OTHER | Age: 62
End: 2022-10-17

## 2022-10-17 NOTE — TELEPHONE ENCOUNTER
Marlen called today to request biopsy results. She is requesting a call from Dr. Jaquez at her earliest convenience or a [employer] message if possible.

## 2022-10-27 ENCOUNTER — TELEPHONE (OUTPATIENT)
Dept: PULMONOLOGY | Facility: OTHER | Age: 62
End: 2022-10-27

## 2022-10-27 DIAGNOSIS — Z72.0 TOBACCO ABUSE: ICD-10-CM

## 2022-10-27 DIAGNOSIS — R91.1 RIGHT UPPER LOBE PULMONARY NODULE: Primary | ICD-10-CM

## 2022-10-27 RX ORDER — LIDOCAINE 40 MG/G
CREAM TOPICAL
Status: CANCELLED | OUTPATIENT
Start: 2022-10-27

## 2022-10-27 NOTE — TELEPHONE ENCOUNTER
Called patient to go over Bronchoscopy education. Informed patient that Bronchoscopy procedure is scheduled for 11/2 at 8am at [facility].  Instructed to arrive at 6am.  An IV will be placed and IV sedation will be given.  Instructed to have nothing to eat after midnight, OK to have clear liquids up until 5 am.   Medication instructions: Hold meds morning of, OK to take BP meds  Stop Aspirin date:  Does not take  Stop Ibuprofen, NSAIDS, coxibs (ie:celebrex):  Does not take  Stop blood thinner date: n/a  Patient understands that they will need a ride home after the procedure and someone to stay with them at home after the procedure.  Patient had no further questions and is agreeable to procedure.  Phone number given for questions. Patient understands they are required to have COVID testing done prior to procedure.     COVID test scheduled , pt will take at home test.

## 2022-10-27 NOTE — CONFIDENTIAL NOTE
Called the patient to let her know that the recommendation from IP was to proceed with a bronchoscopy with a BAL to rule out infection. We will schedule her for this and also set her up for a Follow-up CT in 3 months from the original.    Loni Jaquez MD  Pulmonary and Critical Care  P) [phone]

## 2022-11-02 ENCOUNTER — HOSPITAL ENCOUNTER (OUTPATIENT)
Dept: CARDIOLOGY | Facility: HOSPITAL | Age: 62
Discharge: HOME | End: 2022-11-02
Attending: INTERNAL MEDICINE | Admitting: INTERNAL MEDICINE
Payer: COMMERCIAL

## 2022-11-02 VITALS
DIASTOLIC BLOOD PRESSURE: 78 MMHG | TEMPERATURE: 98.4 F | OXYGEN SATURATION: 95 % | HEART RATE: 66 BPM | SYSTOLIC BLOOD PRESSURE: 148 MMHG | RESPIRATION RATE: 16 BRPM

## 2022-11-02 DIAGNOSIS — Z72.0 TOBACCO ABUSE: ICD-10-CM

## 2022-11-02 DIAGNOSIS — R91.1 RIGHT UPPER LOBE PULMONARY NODULE: ICD-10-CM

## 2022-11-02 LAB
APPEARANCE FLD: ABNORMAL
C PNEUM DNA SPEC QL NAA+PROBE: NOT DETECTED
CELL COUNT BODY FLUID SOURCE: ABNORMAL
COLOR FLD: COLORLESS
EOSINOPHIL NFR FLD MANUAL: 1 %
FLUAV H1 2009 PAND RNA SPEC QL NAA+PROBE: NOT DETECTED
FLUAV H1 RNA SPEC QL NAA+PROBE: NOT DETECTED
FLUAV H3 RNA SPEC QL NAA+PROBE: NOT DETECTED
FLUAV RNA SPEC QL NAA+PROBE: NOT DETECTED
FLUBV RNA SPEC QL NAA+PROBE: NOT DETECTED
GRAM STAIN RESULT: NORMAL
GRAM STAIN RESULT: NORMAL
HADV DNA SPEC QL NAA+PROBE: NOT DETECTED
HCOV PNL SPEC NAA+PROBE: NOT DETECTED
HMPV RNA SPEC QL NAA+PROBE: NOT DETECTED
HPIV1 RNA SPEC QL NAA+PROBE: NOT DETECTED
HPIV2 RNA SPEC QL NAA+PROBE: NOT DETECTED
HPIV3 RNA SPEC QL NAA+PROBE: NOT DETECTED
HPIV4 RNA SPEC QL NAA+PROBE: NOT DETECTED
KOH PREPARATION: NORMAL
KOH PREPARATION: NORMAL
LYMPHOCYTES NFR FLD MANUAL: 36 %
M PNEUMO DNA SPEC QL NAA+PROBE: NOT DETECTED
MONOS+MACROS NFR FLD MANUAL: 54 %
NEUTS BAND NFR FLD MANUAL: 9 %
RBC # FLD: 19 /UL
RSV RNA SPEC QL NAA+PROBE: NOT DETECTED
RSV RNA SPEC QL NAA+PROBE: NOT DETECTED
RV+EV RNA SPEC QL NAA+PROBE: NOT DETECTED
WBC # FLD AUTO: 58 /UL

## 2022-11-02 PROCEDURE — 250N000011 HC RX IP 250 OP 636: Performed by: INTERNAL MEDICINE

## 2022-11-02 PROCEDURE — 87385 HISTOPLASMA CAPSUL AG IA: CPT | Performed by: INTERNAL MEDICINE

## 2022-11-02 PROCEDURE — 99153 MOD SED SAME PHYS/QHP EA: CPT | Performed by: INTERNAL MEDICINE

## 2022-11-02 PROCEDURE — 87116 MYCOBACTERIA CULTURE: CPT | Performed by: INTERNAL MEDICINE

## 2022-11-02 PROCEDURE — 87102 FUNGUS ISOLATION CULTURE: CPT | Mod: XS | Performed by: INTERNAL MEDICINE

## 2022-11-02 PROCEDURE — 87633 RESP VIRUS 12-25 TARGETS: CPT | Performed by: INTERNAL MEDICINE

## 2022-11-02 PROCEDURE — 87015 SPECIMEN INFECT AGNT CONCNTJ: CPT | Performed by: INTERNAL MEDICINE

## 2022-11-02 PROCEDURE — 89051 BODY FLUID CELL COUNT: CPT | Performed by: INTERNAL MEDICINE

## 2022-11-02 PROCEDURE — 88312 SPECIAL STAINS GROUP 1: CPT | Mod: 26 | Performed by: PATHOLOGY

## 2022-11-02 PROCEDURE — 87486 CHLMYD PNEUM DNA AMP PROBE: CPT | Performed by: INTERNAL MEDICINE

## 2022-11-02 PROCEDURE — 87210 SMEAR WET MOUNT SALINE/INK: CPT | Performed by: INTERNAL MEDICINE

## 2022-11-02 PROCEDURE — 250N000009 HC RX 250: Performed by: INTERNAL MEDICINE

## 2022-11-02 PROCEDURE — 87206 SMEAR FLUORESCENT/ACID STAI: CPT | Performed by: INTERNAL MEDICINE

## 2022-11-02 PROCEDURE — 88312 SPECIAL STAINS GROUP 1: CPT | Mod: TC | Performed by: INTERNAL MEDICINE

## 2022-11-02 PROCEDURE — 87102 FUNGUS ISOLATION CULTURE: CPT | Performed by: INTERNAL MEDICINE

## 2022-11-02 PROCEDURE — 87081 CULTURE SCREEN ONLY: CPT | Performed by: INTERNAL MEDICINE

## 2022-11-02 PROCEDURE — 87305 ASPERGILLUS AG IA: CPT | Performed by: INTERNAL MEDICINE

## 2022-11-02 PROCEDURE — 999N000157 HC STATISTIC RCP TIME EA 10 MIN

## 2022-11-02 PROCEDURE — 31624 DX BRONCHOSCOPE/LAVAGE: CPT | Performed by: INTERNAL MEDICINE

## 2022-11-02 PROCEDURE — 99152 MOD SED SAME PHYS/QHP 5/>YRS: CPT | Performed by: INTERNAL MEDICINE

## 2022-11-02 PROCEDURE — 88108 CYTOPATH CONCENTRATE TECH: CPT | Mod: 26 | Performed by: PATHOLOGY

## 2022-11-02 PROCEDURE — 87158 CULTURE TYPING ADDED METHOD: CPT | Performed by: INTERNAL MEDICINE

## 2022-11-02 PROCEDURE — 31624 DX BRONCHOSCOPE/LAVAGE: CPT

## 2022-11-02 PROCEDURE — 99214 OFFICE O/P EST MOD 30 MIN: CPT | Mod: 25 | Performed by: INTERNAL MEDICINE

## 2022-11-02 PROCEDURE — 87070 CULTURE OTHR SPECIMN AEROBIC: CPT | Performed by: INTERNAL MEDICINE

## 2022-11-02 PROCEDURE — 87205 SMEAR GRAM STAIN: CPT | Performed by: INTERNAL MEDICINE

## 2022-11-02 RX ORDER — LIDOCAINE 40 MG/G
CREAM TOPICAL
Status: DISCONTINUED | OUTPATIENT
Start: 2022-11-02 | End: 2022-11-03 | Stop reason: HOSPADM

## 2022-11-02 RX ORDER — ONDANSETRON 2 MG/ML
4 INJECTION INTRAMUSCULAR; INTRAVENOUS EVERY 6 HOURS PRN
Status: DISCONTINUED | OUTPATIENT
Start: 2022-11-02 | End: 2022-11-03 | Stop reason: HOSPADM

## 2022-11-02 RX ORDER — NALOXONE HYDROCHLORIDE 0.4 MG/ML
0.2 INJECTION, SOLUTION INTRAMUSCULAR; INTRAVENOUS; SUBCUTANEOUS
Status: DISCONTINUED | OUTPATIENT
Start: 2022-11-02 | End: 2022-11-03 | Stop reason: HOSPADM

## 2022-11-02 RX ORDER — NALOXONE HYDROCHLORIDE 0.4 MG/ML
0.4 INJECTION, SOLUTION INTRAMUSCULAR; INTRAVENOUS; SUBCUTANEOUS
Status: DISCONTINUED | OUTPATIENT
Start: 2022-11-02 | End: 2022-11-03 | Stop reason: HOSPADM

## 2022-11-02 RX ORDER — LIDOCAINE HYDROCHLORIDE 20 MG/ML
SOLUTION OROPHARYNGEAL
Status: COMPLETED | OUTPATIENT
Start: 2022-11-02 | End: 2022-11-02

## 2022-11-02 RX ORDER — SODIUM CHLORIDE 9 MG/ML
INJECTION, SOLUTION INTRAVENOUS CONTINUOUS
Status: DISCONTINUED | OUTPATIENT
Start: 2022-11-02 | End: 2022-11-03 | Stop reason: HOSPADM

## 2022-11-02 RX ORDER — LIDOCAINE HYDROCHLORIDE 20 MG/ML
10 SOLUTION OROPHARYNGEAL
Status: DISCONTINUED | OUTPATIENT
Start: 2022-11-02 | End: 2022-11-03 | Stop reason: HOSPADM

## 2022-11-02 RX ORDER — ONDANSETRON 4 MG/1
4 TABLET, ORALLY DISINTEGRATING ORAL EVERY 6 HOURS PRN
Status: DISCONTINUED | OUTPATIENT
Start: 2022-11-02 | End: 2022-11-03 | Stop reason: HOSPADM

## 2022-11-02 RX ORDER — LIDOCAINE HYDROCHLORIDE 40 MG/ML
INJECTION, SOLUTION RETROBULBAR
Status: COMPLETED | OUTPATIENT
Start: 2022-11-02 | End: 2022-11-02

## 2022-11-02 RX ORDER — FENTANYL CITRATE 50 UG/ML
INJECTION, SOLUTION INTRAMUSCULAR; INTRAVENOUS
Status: COMPLETED | OUTPATIENT
Start: 2022-11-02 | End: 2022-11-02

## 2022-11-02 RX ORDER — FLUMAZENIL 0.1 MG/ML
0.2 INJECTION, SOLUTION INTRAVENOUS
Status: ACTIVE | OUTPATIENT
Start: 2022-11-02 | End: 2022-11-02

## 2022-11-02 RX ADMIN — LIDOCAINE HYDROCHLORIDE 6 ML: 10 INJECTION, SOLUTION INFILTRATION; PERINEURAL at 08:12

## 2022-11-02 RX ADMIN — LIDOCAINE HYDROCHLORIDE 15 ML: 40 INJECTION, SOLUTION RETROBULBAR; TOPICAL at 07:50

## 2022-11-02 RX ADMIN — FENTANYL CITRATE 25 MCG: 50 INJECTION, SOLUTION INTRAMUSCULAR; INTRAVENOUS at 07:57

## 2022-11-02 RX ADMIN — FENTANYL CITRATE 25 MCG: 50 INJECTION, SOLUTION INTRAMUSCULAR; INTRAVENOUS at 08:09

## 2022-11-02 RX ADMIN — MIDAZOLAM 1 MG: 1 INJECTION INTRAMUSCULAR; INTRAVENOUS at 08:11

## 2022-11-02 RX ADMIN — FENTANYL CITRATE 25 MCG: 50 INJECTION, SOLUTION INTRAMUSCULAR; INTRAVENOUS at 08:11

## 2022-11-02 RX ADMIN — MIDAZOLAM 1 MG: 1 INJECTION INTRAMUSCULAR; INTRAVENOUS at 07:59

## 2022-11-02 RX ADMIN — LIDOCAINE HYDROCHLORIDE 2 ML: 40 INJECTION, SOLUTION RETROBULBAR; TOPICAL at 08:09

## 2022-11-02 RX ADMIN — FENTANYL CITRATE 25 MCG: 50 INJECTION, SOLUTION INTRAMUSCULAR; INTRAVENOUS at 08:02

## 2022-11-02 RX ADMIN — LIDOCAINE HYDROCHLORIDE 10 ML: 20 SOLUTION ORAL; TOPICAL at 08:02

## 2022-11-02 NOTE — PRE-PROCEDURE
GENERAL PRE-PROCEDURE:     Verbal consent obtained?: Yes    Written consent obtained?: Yes    Risks and benefits: Risks, benefits and alternatives were discussed    Consent given by:  Patient  Patient states understanding of procedure being performed: Yes    Patient's understanding of procedure matches consent: Yes    Procedure consent matches procedure scheduled: Yes    Expected level of sedation:  Moderate  Appropriately NPO:  Yes  Mallampati  :  Grade 1- soft palate, uvula, tonsillar pillars, and posterior pharyngeal wall visible  Lungs:  Lungs clear with good breath sounds bilaterally  Heart:  Normal heart sounds and rate  History & Physical reviewed:  History and physical reviewed and no updates needed  Statement of review:  I have reviewed the lab findings, diagnostic data, medications, and the plan for sedation

## 2022-11-02 NOTE — PROGRESS NOTES
Respiratory Therapy Procedure Note  Marlen Dumas is a 62 year old female     Pre-Procedure Vitals:  SpO2: 99% on RA  HR: 61  RR: 18  BS: clear  BP: 158/74      Summary of Care during Procedure:   Assisted Dr. Swenson during Bronchoscopy on 11/2/2022.  4cc of 4% lido neb given  Viscous lido x1 given  2cc 4% lido given x1 above cords  2cc 1% lido given x3 below cords  Bronchoscopy procedure was done successfully without any complication.  BAL of RUL was collected. Patient on RA pre-procedure, patient left the procedure room on RA.    Post-Procedure Vitals:  SpO2: 92% on RA  HR: 66  RR: 18  BS: clear  BP: 128/67      Beatris Lynn, RT 11/2/2022 10:48 AM

## 2022-11-02 NOTE — PROCEDURES
[facility]    Procedure: Bronchoscopy diagnostic ([facility] Pulmonology Only)    Date/Time: 11/2/2022 8:34 AM  Performed by: Tay Swenson MD  Authorized by: Loni Jaquez MD       UNIVERSAL PROTOCOL   Site Marked: NA  Prior Images Obtained and Reviewed:  Yes  Required items: Required blood products, implants, devices and special equipment available    Patient identity confirmed:  Verbally with patient, arm band, provided demographic data and hospital-assigned identification number  Patient was reevaluated immediately before administering moderate or deep sedation or anesthesia  Confirmation Checklist:  Patient's identity using two indicators, relevant allergies, procedure was appropriate and matched the consent or emergent situation and correct equipment/implants were available  Time out: Immediately prior to the procedure a time out was called    Universal Protocol: the Joint Commission Universal Protocol was followed    Preparation: Patient was prepped and draped in usual sterile fashion       ANESTHESIA    Anesthesia: Local infiltration  Local Anesthetic:  Lidocaine 1% without epinephrine      SEDATION  Patient Sedated: Yes    Sedation Type:  Moderate (conscious) sedation  Sedation:  Midazolam and fentanyl (2mg midazolam, 100mcg fentanyl)  Vital signs: Vital signs monitored during sedation      PROCEDURE  Describe Procedure: Vocal cords normal in appearance.  The main koki was normal.  There were no endobronchial masses or lesions seen in any of the airways inspected, to the level of the subsegmental bronchi of the right and left lungs.  There were scattered thin secretions that were suctioned easily.  No bleeding was seen.    A BAL was performed in the RUL, with 150cc NS instilled and approximately 47cc of slightly cloudy, alveolar fluid return.   Patient Tolerance:  Patient tolerated the procedure well with no immediate complications  Length of time physician/provider present for 1:1  monitoring during sedation: 20   Uncomplicated bronchoscopy with BAL of RUL. The patient tolerated the procedure well.  Associated diagnosis: RUL cavitary lesion.

## 2022-11-02 NOTE — H&P
PULMONARY MEDICINE PRE-OP history and physical for bronchoscopy with BAL  11/2/2022      Admit Date: 11/2/2022  CODE: No Order    Reason for Consult: pre-op evaluation for bronchoscopy with BAL      Assessment/Plan:   62 year old lady with a history of tobacco use, bronchiectasis, HLD, HTN, presents for evaluation of RUL cavitary lung lesion. This was previously biopsied using a CT-guided approach, and was negative for malignancy. The history and imaging was reviewed. The patient is appropriate to proceed with diagnostic bronchoscopy of the RUL with BAL.  All questions answered.    Plan:  - diagnostic bronchoscopy with BAL of the RUL  - she will follow up with Dr. Jaquez for results.    Tay Swenson MD (Avi)  [facility] Pulmonary & Critical Care  Pager [phone]  Clinic [phone]  Fax [phone]                                                                                                                                                         HPI:   CCx: RUL cavitary lung lesion. Smoker.    HPI: 62 year old lady with a history of tobacco use, bronchiectasis, HLD, HTN, presents for evaluation of RUL cavitary lung lesion. This was previously biopsied with CT guided approach and was negative for malignancy. Last seen by Dr. Jaquez in clinic on 9/14 and BAL was recommended to rule out infection.  She reports chronic productive cough which she attributes to smoking. No hemoptysis, fevers, chills, night sweats, chest pain or chest pressure.  NPO since before midnight. Does not use blood thinners.                                                                                                                                                         Past Medical History:  Past Medical History:   Diagnosis Date     COPD (chronic obstructive pulmonary disease) (H)      HLD (hyperlipidemia)      HTN (hypertension)      Hx of laparoscopic adjustable gastric banding 10/01/2008    Initial weight  295.5 BMI 50.4 Dr. Rowell     Hymenoptera allergy      Hypothyroidism      S/P colonoscopy 2019     Tobacco abuse      Vitamin D deficiency 2016       Past Surgical History  Past Surgical History:   Procedure Laterality Date     FOOT SURGERY      Sting [place] [facility]     LAPAROSCOPIC GASTRIC BANDING  2008     LUMBAR DISC SURGERY       NM ESOPHAGOGASTRODUODENOSCOPY TRANSORAL DIAGNOSTIC N/A 2020    Procedure: ESOPHAGOGASTRODUODENOSCOPY (EGD);  Surgeon: Jared Howard MD;  Location: [facility];  Service: General       Allergies:  Allergies   Allergen Reactions     Other Environmental Allergy Unknown     Bee sting     Sulfa (Sulfonamide Antibiotics) [Sulfa Drugs] Unknown       PTA medications:  (Not in a hospital admission)      Family Hx:  Family History   Problem Relation Age of Onset     Coronary Artery Disease Mother 58.00     Hypertension Mother      Chronic Obstructive Pulmonary Disease Father      Hypertension Father      Anxiety Disorder Father      Hypertension Brother        Social Hx:  Social History     Socioeconomic History     Marital status: Single     Spouse name: Not on file     Number of children: 0     Years of education: Not on file     Highest education level: Not on file   Occupational History     Not on file   Tobacco Use     Smoking status: Former     Packs/day: 0.50     Years: 37.00     Pack years: 18.50     Types: Cigarettes     Quit date: 2022     Years since quittin.1     Smokeless tobacco: Never     Tobacco comments:     quit 22   Vaping Use     Vaping Use: Never used   Substance and Sexual Activity     Alcohol use: Yes     Alcohol/week: 4.0 standard drinks     Types: 4 Standard drinks or equivalent per week     Comment: 4-5 beer/wine weekly     Drug use: Never     Sexual activity: Never     Birth control/protection: Abstinence, Post-menopausal   Other Topics Concern     Parent/sibling w/ CABG, MI or angioplasty before 65F 55M? Not Asked   Social History  Narrative    Lives with a friend.12/4/2018  Retired after working for [employer].     Social Determinants of Health     Financial Resource Strain: Not on file   Food Insecurity: Not on file   Transportation Needs: Not on file   Physical Activity: Not on file   Stress: Not on file   Social Connections: Not on file   Intimate Partner Violence: Not on file   Housing Stability: Not on file       Exam/Data:     Vitals  BP (!) 153/73   Pulse 56   Temp 98  F (36.7  C) (Oral)   Resp 16   SpO2 95%      No intake/output data recorded.  Weight change:   [unfilled]  EXAM:  BP (!) 153/73   Pulse 56   Temp 98  F (36.7  C) (Oral)   Resp 16   SpO2 95%     Intake/Output last 3 shifts:  No intake/output data recorded.  Intake/Output this shift:  No intake/output data recorded.    Physical Exam  Gen: awake, alert, oriented, no distress  HEENT: NT, no AUGUSTA  CV: RRR, no m/g/r  Resp: slightly diminished without wheezing, rhonchi or rales.   Abd: soft, nontender, BS+  Skin: no rashes or lesions  Ext: no edema  Neuro: PERRL, nonfocal exam    ROS: A complete 10-system review of systems was obtained and is negative with the exception of what is noted in the history of present illness.    Medications:       sodium chloride         lidocaine inhalant  3 mL Nebulization Once     sodium chloride (PF)  3 mL Intracatheter Q8H         DATA  All laboratory and radiology has been personally reviewed by myself today.  No results for input(s): WBC, HGB, HCT, PLT in the last 168 hours.  No results for input(s): NA, CO2, BUN, CREATININE, BILITOT, ALKPHOS, ALT, AST, GLUCOSE in the last 168 hours.    Invalid input(s): K, CL, CALCIUM, LABALBU, PROT      PFT DATA from Sept 2022  Normal other than air trapping.      IMAGING:   Personally reviewed the CT chest and PET/CT                                                       IMPRESSION:  1. Cavitary pulmonary nodule in the right upper lobe with numerous nearby small clustered nodular opacities, unchanged  in size and morphology compared to 09/09/2022. Findings are indeterminate. Appearance and morphology is often seen in granulomatous   process such as fungal infection or nodular sarcoidosis, however, underlying neoplasm is not excluded. Recommend chest CT imaging surveillance. Alternatively, the cavitary nodule is amenable to percutaneous CT-guided biopsy.  2. FDG avid thickening of the distal esophagus, indeterminate, could be inflammatory or neoplastic. Please correlate clinically. Consider EGD.

## 2022-11-02 NOTE — DISCHARGE SUMMARY
Patient presented for bronchoscopy with BAL of RUL for evaluation of RUL cavitary lesion. The procedure was uncomplicated and adequate specimen obtained. See procedure note for detail. She was discharged in stable condition.    She will follow up with Dr. Jaquez for results and to discuss the next steps.    Tay Swenson MD (Avi)  [facility] Pulmonary & Critical Care ([facility])  Clinic [phone]  Fax [phone]

## 2022-11-02 NOTE — DISCHARGE INSTRUCTIONS
1. You are required to have someone accompany you home.    2. Rest today. Do not drive or operate machinery today. Over-activity may produce nausea and dizziness.    3. You should follow your normal diet. Drink plenty of fluids. Do not drink any alcoholic beverages for 24 hours. *(Alcohol may interact with the medications you received today).    4. NO HOT FOODS or LIQUIDS FOR 6 HOURS after the procedure.    5. You may have a sore throat or cough, or cough up small amounts of blood. This is normal. These symptoms should resolve in 24 hours.     6. If you have shortness of breath, a temperature above 101.1 degree F for more than 24 hours, or bleeding from your nose or throat. Call your doctor or go to the Emergency Room with any of these signs.    7. If you have further questions call your doctor               Loni Jaquez [phone]

## 2022-11-03 LAB
PATH REPORT.COMMENTS IMP SPEC: NORMAL
PATH REPORT.COMMENTS IMP SPEC: NORMAL
PATH REPORT.FINAL DX SPEC: NORMAL
PATH REPORT.GROSS SPEC: NORMAL
PATH REPORT.MICROSCOPIC SPEC OTHER STN: NORMAL
PATH REPORT.RELEVANT HX SPEC: NORMAL

## 2022-11-04 LAB
BACTERIA BRONCH: NORMAL
GALACTOMANNAN AG BAL QL: NEGATIVE
GALACTOMANNAN AG SPEC IA-ACNC: 0.08
SCANNED LAB RESULT: NORMAL

## 2022-11-09 NOTE — RESULT ENCOUNTER NOTE
Reviewed results of her BAL which have demonstrated AFB from a BAL. Unclear significance. Have send to ID for review and communicated this with the patient. Likely not a typical mycobacteria and will not require treatment. She is due for a Follow-up scan in December. She is comfortable with this plan.    Loni Jaquez MD  Pulmonary and Critical Care  P) [phone]

## 2022-11-16 LAB
BACTERIA BRONCH: NORMAL
BACTERIA BRONCH: NORMAL

## 2022-11-19 ENCOUNTER — HEALTH MAINTENANCE LETTER (OUTPATIENT)
Age: 62
End: 2022-11-19

## 2022-11-30 LAB
BACTERIA BRONCH: ABNORMAL
BACTERIA BRONCH: NO GROWTH

## 2022-12-05 ENCOUNTER — HOSPITAL ENCOUNTER (OUTPATIENT)
Dept: CT IMAGING | Facility: HOSPITAL | Age: 62
Discharge: HOME | End: 2022-12-05
Attending: INTERNAL MEDICINE | Admitting: INTERNAL MEDICINE
Payer: COMMERCIAL

## 2022-12-05 DIAGNOSIS — Z72.0 TOBACCO ABUSE: ICD-10-CM

## 2022-12-05 DIAGNOSIS — R91.1 RIGHT UPPER LOBE PULMONARY NODULE: ICD-10-CM

## 2022-12-05 PROCEDURE — 71250 CT THORAX DX C-: CPT

## 2022-12-09 DIAGNOSIS — Z72.0 TOBACCO ABUSE: ICD-10-CM

## 2022-12-22 ENCOUNTER — VIRTUAL VISIT (OUTPATIENT)
Dept: PULMONOLOGY | Facility: CLINIC | Age: 62
End: 2022-12-22
Payer: COMMERCIAL

## 2022-12-22 DIAGNOSIS — R91.1 RIGHT UPPER LOBE PULMONARY NODULE: ICD-10-CM

## 2022-12-22 DIAGNOSIS — J44.9 CHRONIC OBSTRUCTIVE PULMONARY DISEASE, UNSPECIFIED COPD TYPE (H): ICD-10-CM

## 2022-12-22 DIAGNOSIS — Z72.0 TOBACCO ABUSE: Primary | ICD-10-CM

## 2022-12-22 PROCEDURE — 99214 OFFICE O/P EST MOD 30 MIN: CPT | Mod: 95 | Performed by: INTERNAL MEDICINE

## 2022-12-22 NOTE — PROGRESS NOTES
Marlen is a 62 year old who is being evaluated via a billable video visit.      How would you like to obtain your AVS? MyChart  If the video visit is dropped, the invitation should be resent by: Text to cell phone: [phone]  Will anyone else be joining your video visit? No        Video-Visit Details  CCx:RUL pulmonary nodules    HPI:Ms. Montez is a 62 year old lady with a past medical history significant for significant ongoing tobacco abuse, bronchiectasis, hyperlipidemia, hypertension, hypothyroidism and a history of morbid obesity status post lap band procedure in 2008 who presents to clinic for the aforementioned chief complaint.  She underwent a routine low-dose lung cancer screening CT scan last week which revealed multiple abnormalities.  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  She had minimal to no symptoms which is why we opted to not offer any treatment for this.  Since her last clinic visit she denies any chest pain, chest tightness, shortness of breath, wheezing, hemoptysis or change in her weight.  She continues to endorse symptoms of GERD.  She underwent a follow-up CT scan of her chest for pulmonary nodules and is here to follow-up.    ROS:  Pertinent positives alluded to in the HPI. Remainder of 10 point ROS is negative.     PMH (Unchanged from previous visit):  1. Ongoing tobacco abuse  2. Bronchiectasis  3. Hyperlipidemia  4. HTN  5. Hypothyroidism    PSH (Unchanged from previous visit):  6. Laparoscopic gastric banding  7. Lumbar disc surgery    Allergies (Unchanged from previous visit):  Reviewed in epic.    Family HX (Unchanged from previous visit):  1. Mother: CAD, obesity  2. Father: COPD, tobacco abuse    Social Hx  (Unchanged from previous visit):  Marital status: Single and lives with a roommate  Number of children: 0  Resides in a house built in the 1970's, no concern for mold.  Occupational  history: Retired  for [employer]   service: No  Pets: No  Smoking history: 40+ pack year history, currently smokes 1 pack a day  Alcohol use: 4-5 drinks per week  Recreational drug use: No  Hobbies: Gardening, carpentry  Recent Travel: None. Was in [place] in February earlier this year    Current Meds:  Current Outpatient Medications   Medication Sig Dispense Refill     amLODIPine (NORVASC) 5 MG tablet TAKE 1 TABLET(5 MG) BY MOUTH DAILY 90 tablet 3     fluticasone (ARNUITY ELLIPTA) 100 MCG/ACT inhaler Inhale 1 puff into the lungs daily 90 each 3     nicotine (NICODERM CQ) 14 MG/24HR 24 hr patch Place 1 patch onto the skin every 24 hours 30 patch 3     nicotine (NICODERM CQ) 7 MG/24HR 24 hr patch Place 1 patch onto the skin every 24 hours 30 patch 3     omeprazole (PRILOSEC) 40 MG DR capsule Take 1 capsule (40 mg) by mouth daily 90 capsule 3     sucralfate (CARAFATE) 1 GM tablet TAKE 1 TABLET(1 GRAM) BY MOUTH FOUR TIMES DAILY 360 tablet 1     varenicline (CHANTIX) 1 MG tablet Take 1 tablet (1 mg) by mouth 2 times daily 60 tablet 5     nicotine (NICODERM CQ) 21 MG/24HR 24 hr patch Place 1 patch onto the skin every 24 hours (Patient not taking: Reported on 12/22/2022) 30 patch 3     zinc gluconate 50 MG tablet Take 1 tablet (50 mg) by mouth daily (Patient not taking: Reported on 12/22/2022) 90 tablet 1     Labs:  Reviewed.     Imaging studies:  CT Chest w/o Contrast 12/5/22:  1.  Nodular opacities in the lungs have slightly decreased in size.  Some of these are cavitary.  2.  Large amount of debris in the distended esophagus.  3.  A gastric laparoscopic band is positioned over the upper gastric body, which is unchanged.    NM PET Scan 9/20/22:  1. Cavitary pulmonary nodule in the right upper lobe with numerous nearby small clustered nodular opacities, unchanged in size and morphology compared to 09/09/2022. Findings are indeterminate. Appearance and morphology is often seen in  granulomatous   process such as fungal infection or nodular sarcoidosis, however, underlying neoplasm is not excluded. Recommend chest CT imaging surveillance. Alternatively, the cavitary nodule is amenable to percutaneous CT-guided biopsy.  2. FDG avid thickening of the distal esophagus, indeterminate, could be inflammatory or neoplastic. Please correlate clinically. Consider EGD.    (Unchanged from previous visit)  LDCT 9/9/22:  1.  New nodular cavity in the right apex with multiple surrounding satellite nodules most of which measure under a centimeter. Additional new cluster of 4 mm nodules in the left lower lobe and a single new nodule in the right middle lobe adjacent to the   major fissure. While neoplasm is possible, the differential diagnosis granulomatous infection can also produce this pattern.  2.  Gastric band with mild dilation of the proximal stomach above the band and diffuse, mild dilation of the esophagus to the thoracic inlet. This pattern of findings can predispose to aspiration events.       PFT's 2/9/18  FEV1/FVC is 70% and is normal.  FEV1 is (89%) predicted and is normal.  FVC is (99%) predicted and normal.  There was improvement in spirometry after a single inhaled dose of bronchodilator.  TLC is 104% predicted and is normal.  RV is (110%) predicted and is normal.  DLCO is (128%) predicted and is normal when it is corrected for hemoglobin.    Impression:  Full Pulmonary Function Test is abnormal.  PFTs are consistent with no obstructive disease.  Spirometry is consistent with reversibility.  There is no hyperinflation.  There is no air-trapping.  Diffusion capacity when corrected for hemoglobin is normal.      Assessment and Plan:Marlen Dumas is a 62 year old with a past medical history significant for ongoing tobacco abuse, bronchiectasis, hyperlipidemia, hypertension, hypothyroidism and a history of morbid obesity status post lap band procedure who presents to clinic today for  establishment of care for a 4B low-dose lung cancer screening CT scan and has previously undergone pulmonary function testing that has revealed reversibility in her spirometry.  Additionally, her CT scan also demonstrates evidence of bronchiectasis. For the present we would recommend;    1. Asthma/COPD (chronic bronchitis) overlap syndrome: As noted based on her pulmonary function testing and symptom complex.  She is also noted to have bronchiectasis on her imaging with some traction evident in her mid zones.  Symptoms are presently well controlled.    Continue Arnuity Ellipta 1 puff daily.  Patient was instructed to rinse her mouth after using this.    As needed albuterol for rescue up to 4 times a day.    Counseled the patient about the importance of discontinuing tobacco products.  2. Right upper lobe pulmonary nodules: Most concerning for malignant process especially given the patient's significant history of tobacco abuse.  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  She underwent a follow-up CT scan on 12/5/2022 which demonstrates that these pulmonary nodules are actually reducing in size likely consistent with an inflammatory process.    Follow-up CT scan in 6 months.  3. Lower esophageal thickening: Noted on imaging.  Patient has had a Lap-Band procedure and I we will send a copy of my note to Dr. Blackburn to follow-up on this abnormality.  4. HCM: Counseled her about tobacco cessation for more than 5 and less than 10 minutes.    5. Follow-up: 6 months with CT.      Loni Jaquez  Pulmonary and Critical Care  [id number]      Type of service:  Video Visit     Originating Location (pt. Location): Home    Distant Location (provider location):  Off-site  Platform used for Video Visit: Ni

## 2022-12-22 NOTE — Clinical Note
Dr. Davila, Marlen has demonstrated some lower esophageal thickening on her chest CT and I would like to defer to you for the management of this if that is ok. Thanks! -Loni

## 2022-12-28 LAB
ACID FAST STAIN (ARUP): NORMAL

## 2023-01-02 DIAGNOSIS — K21.9 GASTROESOPHAGEAL REFLUX DISEASE WITHOUT ESOPHAGITIS: ICD-10-CM

## 2023-01-02 RX ORDER — OMEPRAZOLE 40 MG/1
CAPSULE, DELAYED RELEASE ORAL
Qty: 90 CAPSULE | Refills: 3 | Status: ON HOLD | OUTPATIENT
Start: 2023-01-02 | End: 2023-12-01

## 2023-01-11 ENCOUNTER — MYC MEDICAL ADVICE (OUTPATIENT)
Dept: INTERNAL MEDICINE | Facility: CLINIC | Age: 63
End: 2023-01-11

## 2023-01-11 DIAGNOSIS — J44.9 CHRONIC OBSTRUCTIVE PULMONARY DISEASE, UNSPECIFIED COPD TYPE (H): Primary | ICD-10-CM

## 2023-01-12 RX ORDER — AZITHROMYCIN 250 MG/1
TABLET, FILM COATED ORAL
Qty: 6 TABLET | Refills: 0 | Status: SHIPPED | OUTPATIENT
Start: 2023-01-12 | End: 2023-01-17

## 2023-01-17 ENCOUNTER — MYC MEDICAL ADVICE (OUTPATIENT)
Dept: PULMONOLOGY | Facility: CLINIC | Age: 63
End: 2023-01-17

## 2023-01-17 DIAGNOSIS — B37.0 THRUSH: Primary | ICD-10-CM

## 2023-01-17 RX ORDER — NYSTATIN 100000/ML
500000 SUSPENSION, ORAL (FINAL DOSE FORM) ORAL 4 TIMES DAILY
Qty: 200 ML | Refills: 0 | Status: SHIPPED | OUTPATIENT
Start: 2023-01-17 | End: 2023-11-30

## 2023-02-26 ENCOUNTER — OFFICE VISIT (OUTPATIENT)
Dept: FAMILY MEDICINE | Facility: CLINIC | Age: 63
End: 2023-02-26
Payer: COMMERCIAL

## 2023-02-26 VITALS
DIASTOLIC BLOOD PRESSURE: 79 MMHG | WEIGHT: 167 LBS | SYSTOLIC BLOOD PRESSURE: 145 MMHG | BODY MASS INDEX: 29.12 KG/M2 | HEART RATE: 72 BPM | OXYGEN SATURATION: 98 % | TEMPERATURE: 97.4 F

## 2023-02-26 DIAGNOSIS — B37.0 ORAL PHARYNGEAL CANDIDIASIS: Primary | ICD-10-CM

## 2023-02-26 DIAGNOSIS — K12.2 UVULITIS: ICD-10-CM

## 2023-02-26 DIAGNOSIS — R07.0 THROAT PAIN: ICD-10-CM

## 2023-02-26 DIAGNOSIS — Z72.0 TOBACCO ABUSE: Chronic | ICD-10-CM

## 2023-02-26 LAB
DEPRECATED S PYO AG THROAT QL EIA: NEGATIVE
GROUP A STREP BY PCR: NOT DETECTED

## 2023-02-26 PROCEDURE — 99214 OFFICE O/P EST MOD 30 MIN: CPT | Performed by: FAMILY MEDICINE

## 2023-02-26 PROCEDURE — 87651 STREP A DNA AMP PROBE: CPT | Performed by: FAMILY MEDICINE

## 2023-02-26 RX ORDER — FLUCONAZOLE 100 MG/1
TABLET ORAL
Qty: 15 TABLET | Refills: 0 | Status: SHIPPED | OUTPATIENT
Start: 2023-02-26 | End: 2023-03-12

## 2023-02-26 NOTE — PATIENT INSTRUCTIONS
Start Diflucan for possible thrush in the back of his throat 2 tablets today then 1 tablet daily for a total of 14 days    Start antibiotic for the swelling of uvula    Please call and schedule an appointment at the number noted to be seen by ENT when you return from [place]

## 2023-02-26 NOTE — PROGRESS NOTES
ASSESSMENT/PLAN:      ICD-10-CM    1. Oral pharyngeal candidiasis  B37.0 fluconazole (DIFLUCAN) 100 MG tablet     Adult ENT  Referral      2. Uvulitis  K12.2 amoxicillin-clavulanate (AUGMENTIN) 875-125 MG tablet      3. Throat pain  R07.0 Streptococcus A Rapid Screen w/Reflex to PCR - Clinic Collect     Group A Streptococcus PCR Throat Swab     Adult ENT  Referral      4. Tobacco abuse  Z72.0 Adult ENT  Referral                Reviewed medication instructions and side effects. Follow up if experiences side effects.     I reviewed supportive care, otc meds to use if needed, expected course, and signs of concern.  Follow up as needed or if she does not improve within  1-2 days or if worsens in any way.  Reviewed red flag symptoms and is to go to the ER if experiences any of these.     The use of Dragon/Classtingation services may have been used to construct the content in this note; any grammatical or spelling errors are non-intentional. Please contact the author of this note directly if you are in need of any clarification.      On the day of the encounter, time spend on chart review, patient visit, review of testing, documentation was 30  minutes          Patient Instructions   Start Diflucan for possible thrush in the back of his throat 2 tablets today then 1 tablet daily for a total of 14 days    Start antibiotic for the swelling of uvula    Please call and schedule an appointment at the number noted to be seen by ENT when you return from [place]              Patient presents with:  Pharyngitis: Sore throat for the past 3 weeks. Wondering if thrush from new inhaler from pulmonologist.        Subjective     Marlen Dumas is a 62 year old female who presents to clinic today for the following health issues:    HPI   Patient onset of sore throat 3 weeks ago.  She can associate her symptoms  thrush in the past with her steroid inhaler, the Arnuity Ellipta/(fluticasone) for her COPD.   She does rinse her mouth after use however at times has forgotten. She called her pulmonologist on 2023 abd started nystatin liquid, some improvement but did not resolve. She has white patches in her mouth, under tongue. Sore throat has worsened in the last week, painful to swallow, occasionally gags when she eats, Hx of  tobacco abuse-had been smoking a pack a day  37 years,, down to 1-2 cigarettes a day. Per patient, having throat pain for several months prior to onset of thrush.   No fever, no worsening of her chronic cough, no increase shortness of breath, dyspnea on exertion or wheezing.  No increase use of albuterol inhaler.   Mild nasal congestion.       Past Medical History:   Diagnosis Date     COPD (chronic obstructive pulmonary disease) (H)      HLD (hyperlipidemia)      HTN (hypertension)      Hx of laparoscopic adjustable gastric banding 10/01/2008    Initial weight 295.5 BMI 50.4 Dr. Rowell     Hymenoptera allergy      Hypothyroidism      S/P colonoscopy 2019     Tobacco abuse      Vitamin D deficiency 2016     Social History     Tobacco Use     Smoking status: Former     Packs/day: 0.50     Years: 37.00     Pack years: 18.50     Types: Cigarettes     Quit date: 2022     Years since quittin.6     Smokeless tobacco: Never     Tobacco comments:     quit 22   Vaping Use     Vaping status: Never Used     Passive vaping exposure: Yes   Substance Use Topics     Alcohol use: Yes     Alcohol/week: 4.0 standard drinks of alcohol     Types: 4 Standard drinks or equivalent per week     Comment: 4-5 beer/wine weekly       Current Outpatient Medications   Medication Sig Dispense Refill     amLODIPine (NORVASC) 5 MG tablet TAKE 1 TABLET(5 MG) BY MOUTH DAILY 90 tablet 3     fluticasone (ARNUITY ELLIPTA) 100 MCG/ACT inhaler Inhale 1 puff into the lungs daily (Patient not taking: Reported on 2023) 90 each 3     nystatin (MYCOSTATIN) 113508 UNIT/ML suspension Take 5 mLs (500,000  Units) by mouth 4 times daily Swish and swallow 200 mL 0     omeprazole (PRILOSEC) 40 MG DR capsule TAKE 1 CAPSULE(40 MG) BY MOUTH DAILY BEFORE BREAKFAST 90 capsule 3     sucralfate (CARAFATE) 1 GM tablet TAKE 1 TABLET(1 GRAM) BY MOUTH FOUR TIMES DAILY 360 tablet 1     varenicline (CHANTIX) 1 MG tablet Take 1 tablet (1 mg) by mouth 2 times daily 60 tablet 5     magic mouthwash (ENTER INGREDIENTS IN COMMENTS) suspension Swish and swallow 5 mLs in mouth every 6 hours as needed (oral irritation) 260 mL 1     nicotine (NICODERM CQ) 14 MG/24HR 24 hr patch Place 1 patch onto the skin every 24 hours (Patient not taking: Reported on 2/26/2023) 30 patch 3     nicotine (NICODERM CQ) 21 MG/24HR 24 hr patch Place 1 patch onto the skin every 24 hours (Patient not taking: Reported on 12/22/2022) 30 patch 3     nicotine (NICODERM CQ) 7 MG/24HR 24 hr patch Place 1 patch onto the skin every 24 hours (Patient not taking: Reported on 2/26/2023) 30 patch 3     zinc gluconate 50 MG tablet Take 1 tablet (50 mg) by mouth daily (Patient not taking: Reported on 12/22/2022) 90 tablet 1     Allergies   Allergen Reactions     Other Environmental Allergy Unknown     Bee sting     Sulfa (Sulfonamide Antibiotics) [Sulfa Drugs] Unknown             ROS are negative, except as otherwise noted HPI      Objective    BP (!) 145/79   Pulse 72   Temp 97.4  F (36.3  C)   Wt 75.8 kg (167 lb)   SpO2 98%   BMI 29.12 kg/m    Body mass index is 29.12 kg/m .  Physical Exam     GENERAL: Pleasant and interactive.  Alert and oriented x 3.  No acute distress.   HEENT: Red patches posterior pharynx, small areas of petechiae posterior pharynx and upper soft palate,no white patches,   tonsils -mild erythema,no hypertrophy, symmetric,no exudate  Uvula -erythematous with small petechiae and enlarged, Mouth-scattered white patches on the inner buccal mucosa bilateral and under the tongue and inner mucosa upper and lower lips,  thick white coating on tongue,  Eyes-sclera, lids and conjunctiva are normal.  Nose mild nasal congestion and ears clear.  NECK: Mild adenopathy.  CHEST:  clear, no wheezing, prolonged end expiratory phase,   HEART:  S1 and S2 normal, no murmurs, clicks, gallops or rubs. Regular rate and rhythm.  SKIN:  No rashes  NEURO:Alert and oriented x3, normal strength and tone, normal gait        Diagnostic Test Results:  Labs reviewed in Epic  Results for orders placed or performed in visit on 02/26/23   Streptococcus A Rapid Screen w/Reflex to PCR - Clinic Collect     Status: Normal    Specimen: Throat; Swab   Result Value Ref Range    Group A Strep antigen Negative Negative   Group A Streptococcus PCR Throat Swab     Status: Normal    Specimen: Throat; Swab   Result Value Ref Range    Group A strep by PCR Not Detected Not Detected    Narrative    The Xpert Xpress Strep A test, performed on the [employer] Systems, is a rapid, qualitative in vitro diagnostic test for the detection of Streptococcus pyogenes (Group A ß-hemolytic Streptococcus, Strep A) in throat swab specimens from patients with signs and symptoms of pharyngitis. The Xpert Xpress Strep A test can be used as an aid in the diagnosis of Group A Streptococcal pharyngitis. The assay is not intended to monitor treatment for Group A Streptococcus infections. The Xpert Xpress Strep A test utilizes an automated real-time polymerase chain reaction (PCR) to detect Streptococcus pyogenes DNA.

## 2023-02-27 ENCOUNTER — TELEPHONE (OUTPATIENT)
Dept: INTERNAL MEDICINE | Facility: CLINIC | Age: 63
End: 2023-02-27
Payer: COMMERCIAL

## 2023-02-27 NOTE — TELEPHONE ENCOUNTER
FUTURE VISIT INFORMATION      FUTURE VISIT INFORMATION:    Date: 4/12/23    Time: 11:20am    [place] [facility] [place]:    Referring provider:  Jaylyn Sierra MD    Referring providers clinic:  [facility]    Reason for visit/diagnosis  appt per pt/ref, Throat pain [R07.0], Oral pharyngeal candidiasis [B37.0], Tobacco abuse [Z72.0]/ ref prov: Jaylyn Sierra MD, recs/ref in [facility], confirmed [facility] location    RECORDS REQUESTED FROM:       Clinic name Comments Records Status Imaging Status   [facility]  2/26/23- note with Jaylyn Sierra MD  6/7/21- note with Oumar Morin MD    7/28/20- note Teresita Sharma PA-C    More in epic     8/14/20 - EGD  Epic     Imaging  12/5/22- CT Chest   10/12/22- XR Chest    Epic  PACS

## 2023-03-06 NOTE — TELEPHONE ENCOUNTER
PRIOR AUTHORIZATION DENIED    Medication: nicotine (NICODERM CQ) 14 MG/24HR 24 hr patch- DENIED     Denial Date: 3/6/2023    Denial Rational: Plan does not cover this medication beyond 180 days within a year.      Appeal Information: If provider would like to appeal please provide a letter of medical necessity.

## 2023-03-06 NOTE — TELEPHONE ENCOUNTER
Please initiate prior auth for nicotine TD patch       Thank you,  Flaco Jimenez Jr., CMA on 3/6/2023 at 9:13 AM

## 2023-03-06 NOTE — TELEPHONE ENCOUNTER
Central Prior Authorization Team  Phone: [phone]    PA Initiation    Medication: nicotine (NICODERM CQ) 14 MG/24HR 24 hr patch  Insurance Company: Jul[employer] C ([facility]) - Phone [phone] Fax [phone]  Pharmacy Filling the Rx: [id number] - [place], [place] - [facility] [address] AT [facility] [address]  Filling Pharmacy Phone: [phone]  Filling Pharmacy Fax:    Start Date: 3/6/2023

## 2023-04-12 ENCOUNTER — OFFICE VISIT (OUTPATIENT)
Dept: OTOLARYNGOLOGY | Facility: CLINIC | Age: 63
End: 2023-04-12
Attending: FAMILY MEDICINE
Payer: COMMERCIAL

## 2023-04-12 ENCOUNTER — PRE VISIT (OUTPATIENT)
Dept: OTOLARYNGOLOGY | Facility: CLINIC | Age: 63
End: 2023-04-12

## 2023-04-12 VITALS — HEIGHT: 64 IN | WEIGHT: 175 LBS | BODY MASS INDEX: 29.88 KG/M2

## 2023-04-12 DIAGNOSIS — B37.0 ORAL PHARYNGEAL CANDIDIASIS: ICD-10-CM

## 2023-04-12 DIAGNOSIS — Z72.0 TOBACCO ABUSE: Chronic | ICD-10-CM

## 2023-04-12 DIAGNOSIS — R07.0 THROAT PAIN: ICD-10-CM

## 2023-04-12 PROCEDURE — 99214 OFFICE O/P EST MOD 30 MIN: CPT | Mod: 25 | Performed by: REGISTERED NURSE

## 2023-04-12 PROCEDURE — 31575 DIAGNOSTIC LARYNGOSCOPY: CPT | Performed by: REGISTERED NURSE

## 2023-04-12 ASSESSMENT — PAIN SCALES - GENERAL: PAINLEVEL: NO PAIN (0)

## 2023-04-12 NOTE — PATIENT INSTRUCTIONS
- You were seen in the ENT Clinic today by Evon Pérez NP.   - For throat irritation, mix 1/4 teaspoon of baking soda and 1/8 teaspoon of salt in 1 cup of warm water. Stir it up. Then gargle and spit it out. Do this 2-3 times daily as needed.  - Magic mouthwash as prescribed.  - Please send a [employer] message or call the ENT clinic at [phone] with any questions or concerns.

## 2023-04-12 NOTE — LETTER
4/12/2023       RE: Marlen Dumas  [address]  [facility] [address]     Dear Colleague,    Thank you for referring your patient, Marlen Dumas, to the [facility] [place] [facility]. Please see a copy of my visit note below.    [facility]   Head and Neck Surgery  April 12, 2023     HPI: Marlen Dumas is a 62 year old female who presents today for evaluation of throat pain. Patient states that throat pain began after starting a new inhaler. Patient reports a constant, generalized throat pain. Was seen in urgent care for possible oral thrush. Prescribed diflucan. Patient was also told that she had an enlarged uvula and was prescribed Augmentin for this swelling. Patient denies any improvement of symptoms with these medications. Patient does have a history of a gastric lap band, taking omeprazole 40 mg daily. History of smoking. Quit about 6 months ago.     Patient denies any dysphagia, odynophagia, mouth pain, ear pain, bleeding from the mouth, hemoptysis, voice changes or lumps/bumps of the neck.    Past Medical History:  Past Medical History:   Diagnosis Date    COPD (chronic obstructive pulmonary disease) (H)     HLD (hyperlipidemia)     HTN (hypertension)     Hx of laparoscopic adjustable gastric banding 10/01/2008    Initial weight 295.5 BMI 50.4 Dr. Rowell    Hymenoptera allergy     Hypothyroidism     S/P colonoscopy 5/5/2019    Tobacco abuse     Vitamin D deficiency 8/24/2016     Past Surgical History:  Past Surgical History:   Procedure Laterality Date    FOOT SURGERY      Sting ray stacia    LAPAROSCOPIC GASTRIC BANDING  2008    LUMBAR DISC SURGERY      DE ESOPHAGOGASTRODUODENOSCOPY TRANSORAL DIAGNOSTIC N/A 8/14/2020    Procedure: ESOPHAGOGASTRODUODENOSCOPY (EGD);  Surgeon: Jared Howard MD;  Location: [facility];  Service: General       Medications:  Current Outpatient  Medications   Medication Sig Dispense Refill    amLODIPine (NORVASC) 5 MG tablet TAKE 1 TABLET(5 MG) BY MOUTH DAILY 90 tablet 3    magic mouthwash (ENTER INGREDIENTS IN COMMENTS) suspension Swish and swallow 5 mLs in mouth every 6 hours as needed (oral irritation) 260 mL 1    nystatin (MYCOSTATIN) 859793 UNIT/ML suspension Take 5 mLs (500,000 Units) by mouth 4 times daily Swish and swallow 200 mL 0    omeprazole (PRILOSEC) 40 MG DR capsule TAKE 1 CAPSULE(40 MG) BY MOUTH DAILY BEFORE BREAKFAST 90 capsule 3    sucralfate (CARAFATE) 1 GM tablet TAKE 1 TABLET(1 GRAM) BY MOUTH FOUR TIMES DAILY 360 tablet 1    varenicline (CHANTIX) 1 MG tablet Take 1 tablet (1 mg) by mouth 2 times daily 60 tablet 5    fluticasone (ARNUITY ELLIPTA) 100 MCG/ACT inhaler Inhale 1 puff into the lungs daily (Patient not taking: Reported on 2023) 90 each 3    nicotine (NICODERM CQ) 14 MG/24HR 24 hr patch Place 1 patch onto the skin every 24 hours (Patient not taking: Reported on 2023) 30 patch 3    nicotine (NICODERM CQ) 21 MG/24HR 24 hr patch Place 1 patch onto the skin every 24 hours (Patient not taking: Reported on 2022) 30 patch 3    nicotine (NICODERM CQ) 7 MG/24HR 24 hr patch Place 1 patch onto the skin every 24 hours (Patient not taking: Reported on 2023) 30 patch 3    zinc gluconate 50 MG tablet Take 1 tablet (50 mg) by mouth daily (Patient not taking: Reported on 2022) 90 tablet 1       Allergies:  Allergies   Allergen Reactions    Other Environmental Allergy Unknown     Bee sting    Sulfa (Sulfonamide Antibiotics) [Sulfa Drugs] Unknown        Social History:  Social History     Tobacco Use    Smoking status: Former     Packs/day: 0.50     Years: 37.00     Pack years: 18.50     Types: Cigarettes     Quit date: 2022     Years since quittin.5    Smokeless tobacco: Never    Tobacco comments:     quit 22   Vaping Use    Vaping status: Never Used     Passive vaping exposure: Yes   Substance Use Topics  "   Alcohol use: Yes     Alcohol/week: 4.0 standard drinks of alcohol     Types: 4 Standard drinks or equivalent per week     Comment: 4-5 beer/wine weekly    Drug use: Never     ROS: 10 point ROS neg other than the symptoms noted above in the HPI.    Physical Exam:    Ht 1.626 m (5' 4\")   Wt 79.4 kg (175 lb)   BMI 30.04 kg/m       Constitutional:  The patient was unaccompanied, well-groomed, and in no acute distress.     Neurologic: Alert and oriented x 3.  CN's III-XII within normal limits.  Voice normal.   Psychiatric: The patient's affect was calm, cooperative, and appropriate.    Communication:  Normal; communicates verbally, normal voice quality.   Respiratory: Breathing comfortably without stridor or exertion of accessory muscles.   Head/Face:  Normocephalic and atraumatic.  No lesions or scars.   Salivary glands -  Normal size, no tenderness, swelling, or palpable masses    Oral Cavity: Normal tongue, floor of mouth, buccal mucosa, and palate.  No lesions or masses on inspection.   Oropharynx: Normal mucosa, palate symmetric with normal elevation. Uvula normal without edema or erythema. Soft on palpation without bleeding.    Neck: Supple with normal laryngeal and tracheal landmarks.  The parotid beds were without masses.  No palpable thyroid.  Normal range of motion.   Lymphatic: There is no palpable lymphadenopathy in the neck.     Flexible fiberoptic laryngoscopy: Scope exam was indicated due to sore throat and smoking history. Verbal consent was obtained. The nasal cavity was prepped with an aerosolized solution of topical anesthetic and vasoconstrictive agent. The scope was passed through the anterior nasal cavity and advanced. Inspection of the nasopharynx revealed no gross abnormality. The base of tongue and vallecula are normal. The epiglottis, AE folds, false cords, true cords, arytenoids are normal. Inspection of the larynx revealed bilaterally mobile vocal cords. Pyriform sinuses are symmetric. " The airway is patent. Procedure tolerated well with no immediate complications noted.    Assessment/Plan:  1. Throat pain  Patient with history of bothersome throat pain. Physical and scope exam is normal today. There is no evidence of oral thrush on today's exam. Mucosa is dry. Recommend salt and soda gargles for throat irritation. Patient can also start magic mouthwash for throat symptoms.     Patient should follow up via MyChart if symptoms do not improve with recommendations.     Evon Pérez DNP, APRN, CNP  Otolaryngology  Head & Neck Surgery  [phone]    30 minutes spent on the date of the encounter doing chart review, history and exam, documentation and further activities per the note excluding time performing scope exam.

## 2023-04-17 NOTE — PROGRESS NOTES
[facility]   Head and Neck Surgery  April 12, 2023     HPI: Marlen Dumas is a 62 year old female who presents today for evaluation of throat pain. Patient states that throat pain began after starting a new inhaler. Patient reports a constant, generalized throat pain. Was seen in urgent care for possible oral thrush. Prescribed diflucan. Patient was also told that she had an enlarged uvula and was prescribed Augmentin for this swelling. Patient denies any improvement of symptoms with these medications. Patient does have a history of a gastric lap band, taking omeprazole 40 mg daily. History of smoking. Quit about 6 months ago.     Patient denies any dysphagia, odynophagia, mouth pain, ear pain, bleeding from the mouth, hemoptysis, voice changes or lumps/bumps of the neck.    Past Medical History:  Past Medical History:   Diagnosis Date     COPD (chronic obstructive pulmonary disease) (H)      HLD (hyperlipidemia)      HTN (hypertension)      Hx of laparoscopic adjustable gastric banding 10/01/2008    Initial weight 295.5 BMI 50.4 Dr. Rowell     Hymenoptera allergy      Hypothyroidism      S/P colonoscopy 5/5/2019     Tobacco abuse      Vitamin D deficiency 8/24/2016     Past Surgical History:  Past Surgical History:   Procedure Laterality Date     FOOT SURGERY      Jose palomo     LAPAROSCOPIC GASTRIC BANDING  2008     LUMBAR DISC SURGERY       WA ESOPHAGOGASTRODUODENOSCOPY TRANSORAL DIAGNOSTIC N/A 8/14/2020    Procedure: ESOPHAGOGASTRODUODENOSCOPY (EGD);  Surgeon: Jared Howard MD;  Location: [facility];  Service: General       Medications:  Current Outpatient Medications   Medication Sig Dispense Refill     amLODIPine (NORVASC) 5 MG tablet TAKE 1 TABLET(5 MG) BY MOUTH DAILY 90 tablet 3     magic mouthwash (ENTER INGREDIENTS IN COMMENTS) suspension Swish and swallow 5 mLs in mouth every 6 hours as needed (oral irritation) 260 mL 1     nystatin (MYCOSTATIN) 249004 UNIT/ML  "suspension Take 5 mLs (500,000 Units) by mouth 4 times daily Swish and swallow 200 mL 0     omeprazole (PRILOSEC) 40 MG DR capsule TAKE 1 CAPSULE(40 MG) BY MOUTH DAILY BEFORE BREAKFAST 90 capsule 3     sucralfate (CARAFATE) 1 GM tablet TAKE 1 TABLET(1 GRAM) BY MOUTH FOUR TIMES DAILY 360 tablet 1     varenicline (CHANTIX) 1 MG tablet Take 1 tablet (1 mg) by mouth 2 times daily 60 tablet 5     fluticasone (ARNUITY ELLIPTA) 100 MCG/ACT inhaler Inhale 1 puff into the lungs daily (Patient not taking: Reported on 2023) 90 each 3     nicotine (NICODERM CQ) 14 MG/24HR 24 hr patch Place 1 patch onto the skin every 24 hours (Patient not taking: Reported on 2023) 30 patch 3     nicotine (NICODERM CQ) 21 MG/24HR 24 hr patch Place 1 patch onto the skin every 24 hours (Patient not taking: Reported on 2022) 30 patch 3     nicotine (NICODERM CQ) 7 MG/24HR 24 hr patch Place 1 patch onto the skin every 24 hours (Patient not taking: Reported on 2023) 30 patch 3     zinc gluconate 50 MG tablet Take 1 tablet (50 mg) by mouth daily (Patient not taking: Reported on 2022) 90 tablet 1       Allergies:  Allergies   Allergen Reactions     Other Environmental Allergy Unknown     Bee sting     Sulfa (Sulfonamide Antibiotics) [Sulfa Drugs] Unknown        Social History:  Social History     Tobacco Use     Smoking status: Former     Packs/day: 0.50     Years: 37.00     Pack years: 18.50     Types: Cigarettes     Quit date: 2022     Years since quittin.5     Smokeless tobacco: Never     Tobacco comments:     quit 22   Vaping Use     Vaping status: Never Used     Passive vaping exposure: Yes   Substance Use Topics     Alcohol use: Yes     Alcohol/week: 4.0 standard drinks of alcohol     Types: 4 Standard drinks or equivalent per week     Comment: 4-5 beer/wine weekly     Drug use: Never     ROS: 10 point ROS neg other than the symptoms noted above in the HPI.    Physical Exam:    Ht 1.626 m (5' 4\")   Wt " 79.4 kg (175 lb)   BMI 30.04 kg/m       Constitutional:  The patient was unaccompanied, well-groomed, and in no acute distress.     Neurologic: Alert and oriented x 3.  CN's III-XII within normal limits.  Voice normal.   Psychiatric: The patient's affect was calm, cooperative, and appropriate.    Communication:  Normal; communicates verbally, normal voice quality.   Respiratory: Breathing comfortably without stridor or exertion of accessory muscles.   Head/Face:  Normocephalic and atraumatic.  No lesions or scars.   Salivary glands -  Normal size, no tenderness, swelling, or palpable masses    Oral Cavity: Normal tongue, floor of mouth, buccal mucosa, and palate.  No lesions or masses on inspection.   Oropharynx: Normal mucosa, palate symmetric with normal elevation. Uvula normal without edema or erythema. Soft on palpation without bleeding.    Neck: Supple with normal laryngeal and tracheal landmarks.  The parotid beds were without masses.  No palpable thyroid.  Normal range of motion.   Lymphatic: There is no palpable lymphadenopathy in the neck.     Flexible fiberoptic laryngoscopy: Scope exam was indicated due to sore throat and smoking history. Verbal consent was obtained. The nasal cavity was prepped with an aerosolized solution of topical anesthetic and vasoconstrictive agent. The scope was passed through the anterior nasal cavity and advanced. Inspection of the nasopharynx revealed no gross abnormality. The base of tongue and vallecula are normal. The epiglottis, AE folds, false cords, true cords, arytenoids are normal. Inspection of the larynx revealed bilaterally mobile vocal cords. Pyriform sinuses are symmetric. The airway is patent. Procedure tolerated well with no immediate complications noted.    Assessment/Plan:  1. Throat pain  Patient with history of bothersome throat pain. Physical and scope exam is normal today. There is no evidence of oral thrush on today's exam. Mucosa is dry. Recommend salt  and soda gargles for throat irritation. Patient can also start magic mouthwash for throat symptoms.     Patient should follow up via MyChart if symptoms do not improve with recommendations.     Evon Pérez DNP, APRN, CNP  Otolaryngology  Head & Neck Surgery  [phone]    30 minutes spent on the date of the encounter doing chart review, history and exam, documentation and further activities per the note excluding time performing scope exam.

## 2023-04-24 DIAGNOSIS — R91.8 PULMONARY NODULES: Primary | ICD-10-CM

## 2023-05-20 ENCOUNTER — E-VISIT (OUTPATIENT)
Dept: INTERNAL MEDICINE | Facility: CLINIC | Age: 63
End: 2023-05-20
Payer: MEDICARE

## 2023-05-20 ENCOUNTER — VIRTUAL VISIT (OUTPATIENT)
Dept: URGENT CARE | Facility: CLINIC | Age: 63
End: 2023-05-20
Payer: MEDICARE
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
## 2023-05-20 PROCEDURE — 99207 PR NON-BILLABLE SERV PER CHARTING: CPT | Performed by: INTERNAL MEDICINE

## 2023-05-20 PROCEDURE — 99443 PR PHYSICIAN TELEPHONE EVALUATION 21-30 MIN: CPT | Mod: 95

## 2023-05-20 RX ORDER — DOXYCYCLINE 100 MG/1
200 CAPSULE ORAL ONCE
Qty: 2 CAPSULE | Refills: 0 | Status: SHIPPED | OUTPATIENT
Start: 2023-05-20 | End: 2023-05-20

## 2023-05-20 NOTE — PROGRESS NOTES
Marlen is a 62 year old who is being evaluated via a billable telephone visit.        Assessment & Plan     Tick bite, unspecified site, initial encounter    - doxycycline hyclate (VIBRAMYCIN) 100 MG capsule; Take 2 capsules (200 mg) by mouth once for 1 dose    EDYTA Melchor CNP  Virtual Urgent Care  [facility] VIRTUAL URGENT CARE    Subjective   Marlen is a 62 year old, presenting for the following health issues:  TICK BITE  HPI     Deer tick removed from neck this morning  Unsure how long it was there 1-2 days      Objective           Vitals:  No vitals were obtained today due to virtual visit.    Physical Exam   GENERAL: Healthy, alert and no distress  RESP: No audible wheeze, cough, or visible cyanosis.  No visible retractions or increased work of breathing.    SKIN: Visible skin clear. No significant rash, abnormal pigmentation or lesions.  PSYCH: Mentation appears normal, affect normal/bright, judgement and insight intact, normal speech and appearance well-groomed.    Telephone time spent 22 minutes

## 2023-05-22 RX ORDER — DOXYCYCLINE 100 MG/1
200 CAPSULE ORAL ONCE
Qty: 2 CAPSULE | Refills: 0 | Status: SHIPPED | OUTPATIENT
Start: 2023-05-22 | End: 2023-05-22

## 2023-05-22 NOTE — PATIENT INSTRUCTIONS
I sent in a prescription for doxycycline to take 2 tabs at once to prevent lyme disease.    Please follow up if you develop symptoms like fever or chills, joint pains, or other systemic symptoms.    Ricardo Davila MD  General Internal Medicine  [facility]  5/22/2023, 8:11 AM

## 2023-05-24 ENCOUNTER — HOSPITAL ENCOUNTER (OUTPATIENT)
Dept: CT IMAGING | Facility: HOSPITAL | Age: 63
Discharge: HOME | End: 2023-05-24
Attending: INTERNAL MEDICINE | Admitting: INTERNAL MEDICINE
Payer: MEDICARE

## 2023-05-24 DIAGNOSIS — R91.8 PULMONARY NODULES: ICD-10-CM

## 2023-05-24 PROCEDURE — G1010 CDSM STANSON: HCPCS

## 2023-05-30 DIAGNOSIS — R91.8 PULMONARY NODULES: Primary | ICD-10-CM

## 2023-05-31 DIAGNOSIS — Z72.0 TOBACCO ABUSE: ICD-10-CM

## 2023-06-01 ENCOUNTER — HEALTH MAINTENANCE LETTER (OUTPATIENT)
Age: 63
End: 2023-06-01

## 2023-06-01 RX ORDER — NICOTINE 21 MG/24HR
PATCH, TRANSDERMAL 24 HOURS TRANSDERMAL
Qty: 28 PATCH | Refills: 3 | OUTPATIENT
Start: 2023-06-01

## 2023-06-01 NOTE — TELEPHONE ENCOUNTER
"Refused because:  Should have refills on file    Last Written Prescription Date:  4/27/23  Last Fill Quantity: 28,  # refills: 3   Last office visit provider:  9/2/22     Requested Prescriptions   Pending Prescriptions Disp Refills     nicotine (NICODERM CQ) 14 MG/24HR 24 hr patch [Pharmacy Med Name: NICOTINE TD 14MG/24H PATCH 7S] 28 patch 3     Sig: APPLY 1 PATCH TOPICALLY TO THE SKIN EVERY 24 HOURS       Partial Cholinergic Nicotinic Agonist Agents Failed - 6/1/2023  3:03 PM        Failed - Blood pressure under 140/90 in past 12 months     BP Readings from Last 3 Encounters:   02/26/23 (!) 145/79   11/02/22 (!) 148/78   10/12/22 (!) 153/77                 Passed - Recent (12 mo) or future (30 days) visit within the authorizing provider's specialty     Patient has had an office visit with the authorizing provider or a provider within the authorizing providers department within the previous 12 mos or has a future within next 30 days. See \"Patient Info\" tab in inbasket, or \"Choose Columns\" in Meds & Orders section of the refill encounter.              Passed - Medication is active on med list        Passed - Patient is 18 years of age or older        Passed - Patient is not pregnant        Passed - No positive pregnancy test on file in past 12 months             CHAIM ZARATE RN 06/01/23 3:03 PM  "

## 2023-07-01 ENCOUNTER — HEALTH MAINTENANCE LETTER (OUTPATIENT)
Age: 63
End: 2023-07-01

## 2023-07-07 ENCOUNTER — TELEPHONE (OUTPATIENT)
Dept: INTERNAL MEDICINE | Facility: CLINIC | Age: 63
End: 2023-07-07

## 2023-07-07 ENCOUNTER — OFFICE VISIT (OUTPATIENT)
Dept: PULMONOLOGY | Facility: CLINIC | Age: 63
End: 2023-07-07
Payer: MEDICARE

## 2023-07-07 VITALS
SYSTOLIC BLOOD PRESSURE: 136 MMHG | WEIGHT: 180 LBS | BODY MASS INDEX: 30.9 KG/M2 | HEART RATE: 83 BPM | OXYGEN SATURATION: 95 % | DIASTOLIC BLOOD PRESSURE: 86 MMHG

## 2023-07-07 DIAGNOSIS — Z72.0 TOBACCO ABUSE: Primary | Chronic | ICD-10-CM

## 2023-07-07 DIAGNOSIS — J44.9 CHRONIC OBSTRUCTIVE PULMONARY DISEASE, UNSPECIFIED COPD TYPE (H): ICD-10-CM

## 2023-07-07 PROCEDURE — 99214 OFFICE O/P EST MOD 30 MIN: CPT | Performed by: INTERNAL MEDICINE

## 2023-07-07 RX ORDER — NICOTINE 21 MG/24HR
1 PATCH, TRANSDERMAL 24 HOURS TRANSDERMAL EVERY 24 HOURS
Qty: 14 PATCH | Refills: 0 | Status: SHIPPED | OUTPATIENT
Start: 2023-08-18 | End: 2023-09-01

## 2023-07-07 RX ORDER — PREDNISONE 20 MG/1
40 TABLET ORAL DAILY
Qty: 10 TABLET | Refills: 1 | Status: SHIPPED | OUTPATIENT
Start: 2023-07-07 | End: 2023-07-12

## 2023-07-07 RX ORDER — AZITHROMYCIN 250 MG/1
TABLET, FILM COATED ORAL
Qty: 6 TABLET | Refills: 1 | Status: SHIPPED | OUTPATIENT
Start: 2023-07-07 | End: 2023-07-12

## 2023-07-07 RX ORDER — NICOTINE 21 MG/24HR
1 PATCH, TRANSDERMAL 24 HOURS TRANSDERMAL EVERY 24 HOURS
Qty: 42 PATCH | Refills: 0 | Status: SHIPPED | OUTPATIENT
Start: 2023-07-07 | End: 2023-08-18

## 2023-07-07 ASSESSMENT — ASTHMA QUESTIONNAIRES
QUESTION_5 LAST FOUR WEEKS HOW WOULD YOU RATE YOUR ASTHMA CONTROL: COMPLETELY CONTROLLED
QUESTION_3 LAST FOUR WEEKS HOW OFTEN DID YOUR ASTHMA SYMPTOMS (WHEEZING, COUGHING, SHORTNESS OF BREATH, CHEST TIGHTNESS OR PAIN) WAKE YOU UP AT NIGHT OR EARLIER THAN USUAL IN THE MORNING: NOT AT ALL
ACT_TOTALSCORE: 24
QUESTION_4 LAST FOUR WEEKS HOW OFTEN HAVE YOU USED YOUR RESCUE INHALER OR NEBULIZER MEDICATION (SUCH AS ALBUTEROL): NOT AT ALL
QUESTION_2 LAST FOUR WEEKS HOW OFTEN HAVE YOU HAD SHORTNESS OF BREATH: ONCE OR TWICE A WEEK
ACT_TOTALSCORE: 24
QUESTION_1 LAST FOUR WEEKS HOW MUCH OF THE TIME DID YOUR ASTHMA KEEP YOU FROM GETTING AS MUCH DONE AT WORK, SCHOOL OR AT HOME: NONE OF THE TIME

## 2023-07-07 NOTE — PATIENT INSTRUCTIONS
Please use your Arnuity inhaler once a day every day whether or not you are short of breath, this is not a rescue inhaler so do not use this if you are acutely short of breath.  Please be sure to gargle your mouth after using your Arnuity inhaler.  Please use your albuterol inhaler 2 puffs up to 6 times a day for rescue. If you are not short of breath, you do not need to use this.    COPD flares/exacerbations can vary by person, but usually present with increased shortness of breath, new/worsening wheezing, chest tightness, increase in cough frequency, and/or increase in amount or color of phlegm. COPD exacerbations are usually triggered by viruses, but can be a result of a bacterial infections, or a reaction to some sort of an environmental trigger.    1. If you think you are having a COPD  exacerbation, do not panic. You should continued taking your inhalers as prescribed. Continue taking all of the maintenance inhalers you are supposed to be using everyday. ADD your quick-relief (rescue) medications:  ALBUTEROL 4 puffs or 1 nebulizer treatment; repeat after 30-60 minutes if needed.    2. If your symptoms are not better in 2-4 hours, you should start your Emergency Plan medications:  PREDNISONE 40 mg (2 tablets) once daily. Take this medication for 5 days.  Please take a ZPAK. Take this medication for 5 days.  Call the Pulmonary clinic to notify nursing staff that you think you are having an exacerbation.     3. If your symptoms are severe (very hard to breath even with your rescue medications, you do not feel that your medications are working, you have trouble walking or talking, or you notice your lips/fingernails turning grey or bluish):  Use your rescue medication,  Call your health care provider, AND  Go to the Emergency Room OR CALL [phone]    To talk to Pulmonary clinic nursing staff call [phone].

## 2023-07-08 NOTE — TELEPHONE ENCOUNTER
Message from pulmonary:    Loni Jaquez MD [place], MD Ricardo  Hi Dr. Davila!   I see Marlen for her lung issues and had noted on prior imaging that she has demonstrated some thickening in her distal stomach. I think this should probably be evaluated by GI!   Hope you are doing well.   -Loni

## 2023-07-08 NOTE — TELEPHONE ENCOUNTER
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    Upon her review of previous studies, there was an abnormal finding of inflammation or other issue in her esophagus.    It is unclear what the cause of this is.  It could be due to issues with her esophagus with the lap band, due to acid reflux into this area or due to other things.    She did have upper endoscopy (EGD) in August 2020 which did show some irritation of the esophagus in this area, likely related to acid damage.  There was no signs of cancer or precancerous finding.    The finding was noted on the PET scan from September 2022.  This was about 2 years later.  This likely represents inflammation, but I cannot rule out anything more serious at this time.    We can:    1. See her back to review this if she would like.  We can work on squeezing her into my schedule.  2. Assume this is due to acid injury, continue on the omeprazole (Prilosec) and follow.  3. Do a follow up upper endoscopy (EGD) through the bariatric clinic OR through [facility] just to make sure nothing else is going on.    Please schedule an annual wellness visit with me 9/3/23 or after.    Thank you,    Ricardo Davila MD  [facility]  7/7/2023, 9:48 PM

## 2023-07-10 NOTE — TELEPHONE ENCOUNTER
Spoke to patient, relayed message. Patient is still taking Omeprazole daily  Would like to discuss all of this with PCP in office so scheduled for follow up visit on 7/24/2023

## 2023-07-24 ENCOUNTER — OFFICE VISIT (OUTPATIENT)
Dept: INTERNAL MEDICINE | Facility: CLINIC | Age: 63
End: 2023-07-24
Payer: MEDICARE

## 2023-07-24 VITALS
BODY MASS INDEX: 30.4 KG/M2 | OXYGEN SATURATION: 98 % | WEIGHT: 177.1 LBS | RESPIRATION RATE: 18 BRPM | DIASTOLIC BLOOD PRESSURE: 78 MMHG | SYSTOLIC BLOOD PRESSURE: 124 MMHG | HEART RATE: 71 BPM

## 2023-07-24 DIAGNOSIS — R93.3 ABNORMAL COMPUTED TOMOGRAPHY OF ESOPHAGUS: ICD-10-CM

## 2023-07-24 DIAGNOSIS — K21.9 GASTROESOPHAGEAL REFLUX DISEASE, UNSPECIFIED WHETHER ESOPHAGITIS PRESENT: Primary | ICD-10-CM

## 2023-07-24 DIAGNOSIS — R06.09 DYSPNEA ON EXERTION: ICD-10-CM

## 2023-07-24 DIAGNOSIS — I10 PRIMARY HYPERTENSION: Chronic | ICD-10-CM

## 2023-07-24 DIAGNOSIS — J44.9 CHRONIC OBSTRUCTIVE PULMONARY DISEASE, UNSPECIFIED COPD TYPE (H): ICD-10-CM

## 2023-07-24 DIAGNOSIS — K21.00 GASTROESOPHAGEAL REFLUX DISEASE WITH ESOPHAGITIS WITHOUT HEMORRHAGE: ICD-10-CM

## 2023-07-24 DIAGNOSIS — Z72.0 TOBACCO ABUSE: Chronic | ICD-10-CM

## 2023-07-24 PROBLEM — M54.16 LUMBAR RADICULOPATHY: Status: ACTIVE | Noted: 2021-09-30

## 2023-07-24 PROCEDURE — 99215 OFFICE O/P EST HI 40 MIN: CPT | Performed by: INTERNAL MEDICINE

## 2023-07-24 ASSESSMENT — PAIN SCALES - GENERAL: PAINLEVEL: NO PAIN (0)

## 2023-07-24 NOTE — PROGRESS NOTES
[facility]    Comprehensive and complex medical care - Chronic disease management - Shared decision making - Care coordination - Compassionate care    Patient advocacy - Rational deprescribing - Minimally disruptive medicine - Ethical focus - Customized care         Date of Service: 7/24/2023  Primary Provider: Ricardo Davila    Patient Care Team:  Ricardo Davila MD as PCP - General (Internal Medicine)  Ricardo Davila MD as Assigned PCP  Julissa Hager MD as MD (Family Medicine)  Loni Jaquez MD as Assigned Pulmonology Provider  Maureen Pérez NP as Nurse Practitioner  Maureen Pérez NP as Assigned Surgical Provider          Patient's Pharmacy:    [id number] - [place], [place] - [address] [place] [address] AT [facility] &  [address]  [address] [facility] [address]  Phone: [phone] Fax: [phone]     Patient's Contacts:  Name Home Phone Work Phone Mobile Phone Relationship Lgl GUERA Pablo [phone]   Friend      Patient's Insurance:    Payor: MEDICARE / Plan: MEDICARE / Product Type: Medicare /            Active Problem List:  Problem List as of 7/24/2023 Reviewed: 7/24/2023  4:52 PM by Ricardo Davila MD       High    Tobacco abuse    COPD (chronic obstructive pulmonary disease) (H)    Lumbar radiculopathy    Last Assessment & Plan 9/28/2021 Office Visit Written 9/30/2021  8:48 AM by Vivi Corrales NP     - L5/S1 pattern. She will be referred to PT.  If not making any progress or worsening, consider MRI. I can order this for her, she would need an anxiolytic due to claustrophobia  - Follow up with PCP if worsening or no improvement             Medium    HTN (hypertension)    Hx of laparoscopic adjustable gastric banding    GERD (gastroesophageal reflux disease)       Low    HLD (hyperlipidemia)    Obesity (BMI 30.0-34.9)    Zinc deficiency    Vitamin D deficiency    Normal colonoscopy - 2018 - Average  risk    Hymenoptera allergy        Current Outpatient Medications   Medication Instructions     amLODIPine (NORVASC) 5 MG tablet TAKE 1 TABLET(5 MG) BY MOUTH DAILY     EPINEPHrine (ANY BX GENERIC EQUIV) 0.3 MG/0.3ML injection 2-pack INJECT 0.3ML INTO THE MUSCLE ONCE FOR 1 DOSE     magic mouthwash (ENTER INGREDIENTS IN COMMENTS) suspension 5 mLs, Swish & Swallow, EVERY 6 HOURS PRN     nicotine (NICODERM CQ) 14 MG/24HR 24 hr patch APPLY 1 PATCH TOPICALLY TO THE SKIN EVERY 24 HOURS     [START ON 8/18/2023] nicotine (NICODERM CQ) 14 MG/24HR 24 hr patch 1 patch, Transdermal, EVERY 24 HOURS     nicotine (NICODERM CQ) 21 MG/24HR 24 hr patch 1 patch, Transdermal, EVERY 24 HOURS     [START ON 9/2/2023] nicotine (NICODERM CQ) 7 MG/24HR 24 hr patch 1 patch, Transdermal, EVERY 24 HOURS     nystatin (MYCOSTATIN) 500,000 Units, Oral, 4 TIMES DAILY, Swish and swallow     omeprazole (PRILOSEC) 40 MG DR capsule TAKE 1 CAPSULE(40 MG) BY MOUTH DAILY BEFORE BREAKFAST     sucralfate (CARAFATE) 1 GM tablet TAKE 1 TABLET(1 GRAM) BY MOUTH FOUR TIMES DAILY     varenicline (CHANTIX) 1 mg, Oral, 2 TIMES DAILY      Social History     Social History Narrative    Lives with a friend.12/4/2018  Retired after working for [employer].       Subjective:     Marlen Dumas is a 63 year old female who comes in today for:    Chief Complaint   Patient presents with     Follow Up          7/24/2023     7:41 AM   Additional Questions   Roomed by Flaco MORALEZ CMA     Patient comes in today for follow-up of a number of issues.    We first reviewed her lung nodules and she has followed up with home and I related to this.  Her follow-up work-up has been very good.  There is no major concerns related to malignancy at this point.  She does have routine scheduled follow-up for this.    We reviewed her abnormal scans of her lower esophagus.  She did have an EGD in August 2020 and there was evidence of esophagitis.  She does have a lap band in place which was also  dilating out her esophagus.  She has had her Lap-Band balloon deflated and has had weight gain since this is occurred.  She is taking omeprazole 40 mg in the morning and 20 mg in the evening.  She takes Carafate in between.    She does still have reflux symptoms at times.  We reviewed this with her.  She is on amlodipine of note.    We reviewed the low risk of esophageal cancer.  She had no evidence of Sierra's previously noted.  We cannot rule this out with her smoking history.  She would like to go ahead and get the upper endoscopy and then see about reinstituting her Lap-Band at some point.    We reviewed her coronary calcification.  She had work-up in the past related to her heart in 2008 due to presumed TIAs when she had the lightheadedness episode.  She has noted some increased dyspnea on exertion and occasionally chest tightness at the extremes of exertion for her.  She would be willing to have further imaging to look at this further.    Her blood pressure is doing well without issue.    We reviewed mammogram and she declines this at this time.    We reviewed her other issues noted in the assessment but not specifically addressed in the HPI above.     Objective:     Wt Readings from Last 3 Encounters:   07/24/23 80.3 kg (177 lb 1.6 oz)   07/07/23 81.6 kg (180 lb)   04/12/23 79.4 kg (175 lb)     BP Readings from Last 3 Encounters:   07/24/23 124/78   07/07/23 136/86   02/26/23 (!) 145/79     /78 (BP Location: Right arm, Patient Position: Sitting, Cuff Size: Adult Regular)   Pulse 71   Resp 18   Wt 80.3 kg (177 lb 1.6 oz)   LMP  (LMP Unknown)   SpO2 98%   Breastfeeding No   BMI 30.40 kg/m     The patient is comfortable, no acute distress.  Mood good.  Insight good.  Eyes are nonicteric.  Neck is supple without mass.  No cervical adenopathy.  No thyromegaly. Heart regular rate and rhythm.  She has a musical ejection murmur at the aortic position.  Lungs clear to auscultation bilaterally.   Respiratory effort is good.  Abdomen soft and nontender.  No hepatosplenomegaly.  Extremities no edema.      Diagnostics:     Office Visit on 02/26/2023   Component Date Value Ref Range Status     Group A Strep antigen 02/26/2023 Negative  Negative Final     Group A strep by PCR 02/26/2023 Not Detected  Not Detected Final       No results found for any visits on 07/24/23.     Assessment:     1. Gastroesophageal reflux disease, unspecified whether esophagitis present    2. Abnormal computed tomography of esophagus    3. Dyspnea on exertion    4. Chronic obstructive pulmonary disease, unspecified COPD type (H)    5. Primary hypertension    6. Tobacco abuse    7. Gastroesophageal reflux disease with esophagitis without hemorrhage        Plan:     1. EGD to be scheduled through the [facility].  2. Follow-up with bariatrics as planned.  Follow-up with pulmonary as planned.  3. Nuclear stress test to evaluate for underlying coronary disease.  4. Continue to work on smoking cessation.  Patient is down to 4 cigarettes a day.  5. Declines mammogram at this time.  6. Continue current medications otherwise.  7. Follow up sooner if issues.    Orders Placed This Encounter   Procedures     Adult GI  Referral - Procedure Only        40 minutes or greater was spent today on the patient's care on the day of service.      This includes time for chart preparation, reviewing medical tests done before or during the visit, talking with the patient, review of quality indicators, required documentation, and other elements of care.        Ricardo Davila MD  General Internal Medicine  [facility]    Return in about 1 year (around 7/24/2024) for annual wellness visit.     No future appointments.

## 2023-07-24 NOTE — PATIENT INSTRUCTIONS
Please follow up if you have any further issues.    You may contact me by phone or MyChart if you are worsening or if things are not improving.    ______________________________________________________________________     You can call [phone] during weekday hours to get a hold of our team coordinators if you need to get a message to me.    There are 3 people in our building taking phone calls for me.  Be sure to listen to the prompts to get a hold of them.    You first press 1 for English (press another number for a different language) and then press 2 to get the .    They can then take your message and forward it onto me.    ______________________________________________________________________     Please remember that you can call [phone] ANYTIME to schedule an appointment.     You can schedule appointments 24 hours a day, 7 days a week.      Sometimes the best time to schedule an appointment is after clinic hours when less people are calling in.      Weekends are another option for calling in to schedule appointments.

## 2023-08-26 ENCOUNTER — HOSPITAL ENCOUNTER (EMERGENCY)
Facility: HOSPITAL | Age: 63
Discharge: HOME | End: 2023-08-26
Attending: EMERGENCY MEDICINE | Admitting: EMERGENCY MEDICINE
Payer: MEDICARE

## 2023-08-26 VITALS
WEIGHT: 160 LBS | OXYGEN SATURATION: 96 % | BODY MASS INDEX: 27.31 KG/M2 | HEIGHT: 64 IN | RESPIRATION RATE: 18 BRPM | SYSTOLIC BLOOD PRESSURE: 151 MMHG | DIASTOLIC BLOOD PRESSURE: 82 MMHG | HEART RATE: 66 BPM | TEMPERATURE: 98 F

## 2023-08-26 DIAGNOSIS — T78.40XA ALLERGIC REACTION, INITIAL ENCOUNTER: ICD-10-CM

## 2023-08-26 DIAGNOSIS — K13.79 UVULAR EDEMA: ICD-10-CM

## 2023-08-26 DIAGNOSIS — Z91.030 BEE STING ALLERGY: ICD-10-CM

## 2023-08-26 PROCEDURE — 99283 EMERGENCY DEPT VISIT LOW MDM: CPT

## 2023-08-26 PROCEDURE — 250N000011 HC RX IP 250 OP 636: Mod: JZ | Performed by: EMERGENCY MEDICINE

## 2023-08-26 RX ORDER — METHYLPREDNISOLONE SODIUM SUCCINATE 125 MG/2ML
125 INJECTION, POWDER, LYOPHILIZED, FOR SOLUTION INTRAMUSCULAR; INTRAVENOUS ONCE
Status: COMPLETED | OUTPATIENT
Start: 2023-08-26 | End: 2023-08-26

## 2023-08-26 RX ORDER — FAMOTIDINE 20 MG/1
20 TABLET, FILM COATED ORAL DAILY
Qty: 5 TABLET | Refills: 0 | Status: SHIPPED | OUTPATIENT
Start: 2023-08-26 | End: 2023-08-31

## 2023-08-26 RX ORDER — PREDNISONE 20 MG/1
TABLET ORAL
Qty: 10 TABLET | Refills: 0 | Status: ON HOLD | OUTPATIENT
Start: 2023-08-26 | End: 2023-12-01

## 2023-08-26 RX ORDER — EPINEPHRINE 0.3 MG/.3ML
0.3 INJECTION SUBCUTANEOUS
Qty: 2 EACH | Refills: 3 | Status: SHIPPED | OUTPATIENT
Start: 2023-08-26

## 2023-08-26 RX ORDER — DIPHENHYDRAMINE HYDROCHLORIDE 50 MG/ML
25 INJECTION INTRAMUSCULAR; INTRAVENOUS ONCE
Status: COMPLETED | OUTPATIENT
Start: 2023-08-26 | End: 2023-08-26

## 2023-08-26 RX ORDER — DIPHENHYDRAMINE HCL 25 MG
50 CAPSULE ORAL EVERY 6 HOURS PRN
Qty: 20 CAPSULE | Refills: 0 | Status: SHIPPED | OUTPATIENT
Start: 2023-08-26 | End: 2023-08-31

## 2023-08-26 RX ADMIN — DIPHENHYDRAMINE HYDROCHLORIDE 25 MG: 50 INJECTION INTRAMUSCULAR; INTRAVENOUS at 11:41

## 2023-08-26 RX ADMIN — METHYLPREDNISOLONE SODIUM SUCCINATE 125 MG: 125 INJECTION, POWDER, LYOPHILIZED, FOR SOLUTION INTRAMUSCULAR; INTRAVENOUS at 11:44

## 2023-08-26 RX ADMIN — FAMOTIDINE 20 MG: 10 INJECTION, SOLUTION INTRAVENOUS at 11:45

## 2023-08-26 ASSESSMENT — ENCOUNTER SYMPTOMS
VOMITING: 0
DIARRHEA: 0
NAUSEA: 0
ROS SKIN COMMENTS: NEGATIVE FOR ITCHING.
TROUBLE SWALLOWING: 1
SHORTNESS OF BREATH: 0

## 2023-08-26 ASSESSMENT — ACTIVITIES OF DAILY LIVING (ADL): ADLS_ACUITY_SCORE: 35

## 2023-08-26 NOTE — DISCHARGE INSTRUCTIONS
Read and follow the discharge instructions.    Make Sure you always have an EpiPen with you.    Prednisone Pepcid tomorrow.    Benadryl as instructed do not drive or drink alcohol.    Call your primary care doctor Monday for follow-up.    Call 911 if you have return of swelling of the throat or difficulty breathing chest pain wheezing or any other concerns.

## 2023-08-26 NOTE — ED TRIAGE NOTES
Stung by bee in back of the neck at 1110. Took her epi pen and came here. Has pain in side of neck and face and throat doesn't feel right     Triage Assessment       Row Name 08/26/23 1130       Triage Assessment (Adult)    Airway WDL WDL

## 2023-08-26 NOTE — ED NOTES
Upon re assessment, area of insect bite is normal color and has no raised area or swelling. Uvula and back of throat is red but uvular swelling has decreased.

## 2023-08-26 NOTE — ED PROVIDER NOTES
EMERGENCY DEPARTMENT ENCOUNTER      NAME: Marlen Dumas  AGE: 63 year old female  YOB: 1960  MRN: [id number]  EVALUATION DATE & TIME: 8/26/2023 11:27 AM    PCP: Ricardo Davila    ED PROVIDER: Evon Hazel M.D.      CHIEF COMPLAINT     Chief Complaint   Patient presents with    Insect Bite    Allergic Reaction         FINAL IMPRESSION:     1. Allergic reaction, initial encounter    2. Uvular edema    3. Bee sting allergy          MEDICAL DECISION MAKING:       Pertinent Labs & Imaging studies reviewed. (See chart for details)    63 year old female presents to the Emergency Department for evaluation of bee sting.    Mrs. Dumas 54yo female presents here with a friend.  She was doing well muscular nausea and did not be stung her on the back of her neck.  She has allergies to bees an immediate use her EpiPen.  Feels swelling on the throat but is able to swallow.  Denies Chest pain or shortness of breath.  Feels a rash on the back where she was stung and some tingling on the left side of the face.    She denies any other symptoms.  Has been doing well.  Her to have a Lexiscan on Monday.    Review external records see below patient has a history of COPD is not a smoker.    On examination she is well-appearing cooperative and pleasant.  No stridor no drooling no accessory muscle use.  She has an edematous redundant uvula that is midline.  she has midline she is without wheezing or tachycardia skin there is erythema at the site of the bee sting on the posterior neck but otherwise no urticaria.    Diagnoses include but not limited to local reaction anaphylaxis COPD exacerbation acute coronary syndrome cellulitis cva among others.    Patient already had epinephrine.  Placed on pulse ox cardiac progression monitors.  Given Solu-Medrol Pepcid and Benadryl.    On repeat evaluation patient states she feels much better.  Edematous uvula still present but improved.    Reevaluated.  States she feels much better.   Discussed with her admission for observation and she states she feels good wants to go home.  I think this is reasonable.    Prescription sent to her pharmacy.  She does have a history of COPD and has taking prednisone in the past.    Encouraged her to follow-up with her primary care doctor strict return precautions given.    Clinical impression and decision making 63-year-old female stung by a bee   History of allergy reactions to bee did epinephrine at arrival had uvular edema but no airway compromise.  Treated for allergic reaction  Observe.  Ready to go home.  Reliable discharged in stable condition.       Vital Signs: Hypertension  EKG: None  Imaging: None  Home Meds: Reviewed  ED meds/abx: Solu-Medrol, Pepcid, Benadryl  Fluids: orally    Labs  none      Medical Decision Making    History:  Supplemental history from: Friend  External Record(s) reviewed: Documented in chart, if applicable.    Work Up:  Chart documentation includes differential considered and any EKGs or imaging independently interpreted by provider, where specified.  In additional to work up documented, I considered the following work up: Documented in chart, if applicable.    External consultation:  Discussion of management with another provider: Documented in chart, if applicable    Complicating factors:  Care impacted by chronic illness: Chronic Lung Disease, Hyperlipidemia, Hypertension, and Smoking / Nicotine Use  Care affected by social determinants of health: N/A    Disposition considerations: Discharge. I prescribed additional prescription strength medication(s) as charted. I considered admission, but discharged patient after significant clinical improvement.          Review of External Records  Hospital/Clinic: [facility]   Date: 8/3/2023  Lexiscan was rescheduled    Hospital/Clinic: [facility]  Date: 7/24/2023  The patient has a history of tobacco use, COPD, lumbar radiculopathy, and hypertension.  She had a  CT chest at this time which showed pulmonary nodules.     Consults      ED COURSE   11:30 AM Met with and introduced myself to the patient. Discussed history and plan of care.  11:35 AM Rechecked patient.  12:16 PM Rechecked patient.  1:10 PM Rechecked patient and discussed plan for discharge.    At the conclusion of the encounter I discussed the results of all of the tests and the disposition. The questions were answered. The patient and friend acknowledged understanding and was agreeable with the care plan.     MEDICATIONS GIVEN IN THE EMERGENCY:     Medications   methylPREDNISolone sodium succinate (solu-MEDROL) injection 125 mg (125 mg Intravenous $Given 8/26/23 1144)   famotidine (PEPCID) injection 20 mg (20 mg Intravenous $Given 8/26/23 1145)   diphenhydrAMINE (BENADRYL) injection 25 mg (25 mg Intravenous $Given 8/26/23 1141)       NEW PRESCRIPTIONS STARTED AT TODAY'S ER VISIT     New Prescriptions    DIPHENHYDRAMINE (BENADRYL) 25 MG CAPSULE    Take 2 capsules (50 mg) by mouth every 6 hours as needed for itching or allergies    EPINEPHRINE (ANY BX GENERIC EQUIV) 0.3 MG/0.3ML INJECTION 2-PACK    Inject 0.3 mLs (0.3 mg) into the muscle once as needed for anaphylaxis May repeat one time in 5-15 minutes if response to initial dose is inadequate.    FAMOTIDINE (PEPCID) 20 MG TABLET    Take 1 tablet (20 mg) by mouth daily for 5 days    PREDNISONE (DELTASONE) 20 MG TABLET    Take two tablets (= 40mg) each day for 5 (five) days          =================================================================    HPI     Patient information was obtained from: Patient    Use of : N/A       Marlen Dumas is a 63 year old female who presents by private car for evaluation of bee sting.    The patient reports that 25 minutes prior to evaluation, she was stung by a bee in the back of her neck while she was building a shed outside. She states that she has an allergy to bees and felt her throat begin to close, so she  immediatly administered her epi pen. She states that she is still having some difficulty swallowing and endorses a rash to the sting site as well as tingling to the left side of her mouth. She is otherwise feeling fine and notes that prior to sting she was normal. The patient has a history of COPD as well as tobacco use. She has been prescribed prednisone, but states that she has not taken it in 6 months as she only takes it when she needs it.     Denies nausea, vomiting, diarrhea, itching, drooling, chest pain, shortness of breath, or any other complaints at this time.    REVIEW OF SYSTEMS   Review of Systems   HENT:  Positive for trouble swallowing. Negative for drooling.    Respiratory:  Negative for shortness of breath.    Cardiovascular:  Negative for chest pain.   Gastrointestinal:  Negative for diarrhea, nausea and vomiting.   Skin:  Positive for rash (posterior neck).        Negative for itching.   Neurological:         Positive for tingling to left side of mouth.   All other systems reviewed and are negative.     PAST MEDICAL HISTORY:     Past Medical History:   Diagnosis Date    COPD (chronic obstructive pulmonary disease) (H)     GERD (gastroesophageal reflux disease) 7/24/2023    HLD (hyperlipidemia)     HTN (hypertension)     Hx of laparoscopic adjustable gastric banding 10/01/2008    Initial weight 295.5 BMI 50.4 Dr. Rowell    Hymenoptera allergy     Hypothyroidism     S/P colonoscopy 5/5/2019    Tobacco abuse     Vitamin D deficiency 8/24/2016       PAST SURGICAL HISTORY:     Past Surgical History:   Procedure Laterality Date    FOOT SURGERY      Sting burton palomo    LAPAROSCOPIC GASTRIC BANDING  2008    LUMBAR DISC SURGERY      HI ESOPHAGOGASTRODUODENOSCOPY TRANSORAL DIAGNOSTIC N/A 8/14/2020    Procedure: ESOPHAGOGASTRODUODENOSCOPY (EGD);  Surgeon: Jared Howard MD;  Location: [facility];  Service: General         CURRENT MEDICATIONS:   diphenhydrAMINE (BENADRYL) 25 MG capsule  EPINEPHrine  "(ANY BX GENERIC EQUIV) 0.3 MG/0.3ML injection 2-pack  famotidine (PEPCID) 20 MG tablet  predniSONE (DELTASONE) 20 MG tablet  amLODIPine (NORVASC) 5 MG tablet  EPINEPHrine (ANY BX GENERIC EQUIV) 0.3 MG/0.3ML injection 2-pack  magic mouthwash (ENTER INGREDIENTS IN COMMENTS) suspension  nicotine (NICODERM CQ) 14 MG/24HR 24 hr patch  nicotine (NICODERM CQ) 14 MG/24HR 24 hr patch  [START ON 2023] nicotine (NICODERM CQ) 7 MG/24HR 24 hr patch  nystatin (MYCOSTATIN) 301121 UNIT/ML suspension  omeprazole (PRILOSEC) 40 MG DR capsule  sucralfate (CARAFATE) 1 GM tablet  varenicline (CHANTIX) 1 MG tablet         ALLERGIES:     Allergies   Allergen Reactions    Other Environmental Allergy Unknown     Bee sting    Sulfa (Sulfonamide Antibiotics) [Sulfa Antibiotics] Unknown       FAMILY HISTORY:     Family History   Problem Relation Age of Onset    Coronary Artery Disease Mother 58    Hypertension Mother     Chronic Obstructive Pulmonary Disease Father     Hypertension Father     Anxiety Disorder Father     Hypertension Brother        SOCIAL HISTORY:     Social History     Socioeconomic History    Marital status: Single    Number of children: 0   Tobacco Use    Smoking status: Former     Packs/day: 0.50     Years: 37.00     Pack years: 18.50     Types: Cigarettes     Quit date: 2022     Years since quittin.9    Smokeless tobacco: Never    Tobacco comments:     quit 22   Vaping Use    Vaping Use: Never used   Substance and Sexual Activity    Alcohol use: Yes     Alcohol/week: 4.0 standard drinks of alcohol     Types: 4 Standard drinks or equivalent per week     Comment: 4-5 beer/wine weekly    Drug use: Never    Sexual activity: Never     Birth control/protection: Abstinence, Post-menopausal   Social History Narrative    Lives with a friend.2018  Retired after working for [employer].       VITALS:   BP (!) 151/82   Pulse 66   Temp 98  F (36.7  C) (Oral)   Resp 18   Ht 1.626 m (5' 4\")   Wt 72.6 kg (160 lb)   " LMP  (LMP Unknown)   SpO2 96%   BMI 27.46 kg/m      PHYSICAL EXAM     Physical Exam  Vitals and nursing note reviewed. Exam conducted with a chaperone present.   Constitutional:       General: She is not in acute distress.     Appearance: Normal appearance. She is not ill-appearing, toxic-appearing or diaphoretic.   HENT:      Mouth/Throat:      Mouth: Mucous membranes are moist.      Pharynx: Oropharynx is clear. Uvula midline. Uvula swelling present. No oropharyngeal exudate or posterior oropharyngeal erythema.      Tonsils: No tonsillar exudate or tonsillar abscesses.      Comments: Edematous uvula  Neck:        Comments: Site of the bee sting.  Mild erythema and edema.  Cardiovascular:      Rate and Rhythm: Normal rate.   Neurological:      Mental Status: She is alert.         Physical Exam   Constitutional: Laying in bed in no acute distress.    Head: Atraumatic.     Nose: Nose normal.     Mouth/Throat: Oropharynx is clear and moist. Uvula is enlarged, but midline.    Eyes: EOM are normal. Pupils are equal, round, and reactive to light.     Ears: Bilateral pearly white tympanic membranes.    Neck: Normal range of motion. Neck supple.     Cardiovascular: Normal rate, regular rhythm and normal heart sounds.      Pulmonary/Chest: Normal effort  and breath sounds normal.     Abdominal: soft nontender.    Musculoskeletal: Normal range of motion.     Neurological: Moves upper and lower extremities equally.    Lymphatics: no edema    : NA    Skin: Skin is warm and dry. Site of sting at posterior neck has some mild edema and erythema. No rash.     Psychiatric: Normal mood and affect. Behavior is normal.       LAB:     All pertinent labs reviewed and interpreted.  Labs Ordered and Resulted from Time of ED Arrival to Time of ED Departure - No data to display     RADIOLOGY:     Reviewed all pertinent imaging. Please see official radiology report.  No orders to display        EKG:       I have independently reviewed  and interpreted the EKG(s) documented above.      PROCEDURES:     Procedures      I, Jennifefr Maria, am serving as a scribe to document services personally performed by Dr. Hazel based on my observation and the provider's statements to me. I, Evno Hazel MD attest that Jenniffer Maria is acting in a scribe capacity, has observed my performance of the services and has documented them in accordance with my direction.    Evon Hazel M.D.  Emergency Medicine  [facility] EMERGENCY DEPARTMENT  [address] [place] [id number]  [phone]  Dept: [phone]       Evon Hazel MD  08/26/23 3694

## 2023-08-28 ENCOUNTER — HOSPITAL ENCOUNTER (OUTPATIENT)
Dept: NUCLEAR MEDICINE | Facility: HOSPITAL | Age: 63
Discharge: HOME | End: 2023-08-28
Attending: INTERNAL MEDICINE
Payer: MEDICARE

## 2023-08-28 ENCOUNTER — HOSPITAL ENCOUNTER (OUTPATIENT)
Dept: CARDIOLOGY | Facility: HOSPITAL | Age: 63
Discharge: HOME | End: 2023-08-28
Attending: INTERNAL MEDICINE
Payer: MEDICARE

## 2023-08-28 DIAGNOSIS — R06.09 DYSPNEA ON EXERTION: ICD-10-CM

## 2023-08-28 LAB
CV STRESS CURRENT BP HE: NORMAL
CV STRESS CURRENT HR HE: 100
CV STRESS CURRENT HR HE: 103
CV STRESS CURRENT HR HE: 104
CV STRESS CURRENT HR HE: 107
CV STRESS CURRENT HR HE: 112
CV STRESS CURRENT HR HE: 112
CV STRESS CURRENT HR HE: 113
CV STRESS CURRENT HR HE: 119
CV STRESS CURRENT HR HE: 121
CV STRESS CURRENT HR HE: 121
CV STRESS CURRENT HR HE: 126
CV STRESS CURRENT HR HE: 129
CV STRESS CURRENT HR HE: 131
CV STRESS CURRENT HR HE: 131
CV STRESS CURRENT HR HE: 75
CV STRESS CURRENT HR HE: 77
CV STRESS CURRENT HR HE: 77
CV STRESS CURRENT HR HE: 79
CV STRESS CURRENT HR HE: 79
CV STRESS CURRENT HR HE: 80
CV STRESS CURRENT HR HE: 81
CV STRESS CURRENT HR HE: 82
CV STRESS CURRENT HR HE: 82
CV STRESS CURRENT HR HE: 85
CV STRESS CURRENT HR HE: 85
CV STRESS CURRENT HR HE: 97
CV STRESS DEVIATION TIME HE: NORMAL
CV STRESS ECHO PERCENT HR HE: NORMAL
CV STRESS EXERCISE STAGE HE: NORMAL
CV STRESS EXERCISE STAGE REACHED HE: NORMAL
CV STRESS FINAL RESTING BP HE: NORMAL
CV STRESS FINAL RESTING HR HE: 85
CV STRESS MAX HR HE: 131
CV STRESS MAX TREADMILL GRADE HE: 14
CV STRESS MAX TREADMILL SPEED HE: 3.4
CV STRESS PEAK DIA BP HE: NORMAL
CV STRESS PEAK SYS BP HE: NORMAL
CV STRESS PHASE HE: NORMAL
CV STRESS PROTOCOL HE: NORMAL
CV STRESS RESTING PT POSITION HE: NORMAL
CV STRESS RESTING PT POSITION HE: NORMAL
CV STRESS ST DEVIATION AMOUNT HE: NORMAL
CV STRESS ST DEVIATION ELEVATION HE: NORMAL
CV STRESS ST EVELATION AMOUNT HE: NORMAL
CV STRESS TEST TYPE HE: NORMAL
CV STRESS TOTAL STAGE TIME MIN 1 HE: NORMAL
NUC STRESS EJECTION FRACTION: 47 %
RATE PRESSURE PRODUCT: NORMAL
STRESS ECHO BASELINE DIASTOLIC HE: 87
STRESS ECHO BASELINE HR: 72
STRESS ECHO BASELINE SYSTOLIC BP: 154
STRESS ECHO CALCULATED PERCENT HR: 85 %
STRESS ECHO LAST STRESS DIASTOLIC BP: 82
STRESS ECHO LAST STRESS HR: 131
STRESS ECHO LAST STRESS SYSTOLIC BP: 172
STRESS ECHO POST ESTIMATED WORKLOAD: 8.5
STRESS ECHO POST EXERCISE DUR MIN: 7
STRESS ECHO POST EXERCISE DUR SEC: 0
STRESS ECHO TARGET HR: 155

## 2023-08-28 PROCEDURE — 343N000001 HC RX 343: Performed by: INTERNAL MEDICINE

## 2023-08-28 PROCEDURE — A9500 TC99M SESTAMIBI: HCPCS | Performed by: INTERNAL MEDICINE

## 2023-08-28 PROCEDURE — 78452 HT MUSCLE IMAGE SPECT MULT: CPT | Mod: 26 | Performed by: INTERNAL MEDICINE

## 2023-08-28 PROCEDURE — 93016 CV STRESS TEST SUPVJ ONLY: CPT | Performed by: INTERNAL MEDICINE

## 2023-08-28 PROCEDURE — 93018 CV STRESS TEST I&R ONLY: CPT | Performed by: INTERNAL MEDICINE

## 2023-08-28 PROCEDURE — G1010 CDSM STANSON: HCPCS | Performed by: INTERNAL MEDICINE

## 2023-08-28 PROCEDURE — 93017 CV STRESS TEST TRACING ONLY: CPT

## 2023-08-28 PROCEDURE — G1010 CDSM STANSON: HCPCS

## 2023-08-28 RX ADMIN — Medication 8.4 MILLICURIE: at 11:22

## 2023-08-28 RX ADMIN — Medication 30 MILLICURIE: at 12:33

## 2023-09-08 ENCOUNTER — MYC MEDICAL ADVICE (OUTPATIENT)
Dept: INTERNAL MEDICINE | Facility: CLINIC | Age: 63
End: 2023-09-08
Payer: MEDICARE

## 2023-09-08 DIAGNOSIS — R06.09 DYSPNEA ON EXERTION: ICD-10-CM

## 2023-09-08 DIAGNOSIS — Z72.0 TOBACCO ABUSE: ICD-10-CM

## 2023-09-08 DIAGNOSIS — R94.39 ABNORMAL NUCLEAR STRESS TEST: Primary | ICD-10-CM

## 2023-09-26 ENCOUNTER — HOSPITAL ENCOUNTER (OUTPATIENT)
Dept: CT IMAGING | Facility: CLINIC | Age: 63
Discharge: HOME | End: 2023-09-26
Attending: INTERNAL MEDICINE | Admitting: INTERNAL MEDICINE
Payer: MEDICARE

## 2023-09-26 VITALS — SYSTOLIC BLOOD PRESSURE: 136 MMHG | DIASTOLIC BLOOD PRESSURE: 67 MMHG

## 2023-09-26 DIAGNOSIS — R06.09 DYSPNEA ON EXERTION: ICD-10-CM

## 2023-09-26 DIAGNOSIS — Z72.0 TOBACCO ABUSE: ICD-10-CM

## 2023-09-26 DIAGNOSIS — R94.39 ABNORMAL NUCLEAR STRESS TEST: ICD-10-CM

## 2023-09-26 LAB
BSA FOR ECHO PROCEDURE: 0 M2
CREAT BLD-MCNC: 1 MG/DL (ref 0.6–1.1)
EGFRCR SERPLBLD CKD-EPI 2021: >60 ML/MIN/1.73M2

## 2023-09-26 PROCEDURE — G1010 CDSM STANSON: HCPCS | Performed by: INTERNAL MEDICINE

## 2023-09-26 PROCEDURE — 250N000009 HC RX 250: Performed by: INTERNAL MEDICINE

## 2023-09-26 PROCEDURE — 75574 CT ANGIO HRT W/3D IMAGE: CPT | Mod: 26 | Performed by: INTERNAL MEDICINE

## 2023-09-26 PROCEDURE — 250N000013 HC RX MED GY IP 250 OP 250 PS 637: Performed by: INTERNAL MEDICINE

## 2023-09-26 PROCEDURE — 250N000011 HC RX IP 250 OP 636: Performed by: INTERNAL MEDICINE

## 2023-09-26 PROCEDURE — 82565 ASSAY OF CREATININE: CPT

## 2023-09-26 PROCEDURE — 75574 CT ANGIO HRT W/3D IMAGE: CPT | Mod: ME

## 2023-09-26 RX ORDER — DILTIAZEM HYDROCHLORIDE 5 MG/ML
5 INJECTION INTRAVENOUS
Status: DISCONTINUED | OUTPATIENT
Start: 2023-09-26 | End: 2023-09-27 | Stop reason: HOSPADM

## 2023-09-26 RX ORDER — DILTIAZEM HYDROCHLORIDE 5 MG/ML
10 INJECTION INTRAVENOUS
Status: DISCONTINUED | OUTPATIENT
Start: 2023-09-26 | End: 2023-09-27 | Stop reason: HOSPADM

## 2023-09-26 RX ORDER — METOPROLOL TARTRATE 1 MG/ML
5 INJECTION, SOLUTION INTRAVENOUS
Status: DISCONTINUED | OUTPATIENT
Start: 2023-09-26 | End: 2023-09-27 | Stop reason: HOSPADM

## 2023-09-26 RX ORDER — LIDOCAINE 40 MG/G
CREAM TOPICAL
Status: DISCONTINUED | OUTPATIENT
Start: 2023-09-26 | End: 2023-09-27 | Stop reason: HOSPADM

## 2023-09-26 RX ORDER — IOPAMIDOL 755 MG/ML
100 INJECTION, SOLUTION INTRAVASCULAR ONCE
Status: COMPLETED | OUTPATIENT
Start: 2023-09-26 | End: 2023-09-26

## 2023-09-26 RX ORDER — NITROGLYCERIN 0.4 MG/1
0.4 TABLET SUBLINGUAL ONCE
Status: COMPLETED | OUTPATIENT
Start: 2023-09-26 | End: 2023-09-26

## 2023-09-26 RX ADMIN — NITROGLYCERIN 0.4 MG: 0.4 TABLET SUBLINGUAL at 07:18

## 2023-09-26 RX ADMIN — METOPROLOL TARTRATE 5 MG: 1 INJECTION, SOLUTION INTRAVENOUS at 07:21

## 2023-09-26 RX ADMIN — IOPAMIDOL 100 ML: 755 INJECTION, SOLUTION INTRAVENOUS at 07:43

## 2023-09-27 ENCOUNTER — MYC MEDICAL ADVICE (OUTPATIENT)
Dept: INTERNAL MEDICINE | Facility: CLINIC | Age: 63
End: 2023-09-27
Payer: MEDICARE

## 2023-09-27 ENCOUNTER — TELEPHONE (OUTPATIENT)
Dept: GASTROENTEROLOGY | Facility: CLINIC | Age: 63
End: 2023-09-27
Payer: MEDICARE

## 2023-09-27 DIAGNOSIS — R93.3 ABNORMAL CT SCAN, ESOPHAGUS: Primary | ICD-10-CM

## 2023-09-27 DIAGNOSIS — Z72.0 TOBACCO ABUSE: ICD-10-CM

## 2023-09-27 NOTE — TELEPHONE ENCOUNTER
"Pt wants to go to different location after going through screening questions     Endoscopy Scheduling Screen    Have you had a positive Covid test in the last 14 days?  No    Are you active on MyChart?   Yes    What insurance is in the chart?  Other:  MEDICARE    Ordering/Referring Provider: Ricardo Davila MD in [facility] INTERNAL MEDICINE    (If ordering provider performs procedure, schedule with ordering provider unless otherwise instructed. )    BMI: Estimated body mass index is 27.46 kg/m  as calculated from the following:    Height as of 8/26/23: 1.626 m (5' 4\").    Weight as of 8/26/23: 72.6 kg (160 lb).     Sedation Ordered  moderate sedation.   If patient BMI > 50 do not schedule in [facility].    If patient BMI > 45 do not schedule at [facility].    Are you taking methadone or Suboxone?  No    Are you taking any prescription medications for pain 3 or more times per week?   No    Do you have a history of malignant hyperthermia or adverse reaction to anesthesia?  No    (Females) Are you currently pregnant?   No     Have you been diagnosed or told you have pulmonary hypertension?   No    Do you have an LVAD?  No     Have you been told you have moderate to severe sleep apnea?  No    Have you been told you have COPD, asthma, or any other lung disease?  Yes     What breathing problems do you have?  COPD     Do you use home oxygen?  No    Have your breathing problems required an ED visit or hospitalization in the last year?  No    Do you have any heart conditions?  No     Have you ever had an organ transplant?   No    Have you ever had or are you awaiting a heart or lung transplant?   No    Have you had a stroke or transient ischemic attack (TIA aka \"mini stroke\" in the last 6 months?   No    Have you been diagnosed with or been told you have cirrhosis of the liver?   No    Are you currently on dialysis?   No    Do you need assistance transferring?   No    BMI: Estimated body mass index is 27.46 kg/m  as calculated from the " "following:    Height as of 8/26/23: 1.626 m (5' 4\").    Weight as of 8/26/23: 72.6 kg (160 lb).     Is patients BMI > 40 and scheduling location [facility]?  No    Do you take an injectable medication for weight loss or diabetes (excluding insulin)?  No    Do you take the medication Naltrexone?  No    Do you take blood thinners?  No       Prep   Are you currently on dialysis or do you have chronic kidney disease?  No    Do you have a diagnosis of diabetes?  No    Do you have a diagnosis of cystic fibrosis (CF)?  No    On a regular basis do you go 3 -5 days between bowel movements?  No    BMI > 40?  No    Preferred Pharmacy:    [id number] - [place], [place] - [address] AT [facility] & [address] [address] [address]  [facility] [address]  Phone: [phone] Fax: [phone]      Final Scheduling Details   Colonoscopy prep sent?      Procedure scheduled  Upper endoscopy (EGD)      "

## 2023-10-03 DIAGNOSIS — I10 ESSENTIAL HYPERTENSION: ICD-10-CM

## 2023-10-03 RX ORDER — AMLODIPINE BESYLATE 5 MG/1
TABLET ORAL
Qty: 90 TABLET | Refills: 3 | Status: SHIPPED | OUTPATIENT
Start: 2023-10-03 | End: 2024-06-30

## 2023-10-13 ENCOUNTER — OFFICE VISIT (OUTPATIENT)
Dept: INTERNAL MEDICINE | Facility: CLINIC | Age: 63
End: 2023-10-13
Payer: MEDICARE

## 2023-10-13 VITALS
OXYGEN SATURATION: 98 % | DIASTOLIC BLOOD PRESSURE: 62 MMHG | HEART RATE: 66 BPM | BODY MASS INDEX: 29.57 KG/M2 | TEMPERATURE: 98.3 F | WEIGHT: 173.2 LBS | HEIGHT: 64 IN | SYSTOLIC BLOOD PRESSURE: 138 MMHG | RESPIRATION RATE: 18 BRPM

## 2023-10-13 DIAGNOSIS — Z01.818 PREOPERATIVE EXAMINATION: Primary | ICD-10-CM

## 2023-10-13 DIAGNOSIS — R93.3 ABNORMAL CT SCAN, ESOPHAGUS: ICD-10-CM

## 2023-10-13 DIAGNOSIS — Z98.84 HX OF LAPAROSCOPIC ADJUSTABLE GASTRIC BANDING: ICD-10-CM

## 2023-10-13 DIAGNOSIS — Z72.0 TOBACCO ABUSE: Chronic | ICD-10-CM

## 2023-10-13 DIAGNOSIS — I10 PRIMARY HYPERTENSION: Chronic | ICD-10-CM

## 2023-10-13 DIAGNOSIS — E78.2 MIXED HYPERLIPIDEMIA: ICD-10-CM

## 2023-10-13 DIAGNOSIS — J44.9 CHRONIC OBSTRUCTIVE PULMONARY DISEASE, UNSPECIFIED COPD TYPE (H): ICD-10-CM

## 2023-10-13 PROCEDURE — 99214 OFFICE O/P EST MOD 30 MIN: CPT | Performed by: INTERNAL MEDICINE

## 2023-10-13 RX ORDER — EZETIMIBE 10 MG/1
10 TABLET ORAL DAILY
Qty: 90 TABLET | Refills: 3 | Status: SHIPPED | OUTPATIENT
Start: 2023-10-13 | End: 2024-09-12

## 2023-10-13 NOTE — PROGRESS NOTES
[address] [address]  Phone: [phone]  Fax: [phone]  Primary Provider: Trudy Hlal  Pre-op Performing Provider: TRUDY HALL    {Provider  Link to PREOP SmartSet  Use this to apply standard patient instructions to AVS; includes medication directions, common orders, guidelines for anemia, warfarin, additional testing   :371829}  PREOPERATIVE EVALUATION:  Today's date: 10/13/2023    Marlen is a 63 year old female who presents for a preoperative evaluation.      10/13/2023    11:26 AM   Additional Questions   Roomed by Ara Na   Accompanied by self       Surgical Information:  Surgery/Procedure: Upper Endoscopy   Surgery Location:  [facility]  Surgeon: ***  Surgery Date: 10/18/2023  Time of Surgery: 1:45pm  Where patient plans to recover: At home with family  Fax number for surgical facility: Note does not need to be faxed, will be available electronically in Epic.    {2021 Provider Charting Preference for Preop :907754}    Subjective       HPI related to upcoming procedure: ***        10/13/2023     7:45 AM   Preop Questions   1. Have you ever had a heart attack or stroke? No   2. Have you ever had surgery on your heart or blood vessels, such as a stent placement, a coronary artery bypass, or surgery on an artery in your head, neck, heart, or legs? No   3. Do you have chest pain with activity? No   4. Do you have a history of  heart failure? No   5. Do you currently have a cold, bronchitis or symptoms of other infection? No   6. Do you have a cough, shortness of breath, or wheezing? No   7. Do you or anyone in your family have previous history of blood clots? No   8. Do you or does anyone in your family have a serious bleeding problem such as prolonged bleeding following surgeries or cuts? No   9. Have you ever had problems with anemia or been told to take iron pills? No   10. Have you had any abnormal blood  loss such as black, tarry or bloody stools, or abnormal vaginal bleeding? No   11. Have you ever had a blood transfusion? No   12. Are you willing to have a blood transfusion if it is medically needed before, during, or after your surgery? Yes   13. Have you or any of your relatives ever had problems with anesthesia? No   14. Do you have sleep apnea, excessive snoring or daytime drowsiness? No   15. Do you have any artifical heart valves or other implanted medical devices like a pacemaker, defibrillator, or continuous glucose monitor? No   16. Do you have artificial joints? No   17. Are you allergic to latex? No       Health Care Directive:  Patient does not have a Health Care Directive or Living Will: {ADVANCE_DIRECTIVE_STATUS:826216}    Preoperative Review of :  {Mnpmpreport:773505}  {Review MNPMP for all patients per [facility]CSI MNPMP Profile:819251}    {Chronic problem details (Optional) :437762}    Review of Systems  {ROS Preop Choices:189415}    Patient Active Problem List    Diagnosis Date Noted    GERD (gastroesophageal reflux disease) 07/24/2023     Priority: Medium    Lumbar radiculopathy 09/30/2021     Priority: Medium    Tobacco abuse      Priority: Medium    Hymenoptera allergy 05/08/2019     Priority: Medium    Normal colonoscopy - 2018 - Average risk 05/05/2019     Priority: Medium    Zinc deficiency 08/24/2016     Priority: Medium    Vitamin D deficiency 08/24/2016     Priority: Medium    Obesity (BMI 30.0-34.9) 08/17/2016     Priority: Medium    HTN (hypertension)      Priority: Medium    COPD (chronic obstructive pulmonary disease) (H)      Priority: Medium    HLD (hyperlipidemia)      Priority: Medium    Hx of laparoscopic adjustable gastric banding 10/01/2008     Priority: Medium     Initial weight 295.5 BMI 50.4 Dr. Rowell          Past Medical History:   Diagnosis Date    COPD (chronic obstructive pulmonary disease) (H)     GERD (gastroesophageal reflux disease) 07/24/2023    HLD (hyperlipidemia)      HTN (hypertension)     Hx of laparoscopic adjustable gastric banding 10/01/2008    Initial weight 295.5 BMI 50.4 Dr. Rowell    Hymenoptera allergy     Hypothyroidism     S/P colonoscopy 05/05/2019    Tobacco abuse     Vitamin D deficiency 08/24/2016     Past Surgical History:   Procedure Laterality Date    FOOT SURGERY      Jose palmoo    LAPAROSCOPIC GASTRIC BANDING  2008    LUMBAR DISC SURGERY      MO ESOPHAGOGASTRODUODENOSCOPY TRANSORAL DIAGNOSTIC N/A 8/14/2020    Procedure: ESOPHAGOGASTRODUODENOSCOPY (EGD);  Surgeon: Jared Howard MD;  Location: [facility];  Service: General     Current Outpatient Medications   Medication Sig Dispense Refill    amLODIPine (NORVASC) 5 MG tablet TAKE 1 TABLET(5 MG) BY MOUTH DAILY 90 tablet 3    EPINEPHrine (ANY BX GENERIC EQUIV) 0.3 MG/0.3ML injection 2-pack Inject 0.3 mLs (0.3 mg) into the muscle once as needed for anaphylaxis May repeat one time in 5-15 minutes if response to initial dose is inadequate. 2 each 3    EPINEPHrine (ANY BX GENERIC EQUIV) 0.3 MG/0.3ML injection 2-pack INJECT 0.3ML INTO THE MUSCLE ONCE FOR 1 DOSE 2 each 1    magic mouthwash (ENTER INGREDIENTS IN COMMENTS) suspension Swish and swallow 5 mLs in mouth every 6 hours as needed (oral irritation) 260 mL 1    nicotine (NICODERM CQ) 14 MG/24HR 24 hr patch APPLY 1 PATCH TOPICALLY TO THE SKIN EVERY 24 HOURS 28 patch 3    nystatin (MYCOSTATIN) 481753 UNIT/ML suspension Take 5 mLs (500,000 Units) by mouth 4 times daily Swish and swallow 200 mL 0    omeprazole (PRILOSEC) 40 MG DR capsule TAKE 1 CAPSULE(40 MG) BY MOUTH DAILY BEFORE BREAKFAST 90 capsule 3    predniSONE (DELTASONE) 20 MG tablet Take two tablets (= 40mg) each day for 5 (five) days 10 tablet 0    sucralfate (CARAFATE) 1 GM tablet TAKE 1 TABLET(1 GRAM) BY MOUTH FOUR TIMES DAILY 360 tablet 1    varenicline (CHANTIX) 1 MG tablet Take 1 tablet (1 mg) by mouth 2 times daily 60 tablet 5       Allergies   Allergen Reactions    Other  "Environmental Allergy Unknown     Bee sting    Sulfa (Sulfonamide Antibiotics) [Sulfa Antibiotics] Unknown        Social History     Tobacco Use    Smoking status: Former     Packs/day: 0.50     Years: 37.00     Additional pack years: 0.00     Total pack years: 18.50     Types: Cigarettes     Quit date: 2022     Years since quittin.0    Smokeless tobacco: Never    Tobacco comments:     quit 22   Substance Use Topics    Alcohol use: Yes     Alcohol/week: 4.0 standard drinks of alcohol     Types: 4 Standard drinks or equivalent per week     Comment: 4-5 beer/wine weekly     {FAMILY HISTORY (Optional):797100814}  History   Drug Use Unknown         Objective     /62 (BP Location: Right arm, Patient Position: Sitting, Cuff Size: Adult Regular)   Pulse 66   Temp 98.3  F (36.8  C) (Oral)   Resp 18   Ht 1.626 m (5' 4\")   Wt 78.6 kg (173 lb 3.2 oz)   LMP  (LMP Unknown)   SpO2 98%   BMI 29.73 kg/m      Physical Exam  {EXAM Preop Choices:577559}    Recent Labs   Lab Test 23  0709 10/12/22  0750 09/15/22  1039   HGB  --   --  14.1   PLT  --   --  202   INR  --  0.97  --    NA  --   --  137   POTASSIUM  --   --  3.7   CR 1.0  --  0.91   A1C  --   --  5.8*        Diagnostics:  {LABS:162889}   {EK}    Revised Cardiac Risk Index (RCRI):  The patient has the following serious cardiovascular risks for perioperative complications:  {PREOP REVISED CARDIAC RISK INDEX (RCRI) :585150}     RCRI Interpretation: {REVISED CARDIAC RISK INTERPRETATION :620860}         Signed Electronically by: Ricardo Davila MD  Copy of this evaluation report is provided to requesting physician.    {Provider Resources  Preop [facility] Preop Guidelines  Revised Cardiac Risk Index :207694}  "

## 2023-10-14 NOTE — PROGRESS NOTES
[facility]  Primary Care Specialists    Care coordination - Customized care -  Comprehensive and complex medical care            Date of Service: 10/13/2023  Primary Provider: Ricardo Davila    Patient Care Team:  Ricardo Davila MD as PCP - General (Internal Medicine)  Ricardo Davila MD as Assigned PCP  Julissa Hager MD as MD (Family Medicine)  Loni Jaquez MD as Assigned Pulmonology Provider  Maureen Pérez NP as Nurse Practitioner  Maureen Pérez NP as Assigned Surgical Provider            Patient's Pharmacy:    [id number] - [place], [place] - [address] AT [facility] [address]  [address] [facility] [address]  Phone: [phone] Fax: [phone]     Patient's Contacts:  Name Home Phone Work Phone Mobile Phone Relationship Lgl GUERA Pablo [phone]   Friend        Patient's Insurance:    Payor: MEDICARE / Plan: MEDICARE / Product Type: Medicare /           Preoperative examination    Marlen Dumas is a 63 year old female (1960) who I am asked to see by her surgeon regarding her fitness for upcoming surgery.      Chief Complaint   Patient presents with    Pre-Op Exam     Upper Endoscopy 10/18/2023         Subjective:     Patient in for preop prior to her upper endoscopy (EGD) at [facility].    She is having upper endoscopy (EGD) for abnormal esophagus on CT scan imaging.  No symptoms, but does smoke so do want to rule out esophageal cancer.    Had upper endoscopy (EGD) in 2020 and tolerated well.  This was done by Dr. Howard.    Does have gastric lap band and this might be contributing to some of the abnormal findings on the CT scan.    Reviewed recent coronary testing and looked good.  Should be good from this standpoint.    Did review her chronic obstructive pulmonary disease (COPD) and this is stable.    Reviewed her cholesterol and she is not wanting to use a statin but would try Zetia for  this.  Also reviewed other options.    Discussed aspirin use prior to the procedure.    ______________________________________________________________________         10/13/2023     7:45 AM   Pre-op Questionnaire   1. Have you ever had a heart attack or stroke? No   2. Have you ever had surgery on your heart or blood vessels, such as a stent placement, a coronary artery bypass, or surgery on an artery in your head, neck, heart, or legs? No   3. Do you have chest pain with activity? No   4. Do you have a history of  heart failure? No   5. Do you currently have a cold, bronchitis or symptoms of other infection? No   6. Do you have a cough, shortness of breath, or wheezing? No   7. Do you or anyone in your family have previous history of blood clots? No   8. Do you or does anyone in your family have a serious bleeding problem such as prolonged bleeding following surgeries or cuts? No   9. Have you ever had problems with anemia or been told to take iron pills? No   10. Have you had any abnormal blood loss such as black, tarry or bloody stools, or abnormal vaginal bleeding? No   11. Have you ever had a blood transfusion? No   12. Are you willing to have a blood transfusion if it is medically needed before, during, or after your surgery? Yes   13. Have you or any of your relatives ever had problems with anesthesia? No   14. Do you have sleep apnea, excessive snoring or daytime drowsiness? No   15. Do you have any artifical heart valves or other implanted medical devices like a pacemaker, defibrillator, or continuous glucose monitor? No   16. Do you have artificial joints? No   17. Are you allergic to latex? No     ______________________________________________________________________     We reviewed her other issues noted in the assessment but not specifically addressed in the HPI above.   ______________________________________________________________________     Patient Active Problem List   Diagnosis    Tobacco abuse     COPD (chronic obstructive pulmonary disease) (H)    Primary hypertension    HLD (hyperlipidemia)    Hx of laparoscopic adjustable gastric banding    Obesity (BMI 30.0-34.9)    Zinc deficiency    Vitamin D deficiency    Normal colonoscopy - 2018 - Average risk    Hymenoptera allergy    Lumbar radiculopathy    GERD (gastroesophageal reflux disease)       Past Surgical History:   Procedure Laterality Date    FOOT SURGERY      Jose palomo    LAPAROSCOPIC GASTRIC BANDING      LUMBAR DISC SURGERY      DC ESOPHAGOGASTRODUODENOSCOPY TRANSORAL DIAGNOSTIC N/A 2020    Procedure: ESOPHAGOGASTRODUODENOSCOPY (EGD);  Surgeon: Jared Howard MD;  [facility]: [facility];  Service: General      Social History     Occupational History    Not on file   Tobacco Use    Smoking status: Former     Packs/day: 0.50     Years: 37.00     Additional pack years: 0.00     Total pack years: 18.50     Types: Cigarettes     Quit date: 2022     Years since quittin.0    Smokeless tobacco: Never    Tobacco comments:     quit 22   Vaping Use    Vaping Use: Never used   Substance and Sexual Activity    Alcohol use: Yes     Alcohol/week: 4.0 standard drinks of alcohol     Types: 4 Standard drinks or equivalent per week     Comment: 4-5 beer/wine weekly    Drug use: Never    Sexual activity: Never     Birth control/protection: Abstinence, Post-menopausal     Social History     Social History Narrative    Lives with a friend.2018  Retired after working for [employer].      Family History   Problem Relation Age of Onset    Coronary Artery Disease Mother 58    Hypertension Mother     Chronic Obstructive Pulmonary Disease Father     Hypertension Father     Anxiety Disorder Father     Hypertension Brother       Family history is noncontributory otherwise.    Allergies: Other environmental allergy and Sulfa (sulfonamide antibiotics) [sulfa antibiotics]     Current medications:  Current Outpatient Medications   Medication  "Instructions    amLODIPine (NORVASC) 5 MG tablet TAKE 1 TABLET(5 MG) BY MOUTH DAILY    EPINEPHrine (ANY BX GENERIC EQUIV) 0.3 MG/0.3ML injection 2-pack INJECT 0.3ML INTO THE MUSCLE ONCE FOR 1 DOSE    EPINEPHrine (ANY BX GENERIC EQUIV) 0.3 mg, Intramuscular, ONCE PRN, May repeat one time in 5-15 minutes if response to initial dose is inadequate.    ezetimibe (ZETIA) 10 mg, Oral, DAILY    magic mouthwash (ENTER INGREDIENTS IN COMMENTS) suspension 5 mLs, Swish & Swallow, EVERY 6 HOURS PRN    nicotine (NICODERM CQ) 14 MG/24HR 24 hr patch APPLY 1 PATCH TOPICALLY TO THE SKIN EVERY 24 HOURS    nystatin (MYCOSTATIN) 500,000 Units, Oral, 4 TIMES DAILY, Swish and swallow    omeprazole (PRILOSEC) 40 MG DR capsule TAKE 1 CAPSULE(40 MG) BY MOUTH DAILY BEFORE BREAKFAST    predniSONE (DELTASONE) 20 MG tablet Take two tablets (= 40mg) each day for 5 (five) days    sucralfate (CARAFATE) 1 GM tablet TAKE 1 TABLET(1 GRAM) BY MOUTH FOUR TIMES DAILY    varenicline (CHANTIX) 1 mg, Oral, 2 TIMES DAILY        Objective:     Wt Readings from Last 3 Encounters:   10/13/23 78.6 kg (173 lb 3.2 oz)   08/26/23 72.6 kg (160 lb)   07/24/23 80.3 kg (177 lb 1.6 oz)     BP Readings from Last 3 Encounters:   10/13/23 138/62   09/26/23 136/67   08/26/23 (!) 151/82     /62 (BP Location: Right arm, Patient Position: Sitting, Cuff Size: Adult Regular)   Pulse 66   Temp 98.3  F (36.8  C) (Oral)   Resp 18   Ht 1.626 m (5' 4\")   Wt 78.6 kg (173 lb 3.2 oz)   LMP  (LMP Unknown)   SpO2 98%   BMI 29.73 kg/m     Patient is in no acute distress.  Mood good.  Insight good.  Eyes nonicteric.  Pupils equal.  Ears clear.  Nose clear.  Throat clear.  Neck is supple.  No cervical adenopathy.  No thyromegaly.  Heart is regular rate and rhythm.  Lungs clear to auscultation bilaterally.  Respiratory effort is good.  Abdomen is soft, nontender, no hepatosplenomegaly.  Extremities no edema.     Diagnostics:     Hospital Outpatient Visit on 09/26/2023 "   Component Date Value Ref Range Status    BSA 09/26/2023 0.00  m2 Final    Creatinine POCT 09/26/2023 1.0  0.6 - 1.1 mg/dL Final    GFR, ESTIMATED POCT 09/26/2023 >60  >60 mL/min/1.73m2 Final      Recent Labs   Lab Test 09/26/23  0709 09/20/22  0704 09/15/22  1039 07/13/21  0722   NA  --   --  137 142   POTASSIUM  --   --  3.7 4.8   CHLORIDE  --   --  99 105   CO2  --   --  28 18*   ANIONGAP  --   --  10 19*   GLC  --  92 105* 86   BUN  --   --  11.4 9   CR 1.0  --  0.91 0.81   SANDEEP  --   --  9.8 10.0      CBC RESULTS:   Recent Labs   Lab Test 09/15/22  1039   WBC 8.1   RBC 4.66   HGB 14.1   HCT 43.3   MCV 93   MCH 30.3   MCHC 32.6   RDW 13.9           Impression:     1. Preoperative examination    2. Abnormal CT scan, esophagus    3. Mixed hyperlipidemia    4. Chronic obstructive pulmonary disease, unspecified COPD type (H)    5. Tobacco abuse    6. Primary hypertension    7. Hx of laparoscopic adjustable gastric banding        Plan:     Okay to proceed with surgery as planned.  Okay for sedation at [facility].  Take usual medications on the am of surgery.  Start Zetia.  Continue to work on smoking cessation.  Follow up in 6-12 months for annual wellness visit if willing.  Recheck cholesterol in the next 3-4 months.         Ricardo Davila MD  General Internal Medicine  [facility]    Return in about 1 year (around 10/13/2024) for annual wellness visit.     No future appointments.

## 2023-10-14 NOTE — PATIENT INSTRUCTIONS
Please follow up if you have any further issues.    You may contact me by phone or MyChart if you are worsening or if things are not improving.    ______________________________________________________________________     You can call [phone] during weekday hours to get a hold of our team coordinators if you need to get a message to me.    There are 3 people in our building taking phone calls for me.  Be sure to listen to the prompts to get a hold of them.    You first press 1 for English (press another number for a different language) and then press 2 to get the .    They can then take your message and forward it onto me.    ______________________________________________________________________     Please remember that you can call [phone] ANYTIME to schedule an appointment.     You can schedule appointments 24 hours a day, 7 days a week.      Sometimes the best time to schedule an appointment is after clinic hours when less people are calling in.      Weekends are another option for calling in to schedule appointments.

## 2023-10-18 ENCOUNTER — TRANSFERRED RECORDS (OUTPATIENT)
Dept: HEALTH INFORMATION MANAGEMENT | Facility: CLINIC | Age: 63
End: 2023-10-18
Payer: MEDICARE

## 2023-11-03 ENCOUNTER — TRANSFERRED RECORDS (OUTPATIENT)
Dept: HEALTH INFORMATION MANAGEMENT | Facility: CLINIC | Age: 63
End: 2023-11-03
Payer: MEDICARE

## 2023-11-06 NOTE — PROGRESS NOTES
CCx:RUL pulmonary nodules    HPI:Ms. Montez is a 63 year old lady with a past medical history significant for significant ongoing tobacco abuse, bronchiectasis, hyperlipidemia, hypertension, hypothyroidism and a history of morbid obesity status post lap band procedure in 2008 who presents to clinic for the aforementioned chief complaint.    Since her last clinic visit, she is in her usual state of health but does think that she has gained a little bit of weight.  Her weight gain is related to the fact that she had some fluid let out from her Lap-Band and she plans on following up with the surgery clinic for this.  This aside she specifically denies chest pain, chest tightness, fevers, chills, night sweats, wheezing, shortness of breath, dyspnea on exertion, PND or orthopnea.  She continues to be compliant with her Arnuity Ellipta and since her last clinic appointment has not felt the need to use the albuterol inhaler even once.    Patient supplied answers from flow sheet for:  COPD Assessment Test (CAT)  2009 [employer]. All rights reserved.      9/14/2022     8:00 AM 7/7/2023     1:00 PM   COPD assessment test (CAT)   Cough 4 3   Phlegm 4 3   Chest tightness 2 0   Walk up hill 0 1   Limited activities 0 0   Leaving my home 0 0   Sleep 0 0   Energy 0 0   Total Score 10 7      CAT Key:  The CAT consist of 8 items which are each scored 0-5. The total score ranges from 0-40 with higher scores representing a poorer health status. When interpreting CAT scores, the individual s disease severity should be considered.   Low impact  (1-9)  Medium impact  (10-20)  High impact  (21-30)  Very high impact  (31-40)      Pertinent medical issues:  She underwent a routine low-dose lung cancer screening CT scan last week which revealed multiple abnormalities.  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  She had minimal to no symptoms which is why we  opted to not offer any treatment for this.  Since her last clinic visit she denies any chest pain, chest tightness, shortness of breath, wheezing, hemoptysis or change in her weight.  She continues to endorse symptoms of GERD.  She underwent a follow-up CT scan of her chest for pulmonary nodules and is here to follow-up.    ROS:  Pertinent positives alluded to in the HPI. Remainder of 10 point ROS is negative.     PMH (Unchanged from previous visit):  1. Ongoing tobacco abuse  2. Bronchiectasis  3. Hyperlipidemia  4. HTN  5. Hypothyroidism    PSH (Unchanged from previous visit):  6. Laparoscopic gastric banding  7. Lumbar disc surgery    Allergies (Unchanged from previous visit):  Reviewed in [employer].    Family HX (Unchanged from previous visit):  1. Mother: CAD, obesity  2. Father: COPD, tobacco abuse    Social Hx  (Unchanged from previous visit):  Marital status: Single and lives with a roommate  Number of children: 0  Resides in a house built in the 1970's, no concern for mold.  Occupational history: Retired  for [employer]   service: No  Pets: No  Smoking history: 40+ pack year history, currently smokes 1 pack a day  Alcohol use: 4-5 drinks per week  Recreational drug use: No  Hobbies: Gardening, carpentry  Recent Travel: None. Was in [place] in February earlier this year    Current Meds:  Current Outpatient Medications   Medication Sig Dispense Refill     amLODIPine (NORVASC) 5 MG tablet TAKE 1 TABLET(5 MG) BY MOUTH DAILY 90 tablet 3     EPINEPHrine (ANY BX GENERIC EQUIV) 0.3 MG/0.3ML injection 2-pack INJECT 0.3ML INTO THE MUSCLE ONCE FOR 1 DOSE 2 each 1     fluticasone (ARNUITY ELLIPTA) 100 MCG/ACT inhaler Inhale 1 puff into the lungs daily (Patient not taking: Reported on 4/12/2023) 90 each 3     magic mouthwash (ENTER INGREDIENTS IN COMMENTS) suspension Swish and swallow 5 mLs in mouth every 6 hours as needed (oral irritation) 260 mL 1     nicotine (NICODERM CQ) 14 MG/24HR 24 hr  patch APPLY 1 PATCH TOPICALLY TO THE SKIN EVERY 24 HOURS 28 patch 3     nicotine (NICODERM CQ) 21 MG/24HR 24 hr patch Place 1 patch onto the skin every 24 hours (Patient not taking: Reported on 12/22/2022) 30 patch 3     nicotine (NICODERM CQ) 7 MG/24HR 24 hr patch Place 1 patch onto the skin every 24 hours (Patient not taking: Reported on 2/26/2023) 30 patch 3     nystatin (MYCOSTATIN) 442599 UNIT/ML suspension Take 5 mLs (500,000 Units) by mouth 4 times daily Swish and swallow 200 mL 0     omeprazole (PRILOSEC) 40 MG DR capsule TAKE 1 CAPSULE(40 MG) BY MOUTH DAILY BEFORE BREAKFAST 90 capsule 3     sucralfate (CARAFATE) 1 GM tablet TAKE 1 TABLET(1 GRAM) BY MOUTH FOUR TIMES DAILY 360 tablet 1     varenicline (CHANTIX) 1 MG tablet Take 1 tablet (1 mg) by mouth 2 times daily 60 tablet 5     zinc gluconate 50 MG tablet Take 1 tablet (50 mg) by mouth daily (Patient not taking: Reported on 12/22/2022) 90 tablet 1     Labs:  Reviewed.     Imaging studies:  CT Chest w/o Contrast 5/24/23:  1.  Continued improvement in right upper lobe peribronchovascular nodular opacities.  2.  Mildly dilated fluid-filled esophagus redemonstrated.  3.  Laparoscopic gastric band unchanged in position.     (Unchanged from previous visit)  CT Chest w/o Contrast 12/5/22:  1.  Nodular opacities in the lungs have slightly decreased in size.  Some of these are cavitary.  2.  Large amount of debris in the distended esophagus.  3.  A gastric laparoscopic band is positioned over the upper gastric body, which is unchanged.    NM PET Scan 9/20/22:  1. Cavitary pulmonary nodule in the right upper lobe with numerous nearby small clustered nodular opacities, unchanged in size and morphology compared to 09/09/2022. Findings are indeterminate. Appearance and morphology is often seen in granulomatous   process such as fungal infection or nodular sarcoidosis, however, underlying neoplasm is not excluded. Recommend chest CT imaging surveillance. Alternatively,  the cavitary nodule is amenable to percutaneous CT-guided biopsy.  2. FDG avid thickening of the distal esophagus, indeterminate, could be inflammatory or neoplastic. Please correlate clinically. Consider EGD.    LDCT 9/9/22:  1.  New nodular cavity in the right apex with multiple surrounding satellite nodules most of which measure under a centimeter. Additional new cluster of 4 mm nodules in the left lower lobe and a single new nodule in the right middle lobe adjacent to the   major fissure. While neoplasm is possible, the differential diagnosis granulomatous infection can also produce this pattern.  2.  Gastric band with mild dilation of the proximal stomach above the band and diffuse, mild dilation of the esophagus to the thoracic inlet. This pattern of findings can predispose to aspiration events.       PFT's 2/9/18  FEV1/FVC is 70% and is normal.  FEV1 is (89%) predicted and is normal.  FVC is (99%) predicted and normal.  There was improvement in spirometry after a single inhaled dose of bronchodilator.  TLC is 104% predicted and is normal.  RV is (110%) predicted and is normal.  DLCO is (128%) predicted and is normal when it is corrected for hemoglobin.    Impression:  Full Pulmonary Function Test is abnormal.  PFTs are consistent with no obstructive disease.  Spirometry is consistent with reversibility.  There is no hyperinflation.  There is no air-trapping.  Diffusion capacity when corrected for hemoglobin is normal.      BP (!) 136/90 (BP Location: Left arm, Patient Position: Sitting, Cuff Size: Adult Regular)   Pulse 83   Wt 81.6 kg (180 lb)   SpO2 95%   BMI 30.90 kg/m    GEN: NAD  HEENT: PERRL, No JVD  LUNGS: CTA BL  CVS: S1 & S2 no added sounds  ABD: No HSM, BS audible  EXT: No edema, no rashes  NEURO: AO*3 CN2-12 intact    Assessment and Plan:Marlen Dumas is a 62 year old with a past medical history significant for ongoing tobacco abuse, bronchiectasis, hyperlipidemia, hypertension,  hypothyroidism and a history of morbid obesity status post lap band procedure who presents to clinic today for establishment of care for a 4B low-dose lung cancer screening CT scan and has previously undergone pulmonary function testing that has revealed reversibility in her spirometry.  Additionally, her CT scan also demonstrates evidence of bronchiectasis. For the present we would recommend;    1. Asthma/COPD (chronic bronchitis) overlap syndrome: As noted based on her pulmonary function testing and symptom complex.  She is also noted to have bronchiectasis on her imaging with some traction evident in her mid zones.  Symptoms are presently well controlled.    Continue Arnuity Ellipta 1 puff daily.  Patient was instructed to rinse her mouth after using this.    As needed albuterol for rescue up to 4 times a day.    Counseled the patient about the importance of discontinuing tobacco products and have given her a new prescription for nicotine patch.  2. Right upper lobe pulmonary nodules: Most concerning for malignant process especially given the patient's significant history of tobacco abuse.  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  She underwent a follow-up CT scan on 5/24/23 with continued improvement further confirming that she likely had an inflammatory process.    No further imaging for pulmonary nodules.  3. Lower esophageal thickening: Noted on imaging.  Pa sent to Dr. MUSTAPHA Daniel we will send a copy of my note to Dr. Davila of note to follow-up on this abnormality.  She does follow very closely with the surgery clinic where she had a Lap-Band procedure in may require going back to them.  4. HCM:     Counseled her about tobacco cessation for more than 5 and less than 10 minutes.  I also gave her a prescription for nicotine patch.    She is up-to-date with influenza, pneumococcal and COVID  vaccinations.    Will be switched over to low-dose lung cancer screening from next year in June.  5. Follow-up: 1 year after LDCT.       Loni MARTINEZ [facility]  Pulmonary and Critical Care  5580         Total Volume Injected (Ccs- Only Use Numbers And Decimals): 3.0

## 2023-11-30 ENCOUNTER — APPOINTMENT (OUTPATIENT)
Dept: CT IMAGING | Facility: HOSPITAL | Age: 63
End: 2023-11-30
Attending: STUDENT IN AN ORGANIZED HEALTH CARE EDUCATION/TRAINING PROGRAM
Payer: MEDICARE

## 2023-11-30 ENCOUNTER — HOSPITAL ENCOUNTER (OUTPATIENT)
Facility: HOSPITAL | Age: 63
Setting detail: OBSERVATION
Discharge: HOME | End: 2023-12-01
Attending: EMERGENCY MEDICINE | Admitting: INTERNAL MEDICINE
Payer: MEDICARE

## 2023-11-30 ENCOUNTER — APPOINTMENT (OUTPATIENT)
Dept: ULTRASOUND IMAGING | Facility: HOSPITAL | Age: 63
End: 2023-11-30
Attending: INTERNAL MEDICINE
Payer: MEDICARE

## 2023-11-30 ENCOUNTER — APPOINTMENT (OUTPATIENT)
Dept: CARDIOLOGY | Facility: HOSPITAL | Age: 63
End: 2023-11-30
Attending: INTERNAL MEDICINE
Payer: MEDICARE

## 2023-11-30 ENCOUNTER — APPOINTMENT (OUTPATIENT)
Dept: MRI IMAGING | Facility: HOSPITAL | Age: 63
End: 2023-11-30
Attending: EMERGENCY MEDICINE
Payer: MEDICARE

## 2023-11-30 DIAGNOSIS — G45.9 TIA (TRANSIENT ISCHEMIC ATTACK): ICD-10-CM

## 2023-11-30 DIAGNOSIS — K21.00 GASTROESOPHAGEAL REFLUX DISEASE WITH ESOPHAGITIS WITHOUT HEMORRHAGE: Primary | ICD-10-CM

## 2023-11-30 LAB
ALBUMIN SERPL BCG-MCNC: 4.5 G/DL (ref 3.5–5.2)
ALP SERPL-CCNC: 87 U/L (ref 40–150)
ALT SERPL W P-5'-P-CCNC: 16 U/L (ref 0–50)
ANION GAP SERPL CALCULATED.3IONS-SCNC: 11 MMOL/L (ref 7–15)
APTT PPP: 29 SECONDS (ref 22–38)
AST SERPL W P-5'-P-CCNC: 29 U/L (ref 0–45)
BASOPHILS # BLD AUTO: 0.1 10E3/UL (ref 0–0.2)
BASOPHILS NFR BLD AUTO: 1 %
BILIRUB DIRECT SERPL-MCNC: <0.2 MG/DL (ref 0–0.3)
BILIRUB SERPL-MCNC: 0.5 MG/DL
BUN SERPL-MCNC: 14.3 MG/DL (ref 8–23)
CALCIUM SERPL-MCNC: 10.2 MG/DL (ref 8.8–10.2)
CHLORIDE SERPL-SCNC: 100 MMOL/L (ref 98–107)
CHOLEST SERPL-MCNC: 261 MG/DL
CREAT SERPL-MCNC: 0.92 MG/DL (ref 0.51–0.95)
CREAT SERPL-MCNC: 0.95 MG/DL (ref 0.51–0.95)
DEPRECATED HCO3 PLAS-SCNC: 28 MMOL/L (ref 22–29)
EGFRCR SERPLBLD CKD-EPI 2021: 66 ML/MIN/1.73M2
EGFRCR SERPLBLD CKD-EPI 2021: 69 ML/MIN/1.73M2
EOSINOPHIL # BLD AUTO: 0.2 10E3/UL (ref 0–0.7)
EOSINOPHIL NFR BLD AUTO: 4 %
ERYTHROCYTE [DISTWIDTH] IN BLOOD BY AUTOMATED COUNT: 14.3 % (ref 10–15)
GLUCOSE BLDC GLUCOMTR-MCNC: 115 MG/DL (ref 70–99)
GLUCOSE BLDC GLUCOMTR-MCNC: 119 MG/DL (ref 70–99)
GLUCOSE BLDC GLUCOMTR-MCNC: 145 MG/DL (ref 70–99)
GLUCOSE SERPL-MCNC: 98 MG/DL (ref 70–99)
HBA1C MFR BLD: 6 %
HCT VFR BLD AUTO: 45.7 % (ref 35–47)
HDLC SERPL-MCNC: 102 MG/DL
HGB BLD-MCNC: 14.9 G/DL (ref 11.7–15.7)
IMM GRANULOCYTES # BLD: 0 10E3/UL
IMM GRANULOCYTES NFR BLD: 0 %
INR PPP: 0.97 (ref 0.85–1.15)
LDLC SERPL CALC-MCNC: 146 MG/DL
LVEF ECHO: NORMAL
LYMPHOCYTES # BLD AUTO: 1.1 10E3/UL (ref 0.8–5.3)
LYMPHOCYTES NFR BLD AUTO: 21 %
MAGNESIUM SERPL-MCNC: 1.9 MG/DL (ref 1.7–2.3)
MCH RBC QN AUTO: 30.8 PG (ref 26.5–33)
MCHC RBC AUTO-ENTMCNC: 32.6 G/DL (ref 31.5–36.5)
MCV RBC AUTO: 95 FL (ref 78–100)
MONOCYTES # BLD AUTO: 0.4 10E3/UL (ref 0–1.3)
MONOCYTES NFR BLD AUTO: 8 %
NEUTROPHILS # BLD AUTO: 3.5 10E3/UL (ref 1.6–8.3)
NEUTROPHILS NFR BLD AUTO: 66 %
NONHDLC SERPL-MCNC: 159 MG/DL
NRBC # BLD AUTO: 0 10E3/UL
NRBC BLD AUTO-RTO: 0 /100
PHOSPHATE SERPL-MCNC: 3.9 MG/DL (ref 2.5–4.5)
PLATELET # BLD AUTO: 211 10E3/UL (ref 150–450)
POTASSIUM SERPL-SCNC: 4.2 MMOL/L (ref 3.4–5.3)
PROT SERPL-MCNC: 7.9 G/DL (ref 6.4–8.3)
RBC # BLD AUTO: 4.83 10E6/UL (ref 3.8–5.2)
SODIUM SERPL-SCNC: 139 MMOL/L (ref 135–145)
T4 FREE SERPL-MCNC: 1 NG/DL (ref 0.9–1.7)
TRIGL SERPL-MCNC: 66 MG/DL
TROPONIN T SERPL HS-MCNC: 12 NG/L
TROPONIN T SERPL HS-MCNC: 15 NG/L
TSH SERPL DL<=0.005 MIU/L-ACNC: 6.1 UIU/ML (ref 0.3–4.2)
WBC # BLD AUTO: 5.3 10E3/UL (ref 4–11)

## 2023-11-30 PROCEDURE — 99205 OFFICE O/P NEW HI 60 MIN: CPT | Performed by: PSYCHIATRY & NEUROLOGY

## 2023-11-30 PROCEDURE — 93306 TTE W/DOPPLER COMPLETE: CPT | Mod: 26 | Performed by: INTERNAL MEDICINE

## 2023-11-30 PROCEDURE — 99223 1ST HOSP IP/OBS HIGH 75: CPT | Mod: AI | Performed by: INTERNAL MEDICINE

## 2023-11-30 PROCEDURE — 250N000011 HC RX IP 250 OP 636: Mod: JZ | Performed by: INTERNAL MEDICINE

## 2023-11-30 PROCEDURE — 250N000013 HC RX MED GY IP 250 OP 250 PS 637: Performed by: INTERNAL MEDICINE

## 2023-11-30 PROCEDURE — 80061 LIPID PANEL: CPT | Performed by: INTERNAL MEDICINE

## 2023-11-30 PROCEDURE — 85025 COMPLETE CBC W/AUTO DIFF WBC: CPT | Performed by: STUDENT IN AN ORGANIZED HEALTH CARE EDUCATION/TRAINING PROGRAM

## 2023-11-30 PROCEDURE — G0378 HOSPITAL OBSERVATION PER HR: HCPCS

## 2023-11-30 PROCEDURE — 70496 CT ANGIOGRAPHY HEAD: CPT | Mod: MA

## 2023-11-30 PROCEDURE — 85610 PROTHROMBIN TIME: CPT | Performed by: STUDENT IN AN ORGANIZED HEALTH CARE EDUCATION/TRAINING PROGRAM

## 2023-11-30 PROCEDURE — 96374 THER/PROPH/DIAG INJ IV PUSH: CPT | Mod: XU

## 2023-11-30 PROCEDURE — 250N000011 HC RX IP 250 OP 636: Mod: JZ | Performed by: EMERGENCY MEDICINE

## 2023-11-30 PROCEDURE — 250N000013 HC RX MED GY IP 250 OP 250 PS 637: Performed by: EMERGENCY MEDICINE

## 2023-11-30 PROCEDURE — 70498 CT ANGIOGRAPHY NECK: CPT | Mod: MA

## 2023-11-30 PROCEDURE — 36415 COLL VENOUS BLD VENIPUNCTURE: CPT | Performed by: STUDENT IN AN ORGANIZED HEALTH CARE EDUCATION/TRAINING PROGRAM

## 2023-11-30 PROCEDURE — 82962 GLUCOSE BLOOD TEST: CPT

## 2023-11-30 PROCEDURE — 82248 BILIRUBIN DIRECT: CPT | Performed by: INTERNAL MEDICINE

## 2023-11-30 PROCEDURE — 96372 THER/PROPH/DIAG INJ SC/IM: CPT | Performed by: INTERNAL MEDICINE

## 2023-11-30 PROCEDURE — 93880 EXTRACRANIAL BILAT STUDY: CPT

## 2023-11-30 PROCEDURE — 93005 ELECTROCARDIOGRAM TRACING: CPT | Performed by: STUDENT IN AN ORGANIZED HEALTH CARE EDUCATION/TRAINING PROGRAM

## 2023-11-30 PROCEDURE — 83735 ASSAY OF MAGNESIUM: CPT | Performed by: INTERNAL MEDICINE

## 2023-11-30 PROCEDURE — 83036 HEMOGLOBIN GLYCOSYLATED A1C: CPT | Performed by: INTERNAL MEDICINE

## 2023-11-30 PROCEDURE — G1010 CDSM STANSON: HCPCS

## 2023-11-30 PROCEDURE — 84100 ASSAY OF PHOSPHORUS: CPT | Performed by: INTERNAL MEDICINE

## 2023-11-30 PROCEDURE — 250N000011 HC RX IP 250 OP 636: Performed by: EMERGENCY MEDICINE

## 2023-11-30 PROCEDURE — 85730 THROMBOPLASTIN TIME PARTIAL: CPT | Performed by: STUDENT IN AN ORGANIZED HEALTH CARE EDUCATION/TRAINING PROGRAM

## 2023-11-30 PROCEDURE — 255N000002 HC RX 255 OP 636: Performed by: INTERNAL MEDICINE

## 2023-11-30 PROCEDURE — G0508 CRIT CARE TELEHEA CONSULT 60: HCPCS | Performed by: NURSE PRACTITIONER

## 2023-11-30 PROCEDURE — 84484 ASSAY OF TROPONIN QUANT: CPT | Performed by: EMERGENCY MEDICINE

## 2023-11-30 PROCEDURE — 84439 ASSAY OF FREE THYROXINE: CPT | Performed by: INTERNAL MEDICINE

## 2023-11-30 PROCEDURE — 80053 COMPREHEN METABOLIC PANEL: CPT | Performed by: STUDENT IN AN ORGANIZED HEALTH CARE EDUCATION/TRAINING PROGRAM

## 2023-11-30 PROCEDURE — 84484 ASSAY OF TROPONIN QUANT: CPT | Performed by: STUDENT IN AN ORGANIZED HEALTH CARE EDUCATION/TRAINING PROGRAM

## 2023-11-30 PROCEDURE — 999N000226 HC STATISTIC SLP IP EVAL DEFER

## 2023-11-30 PROCEDURE — 36415 COLL VENOUS BLD VENIPUNCTURE: CPT | Performed by: INTERNAL MEDICINE

## 2023-11-30 PROCEDURE — 99291 CRITICAL CARE FIRST HOUR: CPT | Mod: 25

## 2023-11-30 PROCEDURE — 84443 ASSAY THYROID STIM HORMONE: CPT | Performed by: INTERNAL MEDICINE

## 2023-11-30 PROCEDURE — 82565 ASSAY OF CREATININE: CPT | Mod: 91 | Performed by: INTERNAL MEDICINE

## 2023-11-30 RX ORDER — MECLIZINE HCL 12.5 MG 12.5 MG/1
25 TABLET ORAL ONCE
Status: COMPLETED | OUTPATIENT
Start: 2023-11-30 | End: 2023-11-30

## 2023-11-30 RX ORDER — ROSUVASTATIN CALCIUM 10 MG/1
10 TABLET, COATED ORAL DAILY
Status: DISCONTINUED | OUTPATIENT
Start: 2023-11-30 | End: 2023-12-01 | Stop reason: HOSPADM

## 2023-11-30 RX ORDER — SUCRALFATE 1 G/1
1 TABLET ORAL DAILY PRN
COMMUNITY

## 2023-11-30 RX ORDER — ASPIRIN 325 MG
325 TABLET ORAL ONCE
Status: COMPLETED | OUTPATIENT
Start: 2023-11-30 | End: 2023-11-30

## 2023-11-30 RX ORDER — IOPAMIDOL 755 MG/ML
75 INJECTION, SOLUTION INTRAVASCULAR ONCE
Status: COMPLETED | OUTPATIENT
Start: 2023-11-30 | End: 2023-11-30

## 2023-11-30 RX ORDER — EZETIMIBE 10 MG/1
10 TABLET ORAL DAILY
Status: DISCONTINUED | OUTPATIENT
Start: 2023-12-01 | End: 2023-12-01 | Stop reason: HOSPADM

## 2023-11-30 RX ORDER — ONDANSETRON 4 MG/1
4 TABLET, ORALLY DISINTEGRATING ORAL EVERY 6 HOURS PRN
Status: DISCONTINUED | OUTPATIENT
Start: 2023-11-30 | End: 2023-12-01 | Stop reason: HOSPADM

## 2023-11-30 RX ORDER — ONDANSETRON 2 MG/ML
4 INJECTION INTRAMUSCULAR; INTRAVENOUS EVERY 6 HOURS PRN
Status: DISCONTINUED | OUTPATIENT
Start: 2023-11-30 | End: 2023-12-01 | Stop reason: HOSPADM

## 2023-11-30 RX ORDER — LORAZEPAM 2 MG/ML
1 INJECTION INTRAMUSCULAR ONCE
Status: COMPLETED | OUTPATIENT
Start: 2023-11-30 | End: 2023-11-30

## 2023-11-30 RX ORDER — ASPIRIN 81 MG/1
81 TABLET, CHEWABLE ORAL DAILY
Status: DISCONTINUED | OUTPATIENT
Start: 2023-12-01 | End: 2023-12-01 | Stop reason: HOSPADM

## 2023-11-30 RX ORDER — LABETALOL HYDROCHLORIDE 5 MG/ML
10-20 INJECTION, SOLUTION INTRAVENOUS EVERY 10 MIN PRN
Status: DISCONTINUED | OUTPATIENT
Start: 2023-11-30 | End: 2023-12-01 | Stop reason: HOSPADM

## 2023-11-30 RX ORDER — LORAZEPAM 2 MG/ML
1 INJECTION INTRAMUSCULAR ONCE
Status: COMPLETED | OUTPATIENT
Start: 2023-12-01 | End: 2023-12-01

## 2023-11-30 RX ORDER — LIDOCAINE 40 MG/G
CREAM TOPICAL
Status: DISCONTINUED | OUTPATIENT
Start: 2023-11-30 | End: 2023-12-01 | Stop reason: HOSPADM

## 2023-11-30 RX ORDER — AMLODIPINE BESYLATE 5 MG/1
5 TABLET ORAL DAILY
Status: DISCONTINUED | OUTPATIENT
Start: 2023-12-01 | End: 2023-12-01 | Stop reason: HOSPADM

## 2023-11-30 RX ORDER — CLOPIDOGREL BISULFATE 75 MG/1
300 TABLET ORAL ONCE
Status: COMPLETED | OUTPATIENT
Start: 2023-11-30 | End: 2023-11-30

## 2023-11-30 RX ORDER — ASPIRIN 81 MG/1
81 TABLET, CHEWABLE ORAL DAILY
COMMUNITY

## 2023-11-30 RX ORDER — ALBUTEROL SULFATE 90 UG/1
2 AEROSOL, METERED RESPIRATORY (INHALATION) EVERY 6 HOURS PRN
Status: DISCONTINUED | OUTPATIENT
Start: 2023-11-30 | End: 2023-12-01 | Stop reason: HOSPADM

## 2023-11-30 RX ORDER — HYDRALAZINE HYDROCHLORIDE 20 MG/ML
10-20 INJECTION INTRAMUSCULAR; INTRAVENOUS
Status: DISCONTINUED | OUTPATIENT
Start: 2023-11-30 | End: 2023-12-01 | Stop reason: HOSPADM

## 2023-11-30 RX ORDER — PANTOPRAZOLE SODIUM 40 MG/1
40 TABLET, DELAYED RELEASE ORAL
Status: DISCONTINUED | OUTPATIENT
Start: 2023-11-30 | End: 2023-12-01 | Stop reason: HOSPADM

## 2023-11-30 RX ORDER — ACETAMINOPHEN 325 MG/1
650 TABLET ORAL EVERY 4 HOURS PRN
Status: DISCONTINUED | OUTPATIENT
Start: 2023-11-30 | End: 2023-12-01 | Stop reason: HOSPADM

## 2023-11-30 RX ORDER — ALBUTEROL SULFATE 90 UG/1
2 AEROSOL, METERED RESPIRATORY (INHALATION) EVERY 6 HOURS PRN
COMMUNITY

## 2023-11-30 RX ORDER — ENOXAPARIN SODIUM 100 MG/ML
40 INJECTION SUBCUTANEOUS EVERY 24 HOURS
Status: DISCONTINUED | OUTPATIENT
Start: 2023-11-30 | End: 2023-12-01 | Stop reason: HOSPADM

## 2023-11-30 RX ADMIN — LORAZEPAM 1 MG: 2 INJECTION INTRAMUSCULAR; INTRAVENOUS at 09:53

## 2023-11-30 RX ADMIN — PANTOPRAZOLE SODIUM 40 MG: 20 TABLET, DELAYED RELEASE ORAL at 13:39

## 2023-11-30 RX ADMIN — ENOXAPARIN SODIUM 40 MG: 40 INJECTION SUBCUTANEOUS at 21:00

## 2023-11-30 RX ADMIN — ROSUVASTATIN CALCIUM 10 MG: 10 TABLET, FILM COATED ORAL at 13:39

## 2023-11-30 RX ADMIN — PERFLUTREN 4 ML: 6.52 INJECTION, SUSPENSION INTRAVENOUS at 03:39

## 2023-11-30 RX ADMIN — IOPAMIDOL 75 ML: 755 INJECTION, SOLUTION INTRAVENOUS at 08:38

## 2023-11-30 RX ADMIN — MECLIZINE 25 MG: 12.5 TABLET ORAL at 08:41

## 2023-11-30 RX ADMIN — CLOPIDOGREL BISULFATE 300 MG: 75 TABLET ORAL at 13:23

## 2023-11-30 RX ADMIN — ASPIRIN 325 MG ORAL TABLET 325 MG: 325 PILL ORAL at 09:07

## 2023-11-30 ASSESSMENT — ACTIVITIES OF DAILY LIVING (ADL)
ADLS_ACUITY_SCORE: 20
ADLS_ACUITY_SCORE: 35
ADLS_ACUITY_SCORE: 20
DEPENDENT_IADLS:: INDEPENDENT
ADLS_ACUITY_SCORE: 35
ADLS_ACUITY_SCORE: 35

## 2023-11-30 NOTE — PLAN OF CARE
Occupational Therapy: Orders received. Chart reviewed and discussed with care team.? Occupational Therapy not indicated due to pt has been IND per RN - no ADL or cognitive concerns.? Defer discharge recommendations to medical care team.? Will complete orders.

## 2023-11-30 NOTE — ED TRIAGE NOTES
Pt presents with dizziness and right arm numbness. Last known well 0500. Sudden onset dizziness that resolved and came back. Pt has hx of TIA in the past. No slurred speech or facial droop. Provider to triage, Tier 1 stroke code called, pt to CT.     Triage Assessment (Adult)       Row Name 11/30/23 0818          Triage Assessment    Airway WDL WDL     Last Known Well Date 11/30/23     Last Known Well Time 0500     Additional Documentation Last Known Well Date/Time (Rows)        Respiratory WDL    Respiratory WDL WDL        Skin Circulation/Temperature WDL    Skin Circulation/Temperature WDL WDL        Cardiac WDL    Cardiac WDL WDL        Peripheral/Neurovascular WDL    Peripheral Neurovascular WDL WDL        Cognitive/Neuro/Behavioral WDL    Cognitive/Neuro/Behavioral WDL WDL        RUE Neurovascular Assessment    Sensation RUE numbness present

## 2023-11-30 NOTE — H&P
[facility]    History and Physical - Hospitalist Service       Date of Admission:  11/30/2023    Assessment & Plan      Marlen Dumas is a 63 year old female admitted on 11/30/2023 for acute onset of lighteheadedness/presyncope while seated with associated acute onset of right arm weakness, clumsiness and paresthesias.    *Acute onset lightheadedness/presyncope with associated acute right arm weakness  *H/O recurrent TIA's  *Left proximal ICA stenosis and adjacent  penetrating atherosclerotic ulcer (>70% stenosis)  *Right ICA stenosis (< 50% stenosis).   -MRI brain w/o contrast negative for acute stroke, mass, bleed.  -CTA Head/Neck showed no intracranial large vessel occlusion, stenoses. Mixed atherosclerotic plaque results in at least 70% narrowing involving the proximal left ICA with a suspected area of adjacent penetrating atherosclerotic ulcer.  Predominantly calcified atherosclerotic plaque about the right carotid bifurcation without substantial (50% or greater) luminal narrowing.   -ischemic stroke order set  -neurology consult  -Bilateral Carotid duplex US  -Plavix 300mg load x 1 and ASA 81mg every day.  Neurology to determine if ongoing daily Plavix will be initiated.  --Lipid panel  -Crestor 10mg every day (previously had declined per PCP note on 10/13/23)  -pt/ot/slp  -TTE, tele, neuro checks  -30 day event monitor on discharge  -PCP follow-up for ongoing risk factor modifications    Asthma/COPD (chronic bronchitis) overlap syndrome   History of chronic Mycobacterium abscessus lung disease   RUL pulmonary nodules  -s/p recent  bronchoscopy with BAL that revealed growth of this organism and due to minimal to no symptoms primary Pulmonologist opted to not offer any treatment at that time.  Denies any acute worsening of her symptoms at present.  -Per Dr. Jaquez, surveillance imaging showed nodule improvement with no further imaging planned at this time unless change in clinical  "status develops  -continue inhaler/pulmonary meds once med reconciliation is complete   -oximetry  -bronchial hygiene    Prediabetes: new diagnosis. A1C 6%  -CDE consult for education  -accuchecks as ordered  -Goal glucose inpatient < 180, avoid hyper/hypoglycemia    S/P Gastric lap band procedure due to morbid obesity (2008)  Esophageal dilation and thickening  GERD  -denies dysphagia or obstructive symptoms currently  -s/p EGD 9/2023 per patient at [facility].  Medical records requested as per patient upcoming procedure planned and if potential plan for DAPT this requested information is medically necessary  -FDG avid thickening of the distal esophagus per PET CT (7/7/23)  -PPI  -HOB 30 degrees    Multi-vessel non-obstructive CAD:  -CTA coronary angiogram ((9/2023) showed pLAD, pLCx and OM1 < 40% stenosis, pRPDA 50-60% stenosis caused by soft plaque  -asa, start statin, await med reconcilliation  -aggressive risk factor modification  HTN  -await med reconciliation completion    HLD  -Lipid panel  -Crestor 10mg every day    Hypothyroidism:  -TSH  --await med reconciliation completion    Chronic active tobacco misuse:  -cessation/education      D/W ED RN        Diet: Combination Diet Low Saturated Fat Na <2400mg Diet    DVT Prophylaxis: Enoxaparin (Lovenox) SQ and Pneumatic Compression Devices  Villegas Catheter: Not present  Lines: None     Cardiac Monitoring: ACTIVE order. Indication: Stroke, acute (48 hours)  Code Status: Full Code      Clinically Significant Risk Factors Present on Admission           # Hypercalcemia: Highest Ca = 10.2 mg/dL in last 2 days, will monitor as appropriate        # Hypertension: Noted on problem list      # Overweight: Estimated body mass index is 28.95 kg/m  as calculated from the following:    Height as of this encounter: 1.632 m (5' 4.25\").    Weight as of this encounter: 77.1 kg (170 lb).              Disposition Plan      Expected Discharge Date: 12/01/2023                  Monroe Patel" DO Devine DO  Hospitalist Service  [facility]  Securely message with [employer] (more info)  Text page via [employer] Paging/Directory     ______________________________________________________________________    Chief Complaint   Acute onset of lighteheadedness/presyncope while seated with associated acute onset of right arm weakness, clumsiness and paresthesias.      History of Present Illness   Marlen Dumas is a 63 year old female who has a PMHx significant for ongoing tobacco abuse, bronchiectasis, hyperlipidemia, hypertension, hypothyroidism and a history of morbid obesity status post lap band procedure in 2008 who presented to the ED due to  Acute onset of lighteheadedness/presyncope while seated with associated acute onset of right arm weakness, clumsiness and paresthesias as well as bilateral blurred vision.    On presentation, NIH stroke of 0.  Due to time of onset of symptoms within the 3.5-hour window, ED provider called a tier 1 stroke code. CT and CTA were negative, stroke neurology evaluated the patient and determined she was not a candidate for lytics, and de-escalated the stroke code.  Currently presenting symptoms improved/resolved. No CP, SOB, abdominal pain, n/v/d, dysphagia, headache, tinnitus.  No other areas of new weakness or paresthesias.  Admitted for further evaluation and management.      Past Medical History    Past Medical History:   Diagnosis Date    COPD (chronic obstructive pulmonary disease) (H)     GERD (gastroesophageal reflux disease) 07/24/2023    HLD (hyperlipidemia)     HTN (hypertension)     Hx of laparoscopic adjustable gastric banding 10/01/2008    Initial weight 295.5 BMI 50.4 Dr. Rowell    Hymenoptera allergy     Hypothyroidism     S/P colonoscopy 05/05/2019    Tobacco abuse     Vitamin D deficiency 08/24/2016       Past Surgical History   Past Surgical History:   Procedure Laterality Date    FOOT SURGERY      Sting ray stacia    LAPAROSCOPIC GASTRIC  BANDING  2008    LUMBAR DISC SURGERY      MA ESOPHAGOGASTRODUODENOSCOPY TRANSORAL DIAGNOSTIC N/A 8/14/2020    Procedure: ESOPHAGOGASTRODUODENOSCOPY (EGD);  Surgeon: Jared Howard MD;  [facility];  Service: General       Prior to Admission Medications   Prior to Admission Medications   Prescriptions Last Dose Informant Patient Reported? Taking?   EPINEPHrine (ANY BX GENERIC EQUIV) 0.3 MG/0.3ML injection 2-pack   No No   Sig: INJECT 0.3ML INTO THE MUSCLE ONCE FOR 1 DOSE   EPINEPHrine (ANY BX GENERIC EQUIV) 0.3 MG/0.3ML injection 2-pack   No No   Sig: Inject 0.3 mLs (0.3 mg) into the muscle once as needed for anaphylaxis May repeat one time in 5-15 minutes if response to initial dose is inadequate.   amLODIPine (NORVASC) 5 MG tablet   No No   Sig: TAKE 1 TABLET(5 MG) BY MOUTH DAILY   ezetimibe (ZETIA) 10 MG tablet   No No   Sig: Take 1 tablet (10 mg) by mouth daily   magic mouthwash (ENTER INGREDIENTS IN COMMENTS) suspension   No No   Sig: Swish and swallow 5 mLs in mouth every 6 hours as needed (oral irritation)   nicotine (NICODERM CQ) 14 MG/24HR 24 hr patch   No No   Sig: APPLY 1 PATCH TOPICALLY TO THE SKIN EVERY 24 HOURS   nystatin (MYCOSTATIN) 416006 UNIT/ML suspension   No No   Sig: Take 5 mLs (500,000 Units) by mouth 4 times daily Swish and swallow   omeprazole (PRILOSEC) 40 MG DR capsule   No No   Sig: TAKE 1 CAPSULE(40 MG) BY MOUTH DAILY BEFORE BREAKFAST   predniSONE (DELTASONE) 20 MG tablet   No No   Sig: Take two tablets (= 40mg) each day for 5 (five) days   sucralfate (CARAFATE) 1 GM tablet   No No   Sig: TAKE 1 TABLET(1 GRAM) BY MOUTH FOUR TIMES DAILY   varenicline (CHANTIX) 1 MG tablet   No No   Sig: Take 1 tablet (1 mg) by mouth 2 times daily      Facility-Administered Medications: None          Physical Exam   Vital Signs: Temp: 98.2  F (36.8  C) Temp src: Temporal BP: (!) 142/80 Pulse: 70   Resp: 20 SpO2: 97 % O2 Device: None (Room air)    Weight: 170 lbs 0 oz    Physical  Examination:   General appearance - alert, and in no distress  Eyes - pupils equal and reactive, extraocular eye movements intact, sclera anicteric  Mouth - mucous membranes moist, pharynx normal without lesions  Lungs - decreased throughout, non labored, no wheezing  Heart - normal rate, regular rhythm, normal S1, S2, no murmurs, rubs, clicks or gallops. No peripheral edema.  Abdomen - soft, nontender, nondistended, no masses or organomegaly, BS+  Neurological - alert, oriented, normal speech, no focal findings or movement disorder noted with exception of initially reaching for water glass noted shaking of hand/arm almost spilling cup when placing it down.  CN II-XII intact  Skin - no c/c/p    Lab/imaging reviewed

## 2023-11-30 NOTE — ED PROVIDER NOTES
EMERGENCY DEPARTMENT ENCOUNTER     NAME: Marlen Dumas   AGE: 63 year old female   YOB: 1960   MRN: [id number]   EVALUATION DATE & TIME: 11/30/2023  8:19 AM   PCP: Ricardo Davila     Chief Complaint   Patient presents with    Dizziness   :    FINAL IMPRESSION       1. TIA (transient ischemic attack)           ED COURSE & MEDICAL DECISION MAKING      8:20 Called to triage for neurologic assessment. I introduced myself to the patient, obtained patient history, performed a physical exam, and discussed plan for ED workup including potential diagnostic laboratory/imaging studies and interventions. Tier I Stroke code initiated  8:23 AM Spoke with EVER Spivey, stroke neurology. Discussed the patient's case.  8:30 AM Spoke with Dr. Villareal, neuroradiologist. No bleed on noncontrast CT Head.  8:58 AM Spoke with EVER Spivey, stroke neurology. Stroke code deescalated.  11:09 AM Discussed patient's results with stroke neurology, plan for admission.  11:13 AM I spoke with the hospitalist, Dr. Devine. We discussed the patient's case and they agree to admit the patient.    Pertinent Labs & Imaging studies reviewed. (See chart for details)   63 year old female  presents to the Emergency Department for evaluation of unknown onset dizziness and right arm numbness. Initial Vitals Reviewed. Initial exam notable for patient was sitting in a wheelchair with an NIH stroke of 0, no pronator drift or nystagmus at time of my evaluation.  Because she was just within the 3.5-hour window, I did call a tier 1 stroke code.  Fortunately CT and CTA are negative, stroke neurology evaluated the patient and determined she was not a candidate for lytics, and we de-escalated the stroke code.  An MRI was obtained and does not show a stroke at this time.  I discussed the case with stroke neurology several times, and per their recommendation she has been given a full dose aspirin and a load of 300 mg of Plavix for today.  Because of the  multiple episodes of recurrence, stroke neurology would like her to be admitted for a TIA workup and they plan to consult as an inpatient.  They will make a decision about further Plavix continuation need based on how she does overnight.  Case discussed with hospitalist who accepted the patient for admission.           At the conclusion of the encounter I discussed the results of all of the tests and the disposition. The questions were answered. The patient or family acknowledged understanding and was agreeable with the care plan.     35 minutes critical care time, see procedure note below for details if relevant    Medical Decision Making    History:  Supplemental history from: N/A  External Record(s) reviewed: Documented in chart, if applicable.    Work Up:  Chart documentation includes differential considered and any EKGs or imaging independently interpreted by provider, where specified.  In additional to work up documented, I considered the following work up: Documented in chart, if applicable.    External consultation:  Discussion of management with another provider: Documented in chart, if applicable    Complicating factors:  Care impacted by chronic illness: Chronic Lung Disease, Hyperlipidemia, and Hypertension  Care affected by social determinants of health: N/A    Disposition considerations: Admit.        MEDICATIONS GIVEN IN THE EMERGENCY:   Medications   clopidogrel (PLAVIX) tablet 300 mg (has no administration in time range)   meclizine (ANTIVERT) tablet 25 mg (25 mg Oral $Given 11/30/23 0841)   iopamidol (ISOVUE-370) solution 75 mL (75 mLs Intravenous $Given 11/30/23 0838)   aspirin (ASA) tablet 325 mg (325 mg Oral $Given 11/30/23 0907)   LORazepam (ATIVAN) injection 1 mg (1 mg Intravenous $Given 11/30/23 0953)      NEW PRESCRIPTIONS STARTED AT TODAY'S ER VISIT   New Prescriptions    No medications on file     ================================================================   HISTORY OF PRESENT ILLNESS        Patient information was obtained from: Patient   Use of Intrepreter: N/A   Marlen Dumas is a 63 year old female with history of HTN, HLD, TIA, and COPD who presents by wheelchair for evaluation of dizziness.    The patient reports waking up around 0500 this morning and shortly thereafter had onset of dizziness and right upper extremity numbness. She describes the dizziness as feeling as if she stood up to quickly she would fall over and that her vision is out of focus. She does not describe a room spinning sensation. The dizziness seemed to improve between 1005-1168, but then started to increase again. She is not anticoagulated.    ================================================================        PAST HISTORY     PAST MEDICAL HISTORY:   Past Medical History:   Diagnosis Date    COPD (chronic obstructive pulmonary disease) (H)     GERD (gastroesophageal reflux disease) 07/24/2023    HLD (hyperlipidemia)     HTN (hypertension)     Hx of laparoscopic adjustable gastric banding 10/01/2008    Initial weight 295.5 BMI 50.4 Dr. Rowell    Hymenoptera allergy     Hypothyroidism     S/P colonoscopy 05/05/2019    Tobacco abuse     Vitamin D deficiency 08/24/2016      PAST SURGICAL HISTORY:   Past Surgical History:   Procedure Laterality Date    FOOT SURGERY      Sting [place] stacia    LAPAROSCOPIC GASTRIC BANDING  2008    LUMBAR DISC SURGERY      PA ESOPHAGOGASTRODUODENOSCOPY TRANSORAL DIAGNOSTIC N/A 8/14/2020    Procedure: ESOPHAGOGASTRODUODENOSCOPY (EGD);  Surgeon: Jared Howard MD;  Location: [facility];  Service: General      CURRENT MEDICATIONS:   amLODIPine (NORVASC) 5 MG tablet  EPINEPHrine (ANY BX GENERIC EQUIV) 0.3 MG/0.3ML injection 2-pack  EPINEPHrine (ANY BX GENERIC EQUIV) 0.3 MG/0.3ML injection 2-pack  ezetimibe (ZETIA) 10 MG tablet  magic mouthwash (ENTER INGREDIENTS IN COMMENTS) suspension  nicotine (NICODERM CQ) 14 MG/24HR 24 hr patch  nystatin (MYCOSTATIN) [id number] UNIT/ML  suspension  omeprazole (PRILOSEC) 40 MG DR capsule  predniSONE (DELTASONE) 20 MG tablet  sucralfate (CARAFATE) 1 GM tablet  varenicline (CHANTIX) 1 MG tablet      ALLERGIES:   Allergies   Allergen Reactions    Other Environmental Allergy Unknown     Bee sting    Sulfa (Sulfonamide Antibiotics) [Sulfa Antibiotics] Unknown      FAMILY HISTORY:   Family History   Problem Relation Age of Onset    Coronary Artery Disease Mother 58    Hypertension Mother     Chronic Obstructive Pulmonary Disease Father     Hypertension Father     Anxiety Disorder Father     Hypertension Brother       SOCIAL HISTORY:   Social History     Socioeconomic History    Marital status: Single    Number of children: 0   Tobacco Use    Smoking status: Former     Packs/day: 0.50     Years: 37.00     Additional pack years: 0.00     Total pack years: 18.50     Types: Cigarettes     Quit date: 2022     Years since quittin.2    Smokeless tobacco: Never    Tobacco comments:     quit 20   Vaping Use    Vaping Use: Never used   Substance and Sexual Activity    Alcohol use: Yes     Alcohol/week: 4.0 standard drinks of alcohol     Types: 4 Standard drinks or equivalent per week     Comment: 4-5 beer/wine weekly    Drug use: Never    Sexual activity: Never     Birth control/protection: Abstinence, Post-menopausal   Other Topics Concern    Parent/sibling w/ CABG, MI or angioplasty before 65F 55M? No   Social History Narrative    Lives with a friend.2018  Retired after working for [employer].     Social Determinants of Health     Financial Resource Strain: Low Risk  (10/13/2023)    Financial Resource Strain     Within the past 12 months, have you or your family members you live with been unable to get utilities (heat, electricity) when it was really needed?: No   Food Insecurity: Low Risk  (10/13/2023)    Food Insecurity     Within the past 12 months, did you worry that your food would run out before you got money to buy more?: No     Within the  "past 12 months, did the food you bought just not last and you didn t have money to get more?: No   Transportation Needs: Low Risk  (10/13/2023)    Transportation Needs     Within the past 12 months, has lack of transportation kept you from medical appointments, getting your medicines, non-medical meetings or appointments, work, or from getting things that you need?: No   Interpersonal Safety: Low Risk  (10/13/2023)    Interpersonal Safety     Do you feel physically and emotionally safe where you currently live?: Yes     Within the past 12 months, have you been hit, slapped, kicked or otherwise physically hurt by someone?: No     Within the past 12 months, have you been humiliated or emotionally abused in other ways by your partner or ex-partner?: No   Housing Stability: Low Risk  (10/13/2023)    Housing Stability     Do you have housing? : Yes     Are you worried about losing your housing?: No        VITALS  Patient Vitals for the past 24 hrs:   BP Temp Temp src Pulse Resp SpO2 Height Weight   11/30/23 0959 (!) 142/80 -- -- 70 -- 97 % -- --   11/30/23 0906 (!) 159/75 -- -- 65 20 96 % -- --   11/30/23 0854 (!) 169/79 -- -- 66 16 97 % -- --   11/30/23 0817 (!) 191/87 98.2  F (36.8  C) Temporal 79 22 96 % 1.632 m (5' 4.25\") 77.1 kg (170 lb)        ================================================================    PHYSICAL EXAM     VITAL SIGNS: BP (!) 142/80   Pulse 70   Temp 98.2  F (36.8  C) (Temporal)   Resp 20   Ht 1.632 m (5' 4.25\")   Wt 77.1 kg (170 lb)   LMP  (LMP Unknown)   SpO2 97%   BMI 28.95 kg/m     Constitutional:  Awake, no acute distress   HENT:  Atraumatic, oropharynx without exudate or erythema, membranes moist  Lymph:  No adenopathy  Eyes: EOM intact, PERRL, no injection  Neck: Supple  Respiratory:  Clear to auscultation bilaterally, no wheezes or crackles   Cardiovascular:  Regular rate and rhythm, single S1 and S2   GI:  Soft, nontender, nondistended, no rebound or guarding "   Musculoskeletal:  Moves all extremities, no lower extremity edema, no deformities    Skin:  Warm, dry  Neurologic:  Alert and oriented x3, no focal deficits noted. No pronator drift. NIH 0.       ================================================================  LAB       All pertinent labs reviewed and interpreted.   Labs Ordered and Resulted from Time of ED Arrival to Time of ED Departure   TROPONIN T, HIGH SENSITIVITY - Abnormal       Result Value    Troponin T, High Sensitivity 15 (*)    GLUCOSE BY METER - Abnormal    GLUCOSE BY METER POCT 119 (*)    BASIC METABOLIC PANEL - Normal    Sodium 139      Potassium 4.2      Chloride 100      Carbon Dioxide (CO2) 28      Anion Gap 11      Urea Nitrogen 14.3      Creatinine 0.95      GFR Estimate 66      Calcium 10.2      Glucose 98     INR - Normal    INR 0.97     PARTIAL THROMBOPLASTIN TIME - Normal    aPTT 29     GLUCOSE MONITOR NURSING POCT   CBC WITH PLATELETS AND DIFFERENTIAL    WBC Count 5.3      RBC Count 4.83      Hemoglobin 14.9      Hematocrit 45.7      MCV 95      MCH 30.8      MCHC 32.6      RDW 14.3      Platelet Count 211      % Neutrophils 66      % Lymphocytes 21      % Monocytes 8      % Eosinophils 4      % Basophils 1      % Immature Granulocytes 0      NRBCs per 100 WBC 0      Absolute Neutrophils 3.5      Absolute Lymphocytes 1.1      Absolute Monocytes 0.4      Absolute Eosinophils 0.2      Absolute Basophils 0.1      Absolute Immature Granulocytes 0.0      Absolute NRBCs 0.0     TROPONIN T, HIGH SENSITIVITY        ===============================================================  RADIOLOGY       Reviewed all pertinent imaging. Please see official radiology report.   MR Brain w/o Contrast   Final Result   IMPRESSION:   1.  No acute intracranial process.   2.  Generalized brain atrophy and presumed microvascular ischemic changes as detailed above.      CTA Head Neck with Contrast   Final Result   IMPRESSION:       HEAD CT:   1.  No acute  intracranial hemorrhage or CT evidence of acute large vascular territory ischemic infarct.   2.  Calcified intracranial sclerosis with changes suggestive of mild chronic microvascular ischemic disease.      HEAD CTA:    1.  No high-grade stenosis or occlusion involving the major intracranial arteries.   2.  No aneurysm.      NECK CTA:   1.  No occlusion.   2.  Mixed atherosclerotic plaque results in at least 70% narrowing involving the proximal left ICA with a suspected area of adjacent penetrating atherosclerotic ulcer.   3.  Predominantly calcified atherosclerotic plaque about the right carotid bifurcation without substantial (50% or greater) luminal narrowing.   4.  Additional lesser areas of atherosclerotic disease as detailed above.   5.  No evidence of dissection.      Absence of acute intracranial hemorrhage was discussed with Dr. Morris at approximately 8:30 AM CST on 11/30/2023.            ================================================================  EKG     EKG reviewed interpreted by me shows sinus rhythm with rate of 62, left axis, QTc 442 with no acute ST or T wave changes since August 2008    I have independently reviewed and interpreted the EKG(s) documented above.     ================================================================  PROCEDURES     Critical Care  Performed by: Maxine Morris MD  Authorized by: Maxine Morris MD  Total critical care time: 35 minutes  Critical care time was exclusive of separately billable procedures and treating other patients.  Critical care was necessary to treat or prevent imminent or life-threatening deterioration of the following conditions: stroke code  Critical care was time spent personally by me on the following activities: development of treatment plan with patient or surrogate, discussions with consultants, examination of patient, evaluation of patient's response to treatment, obtaining history from patient or surrogate, ordering and performing  treatments and interventions, ordering and review of laboratory studies, ordering and review of radiographic studies and re-evaluation of patient's condition, this excludes any separately billable procedures.        I, Telly Mobley, am serving as a scribe to document services personally performed by Dr. Morris based on my observation and the provider's statements to me. I, Maxine Morris MD attest that Telly Mobley is acting in a scribe capacity, has observed my performance of the services and has documented them in accordance with my direction.   Maxine Morris M.D.   Emergency Medicine   [facility] EMERGENCY DEPARTMENT  [facility] [place] [id number] [phone]  Dept: [phone]      Maxine Morris MD  11/30/23 0128

## 2023-11-30 NOTE — CONSULTS
"Care Management Initial Consult    General Information  Assessment completed with: PatientMarlen  Type of CM/SW Visit: Initial Assessment    Primary Care Provider verified and updated as needed: Yes   Readmission within the last 30 days: no previous admission in last 30 days      Reason for Consult: discharge planning  Advance Care Planning: Advance Care Planning Reviewed: no concerns identified          Communication Assessment  Patient's communication style: spoken language (English or Bilingual)             Cognitive  Cognitive/Neuro/Behavioral: WDL                      Living Environment:   People in home: significant other  [place]  Current living Arrangements: house (\"A few steps to get inside and then it is all main level that I live on\".)      Able to return to prior arrangements: yes       Family/Social Support:  Care provided by: self  Provides care for: no one  Marital Status: Lives with Significant Other  Significant Other       [place]  Description of Support System: Supportive, Involved    Support Assessment: Adequate family and caregiver support, Adequate social supports, Patient communicates needs well met    Current Resources:   Patient receiving home care services: No     Community Resources: None  Equipment currently used at home: none  Supplies currently used at home: Other (\"glasses\")    Employment/Financial:  Employment Status: retired (\"retired about 4 years\")     Employment/ Comments: \"no  history\"  Financial Concerns:     Referral to Financial Worker: No       Does the patient's insurance plan have a 3 day qualifying hospital stay waiver?  Yes     Which insurance plan 3 day waiver is available? Alternative insurance waiver    Will the waiver be used for post-acute placement? Undetermined at this time    Lifestyle & Psychosocial Needs:  Social Determinants of Health     Food Insecurity: Low Risk  (10/13/2023)    Food Insecurity     Within the past 12 months, did you worry " "that your food would run out before you got money to buy more?: No     Within the past 12 months, did the food you bought just not last and you didn t have money to get more?: No   Depression: Not at risk (7/24/2023)    PHQ-2     PHQ-2 Score: 0   Housing Stability: Low Risk  (10/13/2023)    Housing Stability     Do you have housing? : Yes     Are you worried about losing your housing?: No   Tobacco Use: Medium Risk (11/30/2023)    Patient History     Smoking Tobacco Use: Former     Smokeless Tobacco Use: Never     Passive Exposure: Not on file   Financial Resource Strain: Low Risk  (10/13/2023)    Financial Resource Strain     Within the past 12 months, have you or your family members you live with been unable to get utilities (heat, electricity) when it was really needed?: No   Alcohol Use: Not on file   Transportation Needs: Low Risk  (10/13/2023)    Transportation Needs     Within the past 12 months, has lack of transportation kept you from medical appointments, getting your medicines, non-medical meetings or appointments, work, or from getting things that you need?: No   Physical Activity: Not on file   Interpersonal Safety: Low Risk  (10/13/2023)    Interpersonal Safety     Do you feel physically and emotionally safe where you currently live?: Yes     Within the past 12 months, have you been hit, slapped, kicked or otherwise physically hurt by someone?: No     Within the past 12 months, have you been humiliated or emotionally abused in other ways by your partner or ex-partner?: No   Stress: Not on file   Social Connections: Not on file       Functional Status:  Prior to admission patient needed assistance:   Dependent ADLs:: Independent, Ambulation-no assistive device  Dependent IADLs:: Independent (\"I have someone that does the plowing\".)  Assesssment of Functional Status: At functional baseline    Mental Health Status:          Chemical Dependency Status:                Values/Beliefs:  Spiritual, Cultural " Beliefs, Confucianist Practices, Values that affect care:                 Additional Information:  Marlen lives in a house with her significant other [place]. She is independent with ADLs and IADLs. She drives.    Likely home with no new needs at this time.     Family to transport at discharge.    CM to follow for medical progression of care, discharge recommendations, and final discharge plan.    Johnna Redman RN

## 2023-11-30 NOTE — CONSULTS
"      [facility]    Stroke Consult Note    Reason for Consult: Stroke Code     Chief Complaint: Dizziness      HPI  Marlen Dumas is a 63 year old female with past medical history significant for TIA, COPD, HTN, HLD, and tobacco use. She presented to the ED for evaluation of dizziness and right arm numbness. She reports that she awoke at baseline and shortly thereafter, developed acute onset dizziness and numbness to her entire right arm. She felt \"foggy\". This resolved after 10 minutes. Around 0700, she again developed dizziness and right arm numbness that was persistent on arrival to the ED. She denies weakness, vision changes, language impairment, or headache.     On telestroke video examination, she reports symptoms are improving. She continues to have mild R arm numbness but dizziness has resolved. She is not a candidate for TNK due to non-disabling deficits.     Imaging Findings  CTH negative for acute pathology  CTA head/neck without LVO. At least 70% narrowing of the proximal L ICA.   MRI brain negative for acute pathology.     Intravenous Thrombolysis  Not given due to:   - minor/isolated/quickly resolving symptoms    Endovascular Treatment  Not initiated due to absence of proximal vessel occlusion    Impression   Dizziness and right arm numbness, resolved. Concern for TIA in the setting of L ICA stenosis (70%)    Recommendations  - Neurochecks and Vital Signs every 4 hours   - Daily aspirin 81 mg for secondary stroke prevention  - Plavix (clopidogrel) 300 mg PO loading dose x 1  - Plavix (clopidogrel) 75 mg PO Daily  - Statin: high intensity  - TTE (with Bubble Study if age 60 yrs or less)  - 24-hour Telemetry  - Bedside Glucose Monitoring  - A1c, Lipid Panel  - PT/OT/SLP  - Stroke Education  - Smoking Cessation  - Euthermia, Euglycemia  - Vascular surgery consult for L ICA stenosis     Allyssa Anna, CNP  Vascular Neurology    To page me or covering stroke neurology team " "member, click here: [facility]COM  Choose \"On Call\" tab at top, then select \"NEUROLOGY/ALL SITES\" from middle drop-down box, press Enter, then look for \"stroke\" or \"telestroke\" for your site.  ______________________________________________________    Clinically Significant Risk Factors Present on Admission                  # Hypertension: Noted on problem list      # Overweight: Estimated body mass index is 28.95 kg/m  as calculated from the following:    Height as of this encounter: 1.632 m (5' 4.25\").    Weight as of this encounter: 77.1 kg (170 lb).                Past Medical History   Past Medical History:   Diagnosis Date    COPD (chronic obstructive pulmonary disease) (H)     GERD (gastroesophageal reflux disease) 07/24/2023    HLD (hyperlipidemia)     HTN (hypertension)     Hx of laparoscopic adjustable gastric banding 10/01/2008    Initial weight 295.5 BMI 50.4 Dr. Rowell    Hymenoptera allergy     Hypothyroidism     S/P colonoscopy 05/05/2019    Tobacco abuse     Vitamin D deficiency 08/24/2016     Past Surgical History   Past Surgical History:   Procedure Laterality Date    FOOT SURGERY      Sting [place] stacia    LAPAROSCOPIC GASTRIC BANDING  2008    LUMBAR DISC SURGERY      ND ESOPHAGOGASTRODUODENOSCOPY TRANSORAL DIAGNOSTIC N/A 8/14/2020    Procedure: ESOPHAGOGASTRODUODENOSCOPY (EGD);  Surgeon: Jared Howard MD;  Location: [facility];  Service: General     Medications   Home Meds  Prior to Admission medications    Medication Sig Start Date End Date Taking? Authorizing Provider   amLODIPine (NORVASC) 5 MG tablet TAKE 1 TABLET(5 MG) BY MOUTH DAILY 10/3/23   Ricardo Davila MD   EPINEPHrine (ANY BX GENERIC EQUIV) 0.3 MG/0.3ML injection 2-pack Inject 0.3 mLs (0.3 mg) into the muscle once as needed for anaphylaxis May repeat one time in 5-15 minutes if response to initial dose is inadequate. 8/26/23   Evon Hazel MD   EPINEPHrine (ANY BX GENERIC EQUIV) 0.3 MG/0.3ML injection 2-pack INJECT 0.3ML INTO THE " MUSCLE ONCE FOR 1 DOSE 23   Ricardo Davila MD   ezetimibe (ZETIA) 10 MG tablet Take 1 tablet (10 mg) by mouth daily 10/13/23 10/12/24  Ricardo Davila MD   magic mouthwash (ENTER INGREDIENTS IN COMMENTS) suspension Swish and swallow 5 mLs in mouth every 6 hours as needed (oral irritation) 23   Maureen Pérez NP   nicotine (NICODERM CQ) 14 MG/24HR 24 hr patch APPLY 1 PATCH TOPICALLY TO THE SKIN EVERY 24 HOURS 23   Ricardo Davila MD   nystatin (MYCOSTATIN) 647661 UNIT/ML suspension Take 5 mLs (500,000 Units) by mouth 4 times daily Swish and swallow 23   Loni Jaquez MD   omeprazole (PRILOSEC) 40 MG DR capsule TAKE 1 CAPSULE(40 MG) BY MOUTH DAILY BEFORE BREAKFAST 23   Julissa Hager MD   predniSONE (DELTASONE) 20 MG tablet Take two tablets (= 40mg) each day for 5 (five) days 23   Evon Hazel MD   sucralfate (CARAFATE) 1 GM tablet TAKE 1 TABLET(1 GRAM) BY MOUTH FOUR TIMES DAILY 22   Julissa Hager MD   varenicline (CHANTIX) 1 MG tablet Take 1 tablet (1 mg) by mouth 2 times daily 22   Ricardo Davila MD       Scheduled Meds   meclizine  25 mg Oral Once       Infusion Meds      PRN Meds      Allergies   Allergies   Allergen Reactions    Other Environmental Allergy Unknown     Bee sting    Sulfa (Sulfonamide Antibiotics) [Sulfa Antibiotics] Unknown     Family History   Family History   Problem Relation Age of Onset    Coronary Artery Disease Mother 58    Hypertension Mother     Chronic Obstructive Pulmonary Disease Father     Hypertension Father     Anxiety Disorder Father     Hypertension Brother      Social History   Social History     Tobacco Use    Smoking status: Former     Packs/day: 0.50     Years: 37.00     Additional pack years: 0.00     Total pack years: 18.50     Types: Cigarettes     Quit date: 2022     Years since quittin.2    Smokeless tobacco: Never    Tobacco comments:     quit 22   Vaping Use    Vaping Use: Never used    Substance Use Topics    Alcohol use: Yes     Alcohol/week: 4.0 standard drinks of alcohol     Types: 4 Standard drinks or equivalent per week     Comment: 4-5 beer/wine weekly    Drug use: Never       Review of Systems   The 10 point Review of Systems is negative other than noted in the HPI or here.        PHYSICAL EXAMINATION  Temp:  [98.2  F (36.8  C)] 98.2  F (36.8  C)  Pulse:  [79] 79  Resp:  [22] 22  BP: (191)/(87) 191/87  SpO2:  [96 %] 96 %     Neuro Exam  Mental Status:  alert, oriented x 3, follows commands, speech clear and fluent, naming and repetition normal  Cranial Nerves:  visual fields intact (tested by nurse), EOMI with normal smooth pursuit, facial sensation intact and symmetric (tested by nurse), facial movements symmetric, hearing not formally tested but intact to conversation, no dysarthria, shoulder shrug equal bilaterally, tongue protrusion midline  Motor:  no abnormal movements, able to move all limbs antigravity spontaneously with no signs of hemiparesis observed, no pronator drift  Reflexes:  unable to test (telestroke)  Sensory:  no extinction on double simultaneous stimulation (assessed by nurse), decreased light touch sensation to RUE (new) and LLE (baseline)  Coordination:  normal finger-to-nose and heel-to-shin bilaterally without dysmetria  Station/Gait:  unable to test due to telestroke    Dysphagia Screen  Per Nursing    Stroke Scales    NIHSS  1a. Level of Consciousness 0-->Alert, keenly responsive   1b. LOC Questions 0-->Answers both questions correctly   1c. LOC Commands 0-->Performs both tasks correctly   2.   Best Gaze 0-->Normal   3.   Visual 0-->No visual loss   4.   Facial Palsy 0-->Normal symmetrical movements   5a. Motor Arm, Left 0-->No drift, limb holds 90 (or 45) degrees for full 10 secs   5b. Motor Arm, Right 0-->No drift, limb holds 90 (or 45) degrees for full 10 secs   6a. Motor Leg, Left 0-->No drift, leg holds 30 degree position for full 5 secs   6b. Motor Leg,  "right 0-->No drift, leg holds 30 degree position for full 5 secs   7.   Limb Ataxia 0-->Absent   8.   Sensory 1-->Mild-to-moderate sensory loss, patient feels pinprick is less sharp or is dull on the affected side, or there is a loss of superficial pain with pinprick, but patient is aware of being touched   9.   Best Language 0-->No aphasia, normal   10. Dysarthria 0-->Normal   11. Extinction and Inattention  0-->No abnormality   Total 1 (11/30/23 0847)         Imaging  I personally reviewed all imaging; relevant findings per HPI.     Lab Results Data   CBC  No results for input(s): \"WBC\", \"RBC\", \"HGB\", \"HCT\", \"PLT\" in the last 168 hours.  Basic Metabolic Panel    Recent Labs   Lab 11/30/23  0819   *     Liver Panel  No results for input(s): \"PROTTOTAL\", \"ALBUMIN\", \"BILITOTAL\", \"ALKPHOS\", \"AST\", \"ALT\", \"BILIDIRECT\" in the last 168 hours.  INR    Recent Labs   Lab Test 10/12/22  0750   INR 0.97      Lipid Profile    Recent Labs   Lab Test 09/15/22  1039 07/13/21  0722 12/16/19  1133 06/13/19  0953 02/21/18  1605 10/19/15  1112   CHOL  --  269*  --   --   --  244*   HDL 86 94  --  95   < > 84   * 163* 165* 174*   < > 143*   TRIG  --  59  --   --   --  87    < > = values in this interval not displayed.     A1C    Recent Labs   Lab Test 09/15/22  1039 07/13/21  0722 06/13/19  0953   A1C 5.8* 5.9* 5.5     Troponin  No results for input(s): \"CTROPT\", \"TROPONINIS\", \"TROPONINI\", \"GHTROP\" in the last 168 hours.       Stroke Code Data Data   Stroke Code Data  (for stroke code with tele)  Stroke code activated 11/30/23  0821   First stroke provider response 11/30/23  0823   Video start time 11/30/23  0843   Video end time 11/30/23  0854   Last known normal 11/30/23  0500   Time of discovery (or onset of symptoms)  11/30/23  0530   Head CT read by Stroke Neuro Provider 11/30/23  0831   Was stroke code de-escalated? Yes  11/30/23  0901     Telestroke Service Details  Type of service telemedicine diagnostic " assessment of acute neurological changes   Reason telemedicine is appropriate patient requires assessment with a specialist for diagnosis and treatment of neurological symptoms   Mode of transmission secure interactive audio and video communication per Gregg   Originating site (patient location) [facility]    Distant site (provider location) [facility]       I personally examined and evaluated the patient today. At the time of my evaluation and management the patient was in critical condition today due to stroke symptoms. I personally managed examination. Key decisions made today included MRI. I spent a total of 60 minutes providing critical care services, evaluating the patient, directing care and reviewing laboratory values and radiologic reports.

## 2023-11-30 NOTE — PHARMACY-ADMISSION MEDICATION HISTORY
Pharmacist Admission Medication History    Admission medication history is complete. The information provided in this note is only as accurate as the sources available at the time of the update.    Information Source(s): Patient and CareEverywhere/SureScripts via in-person    Pertinent Information: Patient has magic mouthwash available PRN in case of thrush. She also has PRN prednisone available for her in case of bronchitis. Patient states she uses [employer], filled for 90 days supplies on 04/27/23 and 05/02/23.    Changes made to PTA medication list:  Added: Aspirin, fluticasone, albuterol  Deleted: nicotine patch, epinephrine (duplicate), nystatin, varenicline  Changed: sucralfate frequency changed from QID to daily PRN    Allergies reviewed with patient and updates made in EHR: yes    Medication History Completed By: Harpreet Abrams [facility] 11/30/2023 1:07 PM    PTA Med List   Medication Sig Last Dose    albuterol (PROAIR HFA/PROVENTIL HFA/VENTOLIN HFA) 108 (90 Base) MCG/ACT inhaler Inhale 2 puffs into the lungs every 6 hours as needed for shortness of breath, wheezing or cough  at PRN    amLODIPine (NORVASC) 5 MG tablet TAKE 1 TABLET(5 MG) BY MOUTH DAILY 11/30/2023 at AM    aspirin (ASA) 81 MG chewable tablet Take 81 mg by mouth daily 11/30/2023 at AM    EPINEPHrine (ANY BX GENERIC EQUIV) 0.3 MG/0.3ML injection 2-pack Inject 0.3 mLs (0.3 mg) into the muscle once as needed for anaphylaxis May repeat one time in 5-15 minutes if response to initial dose is inadequate.  at PRN    ezetimibe (ZETIA) 10 MG tablet Take 1 tablet (10 mg) by mouth daily 11/30/2023 at AM    fluticasone (ARNUITY ELLIPTA) 100 MCG/ACT inhaler Inhale 1 puff into the lungs daily 11/30/2023    omeprazole (PRILOSEC) 40 MG DR capsule TAKE 1 CAPSULE(40 MG) BY MOUTH DAILY BEFORE BREAKFAST 11/30/2023 at AM    sucralfate (CARAFATE) 1 GM tablet Take 1 g by mouth daily as needed (heart burn/reflux symptoms)  at PRN

## 2023-11-30 NOTE — PROGRESS NOTES
Physical Therapy: Orders received. Chart reviewed and discussed with care team.? Physical Therapy not indicated due to patient independent with mobility and has no needs for skilled PT.? Defer discharge recommendations to treatment team..? Will complete orders.

## 2023-11-30 NOTE — PROGRESS NOTES
Speech-Language Pathology      SLP orders received. Chart reviewed and discussed with care team.? Speech-Language Pathology Evaluations not indicated due to no reported or resolved deficits related to speech, language, or cognition. Patient tolerating current diet of Regular and Thin with no s/s aspiration per RN. No acute SLP needs identified.? Defer discharge recommendations to treatment team.? Will complete orders.

## 2023-11-30 NOTE — CONSULTS
DIABETES CARE    Situation:  Consulted by Provider for prediabetes education.  Marlen Dumas is a 63 year old female admitted on 11/30/2023 for acute onset of lighteheadedness/presyncope while seated with associated acute onset of right arm weakness, clumsiness and paresthesias     Background:  Related comorbidities include: S/P Gastric lap band procedure due to morbid obesity (2008) ,CAD, HTN, HLD, smoker  PCP: Ricardo Davila MD  Social: Marlen lives in a house with her significant other Carolina. She is independent with ADLs and IADLs     Diabetes History:   New dx of prediabetes. Does have a family history.  Labs:  Hemoglobin A1C: 6.0%  (estimated average  mg/dL)   GFR: 66 mL/min/1.73m^2    Blood Glucose POC: 98, 119 this am    Diet Order: Low fat, low Na   Weight: 77.1 kg    BMI: 28.95 kg/m^2    DM EDUCATION/COUNSELING:  Barriers to Learning and/or DM Self-Management: None    Current education:  Educated/reviewed prediabetes and diabetes basics: pathophysiology, hyperglycemia, long term complications, treatment.    Explained normal/goals of blood sugar control, A1C  Did mention if she was interested in checking her BG, she could purchase a Connect2me Classic at [employer] for $9, strips $9. Suggested if she did that to check a post meal BG as first thing to go higher is post meal BG.  Educated on normal pancreas function    Carbohydrates were briefly discussed and their effect on BG. Reinforced healthy eating. Patient is aware of carbohydrates, feels she ate too many M & Ms this last month. Does have to eat smaller meals related to her past lap band surgery. Reinforced heart healthy meats and fats as well.     States she is a very active person, always on the go.  Reinforced the positive effects of exercise on BG, blood vessels.  She is a smoker, aware of effect on vessels, lungs. Thinking about quitting. Really likes to smoke.     Written handouts given on all education provided.    Assessment:  Patient is active,  "should do well with watching carbs, unsure is she is ready to quit smoking yet    Recommendations:  1. Follow-up with her provider on future A1C tests  2. Can monitor if she is interested in knowing what her sugars are, how she handles the sugar challenge of her meals  3. She will stay active, watch carbs    Refer to \"Guidelines for Insulin Initiation and Care in Hospitalized Adults\" link in Diabetes Management Order set for dosing guidelines.    Hospital goals for blood glucose levels are < 180 mg/dL for improved health outcomes.    Thank you,     Marialuisa Romero RN, Certified Diabetes Care and     [address] [address]  [email or web address]   Office: [phone]  Pager: [phone]                                    "

## 2023-12-01 ENCOUNTER — APPOINTMENT (OUTPATIENT)
Dept: CARDIOLOGY | Facility: HOSPITAL | Age: 63
End: 2023-12-01
Attending: INTERNAL MEDICINE
Payer: MEDICARE

## 2023-12-01 ENCOUNTER — TELEPHONE (OUTPATIENT)
Dept: INTERNAL MEDICINE | Facility: CLINIC | Age: 63
End: 2023-12-01

## 2023-12-01 ENCOUNTER — APPOINTMENT (OUTPATIENT)
Dept: MRI IMAGING | Facility: HOSPITAL | Age: 63
End: 2023-12-01
Attending: PSYCHIATRY & NEUROLOGY
Payer: MEDICARE

## 2023-12-01 VITALS
TEMPERATURE: 98.5 F | RESPIRATION RATE: 18 BRPM | HEIGHT: 64 IN | DIASTOLIC BLOOD PRESSURE: 74 MMHG | HEART RATE: 63 BPM | OXYGEN SATURATION: 95 % | BODY MASS INDEX: 29.02 KG/M2 | SYSTOLIC BLOOD PRESSURE: 130 MMHG | WEIGHT: 170 LBS

## 2023-12-01 LAB
ANION GAP SERPL CALCULATED.3IONS-SCNC: 9 MMOL/L (ref 7–15)
BUN SERPL-MCNC: 13.8 MG/DL (ref 8–23)
CALCIUM SERPL-MCNC: 9.4 MG/DL (ref 8.8–10.2)
CHLORIDE SERPL-SCNC: 103 MMOL/L (ref 98–107)
CREAT SERPL-MCNC: 0.92 MG/DL (ref 0.51–0.95)
DEPRECATED HCO3 PLAS-SCNC: 27 MMOL/L (ref 22–29)
EGFRCR SERPLBLD CKD-EPI 2021: 69 ML/MIN/1.73M2
ERYTHROCYTE [DISTWIDTH] IN BLOOD BY AUTOMATED COUNT: 14.1 % (ref 10–15)
GLUCOSE BLDC GLUCOMTR-MCNC: 101 MG/DL (ref 70–99)
GLUCOSE BLDC GLUCOMTR-MCNC: 127 MG/DL (ref 70–99)
GLUCOSE SERPL-MCNC: 94 MG/DL (ref 70–99)
HCT VFR BLD AUTO: 44 % (ref 35–47)
HGB BLD-MCNC: 14.2 G/DL (ref 11.7–15.7)
MCH RBC QN AUTO: 30.6 PG (ref 26.5–33)
MCHC RBC AUTO-ENTMCNC: 32.3 G/DL (ref 31.5–36.5)
MCV RBC AUTO: 95 FL (ref 78–100)
PLATELET # BLD AUTO: 164 10E3/UL (ref 150–450)
POTASSIUM SERPL-SCNC: 3.9 MMOL/L (ref 3.4–5.3)
RBC # BLD AUTO: 4.64 10E6/UL (ref 3.8–5.2)
SODIUM SERPL-SCNC: 139 MMOL/L (ref 135–145)
WBC # BLD AUTO: 4.5 10E3/UL (ref 4–11)

## 2023-12-01 PROCEDURE — 99215 OFFICE O/P EST HI 40 MIN: CPT | Performed by: PSYCHIATRY & NEUROLOGY

## 2023-12-01 PROCEDURE — 96376 TX/PRO/DX INJ SAME DRUG ADON: CPT

## 2023-12-01 PROCEDURE — 36415 COLL VENOUS BLD VENIPUNCTURE: CPT | Performed by: INTERNAL MEDICINE

## 2023-12-01 PROCEDURE — 250N000013 HC RX MED GY IP 250 OP 250 PS 637: Performed by: INTERNAL MEDICINE

## 2023-12-01 PROCEDURE — 250N000011 HC RX IP 250 OP 636: Performed by: HOSPITALIST

## 2023-12-01 PROCEDURE — G0378 HOSPITAL OBSERVATION PER HR: HCPCS

## 2023-12-01 PROCEDURE — 82310 ASSAY OF CALCIUM: CPT | Performed by: INTERNAL MEDICINE

## 2023-12-01 PROCEDURE — A9585 GADOBUTROL INJECTION: HCPCS | Mod: JZ | Performed by: INTERNAL MEDICINE

## 2023-12-01 PROCEDURE — 85027 COMPLETE CBC AUTOMATED: CPT | Performed by: INTERNAL MEDICINE

## 2023-12-01 PROCEDURE — 72156 MRI NECK SPINE W/O & W/DYE: CPT | Mod: MF

## 2023-12-01 PROCEDURE — 82962 GLUCOSE BLOOD TEST: CPT

## 2023-12-01 PROCEDURE — 999N000096 CARDIAC MOBILE TELEMETRY MONITOR

## 2023-12-01 PROCEDURE — 99238 HOSP IP/OBS DSCHRG MGMT 30/<: CPT | Performed by: INTERNAL MEDICINE

## 2023-12-01 PROCEDURE — 255N000002 HC RX 255 OP 636: Mod: JZ | Performed by: INTERNAL MEDICINE

## 2023-12-01 RX ORDER — CLOPIDOGREL BISULFATE 75 MG/1
75 TABLET ORAL DAILY
Status: DISCONTINUED | OUTPATIENT
Start: 2023-12-01 | End: 2023-12-01 | Stop reason: HOSPADM

## 2023-12-01 RX ORDER — NICOTINE 21 MG/24HR
1 PATCH, TRANSDERMAL 24 HOURS TRANSDERMAL DAILY
Status: DISCONTINUED | OUTPATIENT
Start: 2023-12-01 | End: 2023-12-01 | Stop reason: HOSPADM

## 2023-12-01 RX ORDER — PANTOPRAZOLE SODIUM 40 MG/1
40 TABLET, DELAYED RELEASE ORAL
Qty: 60 TABLET | Refills: 0 | Status: SHIPPED | OUTPATIENT
Start: 2023-12-02 | End: 2024-01-04

## 2023-12-01 RX ORDER — CLOPIDOGREL BISULFATE 75 MG/1
75 TABLET ORAL DAILY
Qty: 21 TABLET | Refills: 0 | Status: SHIPPED | OUTPATIENT
Start: 2023-12-01 | End: 2024-01-08

## 2023-12-01 RX ORDER — GADOBUTROL 604.72 MG/ML
7.5 INJECTION INTRAVENOUS ONCE
Status: COMPLETED | OUTPATIENT
Start: 2023-12-01 | End: 2023-12-01

## 2023-12-01 RX ADMIN — LORAZEPAM 1 MG: 2 INJECTION INTRAMUSCULAR; INTRAVENOUS at 00:18

## 2023-12-01 RX ADMIN — ROSUVASTATIN CALCIUM 10 MG: 10 TABLET, FILM COATED ORAL at 09:01

## 2023-12-01 RX ADMIN — EZETIMIBE 10 MG: 10 TABLET ORAL at 09:01

## 2023-12-01 RX ADMIN — PANTOPRAZOLE SODIUM 40 MG: 20 TABLET, DELAYED RELEASE ORAL at 06:39

## 2023-12-01 RX ADMIN — ASPIRIN 81 MG CHEWABLE TABLET 81 MG: 81 TABLET CHEWABLE at 09:01

## 2023-12-01 RX ADMIN — GADOBUTROL 7.5 ML: 604.72 INJECTION INTRAVENOUS at 00:50

## 2023-12-01 RX ADMIN — NICOTINE 1 PATCH: 21 PATCH, EXTENDED RELEASE TRANSDERMAL at 09:00

## 2023-12-01 RX ADMIN — AMLODIPINE BESYLATE 5 MG: 5 TABLET ORAL at 09:01

## 2023-12-01 ASSESSMENT — ACTIVITIES OF DAILY LIVING (ADL)
ADLS_ACUITY_SCORE: 20

## 2023-12-01 NOTE — DISCHARGE SUMMARY
"[facility]  Hospitalist Discharge Summary      Date of Admission:  11/30/2023  Date of Discharge:  12/1/2023  Discharging Provider: Monroe Devine DO, DO  Discharge Service: Hospitalist Service    Discharge Diagnoses       Clinically Significant Risk Factors     # Overweight: Estimated body mass index is 28.95 kg/m  as calculated from the following:    Height as of this encounter: 1.632 m (5' 4.25\").    Weight as of this encounter: 77.1 kg (170 lb).       Follow-ups Needed After Discharge   Follow-up Appointments     Follow-up and recommended labs and tests       Follow up with primary care provider, Ricardo Davila, within 7 days for   hospital follow- up.  No follow up labs or test are needed.            Unresulted Labs Ordered in the Past 30 Days of this Admission       No orders found for last 31 day(s).            Discharge Disposition   Discharged to home  Condition at discharge: Stable    Hospital Course     Assessment & Plan  Marlen Dumas is a 63 year old female admitted on 11/30/2023 for acute onset of lighteheadedness/presyncope while seated with associated acute onset of right arm weakness, clumsiness and paresthesias.     *Acute onset lightheadedness/presyncope with associated acute right arm weakness  *H/O recurrent TIA's  *Left proximal ICA stenosis and adjacent  penetrating atherosclerotic ulcer (>70% stenosis)  *Right ICA stenosis (< 50% stenosis).   -MRI brain w/o contrast negative for acute stroke, mass, bleed.  -CTA Head/Neck showed no intracranial large vessel occlusion, stenoses. Mixed atherosclerotic plaque results in at least 70% narrowing involving the proximal left ICA with a suspected area of adjacent penetrating atherosclerotic ulcer.  Predominantly calcified atherosclerotic plaque about the right carotid bifurcation without substantial (50% or greater) luminal narrowing.   -ischemic stroke order set implemented  -neurology consult appreciated  -Bilateral " Carotid duplex US reviewed  -Antiplatelet therapy per discharge med rec  -Crestor 10mg every day (previously had declined per PCP note on 10/13/23)  -TTE reviewed  -30 day event monitor on discharge  -PCP follow-up for ongoing risk factor modifications     Asthma/COPD (chronic bronchitis) overlap syndrome   History of chronic Mycobacterium abscessus lung disease   RUL pulmonary nodules  -s/p recent  bronchoscopy with BAL that revealed growth of this organism and due to minimal to no symptoms primary Pulmonologist opted to not offer any treatment at that time.  Denies any acute worsening of her symptoms at present.  -Per Dr. Jaquez, surveillance imaging showed nodule improvement with no further imaging planned at this time unless change in clinical status develops  -stable     Prediabetes: new diagnosis. A1C 6%  -CDE consult appreciated  -pcp follow-up     S/P Gastric lap band procedure due to morbid obesity (2008)  Esophageal dilation and thickening  GERD  -denies dysphagia or obstructive symptoms currently  -s/p EGD 9/2023 per patient at [facility].  Medical records requested as per patient upcoming procedure planned and if potential plan for DAPT this requested information is medically necessary  -FDG avid thickening of the distal esophagus per PET CT (7/7/23)  -PPI     Multi-vessel non-obstructive CAD:  -CTA coronary angiogram ((9/2023) showed pLAD, pLCx and OM1 < 40% stenosis, pRPDA 50-60% stenosis caused by soft plaque  -meds per discharge med rec  -aggressive risk factor modification    HTN  -meds per discharge med rec     HLD  -Lipid panel  -Crestor 10mg every day     Hypothyroidism     Chronic active tobacco misuse:  -cessation advised    Consultations This Hospital Stay   SPEECH LANGUAGE PATH ADULT IP CONSULT  PHARMACY IP CONSULT  PHARMACY IP CONSULT  PHARMACY IP CONSULT  PHYSICAL THERAPY ADULT IP CONSULT  OCCUPATIONAL THERAPY ADULT IP CONSULT  REHAB ADMISSIONS LIAISON IP CONSULT  CARE MANAGEMENT / SOCIAL WORK IP  CONSULT  DIABETES EDUCATION IP CONSULT  PHARMACY IP CONSULT  SMOKING CESSATION PROGRAM IP CONSULT    Code Status   Full Code    Time Spent on this Encounter          Monroe eDvineDO,   M [address] [address]  Phone: [phone]  Fax: [phone]  ______________________________________________________________________    Physical Exam   Vital Signs: Temp: 98.9  F (37.2  C) Temp src: Oral BP: (!) 172/82 (nurse notified) Pulse: 63   Resp: 18 SpO2: 95 % O2 Device: None (Room air)    Weight: 170 lbs 0 oz  Gen nad  Cv rrr  Lungs ctab  Neuro no new deficits       Primary Care Physician   Ricardo Davila    Discharge Orders      Reason for your hospital stay    TIA     Follow-up and recommended labs and tests     Follow up with primary care provider, Ricardo Davila, within 7 days for hospital follow- up.  No follow up labs or test are needed.     Activity    Your activity upon discharge: activity as tolerated     Adult Cardiac Event Monitor     Diet    Follow this diet upon discharge: Orders Placed This Encounter      Combination Diet Low Saturated Fat Na <2400mg Diet, diabetic diet       Significant Results and Procedures       Discharge Medications   Current Discharge Medication List        START taking these medications    Details   clopidogrel (PLAVIX) 75 MG tablet Take 1 tablet (75 mg) by mouth daily for 21 days  Qty: 21 tablet, Refills: 0    Associated Diagnoses: TIA (transient ischemic attack)      pantoprazole (PROTONIX) 40 MG EC tablet Take 1 tablet (40 mg) by mouth every morning (before breakfast)  Qty: 60 tablet, Refills: 0    Associated Diagnoses: Gastroesophageal reflux disease with esophagitis without hemorrhage           CONTINUE these medications which have NOT CHANGED    Details   albuterol (PROAIR HFA/PROVENTIL HFA/VENTOLIN HFA) 108 (90 Base) MCG/ACT inhaler Inhale 2 puffs into the lungs every 6 hours as needed for shortness of breath,  wheezing or cough      amLODIPine (NORVASC) 5 MG tablet TAKE 1 TABLET(5 MG) BY MOUTH DAILY  Qty: 90 tablet, Refills: 3    Associated Diagnoses: Essential hypertension      aspirin (ASA) 81 MG chewable tablet Take 81 mg by mouth daily      EPINEPHrine (ANY BX GENERIC EQUIV) 0.3 MG/0.3ML injection 2-pack Inject 0.3 mLs (0.3 mg) into the muscle once as needed for anaphylaxis May repeat one time in 5-15 minutes if response to initial dose is inadequate.  Qty: 2 each, Refills: 3      ezetimibe (ZETIA) 10 MG tablet Take 1 tablet (10 mg) by mouth daily  Qty: 90 tablet, Refills: 3    Associated Diagnoses: Mixed hyperlipidemia      fluticasone (ARNUITY ELLIPTA) 100 MCG/ACT inhaler Inhale 1 puff into the lungs daily      sucralfate (CARAFATE) 1 GM tablet Take 1 g by mouth daily as needed (heart burn/reflux symptoms)           STOP taking these medications       magic mouthwash (ENTER INGREDIENTS IN COMMENTS) suspension Comments:   Reason for Stopping:         omeprazole (PRILOSEC) 40 MG DR capsule Comments:   Reason for Stopping:         predniSONE (DELTASONE) 20 MG tablet Comments:   Reason for Stopping:             Allergies   Allergies   Allergen Reactions    Other Environmental Allergy Unknown     Bee sting    Sulfa (Sulfonamide Antibiotics) [Sulfa Antibiotics] Unknown

## 2023-12-01 NOTE — PROGRESS NOTES
"PRIMARY DIAGNOSIS: \"GENERIC\" NURSING  OUTPATIENT/OBSERVATION GOALS TO BE MET BEFORE DISCHARGE:  ADLs back to baseline: Yes    Activity and level of assistance: Ambulating independently.    Pain status: Pain free.    Return to near baseline physical activity: Yes     Discharge Planner Nurse   Safe discharge environment identified: Yes  Barriers to discharge: Yes       Entered by: Oscar Crump RN 12/01/2023 12:58 PM     Please review provider order for any additional goals.   Nurse to notify provider when observation goals have been met and patient is ready for discharge.  "

## 2023-12-01 NOTE — PROGRESS NOTES
Care Management Follow Up    Length of Stay (days): 0    Expected Discharge Date: 12/01/2023 or 12/2/23     Concerns to be Addressed: monitoring neuro status      Patient plan of care discussed at interdisciplinary rounds: Yes    Anticipated Discharge Disposition:  home     Anticipated Discharge Services:  none    Anticipated Discharge DME:  none    Education Provided on the Discharge Plan:  per care team    Patient/Family in Agreement with the Plan:        Additional Information:  Patient presenting with acute onset of lighteheadedness/presyncope while seated with associated acute onset of right arm weakness, clumsiness and paresthesias.  PMH of recurrent TIAs. Left proximal ICA stenosis and adjacent  penetrating atherosclerotic ulcer (>70% stenosis)  Right ICA stenosis (< 50% stenosis).   Neurology consulted.      Patient independent with mobility.       Social History:  Marlen lives in a house with her significant other Carolina. She is independent with ADLs and IADLs. She drives. Family to transport at discharge.      12/1/23:  Patient to return home with support from significant other.       Annette Cronin RN

## 2023-12-01 NOTE — PROGRESS NOTES
NEUROLOGY PROGRESS NOTE     ASSESSMENT & PLAN     Diagnosis: Strokelike symptoms-possible TIA     Strokelike symptoms-rule out TIA  Left ICA stenosis  Right neck pain     MRI brain negative for acute stroke  CTA shows left ICA stenosis with ulcerated plaque  Carotid ultrasound: Moderate Left ICA stenosis; consider vascular surgery consultation as outpatient  Echocardiogram unremarkable  Neuro checks, telemetry  Patient's had similar spells in the past and it is possible the spells might not be cerebrovascular in nature  Check MRI C-spine shows mod-severe central stenosis at C4-C5. Outpatient follow-up with Ortho/Spine clinic  Aspirin Plavix for 3 weeks, then ASA monotherapy given no stroke on MRI  , continue Crestor (added) and Zetia (PTA).  Patient does not want to continue on Crestor.  I suggested she talk to her primary physician about management of cholesterol going forward  HbA1c 6.0%. Diabetes educator consulted  PT/occupational therapy  No additional inpatient neurologic workup needed.  Neurology will sign off.  Patient should follow-up in neurology clinic in 3 months     Neurology Discharge Planning: Per primary team    Patient Active Problem List    Diagnosis Date Noted    TIA (transient ischemic attack) 11/30/2023     Priority: Medium    GERD (gastroesophageal reflux disease) 07/24/2023     Priority: Medium    Lumbar radiculopathy 09/30/2021     Priority: Medium    Tobacco abuse      Priority: Medium    Hymenoptera allergy 05/08/2019     Priority: Medium    Normal colonoscopy - 2018 - Average risk 05/05/2019     Priority: Medium    Zinc deficiency 08/24/2016     Priority: Medium    Vitamin D deficiency 08/24/2016     Priority: Medium    Obesity (BMI 30.0-34.9) 08/17/2016     Priority: Medium    Primary hypertension      Priority: Medium    COPD (chronic obstructive pulmonary disease) (H)      Priority: Medium    HLD (hyperlipidemia)      Priority: Medium    Hx of laparoscopic adjustable gastric  banding 10/01/2008     Priority: Medium     Initial weight 295.5 BMI 50.4 Dr. Rowell         Medical History  Past Medical History:   Diagnosis Date    COPD (chronic obstructive pulmonary disease) (H)     GERD (gastroesophageal reflux disease) 07/24/2023    HLD (hyperlipidemia)     HTN (hypertension)     Hx of laparoscopic adjustable gastric banding 10/01/2008    Initial weight 295.5 BMI 50.4 Dr. Rowell    Hymenoptera allergy     Hypothyroidism     S/P colonoscopy 05/05/2019    Tobacco abuse     Vitamin D deficiency 08/24/2016        SUBJECTIVE     64 yo woman presenting with blurred vision and some arm pain/numbness.  Similar symptoms in the past, previous diagnosis of TIA.  Additional history of CAD for which she has been on aspirin.  She complained of neck pain upon presentation as well.    No acute events overnight. NIHSS recorded as 0.    Marlen says that her symptoms have completely dissipated.  No pain, weakness or numbness.  The visual changes have also resolved.  She says that she has had a couple of friends who have been on statins and had problems with these.  She does not want to continue on the Crestor.     OBJECTIVE     Vital signs in last 24 hours  Temp:  [97.6  F (36.4  C)-98.3  F (36.8  C)] 97.7  F (36.5  C)  Pulse:  [63-87] 63  Resp:  [12-33] 18  BP: (104-191)/(54-87) 135/74  SpO2:  [93 %-98 %] 95 %    Weight:   [unfilled]    Review of Systems   Pertinent items are noted in HPI.    General Physical Exam: Patient is alert and oriented x 3. Vital signs were reviewed and are documented in EMR.   Neurological Exam:  Mental status - Patient is awake and grossly oriented to self, place and time. Attention span is normal. Language is fluent and follows commands appropriately.   Cranial nerves - CN II-XII intact. Pupils are reactive and symmetric; EOMI, VFIT, NLF symmetric  Motor - There is no pronator drift. Motor exam shows 5/5 strength in all extremities.  Tone - Tone is symmetric bilaterally in  upper and lower extremities.  Reflexes - Reflexes are symmetric.  Sensation - Sensory exam is grossly intact to light touch, pain.  Coordination - Finger to nose and heel to shin is without dysmetria.  Gait and station - able to ambulate with assistance.  Formal gait testing cannot be done due to safety concerns from ongoing medical issues.      DIAGNOSTIC STUDIES     Pertinent Radiology   Radiology Results: Personally reviewed image/s    CT/CTA  HEAD CT:  1.  No acute intracranial hemorrhage or CT evidence of acute large vascular territory ischemic infarct.  2.  Calcified intracranial sclerosis with changes suggestive of mild chronic microvascular ischemic disease.     HEAD CTA:   1.  No high-grade stenosis or occlusion involving the major intracranial arteries.  2.  No aneurysm.     NECK CTA:  1.  No occlusion.  2.  Mixed atherosclerotic plaque results in at least 70% narrowing involving the proximal left ICA with a suspected area of adjacent penetrating atherosclerotic ulcer.  3.  Predominantly calcified atherosclerotic plaque about the right carotid bifurcation without substantial (50% or greater) luminal narrowing.  4.  Additional lesser areas of atherosclerotic disease as detailed above.  5.  No evidence of dissection.    MRI Brain  IMPRESSION:  1.  No acute intracranial process.  2.  Generalized brain atrophy and presumed microvascular ischemic changes as detailed above.    MRI Cervical Spine  IMPRESSION:  1.  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  2.  At C4-C5, there is moderate-to-severe central canal stenosis, severe right and moderate-to-severe left neural foraminal narrowing.  3.  At C5-C6, there is moderate central canal stenosis, severe right and moderate left neural foraminal narrowing.  4.  At C6-C7, there is mild-to-moderate central canal stenosis, moderate-to-severe left and moderate right neural foraminal narrowing.    Echocardiogram  Left ventricular size, wall  motion and function are normal. The ejection  fraction is 60-65%.  Normal right ventricle size and systolic function.  No hemodynamically significant valvular abnormalities on 2D or color flow  imaging.    Carotid Ultrasound  IMPRESSION:  1.  Moderate plaque formation, velocities consistent with less than 50% stenosis in the right internal carotid artery.  2.  Moderate plaque formation, velocities consistent with 50-69% stenosis in the left internal carotid artery.  3.  Flow within the vertebral arteries is antegrade.    Pertinent Labs   Lab Results: personally reviewed.   Recent Results (from the past 24 hour(s))   Glucose by meter    Collection Time: 11/30/23  8:19 AM   Result Value Ref Range    GLUCOSE BY METER POCT 119 (H) 70 - 99 mg/dL   INR    Collection Time: 11/30/23  8:51 AM   Result Value Ref Range    INR 0.97 0.85 - 1.15   Partial thromboplastin time    Collection Time: 11/30/23  8:51 AM   Result Value Ref Range    aPTT 29 22 - 38 Seconds   CBC with platelets and differential    Collection Time: 11/30/23  8:51 AM   Result Value Ref Range    WBC Count 5.3 4.0 - 11.0 10e3/uL    RBC Count 4.83 3.80 - 5.20 10e6/uL    Hemoglobin 14.9 11.7 - 15.7 g/dL    Hematocrit 45.7 35.0 - 47.0 %    MCV 95 78 - 100 fL    MCH 30.8 26.5 - 33.0 pg    MCHC 32.6 31.5 - 36.5 g/dL    RDW 14.3 10.0 - 15.0 %    Platelet Count 211 150 - 450 10e3/uL    % Neutrophils 66 %    % Lymphocytes 21 %    % Monocytes 8 %    % Eosinophils 4 %    % Basophils 1 %    % Immature Granulocytes 0 %    NRBCs per 100 WBC 0 <1 /100    Absolute Neutrophils 3.5 1.6 - 8.3 10e3/uL    Absolute Lymphocytes 1.1 0.8 - 5.3 10e3/uL    Absolute Monocytes 0.4 0.0 - 1.3 10e3/uL    Absolute Eosinophils 0.2 0.0 - 0.7 10e3/uL    Absolute Basophils 0.1 0.0 - 0.2 10e3/uL    Absolute Immature Granulocytes 0.0 <=0.4 10e3/uL    Absolute NRBCs 0.0 10e3/uL   Hemoglobin A1c    Collection Time: 11/30/23  8:51 AM   Result Value Ref Range    Hemoglobin A1C 6.0 (H) <5.7 %   Basic  metabolic panel    Collection Time: 11/30/23  9:56 AM   Result Value Ref Range    Sodium 139 135 - 145 mmol/L    Potassium 4.2 3.4 - 5.3 mmol/L    Chloride 100 98 - 107 mmol/L    Carbon Dioxide (CO2) 28 22 - 29 mmol/L    Anion Gap 11 7 - 15 mmol/L    Urea Nitrogen 14.3 8.0 - 23.0 mg/dL    Creatinine 0.95 0.51 - 0.95 mg/dL    GFR Estimate 66 >60 mL/min/1.73m2    Calcium 10.2 8.8 - 10.2 mg/dL    Glucose 98 70 - 99 mg/dL   Troponin T, High Sensitivity    Collection Time: 11/30/23  9:56 AM   Result Value Ref Range    Troponin T, High Sensitivity 15 (H) <=14 ng/L   Lipid panel reflex to direct LDL: Non-fasting    Collection Time: 11/30/23  9:56 AM   Result Value Ref Range    Cholesterol 261 (H) <200 mg/dL    Triglycerides 66 <150 mg/dL    Direct Measure  >=50 mg/dL    LDL Cholesterol Calculated 146 (H) <=100 mg/dL    Non HDL Cholesterol 159 (H) <130 mg/dL   Hepatic panel    Collection Time: 11/30/23  9:56 AM   Result Value Ref Range    Protein Total 7.9 6.4 - 8.3 g/dL    Albumin 4.5 3.5 - 5.2 g/dL    Bilirubin Total 0.5 <=1.2 mg/dL    Alkaline Phosphatase 87 40 - 150 U/L    AST 29 0 - 45 U/L    ALT 16 0 - 50 U/L    Bilirubin Direct <0.20 0.00 - 0.30 mg/dL   Magnesium    Collection Time: 11/30/23  9:56 AM   Result Value Ref Range    Magnesium 1.9 1.7 - 2.3 mg/dL   Phosphorus    Collection Time: 11/30/23  9:56 AM   Result Value Ref Range    Phosphorus 3.9 2.5 - 4.5 mg/dL   TSH with free T4 reflex    Collection Time: 11/30/23  9:56 AM   Result Value Ref Range    TSH 6.10 (H) 0.30 - 4.20 uIU/mL   T4 free    Collection Time: 11/30/23  9:56 AM   Result Value Ref Range    Free T4 1.00 0.90 - 1.70 ng/dL   Troponin T, High Sensitivity (now)    Collection Time: 11/30/23  2:49 PM   Result Value Ref Range    Troponin T, High Sensitivity 12 <=14 ng/L   Creatinine    Collection Time: 11/30/23  2:49 PM   Result Value Ref Range    Creatinine 0.92 0.51 - 0.95 mg/dL    GFR Estimate 69 >60 mL/min/1.73m2   Echocardiogram Complete -  For age > 60 yrs    Collection Time: 11/30/23  3:50 PM   Result Value Ref Range    LVEF  60-65%    Glucose by meter    Collection Time: 11/30/23  5:29 PM   Result Value Ref Range    GLUCOSE BY METER POCT 115 (H) 70 - 99 mg/dL   Glucose by meter    Collection Time: 11/30/23  8:57 PM   Result Value Ref Range    GLUCOSE BY METER POCT 145 (H) 70 - 99 mg/dL   CBC with platelets    Collection Time: 12/01/23  6:16 AM   Result Value Ref Range    WBC Count 4.5 4.0 - 11.0 10e3/uL    RBC Count 4.64 3.80 - 5.20 10e6/uL    Hemoglobin 14.2 11.7 - 15.7 g/dL    Hematocrit 44.0 35.0 - 47.0 %    MCV 95 78 - 100 fL    MCH 30.6 26.5 - 33.0 pg    MCHC 32.3 31.5 - 36.5 g/dL    RDW 14.1 10.0 - 15.0 %    Platelet Count 164 150 - 450 10e3/uL   Basic metabolic panel    Collection Time: 12/01/23  6:16 AM   Result Value Ref Range    Sodium 139 135 - 145 mmol/L    Potassium 3.9 3.4 - 5.3 mmol/L    Chloride 103 98 - 107 mmol/L    Carbon Dioxide (CO2) 27 22 - 29 mmol/L    Anion Gap 9 7 - 15 mmol/L    Urea Nitrogen 13.8 8.0 - 23.0 mg/dL    Creatinine 0.92 0.51 - 0.95 mg/dL    GFR Estimate 69 >60 mL/min/1.73m2    Calcium 9.4 8.8 - 10.2 mg/dL    Glucose 94 70 - 99 mg/dL         HOSPITAL MEDICATIONS      amLODIPine  5 mg Oral Daily    aspirin  81 mg Oral Daily    enoxaparin ANTICOAGULANT  40 mg Subcutaneous Q24H    ezetimibe  10 mg Oral Daily    fluticasone  1 puff Inhalation Daily    pantoprazole  40 mg Oral QAM AC    rosuvastatin  10 mg Oral Daily    sodium chloride (PF)  3 mL Intracatheter Q8H        Total time spent for face to face visit, reviewing labs/imaging studies, counseling and coordination of care was: 1 Hour. More than 50% of this time was spent on counseling and coordination of care.    Dragon software used in the dictation on this note.    Arianna Gray MD  [facility] Neurology [address], [address]  [place] [address]

## 2023-12-01 NOTE — CONSULTS
NEUROLOGY CONSULTATION NOTE     Marlen Dumas,  1960, MRN [id number] Date: 2023     [facility]   Code status:  Full Code   PCP: Ricardo Davila, [phone]      ASSESSMENT & PLAN   Diagnosis code: Strokelike symptoms-rule out TIA    Strokelike symptoms-rule out TIA  Left ICA stenosis  Right neck pain    MRI brain negative for acute stroke  CTA shows left ICA stenosis with ulcerated plaque  Carotid ultrasound; consider vascular surgery consultation  Echocardiogram  Cardiac monitoring  Patient's had similar spells in the past and it is possible the spells might not be cerebrovascular in nature.  Check MRI C-spine to further evaluate right neck pain  Aspirin Plavix for 3 weeks.  Was on aspirin 81 mg at baseline  Lipid panel and statin.  Aggressive control of vascular risk factors  PT/OT       Chief Complaint   Patient presents with    Dizziness        HISTORY OF PRESENT ILLNESS     We have been requested by Dr. Devine found to evaluate Marlen Dumas who is a 63 year old  female for evaluation of TIA.  Patient reports that she woke up this morning and noticed some blurred vision in both eyes.  No vision loss.  This was followed by right forearm pain and numbness.  She then felt unsteady when she was trying to walk outside.  Symptoms lasted for about 15 minutes.  No headaches chest pains or palpitations.  She had similar symptoms about 14 years ago and at that time work-up was negative.  She was diagnosed to have a TIA.  Over the last few months she has been taking aspirin 81 mg because of coronary artery disease.  She has also been having neck pain on the right side and has been seeing a chiropractor for this.  Last chiropractic visit was 4 days ago.  MRI did not show stroke.  She was not a candidate for thrombolytics.  CT angiogram did show 70% left ICA stenosis.     PAST MEDICAL & SURGICAL HISTORY     Medical History  Past Medical History:   Diagnosis Date    COPD (chronic obstructive  pulmonary disease) (H)     GERD (gastroesophageal reflux disease) 2023    HLD (hyperlipidemia)     HTN (hypertension)     Hx of laparoscopic adjustable gastric banding 10/01/2008    Initial weight 295.5 BMI 50.4 Dr. Rowell    Hymenoptera allergy     Hypothyroidism     S/P colonoscopy 2019    Tobacco abuse     Vitamin D deficiency 2016     Surgical History  Past Surgical History:   Procedure Laterality Date    FOOT SURGERY      Sting burton palomo    LAPAROSCOPIC GASTRIC BANDING      LUMBAR DISC SURGERY      AR ESOPHAGOGASTRODUODENOSCOPY TRANSORAL DIAGNOSTIC N/A 2020    Procedure: ESOPHAGOGASTRODUODENOSCOPY (EGD);  Surgeon: Jared Howard MD;  Location: [facility];  Service: General        SOCIAL HISTORY     Social History     Tobacco Use    Smoking status: Former     Packs/day: 0.50     Years: 37.00     Additional pack years: 0.00     Total pack years: 18.50     Types: Cigarettes     Quit date: 2022     Years since quittin.2    Smokeless tobacco: Never    Tobacco comments:     quit 22   Vaping Use    Vaping Use: Never used   Substance Use Topics    Alcohol use: Yes     Alcohol/week: 4.0 standard drinks of alcohol     Types: 4 Standard drinks or equivalent per week     Comment: 4-5 beer/wine weekly    Drug use: Never        FAMILY HISTORY     Reviewed, and family history includes Anxiety Disorder in her father; Chronic Obstructive Pulmonary Disease in her father; Coronary Artery Disease (age of onset: 58) in her mother; Hypertension in her brother, father, and mother.     ALLERGIES     Allergies   Allergen Reactions    Other Environmental Allergy Unknown     Bee sting    Sulfa (Sulfonamide Antibiotics) [Sulfa Antibiotics] Unknown        REVIEW OF SYSTEMS     12 System ROS was done other than the HPI this was negative.  Pertinent positives noted in the HPI.     HOME & HOSPITAL MEDICATIONS     Prior to Admission Medications  (Not in a hospital admission)      Hospital  "Medications   [START ON 12/1/2023] amLODIPine  5 mg Oral Daily    [START ON 12/1/2023] aspirin  81 mg Oral Daily    enoxaparin ANTICOAGULANT  40 mg Subcutaneous Q24H    [START ON 12/1/2023] ezetimibe  10 mg Oral Daily    [START ON 12/1/2023] fluticasone  1 puff Inhalation Daily    pantoprazole  40 mg Oral QAM AC    rosuvastatin  10 mg Oral Daily    sodium chloride (PF)  3 mL Intracatheter Q8H        PHYSICAL EXAM     Vital signs  Temp:  [98  F (36.7  C)-98.2  F (36.8  C)] 98  F (36.7  C)  Pulse:  [63-87] 73  Resp:  [12-33] 16  BP: (104-191)/(54-87) 172/79  SpO2:  [94 %-97 %] 94 %    PHYSICAL EXAMINATION  VITALS: BP (!) 172/79 (BP Location: Right arm)   Pulse 73   Temp 98  F (36.7  C) (Oral)   Resp 16   Ht 1.632 m (5' 4.25\")   Wt 77.1 kg (170 lb)   LMP  (LMP Unknown)   SpO2 94%   BMI 28.95 kg/m    GENERAL -Health appearing, No apparent distress  EYES- No scleral icterus, no eyelid droop, Pupils - see Neuro section  HEENT - Normocephalic, atraumatic, Hearing grossly intact; Oral mucosa moist and pink in color. External Ears and nose intact.   Neck - supple.  PULM - Good spontaneous respiratory effort.  CV- Pedal pulses present with no peripheral edema/ No significant varicosities.  MSK- Gait - see Neuro section; Strength and tone- see Neuro section; Range of motion grossly intact.  PSYCH -cooperative    Neurological  Mental status - Patient is awake and grossly oriented to self, place and time. Attention span is normal. Language is fluent and follows commands appropriately.   Cranial nerves - CN II-XII intact. Pupils are reactive and symmetric; EOMI, VFIT, NLF symmetric  Motor - There is no pronator drift. Motor exam shows 5/5 strength in all extremities.  Tone - Tone is symmetric bilaterally in upper and lower extremities.  Reflexes - Reflexes are symmetric.  Sensation - Sensory exam is grossly intact to light touch, pain.  Coordination - Finger to nose and heel to shin is without dysmetria.  Gait and station " --able to ambulate with assistance.  Formal gait testing cannot be done due to safety concerns from ongoing medical issues.       DIAGNOSTIC STUDIES     Pertinent Radiology   HEAD CT:  1.  No acute intracranial hemorrhage or CT evidence of acute large vascular territory ischemic infarct.  2.  Calcified intracranial sclerosis with changes suggestive of mild chronic microvascular ischemic disease.     HEAD CTA:   1.  No high-grade stenosis or occlusion involving the major intracranial arteries.  2.  No aneurysm.     NECK CTA:  1.  No occlusion.  2.  Mixed atherosclerotic plaque results in at least 70% narrowing involving the proximal left ICA with a suspected area of adjacent penetrating atherosclerotic ulcer.  3.  Predominantly calcified atherosclerotic plaque about the right carotid bifurcation without substantial (50% or greater) luminal narrowing.  4.  Additional lesser areas of atherosclerotic disease as detailed above.  5.  No evidence of dissection    MRI-images reviewed  IMPRESSION:  1.  No acute intracranial process.  2.  Generalized brain atrophy and presumed microvascular ischemic changes as detailed above.    Component      Latest Ref Rng 11/30/2023  8:51 AM 11/30/2023  9:56 AM   Cholesterol      <200 mg/dL  261 (H)    Triglycerides      <150 mg/dL  66    HDL Cholesterol      >=50 mg/dL  102    LDL Cholesterol Calculated      <=100 mg/dL  146 (H)    Non HDL Cholesterol      <130 mg/dL  159 (H)    Troponin T, High Sensitivity      <=14 ng/L  15 (H)    Hemoglobin A1C      <5.7 % 6.0 (H)     TSH      0.30 - 4.20 uIU/mL  6.10 (H)    T4 Free      0.90 - 1.70 ng/dL  1.00       Legend:  (H) High  Recent Results (from the past 24 hour(s))   Glucose by meter    Collection Time: 11/30/23  8:19 AM   Result Value Ref Range    GLUCOSE BY METER POCT 119 (H) 70 - 99 mg/dL   INR    Collection Time: 11/30/23  8:51 AM   Result Value Ref Range    INR 0.97 0.85 - 1.15   Partial thromboplastin time    Collection Time: 11/30/23   8:51 AM   Result Value Ref Range    aPTT 29 22 - 38 Seconds   CBC with platelets and differential    Collection Time: 11/30/23  8:51 AM   Result Value Ref Range    WBC Count 5.3 4.0 - 11.0 10e3/uL    RBC Count 4.83 3.80 - 5.20 10e6/uL    Hemoglobin 14.9 11.7 - 15.7 g/dL    Hematocrit 45.7 35.0 - 47.0 %    MCV 95 78 - 100 fL    MCH 30.8 26.5 - 33.0 pg    MCHC 32.6 31.5 - 36.5 g/dL    RDW 14.3 10.0 - 15.0 %    Platelet Count 211 150 - 450 10e3/uL    % Neutrophils 66 %    % Lymphocytes 21 %    % Monocytes 8 %    % Eosinophils 4 %    % Basophils 1 %    % Immature Granulocytes 0 %    NRBCs per 100 WBC 0 <1 /100    Absolute Neutrophils 3.5 1.6 - 8.3 10e3/uL    Absolute Lymphocytes 1.1 0.8 - 5.3 10e3/uL    Absolute Monocytes 0.4 0.0 - 1.3 10e3/uL    Absolute Eosinophils 0.2 0.0 - 0.7 10e3/uL    Absolute Basophils 0.1 0.0 - 0.2 10e3/uL    Absolute Immature Granulocytes 0.0 <=0.4 10e3/uL    Absolute NRBCs 0.0 10e3/uL   Hemoglobin A1c    Collection Time: 11/30/23  8:51 AM   Result Value Ref Range    Hemoglobin A1C 6.0 (H) <5.7 %   Basic metabolic panel    Collection Time: 11/30/23  9:56 AM   Result Value Ref Range    Sodium 139 135 - 145 mmol/L    Potassium 4.2 3.4 - 5.3 mmol/L    Chloride 100 98 - 107 mmol/L    Carbon Dioxide (CO2) 28 22 - 29 mmol/L    Anion Gap 11 7 - 15 mmol/L    Urea Nitrogen 14.3 8.0 - 23.0 mg/dL    Creatinine 0.95 0.51 - 0.95 mg/dL    GFR Estimate 66 >60 mL/min/1.73m2    Calcium 10.2 8.8 - 10.2 mg/dL    Glucose 98 70 - 99 mg/dL   Troponin T, High Sensitivity    Collection Time: 11/30/23  9:56 AM   Result Value Ref Range    Troponin T, High Sensitivity 15 (H) <=14 ng/L   Lipid panel reflex to direct LDL: Non-fasting    Collection Time: 11/30/23  9:56 AM   Result Value Ref Range    Cholesterol 261 (H) <200 mg/dL    Triglycerides 66 <150 mg/dL    Direct Measure  >=50 mg/dL    LDL Cholesterol Calculated 146 (H) <=100 mg/dL    Non HDL Cholesterol 159 (H) <130 mg/dL   Hepatic panel    Collection Time:  11/30/23  9:56 AM   Result Value Ref Range    Protein Total 7.9 6.4 - 8.3 g/dL    Albumin 4.5 3.5 - 5.2 g/dL    Bilirubin Total 0.5 <=1.2 mg/dL    Alkaline Phosphatase 87 40 - 150 U/L    AST 29 0 - 45 U/L    ALT 16 0 - 50 U/L    Bilirubin Direct <0.20 0.00 - 0.30 mg/dL   Magnesium    Collection Time: 11/30/23  9:56 AM   Result Value Ref Range    Magnesium 1.9 1.7 - 2.3 mg/dL   Phosphorus    Collection Time: 11/30/23  9:56 AM   Result Value Ref Range    Phosphorus 3.9 2.5 - 4.5 mg/dL   TSH with free T4 reflex    Collection Time: 11/30/23  9:56 AM   Result Value Ref Range    TSH 6.10 (H) 0.30 - 4.20 uIU/mL   T4 free    Collection Time: 11/30/23  9:56 AM   Result Value Ref Range    Free T4 1.00 0.90 - 1.70 ng/dL   Troponin T, High Sensitivity (now)    Collection Time: 11/30/23  2:49 PM   Result Value Ref Range    Troponin T, High Sensitivity 12 <=14 ng/L   Creatinine    Collection Time: 11/30/23  2:49 PM   Result Value Ref Range    Creatinine 0.92 0.51 - 0.95 mg/dL    GFR Estimate 69 >60 mL/min/1.73m2   Echocardiogram Complete - For age > 60 yrs    Collection Time: 11/30/23  3:50 PM   Result Value Ref Range    LVEF  60-65%    Glucose by meter    Collection Time: 11/30/23  5:29 PM   Result Value Ref Range    GLUCOSE BY METER POCT 115 (H) 70 - 99 mg/dL       Total time spent for face to face visit, reviewing labs/imaging studies, counseling and coordination of care was: Over 80 min More than 50% of this time was spent on counseling and coordination of care.    Counseling patient.  Reviewing chart.  Patient new to me.  Coordination of care with the primary team.  Reviewing test results/imaging studies.    Jorge Mckeon MD  Neurologist  [facility]  Tel:- [phone]

## 2023-12-01 NOTE — PLAN OF CARE
"Goal Outcome Evaluation:      Plan of Care Reviewed With: patient    Overall Patient Progress: improvingOverall Patient Progress: improving    Outcome Evaluation: Pt rated pain 4/10, worse with movment, received Toradol with relief. Reports pain was due to pressure to void even after urinating. BP elevated, no blurry vision, dizziness, or weakness reported. Otherwise VSS. Pills whole. SBA. Slept between cares.    BP (!) 174/85 (BP Location: Right arm)   Pulse 63   Temp 97.6  F (36.4  C) (Oral)   Resp 18   Ht 1.632 m (5' 4.25\")   Wt 77.1 kg (170 lb)   LMP  (LMP Unknown)   SpO2 93%   BMI 28.95 kg/m      Ruslan Rondon RN    "

## 2023-12-01 NOTE — PLAN OF CARE
Problem: Adult Inpatient Plan of Care  Goal: Absence of Hospital-Acquired Illness or Injury  Outcome: Progressing  Intervention: Identify and Manage Fall Risk  Recent Flowsheet Documentation  Taken 11/30/2023 1907 by Anupama Crawford RN  Safety Promotion/Fall Prevention: activity supervised  Intervention: Prevent Skin Injury  Recent Flowsheet Documentation  Taken 11/30/2023 1945 by Anupama Crawford RN  Body Position: position changed independently  Taken 11/30/2023 1907 by Anupama Crawford RN  Body Position: supine, head elevated  Intervention: Prevent and Manage VTE (Venous Thromboembolism) Risk  Recent Flowsheet Documentation  Taken 11/30/2023 1907 by Anupama Crawford RN  VTE Prevention/Management:   SCDs (sequential compression devices) off   patient refused intervention   Goal Outcome Evaluation:    Pt arrived to the unit from ED at 1900, alert and oriented x4, on room air. Pt denies feeling dizzy or lightheaded. Pt no longer has any numbness in R arm.  Scored zero on NIH scale.     Pt is up independently in the room, eating, drinking and voiding well. Refused SCD pumps for now. Pt is able to make her needs known.     Anupama Crawford RN.

## 2023-12-01 NOTE — PLAN OF CARE
"  Problem: Adult Inpatient Plan of Care  Goal: Plan of Care Review  Description: The Plan of Care Review/Shift note should be completed every shift.  The Outcome Evaluation is a brief statement about your assessment that the patient is improving, declining, or no change.  This information will be displayed automatically on your shift  note.  Outcome: Adequate for Care Transition     Problem: Adult Inpatient Plan of Care  Goal: Patient-Specific Goal (Individualized)  Description: You can add care plan individualizations to a care plan. Examples of Individualization might be:  \"Parent requests to be called daily at 9am for status\", \"I have a hard time hearing out of my right ear\", or \"Do not touch me to wake me up as it startles  me\".  Outcome: Adequate for Care Transition     Problem: Adult Inpatient Plan of Care  Goal: Absence of Hospital-Acquired Illness or Injury  Intervention: Prevent Skin Injury  Recent Flowsheet Documentation  Taken 12/1/2023 1000 by Oscar Crump RN  Body Position: position changed independently   Goal Outcome Evaluation:    Patient A&Ox4, ambulating independently in room, halls and cafeteria. Denies SOB upon exertion. Met criteria to discharge in afternoon. Patient left at 1452. Verbalized understanding of discharge education, denied having any questions. Patient prescriptions sent to patient pharmacy.     "

## 2023-12-01 NOTE — PROGRESS NOTES
"PRIMARY DIAGNOSIS: SYNCOPE/TIA  OUTPATIENT/OBSERVATION GOALS TO BE MET BEFORE DISCHARGE:  1. Orthostatic performed: No    2. Diagnostic testing complete & at baseline neurologic testing: Yes    3. Cleared by consultants (if involved): No    4. Interpretation of cardiac rhythm per telemetry tech: NSR with PVCs    5. Tolerating adequate PO diet and medications: Yes    6. Return to near baseline physical activity or neurologic status: Yes    Discharge Planner Nurse   Safe discharge environment identified: Yes  Barriers to discharge: Yes       Entered by: Ruslan Rondon RN 12/01/2023 7:03 AM     Cooperative. Neuro checks good. NIH 0. Anxious about missing glasses. Pills whole. Independent. VSS. Slept between cares.     /74 (BP Location: Left arm)   Pulse 63   Temp 97.7  F (36.5  C) (Oral)   Resp 18   Ht 1.632 m (5' 4.25\")   Wt 77.1 kg (170 lb)   LMP  (LMP Unknown)   SpO2 95%   BMI 28.95 kg/m      Ruslan Rondon RN    "

## 2023-12-01 NOTE — PROGRESS NOTES
"PRIMARY DIAGNOSIS: SYNCOPE/TIA  OUTPATIENT/OBSERVATION GOALS TO BE MET BEFORE DISCHARGE:  1. Orthostatic performed: No    2. Diagnostic testing complete & at baseline neurologic testing: Yes    3. Cleared by consultants (if involved): No    4. Interpretation of cardiac rhythm per telemetry tech: NSR with PVCs    5. Tolerating adequate PO diet and medications: Yes    6. Return to near baseline physical activity or neurologic status: Yes    Discharge Planner Nurse   Safe discharge environment identified: Yes  Barriers to discharge: Yes       Entered by: Ruslan Rondon RN 12/01/2023 3:11 AM     Denies pain, nausea, numbness, tingling. Neuro checks good, NIH score 0.     Reports left side weaker at baseline for over 30 years. Given Ativan before going to MRI due to claustrophobia. Glasses went missing which added to anxiety.    MRI cervical spine showed moderate stenosis. Pt sleeping soundly.    BP (!) 174/85 (BP Location: Right arm)   Pulse 63   Temp 97.6  F (36.4  C) (Oral)   Resp 18   Ht 1.632 m (5' 4.25\")   Wt 77.1 kg (170 lb)   LMP  (LMP Unknown)   SpO2 93%   BMI 28.95 kg/m      Ruslan Rondon RN    "

## 2023-12-01 NOTE — PROGRESS NOTES
Pt AxOx4 on room air. Hypertensive w/ SBP in the 170s after ambulating to the bathroom. Neuros intact, NIH score of 0. Denies N/T in all extremities. Denies pain. No weakness observed. Echo done, EF 60-65%. Ind in room. SR on tele.

## 2023-12-02 NOTE — TELEPHONE ENCOUNTER
Please call patient -    ______________________________________________________________________     Home phone:  [phone] (home)     Cell phone:   Telephone Information:   Mobile [phone]       Other contacts:  Name Home Phone Work Phone Mobile Phone Relationship Lgl BrigidoGUERA Mosher [phone]   Friend      ______________________________________________________________________     I see she was just in the hospital with TIA symptoms.    Looking to schedule a follow up from the hospital stay if she is willing.    Please see if 1:30 pm on Friday December 8th would work out.    Ricardo Davila MD  [facility]  12/1/2023, 11:00 PM

## 2023-12-04 ENCOUNTER — TRANSFERRED RECORDS (OUTPATIENT)
Dept: HEALTH INFORMATION MANAGEMENT | Facility: CLINIC | Age: 63
End: 2023-12-04
Payer: MEDICARE

## 2023-12-04 ENCOUNTER — TELEPHONE (OUTPATIENT)
Dept: INTERNAL MEDICINE | Facility: CLINIC | Age: 63
End: 2023-12-04

## 2023-12-04 NOTE — TELEPHONE ENCOUNTER
Reason for Call:  Appointment Request    Patient requesting this type of appt:  Hospital/ED Follow-Up     Requested provider: Ricardo Davila    Reason patient unable to be scheduled: Not with their preferred provider    When does patient want to be seen/preferred time: This Week    Comments: Per patient - just schedule her she is retired so she can make anything work.  She does have an appt for CT on 12/7 other than that she is flexible to making any appointment time work.    Went to [facility] on 11/30 and was discharged on 12/1/23    Could we send this information to you in [employer] or would you prefer to receive a phone call?:   Patient would prefer a phone call   Okay to leave a detailed message?: Yes at Home number on file [phone] (home)    Call taken on 12/4/2023 at 9:15 AM by Mansi Degroot

## 2023-12-04 NOTE — TELEPHONE ENCOUNTER
There is already a separate encounter for this where this is addressed but not completed.  Call patient.    Ricardo Davila MD  General Internal Medicine  [facility]  12/4/2023, 9:28 AM

## 2023-12-04 NOTE — TELEPHONE ENCOUNTER
Pt calling back- Duplicate encounter made from Mansi Below :        Reason for Call:  Appointment Request     Patient requesting this type of appt:  Hospital/ED Follow-Up      Requested provider: Ricardo Davila     Reason patient unable to be scheduled: Not with their preferred provider     When does patient want to be seen/preferred time: This Week     Comments: Per patient - just schedule her she is retired so she can make anything work.  She does have an appt for CT on 12/7 other than that she is flexible to making any appointment time work.     Went to [facility] on 11/30 and was discharged on 12/1/23     Could we send this information to you in [employer] or would you prefer to receive a phone call?:   Patient would prefer a phone call   Okay to leave a detailed message?: Yes at Home number on file [phone] (home)     Call taken on 12/4/2023 at 9:15 AM by Mansi Degroot      Scheduled pt for 12/8/23 at 130pm. Will send [employer] message letting pt know about appointment.

## 2023-12-06 LAB
ATRIAL RATE - MUSE: 62 BPM
DIASTOLIC BLOOD PRESSURE - MUSE: NORMAL MMHG
INTERPRETATION ECG - MUSE: NORMAL
P AXIS - MUSE: 19 DEGREES
PR INTERVAL - MUSE: 158 MS
QRS DURATION - MUSE: 100 MS
QT - MUSE: 436 MS
QTC - MUSE: 442 MS
R AXIS - MUSE: -22 DEGREES
SYSTOLIC BLOOD PRESSURE - MUSE: NORMAL MMHG
T AXIS - MUSE: 86 DEGREES
VENTRICULAR RATE- MUSE: 62 BPM

## 2023-12-07 ENCOUNTER — HOSPITAL ENCOUNTER (OUTPATIENT)
Dept: CT IMAGING | Facility: HOSPITAL | Age: 63
Discharge: HOME | End: 2023-12-07
Attending: INTERNAL MEDICINE | Admitting: INTERNAL MEDICINE
Payer: MEDICARE

## 2023-12-07 DIAGNOSIS — R91.8 PULMONARY NODULES: ICD-10-CM

## 2023-12-07 PROCEDURE — 71250 CT THORAX DX C-: CPT | Mod: MF

## 2023-12-08 ENCOUNTER — OFFICE VISIT (OUTPATIENT)
Dept: INTERNAL MEDICINE | Facility: CLINIC | Age: 63
End: 2023-12-08
Payer: MEDICARE

## 2023-12-08 VITALS
HEART RATE: 63 BPM | BODY MASS INDEX: 29.74 KG/M2 | WEIGHT: 174.2 LBS | OXYGEN SATURATION: 98 % | TEMPERATURE: 97.9 F | SYSTOLIC BLOOD PRESSURE: 159 MMHG | RESPIRATION RATE: 22 BRPM | DIASTOLIC BLOOD PRESSURE: 85 MMHG | HEIGHT: 64 IN

## 2023-12-08 DIAGNOSIS — I10 PRIMARY HYPERTENSION: Chronic | ICD-10-CM

## 2023-12-08 DIAGNOSIS — I65.22 STENOSIS OF LEFT CAROTID ARTERY: ICD-10-CM

## 2023-12-08 DIAGNOSIS — Z09 HOSPITAL DISCHARGE FOLLOW-UP: ICD-10-CM

## 2023-12-08 DIAGNOSIS — M48.02 SPINAL STENOSIS IN CERVICAL REGION: ICD-10-CM

## 2023-12-08 DIAGNOSIS — R91.8 PULMONARY NODULES: Primary | ICD-10-CM

## 2023-12-08 DIAGNOSIS — Z72.0 TOBACCO ABUSE: Primary | Chronic | ICD-10-CM

## 2023-12-08 DIAGNOSIS — E78.2 MIXED HYPERLIPIDEMIA: ICD-10-CM

## 2023-12-08 DIAGNOSIS — R20.0 RIGHT ARM NUMBNESS: ICD-10-CM

## 2023-12-08 DIAGNOSIS — R42 DIZZINESS: ICD-10-CM

## 2023-12-08 DIAGNOSIS — I65.23 BILATERAL CAROTID ARTERY STENOSIS: ICD-10-CM

## 2023-12-08 DIAGNOSIS — M54.2 NECK PAIN: ICD-10-CM

## 2023-12-08 DIAGNOSIS — I25.10 CORONARY ARTERY DISEASE INVOLVING NATIVE CORONARY ARTERY OF NATIVE HEART WITHOUT ANGINA PECTORIS: ICD-10-CM

## 2023-12-08 DIAGNOSIS — K20.80 LYMPHOCYTIC ESOPHAGITIS: ICD-10-CM

## 2023-12-08 DIAGNOSIS — G45.9 TIA (TRANSIENT ISCHEMIC ATTACK): ICD-10-CM

## 2023-12-08 PROCEDURE — 99215 OFFICE O/P EST HI 40 MIN: CPT | Performed by: INTERNAL MEDICINE

## 2023-12-08 RX ORDER — NICOTINE 21 MG/24HR
1 PATCH, TRANSDERMAL 24 HOURS TRANSDERMAL EVERY 24 HOURS
Qty: 30 PATCH | Refills: 3 | Status: SHIPPED | OUTPATIENT
Start: 2023-12-08

## 2023-12-08 RX ORDER — ROSUVASTATIN CALCIUM 5 MG/1
5 TABLET, COATED ORAL DAILY
Qty: 90 TABLET | Refills: 3 | Status: SHIPPED | OUTPATIENT
Start: 2023-12-08 | End: 2024-01-07 | Stop reason: ALTCHOICE

## 2023-12-08 RX ORDER — VARENICLINE TARTRATE 0.5 (11)-1
KIT ORAL
Qty: 56 TABLET | Refills: 0 | Status: ON HOLD | OUTPATIENT
Start: 2023-12-08 | End: 2024-01-09

## 2023-12-08 RX ORDER — VARENICLINE TARTRATE 1 MG/1
1 TABLET, FILM COATED ORAL 2 TIMES DAILY
Qty: 60 TABLET | Refills: 5 | Status: SHIPPED | OUTPATIENT
Start: 2023-12-08 | End: 2024-07-24

## 2023-12-08 ASSESSMENT — PAIN SCALES - GENERAL: PAINLEVEL: NO PAIN (0)

## 2023-12-08 NOTE — PROGRESS NOTES
"  {PROVIDER CHARTING PREFERENCE:157540}    Subjective   Marlen is a 63 year old, presenting for the following health issues:  Hospital F/U and Medication Request (21mg nicotine patch to a different pharmacy)        12/8/2023     1:24 PM   Additional Questions   Roomed by Dawson ADAMS   Accompanied by N/A       {MA/LPN/RN Pre-Provider Visit Orders- hCG/UA/Strep (Optional):674374}    Hospital Follow-up Visit:    Hospital/Nursing Home/IP Rehab Facility: [facility]  Date of Admission: 11/30/2023  Date of Discharge: 12/01/2023  Reason(s) for Admission: TIA (transient ischemic attack)     Was your hospitalization related to COVID-19? No   Problems taking medications regularly:  None  Medication changes since discharge: START TAKING clopidogrel (PLAVIX) 75 MG tablet, pantoprazole (PROTONIX) 40 MG EC tablet    STOP TAKING magic mouthwash (ENTER INGREDIENTS IN COMMENTS) suspension, omeprazole (PRILOSEC) 40 MG DR capsule, predniSONE (DELTASONE) 20 MG tablet   Problems adhering to non-medication therapy:  None    Summary of hospitalization:  {:388064}  Diagnostic Tests/Treatments reviewed.  Follow up needed: {:483142:}  Other Healthcare Providers Involved in Patient s Care:         {those currently involved after discharge:241010::\"None\"}  Update since discharge: {:504187} {TIP  Include information from family/caregivers, SNF, Care Coordination :375104}        Plan of care communicated with {:057712}     {Reference  Coding guidelines- Moderate Complexity F2F/Video within 7 - 14 days of discharge 6790749, High Complexity F2F/Video within 7 days 7072767 or [id number] days 7483812 :511971}      {additonal problems for provider to add (Optional):815030}  {additonal problems for provider to add (Optional):516413}      Review of Systems   {ROS COMP (Optional):178824}      Objective    BP (!) 159/85 (BP Location: Right arm, Patient Position: Sitting, Cuff Size: Adult Regular)   Pulse 63   Temp 97.9  F (36.6  C) " "(Oral)   Resp 22   Ht 1.632 m (5' 4.25\")   Wt 79 kg (174 lb 3.2 oz)   LMP  (LMP Unknown)   SpO2 98%   BMI 29.67 kg/m    Body mass index is 29.67 kg/m .  Physical Exam   {Exam List (Optional):158353}    {Diagnostic Test Results (Optional):956834}    {AMBULATORY ATTESTATION (Optional):119841}              " DASH Diet

## 2023-12-08 NOTE — PATIENT INSTRUCTIONS
Future Appointments   Date Time Provider Department Center   1/22/2024  9:00 AM [facility]

## 2023-12-08 NOTE — PROGRESS NOTES
[facility] - [facility] Specialists    Comprehensive and complex medical care - Chronic disease management - Shared decision making - Care coordination - Compassionate care    Patient advocacy - Rational deprescribing - Minimally disruptive medicine - Ethical focus - Customized care         Date of Service: 12/8/2023  Primary Provider: Ricardo Davila    Patient Care Team:  Ricardo Davila MD as PCP - General (Internal Medicine)  Ricardo Davila MD as Assigned PCP  Julissa Hagre MD as MD (Family Medicine)  Loni Jaquez MD as Assigned Pulmonology Provider  Maureen Pérez NP as Nurse Practitioner  Maureen Pérez NP as Assigned Surgical Provider          Patient's Pharmacy:    [id number] - [place], [place] - [address] [place] [address] AT [facility] &  [address]  [address] [facility] [address]  Phone: [phone] Fax: [phone]     Patient's Contacts:  Name Home Phone Work Phone Mobile Phone Relationship Lgl GUERA Pablo [phone]   Friend      Patient's Insurance:    Payor: MEDICARE / Plan: MEDICARE / Product Type: Medicare /            Active Problem List:  Problem List as of 12/8/2023 Reviewed: 12/8/2023  3:49 PM by Ricardo Davila MD         High    Tobacco abuse    CAD (coronary artery disease)    COPD (chronic obstructive pulmonary disease) (H)       Medium    Primary hypertension    Hx of laparoscopic adjustable gastric banding    GERD (gastroesophageal reflux disease)    TIA (transient ischemic attack)    Spinal stenosis in cervical region    Lymphocytic esophagitis    Bilateral carotid artery stenosis       Low    Mixed hyperlipidemia    Obesity (BMI 30.0-34.9)    Zinc deficiency    Vitamin D deficiency    Normal colonoscopy - 2018 - Average risk    Hymenoptera allergy    Lumbar radiculopathy    Last Assessment & Plan 9/28/2021 Office Visit Written 9/30/2021  8:48 AM by Vivi Corrales NP     - L5/S1 pattern. She will be  referred to PT.  If not making any progress or worsening, consider MRI. I can order this for her, she would need an anxiolytic due to claustrophobia  - Follow up with PCP if worsening or no improvement              Current Outpatient Medications   Medication Instructions    albuterol (PROAIR HFA/PROVENTIL HFA/VENTOLIN HFA) 108 (90 Base) MCG/ACT inhaler 2 puffs, Inhalation, EVERY 6 HOURS PRN    amLODIPine (NORVASC) 5 MG tablet TAKE 1 TABLET(5 MG) BY MOUTH DAILY    aspirin (ASA) 81 mg, Oral, DAILY    clopidogrel (PLAVIX) 75 mg, Oral, DAILY    EPINEPHrine (ANY BX GENERIC EQUIV) 0.3 mg, Intramuscular, ONCE PRN, May repeat one time in 5-15 minutes if response to initial dose is inadequate.    ezetimibe (ZETIA) 10 mg, Oral, DAILY    fluticasone (ARNUITY ELLIPTA) 100 MCG/ACT inhaler 1 puff, Inhalation, DAILY    nicotine (NICODERM CQ) 21 MG/24HR 24 hr patch 1 patch, Transdermal, EVERY 24 HOURS    pantoprazole (PROTONIX) 40 mg, Oral, EVERY MORNING BEFORE BREAKFAST    rosuvastatin (CRESTOR) 5 mg, Oral, DAILY    sucralfate (CARAFATE) 1 g, Oral, DAILY PRN    varenicline (CHANTIX ALYSSA) 0.5 MG X 11 & 1 MG X 42 tablet Take 0.5 mg tab daily for 3 days, THEN 0.5 mg tab twice daily for 4 days, THEN 1 mg twice daily.    varenicline (CHANTIX) 1 mg, Oral, 2 TIMES DAILY      Social History     Social History Narrative    Lives with a friend.12/4/2018  Retired after working for [employer].       Subjective:     Marlen Dumas is a 63 year old female who comes in today for:    Chief Complaint   Patient presents with    Hospital F/U    Medication Request     21mg nicotine patch to a different pharmacy          12/8/2023     1:24 PM   Additional Questions   Roomed by Dawson ADAMS   Accompanied by N/A     Patient comes in today for follow-up of hospital stay and other issues.  There are number of issues to review from this today.    She presented to the ER after having some issues with dizziness.  She normally wakes up around 430 to 5 AM.  About an  hour after waking up she was reading the newspaper and felt dizziness.  She could not turn her head suddenly.  Her right arm felt a little bit numb at the same time.  This lasted for about 10 to 15 minutes.  It did go away eventually.  She denies any overt vertiginous symptoms.    It occurred later that morning with dizziness after getting up.  She had some right arm pain and numbness at the same time.    She was evaluated at [facility].  Her CT scan showed no acute infarct and MRI likewise did not show this.  She did have evidence of carotid artery disease on both sides with the left being worse with an ulceration.  We talked about possible vascular referral given her age and comorbidities.    We reviewed her CT of her neck as well.  She has had issues with neck pain.  This showed foraminal and central stenosis.  The role of this in this whole process is uncertain.    She has had not had any further major dizzy spells since.  She did have some spells back in 2008 and 2009 which were worked up but really has not had this since then.    We reviewed her tobacco abuse and she would like to go back on Chantix and nicotine patch.  She would like the 21 mg patch.  She can get her Chantix cheaper through [facility].  We will do both for her.  The Chantix has worked in the past.    We reviewed her recent EGD and this showed lymphocytic esophagitis and she has a follow-up with the esophageal specialist related to this.    Her blood pressure is a bit high today.    She is planning to go to [facility] in February.    We reviewed her hyperlipidemia and she would like to now try a low-dose of a statin and see how this goes for her.    She is taking baby aspirin along with clopidogrel but will be stopping the clopidogrel after 21 days.    We reviewed her other issues noted in the assessment but not specifically addressed in the HPI above.     Objective:     Wt Readings from Last 3 Encounters:   12/08/23 79 kg (174 lb 3.2  "oz)   11/30/23 77.1 kg (170 lb)   10/13/23 78.6 kg (173 lb 3.2 oz)     BP Readings from Last 3 Encounters:   12/08/23 (!) 159/85   12/01/23 130/74   10/13/23 138/62     BP (!) 159/85 (BP Location: Right arm, Patient Position: Sitting, Cuff Size: Adult Regular)   Pulse 63   Temp 97.9  F (36.6  C) (Oral)   Resp 22   Ht 1.632 m (5' 4.25\")   Wt 79 kg (174 lb 3.2 oz)   LMP  (LMP Unknown)   SpO2 98%   BMI 29.67 kg/m     The patient is comfortable, no acute distress.  Mood good.  Insight good.  Eyes are nonicteric.  Neck is supple without mass.  No cervical adenopathy.  No thyromegaly. Heart regular rate and rhythm.  Lungs clear to auscultation bilaterally.  Respiratory effort is good.  Extremities no edema.      Diagnostics:     Admission on 11/30/2023, Discharged on 12/01/2023   Component Date Value Ref Range Status    Sodium 11/30/2023 139  135 - 145 mmol/L Final    Reference intervals for this test were updated on 09/26/2023 to more accurately reflect our healthy population. There may be differences in the flagging of prior results with similar values performed with this method. Interpretation of those prior results can be made in the context of the updated reference intervals.     Potassium 11/30/2023 4.2  3.4 - 5.3 mmol/L Final    Chloride 11/30/2023 100  98 - 107 mmol/L Final    Carbon Dioxide (CO2) 11/30/2023 28  22 - 29 mmol/L Final    Anion Gap 11/30/2023 11  7 - 15 mmol/L Final    Urea Nitrogen 11/30/2023 14.3  8.0 - 23.0 mg/dL Final    Creatinine 11/30/2023 0.95  0.51 - 0.95 mg/dL Final    GFR Estimate 11/30/2023 66  >60 mL/min/1.73m2 Final    Calcium 11/30/2023 10.2  8.8 - 10.2 mg/dL Final    Glucose 11/30/2023 98  70 - 99 mg/dL Final    INR 11/30/2023 0.97  0.85 - 1.15 Final    aPTT 11/30/2023 29  22 - 38 Seconds Final    Troponin T, High Sensitivity 11/30/2023 15 (H)  <=14 ng/L Final    Either a High Sensitivity Troponin T baseline (0 hours) value = 100 ng/L, or an increase in High Sensitivity " Troponin T = 7 ng/L at 2 hours compared to 0 hours (2-0 hours), suggests myocardial injury, and urgent clinical attention is required.    If the 2-0 hours increase is <7 ng/L, a High Sensitivity Troponin T result above gender-specific reference ranges warrants further evaluation.   Recommendations for further evaluation include correlation with clinical decision-making tool (e.g., HEART), a 3rd High Sensitivity Troponin T test 2 hours after the 2nd (a 20% change from baseline would represent concern), admission for observation, close PCC/cardiology follow-up, or urgent outpatient provocative testing.    Ventricular Rate 11/30/2023 62  BPM Final    Atrial Rate 11/30/2023 62  BPM Final    ND Interval 11/30/2023 158  ms Final    QRS Duration 11/30/2023 100  ms Final    QT 11/30/2023 436  ms Final    QTc 11/30/2023 442  ms Final    P Axis 11/30/2023 19  degrees Final    R AXIS 11/30/2023 -22  degrees Final    T Axis 11/30/2023 86  degrees Final    Interpretation ECG 11/30/2023    Final                    Value:Sinus rhythm  Septal infarct , age undetermined  Abnormal ECG  When compared with ECG of 10-AUG-2008 11:17,  Septal infarct is now Present  Confirmed by SEE ED PROVIDER NOTE FOR, ECG INTERPRETATION ([id number]),  Dashawn Tamez ([id number]) on 12/6/2023 10:30:50 AM      WBC Count 11/30/2023 5.3  4.0 - 11.0 10e3/uL Final    RBC Count 11/30/2023 4.83  3.80 - 5.20 10e6/uL Final    Hemoglobin 11/30/2023 14.9  11.7 - 15.7 g/dL Final    Hematocrit 11/30/2023 45.7  35.0 - 47.0 % Final    MCV 11/30/2023 95  78 - 100 fL Final    MCH 11/30/2023 30.8  26.5 - 33.0 pg Final    MCHC 11/30/2023 32.6  31.5 - 36.5 g/dL Final    RDW 11/30/2023 14.3  10.0 - 15.0 % Final    Platelet Count 11/30/2023 211  150 - 450 10e3/uL Final    % Neutrophils 11/30/2023 66  % Final    % Lymphocytes 11/30/2023 21  % Final    % Monocytes 11/30/2023 8  % Final    % Eosinophils 11/30/2023 4  % Final    % Basophils 11/30/2023 1  % Final    % Immature  Granulocytes 11/30/2023 0  % Final    NRBCs per 100 WBC 11/30/2023 0  <1 /100 Final    Absolute Neutrophils 11/30/2023 3.5  1.6 - 8.3 10e3/uL Final    Absolute Lymphocytes 11/30/2023 1.1  0.8 - 5.3 10e3/uL Final    Absolute Monocytes 11/30/2023 0.4  0.0 - 1.3 10e3/uL Final    Absolute Eosinophils 11/30/2023 0.2  0.0 - 0.7 10e3/uL Final    Absolute Basophils 11/30/2023 0.1  0.0 - 0.2 10e3/uL Final    Absolute Immature Granulocytes 11/30/2023 0.0  <=0.4 10e3/uL Final    Absolute NRBCs 11/30/2023 0.0  10e3/uL Final    GLUCOSE BY METER POCT 11/30/2023 119 (H)  70 - 99 mg/dL Final    Troponin T, High Sensitivity 11/30/2023 12  <=14 ng/L Final    Either a High Sensitivity Troponin T baseline (0 hours) value = 100 ng/L, or an increase in High Sensitivity Troponin T = 7 ng/L at 2 hours compared to 0 hours (2-0 hours), suggests myocardial injury, and urgent clinical attention is required.    If the 2-0 hours increase is <7 ng/L, a High Sensitivity Troponin T result above gender-specific reference ranges warrants further evaluation.   Recommendations for further evaluation include correlation with clinical decision-making tool (e.g., HEART), a 3rd High Sensitivity Troponin T test 2 hours after the 2nd (a 20% change from baseline would represent concern), admission for observation, close PCC/cardiology follow-up, or urgent outpatient provocative testing.    Hemoglobin A1C 11/30/2023 6.0 (H)  <5.7 % Final    Normal <5.7%   Prediabetes 5.7-6.4%    Diabetes 6.5% or higher     Note: Adopted from ADA consensus guidelines.    Cholesterol 11/30/2023 261 (H)  <200 mg/dL Final    Triglycerides 11/30/2023 66  <150 mg/dL Final    Direct Measure HDL 11/30/2023 102  >=50 mg/dL Final    LDL Cholesterol Calculated 11/30/2023 146 (H)  <=100 mg/dL Final    Non HDL Cholesterol 11/30/2023 159 (H)  <130 mg/dL Final    LVEF  11/30/2023 60-65%   Final    Creatinine 11/30/2023 0.92  0.51 - 0.95 mg/dL Final    GFR Estimate 11/30/2023 69  >60  mL/min/1.73m2 Final    Protein Total 11/30/2023 7.9  6.4 - 8.3 g/dL Final    Albumin 11/30/2023 4.5  3.5 - 5.2 g/dL Final    Bilirubin Total 11/30/2023 0.5  <=1.2 mg/dL Final    Alkaline Phosphatase 11/30/2023 87  40 - 150 U/L Final    Reference intervals for this test were updated on 11/14/2023 to more accurately reflect our healthy population. There may be differences in the flagging of prior results with similar values performed with this method. Interpretation of those prior results can be made in the context of the updated reference intervals.    AST 11/30/2023 29  0 - 45 U/L Final    Reference intervals for this test were updated on 6/12/2023 to more accurately reflect our healthy population. There may be differences in the flagging of prior results with similar values performed with this method. Interpretation of those prior results can be made in the context of the updated reference intervals.    ALT 11/30/2023 16  0 - 50 U/L Final    Reference intervals for this test were updated on 6/12/2023 to more accurately reflect our healthy population. There may be differences in the flagging of prior results with similar values performed with this method. Interpretation of those prior results can be made in the context of the updated reference intervals.      Bilirubin Direct 11/30/2023 <0.20  0.00 - 0.30 mg/dL Final    Magnesium 11/30/2023 1.9  1.7 - 2.3 mg/dL Final    Phosphorus 11/30/2023 3.9  2.5 - 4.5 mg/dL Final    TSH 11/30/2023 6.10 (H)  0.30 - 4.20 uIU/mL Final    Free T4 11/30/2023 1.00  0.90 - 1.70 ng/dL Final    GLUCOSE BY METER POCT 11/30/2023 115 (H)  70 - 99 mg/dL Final    GLUCOSE BY METER POCT 11/30/2023 145 (H)  70 - 99 mg/dL Final    WBC Count 12/01/2023 4.5  4.0 - 11.0 10e3/uL Final    RBC Count 12/01/2023 4.64  3.80 - 5.20 10e6/uL Final    Hemoglobin 12/01/2023 14.2  11.7 - 15.7 g/dL Final    Hematocrit 12/01/2023 44.0  35.0 - 47.0 % Final    MCV 12/01/2023 95  78 - 100 fL Final    MCH 12/01/2023  30.6  26.5 - 33.0 pg Final    MCHC 12/01/2023 32.3  31.5 - 36.5 g/dL Final    RDW 12/01/2023 14.1  10.0 - 15.0 % Final    Platelet Count 12/01/2023 164  150 - 450 10e3/uL Final    Sodium 12/01/2023 139  135 - 145 mmol/L Final    Reference intervals for this test were updated on 09/26/2023 to more accurately reflect our healthy population. There may be differences in the flagging of prior results with similar values performed with this method. Interpretation of those prior results can be made in the context of the updated reference intervals.     Potassium 12/01/2023 3.9  3.4 - 5.3 mmol/L Final    Chloride 12/01/2023 103  98 - 107 mmol/L Final    Carbon Dioxide (CO2) 12/01/2023 27  22 - 29 mmol/L Final    Anion Gap 12/01/2023 9  7 - 15 mmol/L Final    Urea Nitrogen 12/01/2023 13.8  8.0 - 23.0 mg/dL Final    Creatinine 12/01/2023 0.92  0.51 - 0.95 mg/dL Final    GFR Estimate 12/01/2023 69  >60 mL/min/1.73m2 Final    Calcium 12/01/2023 9.4  8.8 - 10.2 mg/dL Final    Glucose 12/01/2023 94  70 - 99 mg/dL Final    GLUCOSE BY METER POCT 12/01/2023 101 (H)  70 - 99 mg/dL Final    GLUCOSE BY METER POCT 12/01/2023 127 (H)  70 - 99 mg/dL Final   ______________________________________________________________________     Pertinent radiology for this visit includes the following:    CT Chest w/o Contrast  Narrative: EXAM: CT CHEST W/O CONTRAST  LOCATION: [facility]  DATE: 12/7/2023    INDICATION:  Pulmonary nodules.  COMPARISON: 9/26/2023 and 5/4/2023   TECHNIQUE: CT chest without IV contrast. Multiplanar reformats were obtained. Dose reduction techniques were used.  CONTRAST: None.    FINDINGS:  LUNGS AND PLEURA: There is mild bronchial wall thickening with a small amount of debris in the airways. A 12 mm x 6 mm opacity with associated architectural distortion in the right apex was previously 19 mm x 9 mm (series 4 image 13). There are a few   adjacent small nodular opacities which are also smaller  (images 15 and 17, for example). A 3 mm right upper lobe nodule has not changed in size (image 20).     MEDIASTINUM/AXILLAE: Patulous esophagus with fluid.     CORONARY ARTERY CALCIFICATION: Severe.    UPPER ABDOMEN:  Gastric band.     MUSCULOSKELETAL: Mild degenerative change and curvature of the spine.   Impression: IMPRESSION:   1.  A dominant right upper lobe nodule and 2 adjacent nodules have slightly decreased in size. A separate 3 mm right upper lobe nodule has not changed in size.  2.  Patulous esophagus with fluid.   3.  Severe coronary artery disease.       ______________________________________________________________________    Assessment:     1. Tobacco abuse    2. Mixed hyperlipidemia    3. Stenosis of left carotid artery    4. Bilateral carotid artery stenosis    5. Spinal stenosis in cervical region    6. Neck pain    7. Lymphocytic esophagitis    8. Hospital discharge follow-up    9. Dizziness    10. Right arm numbness    11. Coronary artery disease involving native coronary artery of native heart without angina pectoris    12. TIA (transient ischemic attack)    13. Primary hypertension        Plan:     New prescriptions done today as noted below.  Continue current medications as is and take clopidogrel for 21 days.  Referral to vascular medicine/surgery for possible intervention on the carotids.  No evidence of obvious cardiac issue but event recorder pending at this time.  Be seen sooner in the meantime if issues.  Continue current medications otherwise.      Orders Placed This Encounter   Procedures    Vascular Surgery Referral      Orders Placed This Encounter   Medications    varenicline (CHANTIX ALYSSA) 0.5 MG X 11 & 1 MG X 42 tablet     Sig: Take 0.5 mg tab daily for 3 days, THEN 0.5 mg tab twice daily for 4 days, THEN 1 mg twice daily.     Dispense:  56 tablet     Refill:  0    varenicline (CHANTIX) 1 MG tablet     Sig: Take 1 tablet (1 mg) by mouth 2 times daily     Dispense:  60 tablet      Refill:  5    nicotine (NICODERM CQ) 21 MG/24HR 24 hr patch     Sig: Place 1 patch onto the skin every 24 hours     Dispense:  30 patch     Refill:  3    rosuvastatin (CRESTOR) 5 MG tablet     Sig: Take 1 tablet (5 mg) by mouth daily     Dispense:  90 tablet     Refill:  3        40 minutes or greater was spent today on the patient's care on the day of service.      This includes time for chart preparation, reviewing medical tests done before or during the visit, talking with the patient, review of quality indicators, required documentation, and other elements of care.        Ricardo Davila MD  General Internal Medicine  [facility]    Return in about 6 weeks (around 1/22/2024) for follow up visit.     Future Appointments   Date Time Provider Department Center   1/22/2024  9:00 AM Ricardo Davila MD [facility]

## 2023-12-11 ENCOUNTER — TRANSFERRED RECORDS (OUTPATIENT)
Dept: HEALTH INFORMATION MANAGEMENT | Facility: CLINIC | Age: 63
End: 2023-12-11
Payer: MEDICARE

## 2024-01-03 DIAGNOSIS — J44.9 CHRONIC OBSTRUCTIVE PULMONARY DISEASE, UNSPECIFIED COPD TYPE (H): Primary | ICD-10-CM

## 2024-01-03 PROCEDURE — 93228 REMOTE 30 DAY ECG REV/REPORT: CPT | Performed by: INTERNAL MEDICINE

## 2024-01-04 ENCOUNTER — OFFICE VISIT (OUTPATIENT)
Dept: VASCULAR SURGERY | Facility: CLINIC | Age: 64
End: 2024-01-04
Attending: INTERNAL MEDICINE
Payer: MEDICARE

## 2024-01-04 ENCOUNTER — MYC MEDICAL ADVICE (OUTPATIENT)
Dept: INTERNAL MEDICINE | Facility: CLINIC | Age: 64
End: 2024-01-04
Payer: MEDICARE

## 2024-01-04 VITALS — HEART RATE: 66 BPM | OXYGEN SATURATION: 99 % | SYSTOLIC BLOOD PRESSURE: 155 MMHG | DIASTOLIC BLOOD PRESSURE: 96 MMHG

## 2024-01-04 DIAGNOSIS — K21.00 GASTROESOPHAGEAL REFLUX DISEASE WITH ESOPHAGITIS WITHOUT HEMORRHAGE: ICD-10-CM

## 2024-01-04 DIAGNOSIS — I65.22 STENOSIS OF LEFT CAROTID ARTERY: ICD-10-CM

## 2024-01-04 PROCEDURE — G0463 HOSPITAL OUTPT CLINIC VISIT: HCPCS | Performed by: SURGERY

## 2024-01-04 PROCEDURE — 99203 OFFICE O/P NEW LOW 30 MIN: CPT | Performed by: SURGERY

## 2024-01-04 RX ORDER — PANTOPRAZOLE SODIUM 40 MG/1
40 TABLET, DELAYED RELEASE ORAL DAILY
Qty: 90 TABLET | Refills: 3 | Status: SHIPPED | OUTPATIENT
Start: 2024-01-04 | End: 2024-07-24

## 2024-01-04 RX ORDER — CEFAZOLIN SODIUM/WATER 2 G/20 ML
2 SYRINGE (ML) INTRAVENOUS
Status: CANCELLED | OUTPATIENT
Start: 2024-01-09

## 2024-01-04 RX ORDER — CEFAZOLIN SODIUM/WATER 2 G/20 ML
2 SYRINGE (ML) INTRAVENOUS SEE ADMIN INSTRUCTIONS
Status: CANCELLED | OUTPATIENT
Start: 2024-01-09

## 2024-01-04 NOTE — PROGRESS NOTES
[facility]        Patient is here for a consult to discuss Carotid stenosis. Patient has symptoms of vision loss and dizziness which have resolved. She was seen in the ED and imaging was done which found carotid stenosis.     Pt is going to [place] from 2/2-4/2.    Pt is currently taking Aspirin and Statin.    BP (!) 155/96   Pulse 66   LMP  (LMP Unknown)   SpO2 99%     The provider has been notified that the patient has no concerns.     Questions patient would like addressed today are: N/A.    Refills are needed: No    Has homecare services and agency name:  No

## 2024-01-04 NOTE — PATIENT INSTRUCTIONS
Marlen    Your visit to [facility] for your surgery is coming soon and we look forward to seeing you! This friendly reminder and pre-procedure checklist will help to ensure your surgery goes smoothly and meets your expectations. At [facility], our goal is to provide you with a great patient experience and to deliver genuine, professional care to every patient.     Please complete all the steps in advance of your visit. If you have any questions about the items listed below, please give our office a call. We can be reached at [phone] or visit our website at [email or web address]  for more information.     Procedure: Left Carotid Endarterectomy    Procedure Date :  TBD    Arrival Time:  Tentative time and subject to change. The pre-admission nurse will call you 1-2 days before surgery to tell you time of arrival.     Procedure Location: TBD    Surgeon: Dr. Timbo Hart    Admission Type: Inpatient    COVID-19 Test: is no longer required. If you are experiencing COVID symptoms or have tested positive for COVID-19 within 14 days of procedure date, reach out to the care team to reschedule please.     Post Operative Appointment: TBD      If you take blood thinners:   PLEASE DO NOT STOP YOUR ASPIRIN OR PLAVIX UNLESS SPECIFICALLY DIRECTED BY THE VASCULAR SURGEON TO STOP!  In most cases Vascular surgeons want you to continue these. This is different from most NON vascular surgeries and may not be well known by your Primary Care Provider        If you take these diabetic medications, please discuss with your primary doctor and follow the hold instructions:   Hold seven (7) days prior for once weekly injectable doses [semaglutide (Ozempic, Wegovy), dulaglutide (Trulicity), exenatide ER (Bydureon), tirzepatide (Mounjaro)]  Hold the day before and day of for once daily injectable GLP-1 agonists [exenatide (Byetta), liraglutide (Saxenda, Victoza)]  Hold  seven (7) days for oral semaglutide (Rybelsus)       Notify our office right away if you have any changes in your health status or if you develop a cold, flu, diarrhea, infection, fever or sore throat before your scheduled surgery date.   We can be reached at [phone] Monday-Friday 8 am-4:30 pm if you have any questions.       Complete the following checklist before your surgery    []  You will need a Pre-op Physical within 30 days before surgery with your primary care provider. This exam is required by the anesthesiologist to ensure a safe surgical experience.    Failure  to obtain your pre-op physical will lead to cancellation of your surgery  Call them right away to schedule this. Please ensure your Preoperative Physical is faxed to the Hospital if done outside of [facility] system.     [] Preoperative Medication Instructions  Contact your prescribing provider and/or vascular surgeon for instructions before discontinuing any medications especially anticoagulants. (Examples: Coumadin, Plavix, Xarelto, Eliquis, Pradaxa, Effient, Brilinta)   Hold Ibuprofen, Herbal Supplements and Vitamin E 7 days before  Stop Cialis, Levitra and Viagra 2-3 days before surgery    [] Fasting Requirements  Nothing to eat for 8 hours before surgery unless instructed differently by the surgery nurse.   You may have clear liquids up to two hours before your arrival time (coffee, tea, water, or Gatorade. No cream or milk)  If your primary care provider has instructed you to continue oral medications, you may take them on the morning of surgery with a small sip of water.    No alcohol or smoking 24 hours before surgery.     [] Contact your insurance regarding coverage  If you would like a [facility] Estimate for your upcoming procedure at [facility] Location, contact Cost of Care Estimates   Advocates are available Monday through Friday 8am - 5pm   [phone]  You may also submit a request online through your [employer]  account.   If your procedure is at [facility] please contact the numbers below for Cost of Care Estimates.   - Facility Charge: [phone]    Anesthesiology charge:  [phone]      [] DO NOT BRING FMLA WITH TO SURGERY.  These should be sent to the provider's office by fax to [phone].     [] Day of Surgery  Medications - Take as indicated with sips of water.   Wear comfortable loose fitting clothes. Wear your glasses-Not contacts. Do not wear jewelry including rings and body piercings.   You may have 1 family member wait in the lobby during your surgery. Visitor restrictions are subject to change. Please verify with the surgery nurse when they call.   If same day surgery-Have an adult  come with you to surgery that can help you understand the surgeon's instructions, drive you home and stay with you overnight the first night.    [] You will receive a call from a surgery nurse 1-3 days prior to surgery. They will go over more details with you.     [] If the hospital is at [facility] capacity and does not have available inpatient beds, your procedure may need to be postponed. We will contact you if this happens.      Use Chlorhexidine Gluconate 4% soap to help prevent surgical site infections    You play an important part in helping to prevent a surgical site infection. Let s review what you will need to do.    Chlorhexidine Gluconate 4% is a powerful disinfectant that will help make sure your skin is free of germs. It looks and feels just like liquid soap and should be used liked a shower gel.    [facility] patients can receive free Chlorhexidine Gluconate 4% soap for surgery at any outpatient [facility] pharmacy. You can also buy this over the counter at any pharmacy.     Do not shave the skin around your surgery site for seven days before your surgery.      Follow these steps to help prevent surgical site infections:  You will need to shower the night before your surgery.   Remove all body  "piercings and jewelry, and leave them off until after your surgery.  In the shower, wash your body with your regular soap first, and wash your hair with your regular shampoo.  Be sure to rinse off thoroughly to remove any remaining soap and shampoo residue.  Using a clean washcloth, clean your skin with this disinfectant soap. Use enough gel to cover your entire body. You don t need to scrub your skin, but make sure you liberally wash the area where your surgery will be done. Leave on for 1 minute. Then rinse off.   Chlorhexidine Gluconate is a strong disinfectant, so do not use it on or near your face, eyes, ears, or head. Also, do not wash with your regular soap after using this.  After your shower, pat yourself dry with a clean, soft towel. Do not put on any deodorants, lotions, powders, or oils afterwards. Be sure to put on clean clothing.      If you are allergic to Chlorhexidine Gluconate, use an antibacterial soap instead, following the same steps.    If you cannot use the shower, wash yourself with this soap at the sink. Make sure the sink is clean before you begin, and do the best you can to clean your entire body.         A carotid endarterectomy (say \"kuh-RAW-tid px-opr-hqa-K-[facility]-alida\") is surgery to remove fatty build-up (plaque) from one of the carotid arteries. Your doctor made a cut (incision) in your neck and carotid artery to take out the plaque.    You may have a sore throat for a few days. You can expect the incision to be sore for about a week. The area around it may also be swollen and bruised at first. The area in front of the incision may be numb. This usually gets better after several months.    Your doctor closed the incision in your neck with stitches. The stitches will be removed 7 to 10 days after surgery, or you may have stitches that dissolve on their own.    You may feel more tired than usual for several weeks after surgery. You can do light activities around the house. But don't do " anything strenuous until your doctor says it is okay. This may be for at least 2 weeks.    You will have regular tests to check blood flow in your carotid arteries.    Current as of: September 7, 2022  Author: [employer]wise Staff  Medical Review:Valentine Ellsworth MD - Internal Medicine & HECTOR Redman MD - Internal Medicine & Miguel Yancey MD - Family Medicine & Gareth Méndez MD - Family Medicine & Malik Villar MD, PhD - Cardiology        Incision Care   You may have a small drain placed in your incision during the surgery.  This will be removed before you go home.  You may shower when you go home, do not rub incision but rather let the warm soapy water run over it and be sure to pat dry the incision well after bathing.   Do not shave directly over the incision until it is healed.   DO NOT IMMERSE THE INCISION IN A TUB/POOL/Etc. UNTIL HEALED.    Restrictions  Do not drive for at least one week, OR if you are still taking any narcotic pain medication.  Do not lift anything heavier than a gallon of milk for one week after going home.    Follow-Up  If a follow up appointment was not scheduled prior to your procedure please call [phone] if you are not contacted within 3 business days following your procedure to schedule one. Follow up appointments are scheduled with either your Physician or one of our Physician Assistants.     Risks and Possible Complications  Numbness - It is normal to have some numbness around the incision. Numbness can extend beyond the incision to areas of the neck, ear, and face. The numbness is due to bruising of nerves during the surgery and will gradually improve over a period of months.  Hoarseness/Difficulty Speaking and Swallowing - The bruising of nerves in the neck can also cause a hoarse voice, difficulty speaking, or swallowing. This may improve over time, HOWEVER if it continues for more than a few days please contact our office.  Excessive Swelling - There will be some  swelling immediately after surgery, which usually resolves within one week. If you notice that the swelling is getting worse, notify your surgeon.   Drainage/Bleeding - If there is any drainage or bleeding it should be a very small amount (less than a teaspoon per day). If you have excessive bleeding or drainage from the incision, call your surgeon right away.     NOTIFY YOUR SURGEON AND SEEK EMERGENCY TREATMENT IF:  YOU HAVE SUDDEN WEAKNESS ON ONE SIDE OF YOUR BODY  YOU HAVE A SEVERE HEADACHE, ESPECIALLY ON THE SAME SIDE AS THE SURGERY   YOU HAVE SUDDEN: BLURRED VISION, DIFFICULTY SPEAKING, WALKING, OR MEMORY LOSS  THESE COULD BE SIGNS OF A STROKE AND NEED TO BE EVALUATED IMMEDIATELY     It is important not to let your blood pressure get too high after surgery, so PLEASE make sure to take ALL of your blood pressure medications unless instructed otherwise. If you are having difficulties with it getting to high PLEASE CALL OUR OFFICE or primary MD.

## 2024-01-05 ENCOUNTER — DOCUMENTATION ONLY (OUTPATIENT)
Dept: VASCULAR SURGERY | Facility: CLINIC | Age: 64
End: 2024-01-05
Payer: MEDICARE

## 2024-01-05 NOTE — PROGRESS NOTES
Surgery Scheduled    Confirmed surgery date with pt and reviewed instructions.  Pt will have preop exam on Monday 1/8/24.    Surgery/Procedure: Left Carotid Endarterectomy    Special Equipment: intra-op U/s, eeg, omniretractor    Location: [facility]:  [address] [address] (Phone: [phone], Fax: [phone])    Date: 1/9/2024    Time: 720AM    Admission Type: Inpatient    Surgeon: Dr. Hart    OR Confirmed & :  Yes with Meme on 1/5/2024    Entered on provider calendar:  Yes    Post Op:     Provider Department   1/25/2024 10:00 AM (Arrive by 9:45 AM) Stacy Castro PA-C [facility]       Wound Vac Needed: No    Home Care Needed: No    Ultrasound Contacted: scheduled in [facility]  US RM 2    Blood Thinners Addressed: N/A

## 2024-01-07 RX ORDER — ATORVASTATIN CALCIUM 40 MG/1
40 TABLET, FILM COATED ORAL DAILY
Qty: 90 TABLET | Refills: 4 | Status: SHIPPED | OUTPATIENT
Start: 2024-01-07 | End: 2025-04-01

## 2024-01-07 NOTE — PROGRESS NOTES
VASCULAR SURGERY CLINIC CONSULTATION   VASCULAR SURGEON: Dr. Hart    LOCATION:  [facility]      Marlen Dumas  Medical Record #:  [id number]  YOB: 1960  Age:  63 year old     Date of Service: 1/4/2024    PRIMARY CARE PROVIDER: Ricardo Davila      Reason for visit: Left carotid artery stenosis    IMPRESSION:  63 y.o. female with symptomatic high-grade left internal carotid artery stenosis.    Recently hospitalized with transient right arm weakness and dizziness, which we must assume is TIA.  No stroke on MRI, benign cardiac workup.  She does have significant cervical stenosis seen on MRI, however would not expect one-time transient episode of right arm weakness with her cervical disease.    RECOMMENDATION:    Given the high-grade stenosis of the left internal carotid artery, very vulnerable thrombotic lesion, and presumed symptomatic carotid stenosis with treated right arm weakness, we recommend carotid endarterectomy for stroke prevention.    She will continue taking aspirin 81 mg, Zetia, and will switch to high-dose statin therapy.  She is scheduled for preoperative valuation by her PCP on Monday Jan 8 and is scheduled for carotid endarterectomy following day.    Discussed with staff, Dr. Tanner August MD      HPI:  Marlen Dumas is a 63 year old female who was seen today in consultation for left carotid artery stenosis.  She was admitted approxi-1 month ago with symptoms of dizziness and transient right arm paresthesias.  She is undergone full stroke workup which there is no stroke seen on MRI.  She is completed monitoring with Holter monitor which identified several symptomatic episodes of dizziness which corresponded with PVCs but no sustained arrhythmias.  She is a smoker and is right-handed.  At the time of her recent admission her LDL was 146, hemoglobin A1c was 6.0%, and LVEF was 60% without significant valvular disease or  intracardiac shunt.  She has a history of hypertension, coronary disease, obesity s/p laparoscopic gastric band in 2008. 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  She is a retired  for [employer].      REVIEW OF SYSTEMS:    A 12 point ROS was reviewed and except for what is listed in the HPI above, all others are negative    PHH:    Past Medical History:   Diagnosis Date    CAD (coronary artery disease) 12/8/2023    COPD (chronic obstructive pulmonary disease) (H)     GERD (gastroesophageal reflux disease) 07/24/2023    HLD (hyperlipidemia)     HTN (hypertension)     Hx of laparoscopic adjustable gastric banding 10/01/2008    Initial weight 295.5 BMI 50.4 Dr. Rowell    Hymenoptera allergy     Hypothyroidism     S/P colonoscopy 05/05/2019    Tobacco abuse     Vitamin D deficiency 08/24/2016        Past Surgical History:   Procedure Laterality Date    FOOT SURGERY      Sting [facility]    LAPAROSCOPIC GASTRIC BANDING  2008    LUMBAR DISC SURGERY      NM ESOPHAGOGASTRODUODENOSCOPY TRANSORAL DIAGNOSTIC N/A 8/14/2020    Procedure: ESOPHAGOGASTRODUODENOSCOPY (EGD);  Surgeon: Jared Howard MD;  Location: [facility];  Service: General       ALLERGIES:    Allergies   Allergen Reactions    Other Environmental Allergy Unknown     Bee sting    Sulfa (Sulfonamide Antibiotics) [Sulfa Antibiotics] Unknown       MEDS:    Current Outpatient Medications:     albuterol (PROAIR HFA/PROVENTIL HFA/VENTOLIN HFA) 108 (90 Base) MCG/ACT inhaler, Inhale 2 puffs into the lungs every 6 hours as needed for shortness of breath, wheezing or cough, Disp: , Rfl:     amLODIPine (NORVASC) 5 MG tablet, TAKE 1 TABLET(5 MG) BY MOUTH DAILY, Disp: 90 tablet, Rfl: 3    aspirin (ASA) 81 MG chewable tablet, Take 81 mg by mouth daily, Disp: , Rfl:     EPINEPHrine (ANY BX GENERIC EQUIV) 0.3 MG/0.3ML injection 2-pack, Inject  0.3 mLs (0.3 mg) into the muscle once as needed for anaphylaxis May repeat one time in 5-15 minutes if response to initial dose is inadequate., Disp: 2 each, Rfl: 3    ezetimibe (ZETIA) 10 MG tablet, Take 1 tablet (10 mg) by mouth daily, Disp: 90 tablet, Rfl: 3    fluticasone (ARNUITY ELLIPTA) 100 MCG/ACT inhaler, Inhale 1 puff into the lungs daily, Disp: 30 each, Rfl: 3    nicotine (NICODERM CQ) 21 MG/24HR 24 hr patch, Place 1 patch onto the skin every 24 hours, Disp: 30 patch, Rfl: 3    pantoprazole (PROTONIX) 40 MG EC tablet, Take 1 tablet (40 mg) by mouth daily, Disp: 90 tablet, Rfl: 3    rosuvastatin (CRESTOR) 5 MG tablet, Take 1 tablet (5 mg) by mouth daily, Disp: 90 tablet, Rfl: 3    sucralfate (CARAFATE) 1 GM tablet, Take 1 g by mouth daily as needed (heart burn/reflux symptoms), Disp: , Rfl:     varenicline (CHANTIX ALYSSA) 0.5 MG X 11 & 1 MG X 42 tablet, Take 0.5 mg tab daily for 3 days, THEN 0.5 mg tab twice daily for 4 days, THEN 1 mg twice daily., Disp: 56 tablet, Rfl: 0    varenicline (CHANTIX) 1 MG tablet, Take 1 tablet (1 mg) by mouth 2 times daily, Disp: 60 tablet, Rfl: 5    clopidogrel (PLAVIX) 75 MG tablet, Take 1 tablet (75 mg) by mouth daily for 21 days (Patient not taking: Reported on 1/4/2024), Disp: 21 tablet, Rfl: 0    SOCIAL HABITS:   Social History    Substance and Sexual Activity      Alcohol use: Yes        Alcohol/week: 4.0 standard drinks of alcohol        Types: 4 Standard drinks or equivalent per week        Comment: 4-5 beer/wine weekly      History   Drug Use Unknown      History   Smoking Status    Former    Packs/day: 0.50    Years: 37.00    Types: Cigarettes    Quit date: 9/12/2022   Smokeless Tobacco    Never        FAMILY HISTORY:    Family History   Problem Relation Age of Onset    Coronary Artery Disease Mother 58    Hypertension Mother     Chronic Obstructive Pulmonary Disease Father     Hypertension Father     Anxiety Disorder Father     Hypertension Brother        PE:  BP  (!) 155/96   Pulse 66   LMP  (LMP Unknown)   SpO2 99%   Wt Readings from Last 1 Encounters:   12/08/23 79 kg (174 lb 3.2 oz)     There is no height or weight on file to calculate BMI.    EXAM:  Gen: no acute distress, sitting calmly in exam chair  HEENT: Atraumatic, normocephalic  Neuro: Alert and oriented. No gross neurologic deficits.  Face metric, tongue midline, normal strength and sensation in all 4 extremities, speech fluent.  Pulm: nonlabored breathing on room air, no cough  CV: RRR by radial pulse, noncyanotic   ABD:soft, non-tender, nondistended  MSK:  Normal active range of motion, no edema  Skin: Warm, dry, no rashes or abrasions on exposed skin  Psych: Normal affect, cooperative      DIAGNOSTIC STUDIES:     Images:  Recent carotid duplex demonstrates a left internal carotid artery peak systolic velocity of 145 cm/s, end-diastolic velocity of 49 cm/s, ICA/CCA ratio 2.1, consistent with 50 to 70% stenosis.  Right internal carotid artery demonstrates peak systolic velocity of 74 cm/s, end-diastolic velocity 24 cm, ICA/CCA ratio 0.9, consistent with less than 50% stenosis.    CTA head neck demonstrates approximately 80% stenosis of the proximal left ICA by my measurements using NASCET criteria and includes large thrombus only partially adhered to the vessel wall.      LABS:      Sodium   Date Value Ref Range Status   12/01/2023 139 135 - 145 mmol/L Final     Comment:     Reference intervals for this test were updated on 09/26/2023 to more accurately reflect our healthy population. There may be differences in the flagging of prior results with similar values performed with this method. Interpretation of those prior results can be made in the context of the updated reference intervals.    11/30/2023 139 135 - 145 mmol/L Final     Comment:     Reference intervals for this test were updated on 09/26/2023 to more accurately reflect our healthy population. There may be differences in the flagging of prior  results with similar values performed with this method. Interpretation of those prior results can be made in the context of the updated reference intervals.    09/15/2022 137 136 - 145 mmol/L Final     Urea Nitrogen   Date Value Ref Range Status   12/01/2023 13.8 8.0 - 23.0 mg/dL Final   11/30/2023 14.3 8.0 - 23.0 mg/dL Final   09/15/2022 11.4 8.0 - 23.0 mg/dL Final   07/13/2021 9 8 - 22 mg/dL Final   06/13/2019 10 8 - 22 mg/dL Final   05/07/2019 10 8 - 22 mg/dL Final     Hemoglobin   Date Value Ref Range Status   12/01/2023 14.2 11.7 - 15.7 g/dL Final   11/30/2023 14.9 11.7 - 15.7 g/dL Final   09/15/2022 14.1 11.7 - 15.7 g/dL Final     Platelet Count   Date Value Ref Range Status   12/01/2023 164 150 - 450 10e3/uL Final   11/30/2023 211 150 - 450 10e3/uL Final   09/15/2022 202 150 - 450 10e3/uL Final     INR   Date Value Ref Range Status   11/30/2023 0.97 0.85 - 1.15 Final   10/12/2022 0.97 0.85 - 1.15 Final     I have seen and evaluated this patient with a fellow.  Past medical history, surgical history, past family history review of systems, HPI, and physical exam were reviewed by me personally.  I agree with all the above.        Timbo Hart MD,  [facility]  VASCULAR AND ENDOVASCULAR SURGERY

## 2024-01-07 NOTE — H&P (VIEW-ONLY)
VASCULAR SURGERY CLINIC CONSULTATION   VASCULAR SURGEON: Dr. Hart    LOCATION:  [facility]      Marlen Dumas  Medical Record #:  [id number]  YOB: 1960  Age:  63 year old     Date of Service: 1/4/2024    PRIMARY CARE PROVIDER: Ricardo Davila      Reason for visit: Left carotid artery stenosis    IMPRESSION:  63 y.o. female with symptomatic high-grade left internal carotid artery stenosis.    Recently hospitalized with transient right arm weakness and dizziness, which we must assume is TIA.  No stroke on MRI, benign cardiac workup.  She does have significant cervical stenosis seen on MRI, however would not expect one-time transient episode of right arm weakness with her cervical disease.    RECOMMENDATION:    Given the high-grade stenosis of the left internal carotid artery, very vulnerable thrombotic lesion, and presumed symptomatic carotid stenosis with treated right arm weakness, we recommend carotid endarterectomy for stroke prevention.    She will continue taking aspirin 81 mg, Zetia, and will switch to high-dose statin therapy.  She is scheduled for preoperative valuation by her PCP on Monday Jan 8 and is scheduled for carotid endarterectomy following day.    Discussed with staff, Dr. Tanner August MD      HPI:  Marlen Dumas is a 63 year old female who was seen today in consultation for left carotid artery stenosis.  She was admitted approxi-1 month ago with symptoms of dizziness and transient right arm paresthesias.  She is undergone full stroke workup which there is no stroke seen on MRI.  She is completed monitoring with Holter monitor which identified several symptomatic episodes of dizziness which corresponded with PVCs but no sustained arrhythmias.  She is a smoker and is right-handed.  At the time of her recent admission her LDL was 146, hemoglobin A1c was 6.0%, and LVEF was 60% without significant valvular disease or  intracardiac shunt.  She has a history of hypertension, coronary disease, obesity s/p laparoscopic gastric band in 2008. 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  She is a retired  for [employer].      REVIEW OF SYSTEMS:    A 12 point ROS was reviewed and except for what is listed in the HPI above, all others are negative    PHH:    Past Medical History:   Diagnosis Date    CAD (coronary artery disease) 12/8/2023    COPD (chronic obstructive pulmonary disease) (H)     GERD (gastroesophageal reflux disease) 07/24/2023    HLD (hyperlipidemia)     HTN (hypertension)     Hx of laparoscopic adjustable gastric banding 10/01/2008    Initial weight 295.5 BMI 50.4 Dr. Rowell    Hymenoptera allergy     Hypothyroidism     S/P colonoscopy 05/05/2019    Tobacco abuse     Vitamin D deficiency 08/24/2016        Past Surgical History:   Procedure Laterality Date    FOOT SURGERY      Sting [facility]omo    LAPAROSCOPIC GASTRIC BANDING  2008    LUMBAR DISC SURGERY      LA ESOPHAGOGASTRODUODENOSCOPY TRANSORAL DIAGNOSTIC N/A 8/14/2020    Procedure: ESOPHAGOGASTRODUODENOSCOPY (EGD);  Surgeon: Jared Howard MD;  Location: formerly [facility];  Service: General       ALLERGIES:    Allergies   Allergen Reactions    Other Environmental Allergy Unknown     Bee sting    Sulfa (Sulfonamide Antibiotics) [Sulfa Antibiotics] Unknown       MEDS:    Current Outpatient Medications:     albuterol (PROAIR HFA/PROVENTIL HFA/VENTOLIN HFA) 108 (90 Base) MCG/ACT inhaler, Inhale 2 puffs into the lungs every 6 hours as needed for shortness of breath, wheezing or cough, Disp: , Rfl:     amLODIPine (NORVASC) 5 MG tablet, TAKE 1 TABLET(5 MG) BY MOUTH DAILY, Disp: 90 tablet, Rfl: 3    aspirin (ASA) 81 MG chewable tablet, Take 81 mg by mouth daily, Disp: , Rfl:     EPINEPHrine (ANY BX GENERIC EQUIV) 0.3 MG/0.3ML injection 2-pack, Inject  0.3 mLs (0.3 mg) into the muscle once as needed for anaphylaxis May repeat one time in 5-15 minutes if response to initial dose is inadequate., Disp: 2 each, Rfl: 3    ezetimibe (ZETIA) 10 MG tablet, Take 1 tablet (10 mg) by mouth daily, Disp: 90 tablet, Rfl: 3    fluticasone (ARNUITY ELLIPTA) 100 MCG/ACT inhaler, Inhale 1 puff into the lungs daily, Disp: 30 each, Rfl: 3    nicotine (NICODERM CQ) 21 MG/24HR 24 hr patch, Place 1 patch onto the skin every 24 hours, Disp: 30 patch, Rfl: 3    pantoprazole (PROTONIX) 40 MG EC tablet, Take 1 tablet (40 mg) by mouth daily, Disp: 90 tablet, Rfl: 3    rosuvastatin (CRESTOR) 5 MG tablet, Take 1 tablet (5 mg) by mouth daily, Disp: 90 tablet, Rfl: 3    sucralfate (CARAFATE) 1 GM tablet, Take 1 g by mouth daily as needed (heart burn/reflux symptoms), Disp: , Rfl:     varenicline (CHANTIX ALYSSA) 0.5 MG X 11 & 1 MG X 42 tablet, Take 0.5 mg tab daily for 3 days, THEN 0.5 mg tab twice daily for 4 days, THEN 1 mg twice daily., Disp: 56 tablet, Rfl: 0    varenicline (CHANTIX) 1 MG tablet, Take 1 tablet (1 mg) by mouth 2 times daily, Disp: 60 tablet, Rfl: 5    clopidogrel (PLAVIX) 75 MG tablet, Take 1 tablet (75 mg) by mouth daily for 21 days (Patient not taking: Reported on 1/4/2024), Disp: 21 tablet, Rfl: 0    SOCIAL HABITS:   Social History    Substance and Sexual Activity      Alcohol use: Yes        Alcohol/week: 4.0 standard drinks of alcohol        Types: 4 Standard drinks or equivalent per week        Comment: 4-5 beer/wine weekly      History   Drug Use Unknown      History   Smoking Status    Former    Packs/day: 0.50    Years: 37.00    Types: Cigarettes    Quit date: 9/12/2022   Smokeless Tobacco    Never        FAMILY HISTORY:    Family History   Problem Relation Age of Onset    Coronary Artery Disease Mother 58    Hypertension Mother     Chronic Obstructive Pulmonary Disease Father     Hypertension Father     Anxiety Disorder Father     Hypertension Brother        PE:  BP  (!) 155/96   Pulse 66   LMP  (LMP Unknown)   SpO2 99%   Wt Readings from Last 1 Encounters:   12/08/23 79 kg (174 lb 3.2 oz)     There is no height or weight on file to calculate BMI.    EXAM:  Gen: no acute distress, sitting calmly in exam chair  HEENT: Atraumatic, normocephalic  Neuro: Alert and oriented. No gross neurologic deficits.  Face metric, tongue midline, normal strength and sensation in all 4 extremities, speech fluent.  Pulm: nonlabored breathing on room air, no cough  CV: RRR by radial pulse, noncyanotic   ABD:soft, non-tender, nondistended  MSK:  Normal active range of motion, no edema  Skin: Warm, dry, no rashes or abrasions on exposed skin  Psych: Normal affect, cooperative      DIAGNOSTIC STUDIES:     Images:  Recent carotid duplex demonstrates a left internal carotid artery peak systolic velocity of 145 cm/s, end-diastolic velocity of 49 cm/s, ICA/CCA ratio 2.1, consistent with 50 to 70% stenosis.  Right internal carotid artery demonstrates peak systolic velocity of 74 cm/s, end-diastolic velocity 24 cm, ICA/CCA ratio 0.9, consistent with less than 50% stenosis.    CTA head neck demonstrates approximately 80% stenosis of the proximal left ICA by my measurements using NASCET criteria and includes large thrombus only partially adhered to the vessel wall.      LABS:      Sodium   Date Value Ref Range Status   12/01/2023 139 135 - 145 mmol/L Final     Comment:     Reference intervals for this test were updated on 09/26/2023 to more accurately reflect our healthy population. There may be differences in the flagging of prior results with similar values performed with this method. Interpretation of those prior results can be made in the context of the updated reference intervals.    11/30/2023 139 135 - 145 mmol/L Final     Comment:     Reference intervals for this test were updated on 09/26/2023 to more accurately reflect our healthy population. There may be differences in the flagging of prior  results with similar values performed with this method. Interpretation of those prior results can be made in the context of the updated reference intervals.    09/15/2022 137 136 - 145 mmol/L Final     Urea Nitrogen   Date Value Ref Range Status   12/01/2023 13.8 8.0 - 23.0 mg/dL Final   11/30/2023 14.3 8.0 - 23.0 mg/dL Final   09/15/2022 11.4 8.0 - 23.0 mg/dL Final   07/13/2021 9 8 - 22 mg/dL Final   06/13/2019 10 8 - 22 mg/dL Final   05/07/2019 10 8 - 22 mg/dL Final     Hemoglobin   Date Value Ref Range Status   12/01/2023 14.2 11.7 - 15.7 g/dL Final   11/30/2023 14.9 11.7 - 15.7 g/dL Final   09/15/2022 14.1 11.7 - 15.7 g/dL Final     Platelet Count   Date Value Ref Range Status   12/01/2023 164 150 - 450 10e3/uL Final   11/30/2023 211 150 - 450 10e3/uL Final   09/15/2022 202 150 - 450 10e3/uL Final     INR   Date Value Ref Range Status   11/30/2023 0.97 0.85 - 1.15 Final   10/12/2022 0.97 0.85 - 1.15 Final     I have seen and evaluated this patient with a fellow.  Past medical history, surgical history, past family history review of systems, HPI, and physical exam were reviewed by me personally.  I agree with all the above.        Timbo Hart MD,  [facility]  VASCULAR AND ENDOVASCULAR SURGERY

## 2024-01-08 ENCOUNTER — ANESTHESIA EVENT (OUTPATIENT)
Dept: SURGERY | Facility: CLINIC | Age: 64
DRG: 039 | End: 2024-01-08
Payer: MEDICARE

## 2024-01-08 ENCOUNTER — OFFICE VISIT (OUTPATIENT)
Dept: INTERNAL MEDICINE | Facility: CLINIC | Age: 64
End: 2024-01-08
Payer: MEDICARE

## 2024-01-08 VITALS
RESPIRATION RATE: 18 BRPM | OXYGEN SATURATION: 97 % | WEIGHT: 177 LBS | SYSTOLIC BLOOD PRESSURE: 148 MMHG | DIASTOLIC BLOOD PRESSURE: 90 MMHG | BODY MASS INDEX: 30.15 KG/M2 | HEART RATE: 67 BPM | TEMPERATURE: 97.7 F

## 2024-01-08 DIAGNOSIS — Z72.0 TOBACCO ABUSE: Chronic | ICD-10-CM

## 2024-01-08 DIAGNOSIS — G45.9 TIA (TRANSIENT ISCHEMIC ATTACK): ICD-10-CM

## 2024-01-08 DIAGNOSIS — E78.2 MIXED HYPERLIPIDEMIA: ICD-10-CM

## 2024-01-08 DIAGNOSIS — I65.22 LEFT CAROTID ARTERY STENOSIS: ICD-10-CM

## 2024-01-08 DIAGNOSIS — I25.10 CORONARY ARTERY DISEASE INVOLVING NATIVE CORONARY ARTERY OF NATIVE HEART WITHOUT ANGINA PECTORIS: Chronic | ICD-10-CM

## 2024-01-08 DIAGNOSIS — J44.9 CHRONIC OBSTRUCTIVE PULMONARY DISEASE, UNSPECIFIED COPD TYPE (H): ICD-10-CM

## 2024-01-08 DIAGNOSIS — I10 PRIMARY HYPERTENSION: Chronic | ICD-10-CM

## 2024-01-08 DIAGNOSIS — Z01.818 PREOPERATIVE EXAMINATION: Primary | ICD-10-CM

## 2024-01-08 PROCEDURE — 99214 OFFICE O/P EST MOD 30 MIN: CPT | Performed by: INTERNAL MEDICINE

## 2024-01-08 NOTE — PROGRESS NOTES
[facility]  Primary Care Specialists    Care coordination - Customized care -  Comprehensive and complex medical care            Date of Service: 1/8/2024  Primary Provider: Ricardo Davila    Patient Care Team:  Ricardo Davila MD as PCP - General (Internal Medicine)  Ricardo Davila MD as Assigned PCP  Julissa Hager MD as MD (Family Medicine)  Loni Jaquez MD as Assigned Pulmonology Provider  Maureen Pérez NP as Nurse Practitioner  Maureen Pérez NP as Assigned Surgical Provider            Patient's Pharmacy:    [id number] - [place], [place] - [address] AT [facility] [address]  [address] [facility] [address]  Phone: [phone] Fax: [phone]     Patient's Contacts:  Name Home Phone Work Phone Mobile Phone Relationship Lgl GUERA Pablo [phone]   Friend        Patient's Insurance:    Payor: MEDICARE / Plan: MEDICARE / Product Type: Medicare /           Preoperative examination    Marlen Dumas is a 63 year old female (1960) who I am asked to see by her surgeon regarding her fitness for upcoming surgery.      Chief Complaint   Patient presents with    Pre-Op Exam        Subjective:     Patient comes in today for preoperative evaluation prior to her planned left carotid artery surgery.    She has seen vascular surgery for this and the plan is to clean out the vessel via endarterectomy.    We reviewed her smoking and she is working on reducing her smoking.  The Chantix and the patch seem to be working better together.    We reviewed her history of coronary disease and she has nonobstructive disease.  Her perioperative management related to this was reviewed.    She has had no worsening symptoms related to her lungs.    ______________________________________________________________________         1/5/2024     2:58 PM   Pre-op Questionnaire   1. Have you ever had a heart attack or stroke? No   2. Have you ever  had surgery on your heart or blood vessels, such as a stent placement, a coronary artery bypass, or surgery on an artery in your head, neck, heart, or legs? No   3. Do you have chest pain with activity? No   4. Do you have a history of  heart failure? No   5. Do you currently have a cold, bronchitis or symptoms of other infection? No   6. Do you have a cough, shortness of breath, or wheezing? No   7. Do you or anyone in your family have previous history of blood clots? No   8. Do you or does anyone in your family have a serious bleeding problem such as prolonged bleeding following surgeries or cuts? No   9. Have you ever had problems with anemia or been told to take iron pills? No   10. Have you had any abnormal blood loss such as black, tarry or bloody stools, or abnormal vaginal bleeding? No   11. Have you ever had a blood transfusion? No   12. Are you willing to have a blood transfusion if it is medically needed before, during, or after your surgery? Yes   13. Have you or any of your relatives ever had problems with anesthesia? No   14. Do you have sleep apnea, excessive snoring or daytime drowsiness? No   15. Do you have any artifical heart valves or other implanted medical devices like a pacemaker, defibrillator, or continuous glucose monitor? No   16. Do you have artificial joints? No   17. Are you allergic to latex? No     ______________________________________________________________________     We reviewed her other issues noted in the assessment but not specifically addressed in the HPI above.   ______________________________________________________________________     Patient Active Problem List   Diagnosis    Tobacco abuse    COPD (chronic obstructive pulmonary disease) (H)    Primary hypertension    Mixed hyperlipidemia    Hx of laparoscopic adjustable gastric banding    Obesity (BMI 30.0-34.9)    Zinc deficiency    Vitamin D deficiency    Normal colonoscopy - 2018 - Average risk    Hymenoptera allergy     Lumbar radiculopathy    GERD (gastroesophageal reflux disease)    TIA (transient ischemic attack)    Spinal stenosis in cervical region    Lymphocytic esophagitis    CAD (coronary artery disease)    Bilateral carotid artery stenosis       Past Surgical History:   Procedure Laterality Date    FOOT SURGERY      Jose palomo    LAPAROSCOPIC GASTRIC BANDING  2008    LUMBAR DISC SURGERY      [place] ESOPHAGOGASTRODUODENOSCOPY TRANSORAL DIAGNOSTIC N/A 2020    Procedure: ESOPHAGOGASTRODUODENOSCOPY (EGD);  Surgeon: Jared Howard MD;  Location: [facility];  Service: General      Social History     Occupational History    Not on file   Tobacco Use    Smoking status: Former     Packs/day: 0.50     Years: 37.00     Additional pack years: 0.00     Total pack years: 18.50     Types: Cigarettes     Quit date: 2022     Years since quittin.3    Smokeless tobacco: Never    Tobacco comments:     quit 22   Vaping Use    Vaping Use: Never used   Substance and Sexual Activity    Alcohol use: Yes     Alcohol/week: 4.0 standard drinks of alcohol     Types: 4 Standard drinks or equivalent per week     Comment: 4-5 beer/wine weekly    Drug use: Never    Sexual activity: Never     Birth control/protection: Abstinence, Post-menopausal     Social History     Social History Narrative    Lives with a friend.2018  Retired after working for [employer].      Family History   Problem Relation Age of Onset    Coronary Artery Disease Mother 58    Hypertension Mother     Chronic Obstructive Pulmonary Disease Father     Hypertension Father     Anxiety Disorder Father     Hypertension Brother       Family history is noncontributory otherwise.    Allergies: Other environmental allergy and Sulfa (sulfonamide antibiotics) [sulfa antibiotics]     Current medications:  Current Outpatient Medications   Medication Instructions    albuterol (PROAIR HFA/PROVENTIL HFA/VENTOLIN HFA) 108 (90 Base) MCG/ACT inhaler 2 puffs, Inhalation,  "EVERY 6 HOURS PRN    amLODIPine (NORVASC) 5 MG tablet TAKE 1 TABLET(5 MG) BY MOUTH DAILY    aspirin (ASA) 81 mg, Oral, DAILY    atorvastatin (LIPITOR) 40 mg, Oral, DAILY    EPINEPHrine (ANY BX GENERIC EQUIV) 0.3 mg, Intramuscular, ONCE PRN, May repeat one time in 5-15 minutes if response to initial dose is inadequate.    ezetimibe (ZETIA) 10 mg, Oral, DAILY    fluticasone (ARNUITY ELLIPTA) 100 MCG/ACT inhaler 1 puff, Inhalation, DAILY    nicotine (NICODERM CQ) 21 MG/24HR 24 hr patch 1 patch, Transdermal, EVERY 24 HOURS    pantoprazole (PROTONIX) 40 mg, Oral, DAILY    sucralfate (CARAFATE) 1 g, Oral, DAILY PRN    varenicline (CHANTIX ALYSSA) 0.5 MG X 11 & 1 MG X 42 tablet Take 0.5 mg tab daily for 3 days, THEN 0.5 mg tab twice daily for 4 days, THEN 1 mg twice daily.    varenicline (CHANTIX) 1 mg, Oral, 2 TIMES DAILY        Objective:     Wt Readings from Last 3 Encounters:   01/08/24 80.3 kg (177 lb)   12/08/23 79 kg (174 lb 3.2 oz)   11/30/23 77.1 kg (170 lb)     BP Readings from Last 3 Encounters:   01/08/24 (!) 148/90   01/04/24 (!) 155/96   12/08/23 (!) 159/85     BP (!) 148/90 (BP Location: Right arm, Patient Position: Sitting)   Pulse 67   Temp 97.7  F (36.5  C) (Oral)   Resp 18   Wt 80.3 kg (177 lb)   LMP  (LMP Unknown)   .2 cm (64.25\")   SpO2 97%   BMI 30.15 kg/m     Patient is in no acute distress.  Mood good.  Insight good.  Eyes nonicteric.  Pupils equal.  Ears clear.  Nose clear.  Throat clear.  Neck is supple.  No cervical adenopathy.  No thyromegaly.  Heart is regular rate and rhythm.  Lungs clear to auscultation bilaterally.  Respiratory effort is good.  Extremities no edema.     Diagnostics:     Admission on 11/30/2023, Discharged on 12/01/2023   Component Date Value Ref Range Status    Sodium 11/30/2023 139  135 - 145 mmol/L Final    Reference intervals for this test were updated on 09/26/2023 to more accurately reflect our healthy population. There may be differences in the flagging of " prior results with similar values performed with this method. Interpretation of those prior results can be made in the context of the updated reference intervals.     Potassium 11/30/2023 4.2  3.4 - 5.3 mmol/L Final    Chloride 11/30/2023 100  98 - 107 mmol/L Final    Carbon Dioxide (CO2) 11/30/2023 28  22 - 29 mmol/L Final    Anion Gap 11/30/2023 11  7 - 15 mmol/L Final    Urea Nitrogen 11/30/2023 14.3  8.0 - 23.0 mg/dL Final    Creatinine 11/30/2023 0.95  0.51 - 0.95 mg/dL Final    GFR Estimate 11/30/2023 66  >60 mL/min/1.73m2 Final    Calcium 11/30/2023 10.2  8.8 - 10.2 mg/dL Final    Glucose 11/30/2023 98  70 - 99 mg/dL Final    INR 11/30/2023 0.97  0.85 - 1.15 Final    aPTT 11/30/2023 29  22 - 38 Seconds Final    Troponin T, High Sensitivity 11/30/2023 15 (H)  <=14 ng/L Final    Either a High Sensitivity Troponin T baseline (0 hours) value = 100 ng/L, or an increase in High Sensitivity Troponin T = 7 ng/L at 2 hours compared to 0 hours (2-0 hours), suggests myocardial injury, and urgent clinical attention is required.    If the 2-0 hours increase is <7 ng/L, a High Sensitivity Troponin T result above gender-specific reference ranges warrants further evaluation.   Recommendations for further evaluation include correlation with clinical decision-making tool (e.g., HEART), a 3rd High Sensitivity Troponin T test 2 hours after the 2nd (a 20% change from baseline would represent concern), admission for observation, close PCC/cardiology follow-up, or urgent outpatient provocative testing.    Ventricular Rate 11/30/2023 62  BPM Final    Atrial Rate 11/30/2023 62  BPM Final    TX Interval 11/30/2023 158  ms Final    QRS Duration 11/30/2023 100  ms Final    QT 11/30/2023 436  ms Final    QTc 11/30/2023 442  ms Final    P Axis 11/30/2023 19  degrees Final    R AXIS 11/30/2023 -22  degrees Final    T Axis 11/30/2023 86  degrees Final    Interpretation ECG 11/30/2023    Final                    Value:Sinus rhythm  Septal  infarct , age undetermined  Abnormal ECG  When compared with ECG of 10-AUG-2008 11:17,  Septal infarct is now Present  Confirmed by SEE ED PROVIDER NOTE FOR, ECG INTERPRETATION ([id number]),  Dashawn Tamez ([id number]) on 12/6/2023 10:30:50 AM      WBC Count 11/30/2023 5.3  4.0 - 11.0 10e3/uL Final    RBC Count 11/30/2023 4.83  3.80 - 5.20 10e6/uL Final    Hemoglobin 11/30/2023 14.9  11.7 - 15.7 g/dL Final    Hematocrit 11/30/2023 45.7  35.0 - 47.0 % Final    MCV 11/30/2023 95  78 - 100 fL Final    MCH 11/30/2023 30.8  26.5 - 33.0 pg Final    MCHC 11/30/2023 32.6  31.5 - 36.5 g/dL Final    RDW 11/30/2023 14.3  10.0 - 15.0 % Final    Platelet Count 11/30/2023 211  150 - 450 10e3/uL Final    % Neutrophils 11/30/2023 66  % Final    % Lymphocytes 11/30/2023 21  % Final    % Monocytes 11/30/2023 8  % Final    % Eosinophils 11/30/2023 4  % Final    % Basophils 11/30/2023 1  % Final    % Immature Granulocytes 11/30/2023 0  % Final    NRBCs per 100 WBC 11/30/2023 0  <1 /100 Final    Absolute Neutrophils 11/30/2023 3.5  1.6 - 8.3 10e3/uL Final    Absolute Lymphocytes 11/30/2023 1.1  0.8 - 5.3 10e3/uL Final    Absolute Monocytes 11/30/2023 0.4  0.0 - 1.3 10e3/uL Final    Absolute Eosinophils 11/30/2023 0.2  0.0 - 0.7 10e3/uL Final    Absolute Basophils 11/30/2023 0.1  0.0 - 0.2 10e3/uL Final    Absolute Immature Granulocytes 11/30/2023 0.0  <=0.4 10e3/uL Final    Absolute NRBCs 11/30/2023 0.0  10e3/uL Final    GLUCOSE BY METER POCT 11/30/2023 119 (H)  70 - 99 mg/dL Final    Troponin T, High Sensitivity 11/30/2023 12  <=14 ng/L Final    Either a High Sensitivity Troponin T baseline (0 hours) value = 100 ng/L, or an increase in High Sensitivity Troponin T = 7 ng/L at 2 hours compared to 0 hours (2-0 hours), suggests myocardial injury, and urgent clinical attention is required.    If the 2-0 hours increase is <7 ng/L, a High Sensitivity Troponin T result above gender-specific reference ranges warrants further evaluation.    Recommendations for further evaluation include correlation with clinical decision-making tool (e.g., HEART), a 3rd High Sensitivity Troponin T test 2 hours after the 2nd (a 20% change from baseline would represent concern), admission for observation, close PCC/cardiology follow-up, or urgent outpatient provocative testing.    Hemoglobin A1C 11/30/2023 6.0 (H)  <5.7 % Final    Normal <5.7%   Prediabetes 5.7-6.4%    Diabetes 6.5% or higher     Note: Adopted from ADA consensus guidelines.    Cholesterol 11/30/2023 261 (H)  <200 mg/dL Final    Triglycerides 11/30/2023 66  <150 mg/dL Final    Direct Measure HDL 11/30/2023 102  >=50 mg/dL Final    LDL Cholesterol Calculated 11/30/2023 146 (H)  <=100 mg/dL Final    Non HDL Cholesterol 11/30/2023 159 (H)  <130 mg/dL Final    LVEF  11/30/2023 60-65%   Final    Creatinine 11/30/2023 0.92  0.51 - 0.95 mg/dL Final    GFR Estimate 11/30/2023 69  >60 mL/min/1.73m2 Final    Protein Total 11/30/2023 7.9  6.4 - 8.3 g/dL Final    Albumin 11/30/2023 4.5  3.5 - 5.2 g/dL Final    Bilirubin Total 11/30/2023 0.5  <=1.2 mg/dL Final    Alkaline Phosphatase 11/30/2023 87  40 - 150 U/L Final    Reference intervals for this test were updated on 11/14/2023 to more accurately reflect our healthy population. There may be differences in the flagging of prior results with similar values performed with this method. Interpretation of those prior results can be made in the context of the updated reference intervals.    AST 11/30/2023 29  0 - 45 U/L Final    Reference intervals for this test were updated on 6/12/2023 to more accurately reflect our healthy population. There may be differences in the flagging of prior results with similar values performed with this method. Interpretation of those prior results can be made in the context of the updated reference intervals.    ALT 11/30/2023 16  0 - 50 U/L Final    Reference intervals for this test were updated on 6/12/2023 to more accurately reflect our  healthy population. There may be differences in the flagging of prior results with similar values performed with this method. Interpretation of those prior results can be made in the context of the updated reference intervals.      Bilirubin Direct 11/30/2023 <0.20  0.00 - 0.30 mg/dL Final    Magnesium 11/30/2023 1.9  1.7 - 2.3 mg/dL Final    Phosphorus 11/30/2023 3.9  2.5 - 4.5 mg/dL Final    TSH 11/30/2023 6.10 (H)  0.30 - 4.20 uIU/mL Final    Free T4 11/30/2023 1.00  0.90 - 1.70 ng/dL Final    GLUCOSE BY METER POCT 11/30/2023 115 (H)  70 - 99 mg/dL Final    GLUCOSE BY METER POCT 11/30/2023 145 (H)  70 - 99 mg/dL Final    WBC Count 12/01/2023 4.5  4.0 - 11.0 10e3/uL Final    RBC Count 12/01/2023 4.64  3.80 - 5.20 10e6/uL Final    Hemoglobin 12/01/2023 14.2  11.7 - 15.7 g/dL Final    Hematocrit 12/01/2023 44.0  35.0 - 47.0 % Final    MCV 12/01/2023 95  78 - 100 fL Final    MCH 12/01/2023 30.6  26.5 - 33.0 pg Final    MCHC 12/01/2023 32.3  31.5 - 36.5 g/dL Final    RDW 12/01/2023 14.1  10.0 - 15.0 % Final    Platelet Count 12/01/2023 164  150 - 450 10e3/uL Final    Sodium 12/01/2023 139  135 - 145 mmol/L Final    Reference intervals for this test were updated on 09/26/2023 to more accurately reflect our healthy population. There may be differences in the flagging of prior results with similar values performed with this method. Interpretation of those prior results can be made in the context of the updated reference intervals.     Potassium 12/01/2023 3.9  3.4 - 5.3 mmol/L Final    Chloride 12/01/2023 103  98 - 107 mmol/L Final    Carbon Dioxide (CO2) 12/01/2023 27  22 - 29 mmol/L Final    Anion Gap 12/01/2023 9  7 - 15 mmol/L Final    Urea Nitrogen 12/01/2023 13.8  8.0 - 23.0 mg/dL Final    Creatinine 12/01/2023 0.92  0.51 - 0.95 mg/dL Final    GFR Estimate 12/01/2023 69  >60 mL/min/1.73m2 Final    Calcium 12/01/2023 9.4  8.8 - 10.2 mg/dL Final    Glucose 12/01/2023 94  70 - 99 mg/dL Final    GLUCOSE BY METER POCT  2023 101 (H)  70 - 99 mg/dL Final    GLUCOSE BY METER POCT 2023 127 (H)  70 - 99 mg/dL Final      ECG results from 23   ECG 12-LEAD WITH MUSE (LHE)     Value    Systolic Blood Pressure     Diastolic Blood Pressure     Ventricular Rate 62    Atrial Rate 62    AL Interval 158    QRS Duration 100        QTc 442    P Axis 19    R AXIS -22    T Axis 86    Interpretation ECG      Sinus rhythm  Septal infarct , age undetermined  Abnormal ECG  When compared with ECG of 10-AUG-2008 11:17,  Septal infarct is now Present  Confirmed by SEE ED PROVIDER NOTE FOR, ECG INTERPRETATION (),  MuseAdmin, MuseAdmin () on 2023 10:30:50 AM        CTA Angiogram coronary artery    Height: Not recorded   Weight: Not recorded   Blood Pressure: 136/67    Date of Study: 23   Ordering Provider: Ricardo Davila MD   Clinical Indications: Abnormal nuclear stress test [R94.39 (ICD-10-CM)], Dyspnea on exertion [R06.09 (ICD-10-CM)], Tobacco abuse [Z72.0 (ICD-10-CM)]       Interpreting Physicians  Performing Staff   Bharati Calvo MD Tech: Eileen Turner ARRT        Patient Information    Patient Name  Marlen Dumas MRN  [id number] Legal Sex  Female              Age  1960 (63 year old)     Reason for Exam  Priority: Routine  Dx: Abnormal nuclear stress test [R94.39 (ICD-10-CM)]; Dyspnea on exertion [R06.09 (ICD-10-CM)]; Tobacco abuse [Z72.0 (ICD-10-CM)]     Indications    Abnormal nuclear stress test [R94.39 (ICD-10-CM)]   Dyspnea on exertion [R06.09 (ICD-10-CM)]   Tobacco abuse [Z72.0 (ICD-10-CM)]     Interpretation Summary    Normal left main  Mild disease in proximal LAD, less than 40% stenosis.  Mild disease in proximal LCx and proximal OM1, less than 40% stenosis.  Mild disease in proximal RCA.  Moderate to stenosis in the proximal right PDA, 50 to 60% stenosis which is caused by soft plaque.  Normal size of the visualized aortic segments.  No thrombus in left atrial appendage.  No  pericardial effusion or calcified pericardium.    Impression:     1. Preoperative examination    2. Left carotid artery stenosis    3. Chronic obstructive pulmonary disease, unspecified COPD type (H)   Stable at this time and no new symptoms.  Continue to monitor.   4. Tobacco abuse    5. Coronary artery disease involving native coronary artery of native heart without angina pectoris    6. Primary hypertension    7. TIA (transient ischemic attack)    8. Mixed hyperlipidemia        Plan:     Okay to proceed with surgery as planned.  Blood work tomorrow as ordered by surgery.  Take usual medications on the am of surgery with a sip of water.  Continue to work on smoking cessation.  Continue current medications.  Follow up sooner if issues.         Ricardo Davila MD  General Internal Medicine  [facility]    Return in about 2 weeks (around 1/22/2024) for follow up visit.     Future Appointments   Date Time Provider Department Center   1/9/2024  7:20 AM [facility] 2 [facility]   1/22/2024  9:00 AM [facility]   1/25/2024 10:00 AM [facility]

## 2024-01-08 NOTE — PROGRESS NOTES
Wt Readings from Last 20 Encounters:   01/08/24 80.3 kg (177 lb)   12/08/23 79 kg (174 lb 3.2 oz)   11/30/23 77.1 kg (170 lb)   10/13/23 78.6 kg (173 lb 3.2 oz)   08/26/23 72.6 kg (160 lb)   07/24/23 80.3 kg (177 lb 1.6 oz)   07/07/23 81.6 kg (180 lb)   04/12/23 79.4 kg (175 lb)   02/26/23 75.8 kg (167 lb)   09/15/22 70.3 kg (155 lb)   09/14/22 70.9 kg (156 lb 6.4 oz)   09/02/22 68 kg (150 lb)   10/04/21 68 kg (150 lb)   09/28/21 67.1 kg (148 lb)   07/02/21 67.1 kg (148 lb)   06/17/21 69.4 kg (152 lb 14.4 oz)   06/07/21 69.4 kg (153 lb)   08/14/20 70.3 kg (155 lb)   02/07/20 65.6 kg (144 lb 9.6 oz)   02/05/20 65.8 kg (145 lb)     BP Readings from Last 20 Encounters:   01/08/24 (!) 148/90   01/04/24 (!) 155/96   12/08/23 (!) 159/85   12/01/23 130/74   10/13/23 138/62   09/26/23 136/67   08/26/23 (!) 151/82   07/24/23 124/78   07/07/23 136/86   02/26/23 (!) 145/79   11/02/22 (!) 148/78   10/12/22 (!) 153/77   09/15/22 124/78   09/14/22 122/68   09/02/22 110/70   10/18/21 128/74   10/04/21 134/72   09/28/21 138/74   07/04/21 136/80   06/17/21 120/74      Pulse Readings from Last 20 Encounters:   01/08/24 67   01/04/24 66   12/08/23 63   11/30/23 63   10/13/23 66   08/26/23 66   07/24/23 71   07/07/23 83   02/26/23 72   11/02/22 66   10/12/22 62   09/14/22 72   09/02/22 77   10/18/21 68   10/04/21 76   09/28/21 79   07/02/21 77   06/17/21 82   06/07/21 80   05/27/21 78     SpO2 Readings from Last 20 Encounters:   01/08/24 97%   01/04/24 99%   12/08/23 98%   12/01/23 95%   10/13/23 98%   08/26/23 96%   07/24/23 98%   07/07/23 95%   02/26/23 98%   11/02/22 95%   10/12/22 98%   09/14/22 98%   09/02/22 96%   10/18/21 96%   10/04/21 97%   09/28/21 97%   07/02/21 98%   06/17/21 96%   06/07/21 95%   05/27/21 96%

## 2024-01-08 NOTE — PATIENT INSTRUCTIONS
Future Appointments   Date Time Provider Department Center   1/9/2024  7:20 AM [facility]  [facility] 2 [facility]   1/22/2024  9:00 AM [facility]   1/25/2024 10:00 AM [facility]

## 2024-01-09 ENCOUNTER — ANCILLARY PROCEDURE (OUTPATIENT)
Dept: VASCULAR ULTRASOUND | Facility: CLINIC | Age: 64
DRG: 039 | End: 2024-01-09
Attending: SURGERY
Payer: MEDICARE

## 2024-01-09 ENCOUNTER — ANESTHESIA (OUTPATIENT)
Dept: SURGERY | Facility: CLINIC | Age: 64
DRG: 039 | End: 2024-01-09
Payer: MEDICARE

## 2024-01-09 ENCOUNTER — HOSPITAL ENCOUNTER (INPATIENT)
Facility: CLINIC | Age: 64
LOS: 1 days | Discharge: HOME | DRG: 039 | End: 2024-01-10
Attending: SURGERY | Admitting: SURGERY
Payer: MEDICARE

## 2024-01-09 DIAGNOSIS — I65.22 LEFT CAROTID STENOSIS: Primary | ICD-10-CM

## 2024-01-09 DIAGNOSIS — I65.22 STENOSIS OF LEFT CAROTID ARTERY: ICD-10-CM

## 2024-01-09 LAB
BASOPHILS # BLD AUTO: 0.1 10E3/UL (ref 0–0.2)
BASOPHILS NFR BLD AUTO: 1 %
CREAT SERPL-MCNC: 0.92 MG/DL (ref 0.51–0.95)
EGFRCR SERPLBLD CKD-EPI 2021: 69 ML/MIN/1.73M2
EOSINOPHIL # BLD AUTO: 0.3 10E3/UL (ref 0–0.7)
EOSINOPHIL NFR BLD AUTO: 5 %
ERYTHROCYTE [DISTWIDTH] IN BLOOD BY AUTOMATED COUNT: 13.5 % (ref 10–15)
GLUCOSE BLDC GLUCOMTR-MCNC: 97 MG/DL (ref 70–99)
GLUCOSE SERPL-MCNC: 92 MG/DL (ref 70–99)
HCT VFR BLD AUTO: 45 % (ref 35–47)
HGB BLD-MCNC: 14.5 G/DL (ref 11.7–15.7)
IMM GRANULOCYTES # BLD: 0 10E3/UL
IMM GRANULOCYTES NFR BLD: 0 %
LYMPHOCYTES # BLD AUTO: 1.4 10E3/UL (ref 0.8–5.3)
LYMPHOCYTES NFR BLD AUTO: 23 %
MCH RBC QN AUTO: 30.7 PG (ref 26.5–33)
MCHC RBC AUTO-ENTMCNC: 32.2 G/DL (ref 31.5–36.5)
MCV RBC AUTO: 95 FL (ref 78–100)
MONOCYTES # BLD AUTO: 0.5 10E3/UL (ref 0–1.3)
MONOCYTES NFR BLD AUTO: 9 %
NEUTROPHILS # BLD AUTO: 3.7 10E3/UL (ref 1.6–8.3)
NEUTROPHILS NFR BLD AUTO: 62 %
NRBC # BLD AUTO: 0 10E3/UL
NRBC BLD AUTO-RTO: 0 /100
PLATELET # BLD AUTO: 197 10E3/UL (ref 150–450)
POTASSIUM SERPL-SCNC: 3.6 MMOL/L (ref 3.4–5.3)
RBC # BLD AUTO: 4.73 10E6/UL (ref 3.8–5.2)
WBC # BLD AUTO: 6 10E3/UL (ref 4–11)

## 2024-01-09 PROCEDURE — 4A00X4Z MEASUREMENT OF CENTRAL NERVOUS ELECTRICAL ACTIVITY, EXTERNAL APPROACH: ICD-10-PCS | Performed by: SURGERY

## 2024-01-09 PROCEDURE — 258N000003 HC RX IP 258 OP 636: Performed by: ANESTHESIOLOGY

## 2024-01-09 PROCEDURE — 250N000009 HC RX 250: Performed by: SURGERY

## 2024-01-09 PROCEDURE — 03UJ0JZ SUPPLEMENT LEFT COMMON CAROTID ARTERY WITH SYNTHETIC SUBSTITUTE, OPEN APPROACH: ICD-10-PCS | Performed by: SURGERY

## 2024-01-09 PROCEDURE — 272N000004 HC RX 272: Performed by: SURGERY

## 2024-01-09 PROCEDURE — 250N000011 HC RX IP 250 OP 636: Performed by: ANESTHESIOLOGY

## 2024-01-09 PROCEDURE — 85025 COMPLETE CBC W/AUTO DIFF WBC: CPT | Performed by: SURGERY

## 2024-01-09 PROCEDURE — 99222 1ST HOSP IP/OBS MODERATE 55: CPT | Performed by: FAMILY MEDICINE

## 2024-01-09 PROCEDURE — 82565 ASSAY OF CREATININE: CPT | Performed by: SURGERY

## 2024-01-09 PROCEDURE — 250N000011 HC RX IP 250 OP 636: Performed by: NURSE ANESTHETIST, CERTIFIED REGISTERED

## 2024-01-09 PROCEDURE — 84132 ASSAY OF SERUM POTASSIUM: CPT | Performed by: SURGERY

## 2024-01-09 PROCEDURE — 250N000009 HC RX 250: Performed by: NURSE ANESTHETIST, CERTIFIED REGISTERED

## 2024-01-09 PROCEDURE — 250N000013 HC RX MED GY IP 250 OP 250 PS 637: Performed by: NURSE ANESTHETIST, CERTIFIED REGISTERED

## 2024-01-09 PROCEDURE — 999N000063 US INTRAOPERATIVE

## 2024-01-09 PROCEDURE — 999N000141 HC STATISTIC PRE-PROCEDURE NURSING ASSESSMENT: Performed by: SURGERY

## 2024-01-09 PROCEDURE — 76998 US GUIDE INTRAOP: CPT | Mod: 26 | Performed by: SURGERY

## 2024-01-09 PROCEDURE — 200N000001 HC R&B ICU

## 2024-01-09 PROCEDURE — B344ZZZ ULTRASONOGRAPHY OF LEFT COMMON CAROTID ARTERY: ICD-10-PCS | Performed by: SURGERY

## 2024-01-09 PROCEDURE — C1763 CONN TISS, NON-HUMAN: HCPCS | Performed by: SURGERY

## 2024-01-09 PROCEDURE — 258N000003 HC RX IP 258 OP 636: Performed by: NURSE ANESTHETIST, CERTIFIED REGISTERED

## 2024-01-09 PROCEDURE — 88311 DECALCIFY TISSUE: CPT | Mod: TC | Performed by: SURGERY

## 2024-01-09 PROCEDURE — 250N000011 HC RX IP 250 OP 636: Performed by: SURGERY

## 2024-01-09 PROCEDURE — 370N000017 HC ANESTHESIA TECHNICAL FEE, PER MIN: Performed by: SURGERY

## 2024-01-09 PROCEDURE — 82947 ASSAY GLUCOSE BLOOD QUANT: CPT | Performed by: SURGERY

## 2024-01-09 PROCEDURE — 250N000005 HC OR RX SURGIFLO HEMOSTATIC MATRIX 10ML 199102S OPNP: Performed by: SURGERY

## 2024-01-09 PROCEDURE — 35301 RECHANNELING OF ARTERY: CPT | Mod: LT | Performed by: SURGERY

## 2024-01-09 PROCEDURE — 03CN0ZZ EXTIRPATION OF MATTER FROM LEFT EXTERNAL CAROTID ARTERY, OPEN APPROACH: ICD-10-PCS | Performed by: SURGERY

## 2024-01-09 PROCEDURE — 03UN0JZ SUPPLEMENT LEFT EXTERNAL CAROTID ARTERY WITH SYNTHETIC SUBSTITUTE, OPEN APPROACH: ICD-10-PCS | Performed by: SURGERY

## 2024-01-09 PROCEDURE — 250N000013 HC RX MED GY IP 250 OP 250 PS 637: Performed by: SURGERY

## 2024-01-09 PROCEDURE — 258N000003 HC RX IP 258 OP 636: Performed by: SURGERY

## 2024-01-09 PROCEDURE — 36415 COLL VENOUS BLD VENIPUNCTURE: CPT | Performed by: SURGERY

## 2024-01-09 PROCEDURE — 710N000010 HC RECOVERY PHASE 1, LEVEL 2, PER MIN: Performed by: SURGERY

## 2024-01-09 PROCEDURE — 03CJ0ZZ EXTIRPATION OF MATTER FROM LEFT COMMON CAROTID ARTERY, OPEN APPROACH: ICD-10-PCS | Performed by: SURGERY

## 2024-01-09 PROCEDURE — 360N000077 HC SURGERY LEVEL 4, PER MIN: Performed by: SURGERY

## 2024-01-09 PROCEDURE — 272N000001 HC OR GENERAL SUPPLY STERILE: Performed by: SURGERY

## 2024-01-09 PROCEDURE — 250N000009 HC RX 250: Performed by: ANESTHESIOLOGY

## 2024-01-09 PROCEDURE — 250N000025 HC SEVOFLURANE, PER MIN: Performed by: SURGERY

## 2024-01-09 DEVICE — DECELLULARIZED BOVINE PERICARDIUM
Type: IMPLANTABLE DEVICE | Site: NECK | Status: FUNCTIONAL
Brand: PHOTOFIX DECELLULARIZED BOVINE PERICARDIUM

## 2024-01-09 RX ORDER — ATORVASTATIN CALCIUM 40 MG/1
40 TABLET, FILM COATED ORAL DAILY
Status: DISCONTINUED | OUTPATIENT
Start: 2024-01-10 | End: 2024-01-10 | Stop reason: HOSPADM

## 2024-01-09 RX ORDER — PROPOFOL 10 MG/ML
INJECTION, EMULSION INTRAVENOUS PRN
Status: DISCONTINUED | OUTPATIENT
Start: 2024-01-09 | End: 2024-01-09

## 2024-01-09 RX ORDER — FENTANYL CITRATE 50 UG/ML
25 INJECTION, SOLUTION INTRAMUSCULAR; INTRAVENOUS EVERY 5 MIN PRN
Status: DISCONTINUED | OUTPATIENT
Start: 2024-01-09 | End: 2024-01-09 | Stop reason: HOSPADM

## 2024-01-09 RX ORDER — BISACODYL 10 MG
10 SUPPOSITORY, RECTAL RECTAL DAILY PRN
Status: DISCONTINUED | OUTPATIENT
Start: 2024-01-09 | End: 2024-01-10 | Stop reason: HOSPADM

## 2024-01-09 RX ORDER — PROTAMINE SULFATE 10 MG/ML
INJECTION, SOLUTION INTRAVENOUS
Status: COMPLETED
Start: 2024-01-09 | End: 2024-01-09

## 2024-01-09 RX ORDER — ACETAMINOPHEN 325 MG/1
975 TABLET ORAL EVERY 8 HOURS
Status: DISCONTINUED | OUTPATIENT
Start: 2024-01-09 | End: 2024-01-10 | Stop reason: HOSPADM

## 2024-01-09 RX ORDER — ONDANSETRON 2 MG/ML
4 INJECTION INTRAMUSCULAR; INTRAVENOUS EVERY 6 HOURS PRN
Status: DISCONTINUED | OUTPATIENT
Start: 2024-01-09 | End: 2024-01-10 | Stop reason: HOSPADM

## 2024-01-09 RX ORDER — EPINEPHRINE 1 MG/ML
0.3 INJECTION, SOLUTION INTRAMUSCULAR; SUBCUTANEOUS
Status: DISCONTINUED | OUTPATIENT
Start: 2024-01-09 | End: 2024-01-10 | Stop reason: HOSPADM

## 2024-01-09 RX ORDER — ACETAMINOPHEN 325 MG/1
650 TABLET ORAL EVERY 4 HOURS PRN
Status: DISCONTINUED | OUTPATIENT
Start: 2024-01-12 | End: 2024-01-10 | Stop reason: HOSPADM

## 2024-01-09 RX ORDER — CEFAZOLIN SODIUM 2 G/100ML
2 INJECTION, SOLUTION INTRAVENOUS EVERY 8 HOURS
Qty: 200 ML | Refills: 0 | Status: COMPLETED | OUTPATIENT
Start: 2024-01-09 | End: 2024-01-10

## 2024-01-09 RX ORDER — OXYCODONE HYDROCHLORIDE 5 MG/1
5 TABLET ORAL EVERY 4 HOURS PRN
Status: DISCONTINUED | OUTPATIENT
Start: 2024-01-09 | End: 2024-01-10 | Stop reason: HOSPADM

## 2024-01-09 RX ORDER — PANTOPRAZOLE SODIUM 40 MG/1
40 TABLET, DELAYED RELEASE ORAL DAILY
Status: DISCONTINUED | OUTPATIENT
Start: 2024-01-10 | End: 2024-01-10 | Stop reason: HOSPADM

## 2024-01-09 RX ORDER — NALOXONE HYDROCHLORIDE 0.4 MG/ML
0.4 INJECTION, SOLUTION INTRAMUSCULAR; INTRAVENOUS; SUBCUTANEOUS
Status: DISCONTINUED | OUTPATIENT
Start: 2024-01-09 | End: 2024-01-10 | Stop reason: HOSPADM

## 2024-01-09 RX ORDER — HEPARIN SODIUM 5000 [USP'U]/.5ML
5000 INJECTION, SOLUTION INTRAVENOUS; SUBCUTANEOUS EVERY 8 HOURS
Status: DISCONTINUED | OUTPATIENT
Start: 2024-01-10 | End: 2024-01-10 | Stop reason: HOSPADM

## 2024-01-09 RX ORDER — PHENYLEPHRINE HCL IN 0.9% NACL 50MG/250ML
PLASTIC BAG, INJECTION (ML) INTRAVENOUS
Status: DISCONTINUED
Start: 2024-01-09 | End: 2024-01-09 | Stop reason: HOSPADM

## 2024-01-09 RX ORDER — DEXMEDETOMIDINE HYDROCHLORIDE 4 UG/ML
INJECTION, SOLUTION INTRAVENOUS PRN
Status: DISCONTINUED | OUTPATIENT
Start: 2024-01-09 | End: 2024-01-09

## 2024-01-09 RX ORDER — NALOXONE HYDROCHLORIDE 0.4 MG/ML
0.2 INJECTION, SOLUTION INTRAMUSCULAR; INTRAVENOUS; SUBCUTANEOUS
Status: DISCONTINUED | OUTPATIENT
Start: 2024-01-09 | End: 2024-01-10 | Stop reason: HOSPADM

## 2024-01-09 RX ORDER — AMOXICILLIN 250 MG
1 CAPSULE ORAL 2 TIMES DAILY
Status: DISCONTINUED | OUTPATIENT
Start: 2024-01-09 | End: 2024-01-10 | Stop reason: HOSPADM

## 2024-01-09 RX ORDER — HYDROMORPHONE HCL IN WATER/PF 6 MG/30 ML
0.4 PATIENT CONTROLLED ANALGESIA SYRINGE INTRAVENOUS
Status: DISCONTINUED | OUTPATIENT
Start: 2024-01-09 | End: 2024-01-10 | Stop reason: HOSPADM

## 2024-01-09 RX ORDER — PHENYLEPHRINE HCL IN 0.9% NACL 50MG/250ML
.1-6 PLASTIC BAG, INJECTION (ML) INTRAVENOUS CONTINUOUS
Status: DISCONTINUED | OUTPATIENT
Start: 2024-01-09 | End: 2024-01-10 | Stop reason: HOSPADM

## 2024-01-09 RX ORDER — SODIUM CHLORIDE, SODIUM LACTATE, POTASSIUM CHLORIDE, CALCIUM CHLORIDE 600; 310; 30; 20 MG/100ML; MG/100ML; MG/100ML; MG/100ML
INJECTION, SOLUTION INTRAVENOUS CONTINUOUS
Status: DISCONTINUED | OUTPATIENT
Start: 2024-01-09 | End: 2024-01-09 | Stop reason: HOSPADM

## 2024-01-09 RX ORDER — LIDOCAINE 40 MG/G
CREAM TOPICAL
Status: DISCONTINUED | OUTPATIENT
Start: 2024-01-09 | End: 2024-01-09 | Stop reason: HOSPADM

## 2024-01-09 RX ORDER — HEPARIN SODIUM 1000 [USP'U]/ML
INJECTION, SOLUTION INTRAVENOUS; SUBCUTANEOUS PRN
Status: DISCONTINUED | OUTPATIENT
Start: 2024-01-09 | End: 2024-01-09 | Stop reason: HOSPADM

## 2024-01-09 RX ORDER — HEPARIN SODIUM 1000 [USP'U]/ML
INJECTION, SOLUTION INTRAVENOUS; SUBCUTANEOUS PRN
Status: DISCONTINUED | OUTPATIENT
Start: 2024-01-09 | End: 2024-01-09

## 2024-01-09 RX ORDER — SUCRALFATE 1 G/1
1 TABLET ORAL DAILY PRN
Status: DISCONTINUED | OUTPATIENT
Start: 2024-01-09 | End: 2024-01-10 | Stop reason: HOSPADM

## 2024-01-09 RX ORDER — KETAMINE HYDROCHLORIDE 10 MG/ML
INJECTION INTRAMUSCULAR; INTRAVENOUS PRN
Status: DISCONTINUED | OUTPATIENT
Start: 2024-01-09 | End: 2024-01-09

## 2024-01-09 RX ORDER — HYDROMORPHONE HCL IN WATER/PF 6 MG/30 ML
0.2 PATIENT CONTROLLED ANALGESIA SYRINGE INTRAVENOUS EVERY 5 MIN PRN
Status: DISCONTINUED | OUTPATIENT
Start: 2024-01-09 | End: 2024-01-09 | Stop reason: HOSPADM

## 2024-01-09 RX ORDER — ONDANSETRON 4 MG/1
4 TABLET, ORALLY DISINTEGRATING ORAL EVERY 6 HOURS PRN
Status: DISCONTINUED | OUTPATIENT
Start: 2024-01-09 | End: 2024-01-10 | Stop reason: HOSPADM

## 2024-01-09 RX ORDER — CEFAZOLIN SODIUM/WATER 2 G/20 ML
2 SYRINGE (ML) INTRAVENOUS SEE ADMIN INSTRUCTIONS
Status: DISCONTINUED | OUTPATIENT
Start: 2024-01-09 | End: 2024-01-09 | Stop reason: HOSPADM

## 2024-01-09 RX ORDER — ONDANSETRON 2 MG/ML
INJECTION INTRAMUSCULAR; INTRAVENOUS PRN
Status: DISCONTINUED | OUTPATIENT
Start: 2024-01-09 | End: 2024-01-09

## 2024-01-09 RX ORDER — LABETALOL HYDROCHLORIDE 5 MG/ML
INJECTION, SOLUTION INTRAVENOUS PRN
Status: DISCONTINUED | OUTPATIENT
Start: 2024-01-09 | End: 2024-01-09

## 2024-01-09 RX ORDER — ONDANSETRON 2 MG/ML
4 INJECTION INTRAMUSCULAR; INTRAVENOUS EVERY 30 MIN PRN
Status: DISCONTINUED | OUTPATIENT
Start: 2024-01-09 | End: 2024-01-09 | Stop reason: HOSPADM

## 2024-01-09 RX ORDER — FENTANYL CITRATE 50 UG/ML
50 INJECTION, SOLUTION INTRAMUSCULAR; INTRAVENOUS EVERY 5 MIN PRN
Status: DISCONTINUED | OUTPATIENT
Start: 2024-01-09 | End: 2024-01-09 | Stop reason: HOSPADM

## 2024-01-09 RX ORDER — OXYCODONE HYDROCHLORIDE 5 MG/1
10 TABLET ORAL EVERY 4 HOURS PRN
Status: DISCONTINUED | OUTPATIENT
Start: 2024-01-09 | End: 2024-01-10 | Stop reason: HOSPADM

## 2024-01-09 RX ORDER — FENTANYL CITRATE 50 UG/ML
25-100 INJECTION, SOLUTION INTRAMUSCULAR; INTRAVENOUS
Status: DISCONTINUED | OUTPATIENT
Start: 2024-01-09 | End: 2024-01-09 | Stop reason: HOSPADM

## 2024-01-09 RX ORDER — ASPIRIN 81 MG/1
81 TABLET, CHEWABLE ORAL DAILY
Status: DISCONTINUED | OUTPATIENT
Start: 2024-01-10 | End: 2024-01-10 | Stop reason: HOSPADM

## 2024-01-09 RX ORDER — ALBUTEROL SULFATE 90 UG/1
2 AEROSOL, METERED RESPIRATORY (INHALATION) EVERY 6 HOURS PRN
Status: DISCONTINUED | OUTPATIENT
Start: 2024-01-09 | End: 2024-01-10 | Stop reason: HOSPADM

## 2024-01-09 RX ORDER — HYDROMORPHONE HCL IN WATER/PF 6 MG/30 ML
0.4 PATIENT CONTROLLED ANALGESIA SYRINGE INTRAVENOUS EVERY 5 MIN PRN
Status: DISCONTINUED | OUTPATIENT
Start: 2024-01-09 | End: 2024-01-09 | Stop reason: HOSPADM

## 2024-01-09 RX ORDER — HEPARIN SODIUM 1000 [USP'U]/ML
INJECTION, SOLUTION INTRAVENOUS; SUBCUTANEOUS
Status: DISCONTINUED
Start: 2024-01-09 | End: 2024-01-09 | Stop reason: HOSPADM

## 2024-01-09 RX ORDER — PROCHLORPERAZINE MALEATE 5 MG
5 TABLET ORAL EVERY 6 HOURS PRN
Status: DISCONTINUED | OUTPATIENT
Start: 2024-01-09 | End: 2024-01-10 | Stop reason: HOSPADM

## 2024-01-09 RX ORDER — PROTAMINE SULFATE 10 MG/ML
INJECTION, SOLUTION INTRAVENOUS PRN
Status: DISCONTINUED | OUTPATIENT
Start: 2024-01-09 | End: 2024-01-09

## 2024-01-09 RX ORDER — ALBUTEROL SULFATE 90 UG/1
AEROSOL, METERED RESPIRATORY (INHALATION) PRN
Status: DISCONTINUED | OUTPATIENT
Start: 2024-01-09 | End: 2024-01-09

## 2024-01-09 RX ORDER — LIDOCAINE 40 MG/G
CREAM TOPICAL
Status: DISCONTINUED | OUTPATIENT
Start: 2024-01-09 | End: 2024-01-10 | Stop reason: HOSPADM

## 2024-01-09 RX ORDER — LABETALOL HYDROCHLORIDE 5 MG/ML
10 INJECTION, SOLUTION INTRAVENOUS EVERY 10 MIN PRN
Status: DISCONTINUED | OUTPATIENT
Start: 2024-01-09 | End: 2024-01-10 | Stop reason: HOSPADM

## 2024-01-09 RX ORDER — SODIUM CHLORIDE, SODIUM LACTATE, POTASSIUM CHLORIDE, CALCIUM CHLORIDE 600; 310; 30; 20 MG/100ML; MG/100ML; MG/100ML; MG/100ML
INJECTION, SOLUTION INTRAVENOUS CONTINUOUS
Status: ACTIVE | OUTPATIENT
Start: 2024-01-09 | End: 2024-01-09

## 2024-01-09 RX ORDER — DEXAMETHASONE SODIUM PHOSPHATE 10 MG/ML
INJECTION, SOLUTION INTRAMUSCULAR; INTRAVENOUS PRN
Status: DISCONTINUED | OUTPATIENT
Start: 2024-01-09 | End: 2024-01-09

## 2024-01-09 RX ORDER — AMLODIPINE BESYLATE 5 MG/1
5 TABLET ORAL DAILY
Status: DISCONTINUED | OUTPATIENT
Start: 2024-01-10 | End: 2024-01-10 | Stop reason: HOSPADM

## 2024-01-09 RX ORDER — ONDANSETRON 4 MG/1
4 TABLET, ORALLY DISINTEGRATING ORAL EVERY 30 MIN PRN
Status: DISCONTINUED | OUTPATIENT
Start: 2024-01-09 | End: 2024-01-09 | Stop reason: HOSPADM

## 2024-01-09 RX ORDER — CEFAZOLIN SODIUM/WATER 2 G/20 ML
2 SYRINGE (ML) INTRAVENOUS
Status: COMPLETED | OUTPATIENT
Start: 2024-01-09 | End: 2024-01-09

## 2024-01-09 RX ORDER — EZETIMIBE 10 MG/1
10 TABLET ORAL DAILY
Status: DISCONTINUED | OUTPATIENT
Start: 2024-01-10 | End: 2024-01-10 | Stop reason: HOSPADM

## 2024-01-09 RX ORDER — VARENICLINE TARTRATE 0.5 MG/1
1 TABLET, FILM COATED ORAL 2 TIMES DAILY
Status: DISCONTINUED | OUTPATIENT
Start: 2024-01-09 | End: 2024-01-10 | Stop reason: HOSPADM

## 2024-01-09 RX ORDER — LIDOCAINE HYDROCHLORIDE 10 MG/ML
INJECTION, SOLUTION EPIDURAL; INFILTRATION; INTRACAUDAL; PERINEURAL
Status: DISCONTINUED
Start: 2024-01-09 | End: 2024-01-09 | Stop reason: HOSPADM

## 2024-01-09 RX ORDER — HYDROMORPHONE HCL IN WATER/PF 6 MG/30 ML
0.2 PATIENT CONTROLLED ANALGESIA SYRINGE INTRAVENOUS
Status: DISCONTINUED | OUTPATIENT
Start: 2024-01-09 | End: 2024-01-10 | Stop reason: HOSPADM

## 2024-01-09 RX ORDER — POLYETHYLENE GLYCOL 3350 17 G/17G
17 POWDER, FOR SOLUTION ORAL DAILY
Status: DISCONTINUED | OUTPATIENT
Start: 2024-01-10 | End: 2024-01-10 | Stop reason: HOSPADM

## 2024-01-09 RX ORDER — SODIUM CHLORIDE 9 MG/ML
INJECTION, SOLUTION INTRAVENOUS CONTINUOUS PRN
Status: DISCONTINUED | OUTPATIENT
Start: 2024-01-09 | End: 2024-01-09

## 2024-01-09 RX ADMIN — FENTANYL CITRATE 100 MCG: 50 INJECTION INTRAMUSCULAR; INTRAVENOUS at 07:37

## 2024-01-09 RX ADMIN — PHENYLEPHRINE HYDROCHLORIDE 200 MCG: 10 INJECTION INTRAVENOUS at 08:40

## 2024-01-09 RX ADMIN — DEXAMETHASONE SODIUM PHOSPHATE 10 MG: 10 INJECTION, SOLUTION INTRAMUSCULAR; INTRAVENOUS at 07:53

## 2024-01-09 RX ADMIN — OXYCODONE HYDROCHLORIDE 5 MG: 5 TABLET ORAL at 12:15

## 2024-01-09 RX ADMIN — ALBUTEROL SULFATE 6 PUFF: 90 AEROSOL, METERED RESPIRATORY (INHALATION) at 08:09

## 2024-01-09 RX ADMIN — Medication 8 MCG: at 09:44

## 2024-01-09 RX ADMIN — FENTANYL CITRATE 50 MCG: 50 INJECTION INTRAMUSCULAR; INTRAVENOUS at 08:36

## 2024-01-09 RX ADMIN — PHENYLEPHRINE HYDROCHLORIDE 0.2 MCG/KG/MIN: 10 INJECTION INTRAVENOUS at 07:57

## 2024-01-09 RX ADMIN — MIDAZOLAM 2 MG: 1 INJECTION INTRAMUSCULAR; INTRAVENOUS at 07:23

## 2024-01-09 RX ADMIN — ACETAMINOPHEN 975 MG: 325 TABLET ORAL at 20:20

## 2024-01-09 RX ADMIN — HEPARIN SODIUM 10000 UNITS: 1000 INJECTION INTRAVENOUS; SUBCUTANEOUS at 08:19

## 2024-01-09 RX ADMIN — CEFAZOLIN SODIUM 2 G: 2 INJECTION, SOLUTION INTRAVENOUS at 23:49

## 2024-01-09 RX ADMIN — LIDOCAINE HYDROCHLORIDE 50 MG: 10 INJECTION, SOLUTION EPIDURAL; INFILTRATION; INTRACAUDAL; PERINEURAL at 07:50

## 2024-01-09 RX ADMIN — NICARDIPINE HYDROCHLORIDE 2.5 MG/HR: 0.2 INJECTION, SOLUTION INTRAVENOUS at 08:21

## 2024-01-09 RX ADMIN — KETAMINE HYDROCHLORIDE 10 MG: 10 INJECTION INTRAMUSCULAR; INTRAVENOUS at 08:23

## 2024-01-09 RX ADMIN — SODIUM CHLORIDE, POTASSIUM CHLORIDE, SODIUM LACTATE AND CALCIUM CHLORIDE: 600; 310; 30; 20 INJECTION, SOLUTION INTRAVENOUS at 06:35

## 2024-01-09 RX ADMIN — HEPARIN SODIUM 2000 UNITS: 1000 INJECTION INTRAVENOUS; SUBCUTANEOUS at 09:03

## 2024-01-09 RX ADMIN — SODIUM CHLORIDE, POTASSIUM CHLORIDE, SODIUM LACTATE AND CALCIUM CHLORIDE: 600; 310; 30; 20 INJECTION, SOLUTION INTRAVENOUS at 11:55

## 2024-01-09 RX ADMIN — PHENYLEPHRINE HYDROCHLORIDE 100 MCG: 10 INJECTION INTRAVENOUS at 07:50

## 2024-01-09 RX ADMIN — MIDAZOLAM 2 MG: 1 INJECTION INTRAMUSCULAR; INTRAVENOUS at 09:48

## 2024-01-09 RX ADMIN — PROTAMINE SULFATE 50 MG: 10 INJECTION, SOLUTION INTRAVENOUS at 09:15

## 2024-01-09 RX ADMIN — LABETALOL HYDROCHLORIDE 5 MG: 5 INJECTION, SOLUTION INTRAVENOUS at 09:34

## 2024-01-09 RX ADMIN — KETAMINE HYDROCHLORIDE 40 MG: 10 INJECTION INTRAMUSCULAR; INTRAVENOUS at 07:50

## 2024-01-09 RX ADMIN — VARENICLINE 1 MG: 0.5 TABLET, FILM COATED ORAL at 20:24

## 2024-01-09 RX ADMIN — SODIUM CHLORIDE, POTASSIUM CHLORIDE, SODIUM LACTATE AND CALCIUM CHLORIDE: 600; 310; 30; 20 INJECTION, SOLUTION INTRAVENOUS at 08:34

## 2024-01-09 RX ADMIN — PROPOFOL 130 MG: 10 INJECTION, EMULSION INTRAVENOUS at 07:50

## 2024-01-09 RX ADMIN — ONDANSETRON 4 MG: 2 INJECTION INTRAMUSCULAR; INTRAVENOUS at 09:18

## 2024-01-09 RX ADMIN — FENTANYL CITRATE 50 MCG: 50 INJECTION INTRAMUSCULAR; INTRAVENOUS at 08:35

## 2024-01-09 RX ADMIN — ACETAMINOPHEN 975 MG: 325 TABLET ORAL at 12:15

## 2024-01-09 RX ADMIN — FENTANYL CITRATE 25 MCG: 50 INJECTION, SOLUTION INTRAMUSCULAR; INTRAVENOUS at 10:41

## 2024-01-09 RX ADMIN — SUGAMMADEX 200 MG: 100 INJECTION, SOLUTION INTRAVENOUS at 09:55

## 2024-01-09 RX ADMIN — ROCURONIUM BROMIDE 50 MG: 10 INJECTION, SOLUTION INTRAVENOUS at 07:50

## 2024-01-09 RX ADMIN — PHENYLEPHRINE HYDROCHLORIDE 200 MCG: 10 INJECTION INTRAVENOUS at 08:01

## 2024-01-09 RX ADMIN — Medication 2 G: at 07:30

## 2024-01-09 RX ADMIN — PHENYLEPHRINE HYDROCHLORIDE 100 MCG: 10 INJECTION INTRAVENOUS at 08:46

## 2024-01-09 RX ADMIN — SODIUM CHLORIDE: 9 INJECTION, SOLUTION INTRAVENOUS at 07:25

## 2024-01-09 RX ADMIN — Medication 12 MCG: at 09:07

## 2024-01-09 RX ADMIN — CEFAZOLIN SODIUM 2 G: 2 INJECTION, SOLUTION INTRAVENOUS at 16:52

## 2024-01-09 ASSESSMENT — ACTIVITIES OF DAILY LIVING (ADL)
ADLS_ACUITY_SCORE: 35
ADLS_ACUITY_SCORE: 37
ADLS_ACUITY_SCORE: 35
ADLS_ACUITY_SCORE: 37
ADLS_ACUITY_SCORE: 35

## 2024-01-09 ASSESSMENT — COPD QUESTIONNAIRES
COPD: 1
CAT_SEVERITY: MODERATE

## 2024-01-09 ASSESSMENT — LIFESTYLE VARIABLES: TOBACCO_USE: 1

## 2024-01-09 NOTE — ANESTHESIA CARE TRANSFER NOTE
Patient: Marlen Dumas    Procedure: Procedure(s):  ENDARTERECTOMY, CAROTID       Diagnosis: Stenosis of left carotid artery [I65.22]  Diagnosis Additional Information: No value filed.    Anesthesia Type:   General     Note:    Oropharynx: oropharynx clear of all foreign objects  Level of Consciousness: drowsy  Oxygen Supplementation: face mask  Level of Supplemental Oxygen (L/min / FiO2): 8  Independent Airway: airway patency satisfactory and stable  Dentition: dentition unchanged  Vital Signs Stable: post-procedure vital signs reviewed and stable  Report to RN Given: handoff report given  Patient transferred to: PACU  Comments: Moving all extremities, following all commands  Handoff Report: Identifed the Patient, Identified the Reponsible Provider, Reviewed the pertinent medical history, Discussed the surgical course, Reviewed Intra-OP anesthesia mangement and issues during anesthesia, Set expectations for post-procedure period and Allowed opportunity for questions and acknowledgement of understanding      Vitals:  Vitals Value Taken Time   /58 01/09/24 1016   Temp 37.4  C (99.3  F) 01/09/24 1014   Pulse 69 01/09/24 1016   Resp 6 01/09/24 1016   SpO2 100% 01/09/24  1016   Vitals shown include unfiled device data.    Electronically Signed By: EDYTA MCNULTY CRNA  January 9, 2024  10:18 AM

## 2024-01-09 NOTE — OP NOTE
VASCULAR SURGERY OPERATIVE REPORT        LOCATION:    [facility]    Marlen Dumas   Medical Record #:  [id number]  YOB: 1960  Age:  63 year old     Date of Service: 01/9/2024    PRIMARY CARE PROVIDER: Ricardo Davila    Preoperative diagnosis    Symptomatic left carotid artery stenosis  Postoperative diagnosis    Same    Surgeon: Timbo Hart MD, RPVI   Assistant: Stacy Castro PA-C    I attest that no qualified resident or fellow was available to assist for the surgery because there were no surgical resident or fellows available due to residency rotation.  Circumstances required the skills of Stacy Castro PA-C to assist with all parts of this operation including incision, dissection, anastomosis, and wound closure.     Name of the procedure    left carotid endarterectomy with EEG monitoring  2.   Intraoperative carotid ultrasound    Indication for procedure:    63-year-old female with symptomatic left carotid artery stenosis.  On ultrasound and CT scan were found that she has a very well and the lesion with previous episode of amaurosis.  She is scheduled for left carotid endarterectomy.I discussed the risks of left-sided carotid endarterectomy including but not limited to death, stroke, bleeding, MI and infection. We reviewed the benefits and alternatives including medical management and stenting. Ms. Dumas was involved in all aspects of decision making and appears to understand. They would like to proceed with the recommended treatment of carotid endarterectomy.       Description of procedure:    Patient was brought to the operating room and transferred to the operating room table in stable condition.  Patient was induced with general anesthesia.  Patient was then positioned in the supine position with both arms tucked with the neck turned to the patient's right.  Patient neck was also extended using shoulder roll placed horizontally in the upper mid back.  EEG leads  were placed for neuro monitoring during the case.  Ultrasound was then used to loco the carotid bifurcation.  Patient's left neck was then prepped and draped in the usual sterile fashion.    An incision was made along the anterior border of the sternocleidomastoid given the extensive nature of the disease.  Incision was then carried down through the platysma using electrocautery. All cutaneous oozing was controlled with electrocautery.  Dissection was carried down along the medial aspect of the sternocleidomastoid.  A small subcutaneous nerve was identified and divided between 2 small clips.  After sufficient mobilization of the sternocleidomastoid superiorly and inferiorly, the sternocleidomastoid was retracted laterally and self retainer retractors were placed.  The internal jugular vein was identified and sharply dissected free with Metzenbaum scissors.  Small crossing branches of the internal jugular vein were ligated using small clips.  After sufficient mobilization of the internal jugular vein, the carotid artery was identified medially.  Patient was then systemically heparinized with [id number] units of heparin with plans to redosed every 45 minutes as needed.  Furthermore, the patient's mean arterial pressure was increased to greater than 100 mmHg.  Sharp dissection of the carotid artery was performed using Metzenbaum scissors.  The common carotid artery was circumferentially dissected free.  Proximal part of the common carotid artery was noted to be soft without any obvious evidence of disease.  This area was encircled with a vessel loop.  Attention was then turned to identifying the internal carotid artery.  Dissection was carried distally.  The external carotid artery was dissected free from the surrounding subcutaneous tissue and encircled with a vessel loop.  Attention was then turned to were identifying the internal carotid artery.  The internal carotid artery was noted to be running posteriorly.  Sharp  dissection was used to circumferentially dissect out the internal carotid artery.  The internal carotid artery was then encircled with a vessel loop.  A blunt right angle was then used to open up the space posterior to the internal carotid artery so as to facilitate placement of the Lawson bulldog clamp.  At this time, the internal carotid artery was then clamped using a Lawson bulldog clamp.  Dissection of the remainder of the common carotid artery and carotid bulb was performed.  The superior thyroid branch of the external carotid artery was also dissected freely and encircled with a vessel loop.  The common carotid artery was clamped using an angled DeBakey clamp.  The external carotid artery and superior thyroid branch of the external carotid artery were clamped using the previously placed Vesseloops.    An 11 blade was used to make a nick along the anterior wall of the common carotid artery.  Espinosa scissors were then used to extend this arteriotomy along the anterior wall of the internal carotid artery to an area in the internal carotid artery that appeared free of atherosclerotic disease.  High-grade stenosis in the carotid bulb and proximal internal carotid artery was noted.  The arteriotomy was then extended proximally along the common carotid artery to an area in the proximal common carotid artery that appeared free of disease.  There was noted to be atherosclerotic plaque along the medial wall of the common carotid artery.  [place] elevator was used to free the calcific plaque along the common carotid artery.  After sufficient mobilization of the carotid plaque, a blunt right angle was used to come around circumferentially and the carotid plaque was divided using Espinosa scissors.  The carotid plaque was then freed proximally along the common carotid artery and removed.  Eversion endarterectomy of the external carotid artery was then performed.  The carotid plaque was then mobilized distally.  Downward  traction was then used to remove the plaque from the internal carotid artery so that it tapered smoothly.  Ibrahim forceps were used to create a smooth tapered endpoint of the internal carotid artery.  The endarterectomized carotid artery was then examined carefully using heparinized saline and any loose intimal flaps were then removed using a combination of peanut and Mills forceps.  The distal endpoint was noted to be stable and not loose.  The arterial wall was then closed using a 0.8 cm x 8 cm bovine pericardial patch using a running 5-0 Prolene suture.  Prior to completing the patch, standard flushing maneuvers were performed.  After completion of the patch, the external carotid artery was open first followed by the common carotid artery to flush any loose debris up the external carotid artery.  Internal carotid artery was opened last.  Additional hemostasis was achieved by using interrupted 6-0 Prolene sutures.  Completion carotid duplex was performed.  There were noted to be no loose intimal flaps.  Velocities were measured in the right common carotid artery, internal carotid artery, and external carotid artery.  Velocities in all 3 were noted to be not hemodynamically significant.  No visible defects were noted.    60 mg of protamine was administered for heparin reversal.  Thrombin-soaked Gelfoam was placed along the suture line.  Again there was noted to be significant oozing from the surrounding dissected tissue.  Surgicel was placed along the suture line as well.  Valsalva maneuver was performed by anesthesia, no additional bleeding was noted.   A small skin nick was made laterally.  A 10 Moldovan flat ISAEL drain was placed along the wound bed.  The drain was secured using a 3-0 nylon suture.  After confirming appropriate hemostasis along the suture line, the wound was copiously irrigated using warm saline solution.  Surgi-Chad hemostatic agent was placed along the suture line.  The platysma layer was then  closed with a running 3-0 Vicryl suture.  The skin was closed with a running 4-0 Monocryl subcuticular suture.  Dermabond was placed along the wound.  All needle, sponge, and instrument counts were correct at the end of the operation.    Patient tolerated the operation well.  There were noted to be no EEG changes throughout the entirety of the case.  Patient was extubated without any complications.    After extubation, patient was noted to be neurologically intact.  Patient was transferred to the postanesthesia care unit in stable condition.  In the postanesthesia care unit, patient was more alert and moving all extremities and was neurologically at baseline.      Estimated blood loss: 100 cc    Specimens: Carotid plaque    Complications: None          Timbo Hart MD, [facility]  [phone]  Fax: [phone]

## 2024-01-09 NOTE — ANESTHESIA PROCEDURE NOTES
Airway       Patient location during procedure: OR       Procedure Start/Stop Times: 1/9/2024 7:53 AM  Staff -        Anesthesiologist:  Lars Wilson MD       CRNA: Asuncion Epps APRN CRNA       Performed By: CRNA  Consent for Airway        Urgency: elective  Indications and Patient Condition       Indications for airway management: sean-procedural and airway protection       Induction type:intravenous       Mask difficulty assessment: 2 - vent by mask + OA or adjuvant +/- NMBA    Final Airway Details       Final airway type: endotracheal airway       Successful airway: ETT - single  Endotracheal Airway Details        ETT size (mm): 7.0       Cuffed: yes       Successful intubation technique: direct laryngoscopy       DL Blade Type: Ruffin 2       Grade View of Cords: 1       Adjucts: stylet       Position: Right       Measured from: lips       Secured at (cm): 22       Bite block used: None    Post intubation assessment        Placement verified by: capnometry, equal breath sounds and chest rise        Number of attempts at approach: 1       Number of other approaches attempted: 0       Secured with: commercial tube peña and tape       Ease of procedure: easy       Dentition: Intact and Unchanged       Dental guard used and removed. Dental Guard Type: Standard White.    Medication(s) Administered   Medication Administration Time: 1/9/2024 7:53 AM

## 2024-01-09 NOTE — PHARMACY-ADMISSION MEDICATION HISTORY
Pharmacist RICHARDSON Medication History    Admission medication history is complete. The information provided in this note is only as accurate as the sources available at the time of the update.    Medication reconciliation/reorder completed by provider prior to medication history? No    Information Source(s): Patient and Clinic records and Care Everywhere via in-person    Pertinent Information: N/A    Allergies reviewed with patient and updates made in EHR: yes    Medications available for use during hospital stay: None.      Medication History Completed By: Nj Molina [facility] 1/9/2024 6:35 AM    PTA Med List   Medication Sig Note Last Dose    albuterol (PROAIR HFA/PROVENTIL HFA/VENTOLIN HFA) 108 (90 Base) MCG/ACT inhaler Inhale 2 puffs into the lungs every 6 hours as needed for shortness of breath, wheezing or cough  Unknown at prn    amLODIPine (NORVASC) 5 MG tablet TAKE 1 TABLET(5 MG) BY MOUTH DAILY  1/9/2024 at AM    aspirin (ASA) 81 MG chewable tablet Take 81 mg by mouth daily 1/5/2024: continue 1/9/2024 at AM    atorvastatin (LIPITOR) 40 MG tablet Take 1 tablet (40 mg) by mouth daily  1/9/2024 at AM    EPINEPHrine (ANY BX GENERIC EQUIV) 0.3 MG/0.3ML injection 2-pack Inject 0.3 mLs (0.3 mg) into the muscle once as needed for anaphylaxis May repeat one time in 5-15 minutes if response to initial dose is inadequate.  Unknown at prn    ezetimibe (ZETIA) 10 MG tablet Take 1 tablet (10 mg) by mouth daily  1/9/2024 at AM    fluticasone (ARNUITY ELLIPTA) 100 MCG/ACT inhaler Inhale 1 puff into the lungs daily  1/9/2024 at AM, not with    nicotine (NICODERM CQ) 21 MG/24HR 24 hr patch Place 1 patch onto the skin every 24 hours  1/8/2024 at AM, not on    pantoprazole (PROTONIX) 40 MG EC tablet Take 1 tablet (40 mg) by mouth daily  1/9/2024 at AM    sucralfate (CARAFATE) 1 GM tablet Take 1 g by mouth daily as needed (heart burn/reflux symptoms)  1/9/2024 at AM    varenicline (CHANTIX) 1 MG tablet Take 1 tablet (1 mg) by  mouth 2 times daily  1/9/2024 at AM

## 2024-01-09 NOTE — INTERVAL H&P NOTE
"I have reviewed the surgical (or preoperative) H&P that is linked to this encounter, and examined the patient. There are no significant changes    Clinical Conditions Present on Arrival:  Clinically Significant Risk Factors Present on Admission                 # Drug Induced Platelet Defect: home medication list includes an antiplatelet medication  # Obesity: Estimated body mass index is 30.38 kg/m  as calculated from the following:    Height as of this encounter: 1.626 m (5' 4\").    Weight as of this encounter: 80.3 kg (177 lb).       "

## 2024-01-09 NOTE — PLAN OF CARE
Patient had Carotid Endarterectomy today 01/09/24 admitted from PACU about 1130. Patient alert and oriented. Discussed with Dr. Tanner becerril to sit or ambulate. Up to bedside commode. Voiding spontaneously. Complained of pain to surgical site. Available pain medication given. Patient reported improved in pain. VSS. LS clear. Initially on Oxygen weaning off. On Tele SB with prolonged QT and inverted T waves. Denies chest pain or shortness of breath. Tolerating clear liquid diet advanced to Full liquid diet. Plan discharge home pending medical progression. Will monitor and continue plan of care.     Problem: Carotid Revascularization  Goal: Effective Oxygenation and Ventilation  Outcome: Progressing     Problem: Carotid Revascularization  Goal: Effective Urinary Elimination  Outcome: Progressing     Problem: Carotid Revascularization  Goal: Acceptable Pain Control  Outcome: Progressing     Problem: Carotid Revascularization  Goal: Adequate Tissue Perfusion  Outcome: Progressing     Problem: Carotid Revascularization  Goal: Absence of Bleeding  Outcome: Progressing     Problem: Surgery Nonspecified  Goal: Anesthesia/Sedation Recovery  Intervention: Optimize Anesthesia Recovery  Recent Flowsheet Documentation  Taken 1/9/2024 1200 by Camille Machado RN  Safety Promotion/Fall Prevention:   safety round/check completed   room door open   mobility aid in reach   lighting adjusted   increase visualization of patient   increased rounding and observation   clutter free environment maintained

## 2024-01-09 NOTE — ANESTHESIA POSTPROCEDURE EVALUATION
Patient: Marlen Dumas    Procedure: Procedure(s):  ENDARTERECTOMY, CAROTID       Anesthesia Type:  General    Note:  Disposition: Admission; ICU            ICU Sign Out: Anesthesiologist/ICU physician sign out WAS performed   Postop Pain Control: Uneventful            Sign Out: Well controlled pain   PONV: No   Neuro/Psych: Uneventful            Sign Out: Acceptable/Baseline neuro status   Airway/Respiratory: Uneventful            Sign Out: O2 supplementation               Oxygen: Face mask   CV/Hemodynamics: Uneventful            Sign Out: Acceptable CV status; No obvious hypovolemia; No obvious fluid overload   Other NRE: NONE   DID A NON-ROUTINE EVENT OCCUR?            Last vitals:  Vitals Value Taken Time   /62 01/09/24 1020   Temp 37.4  C (99.3  F) 01/09/24 1014   Pulse 67 01/09/24 1022   Resp 11 01/09/24 1022   SpO2 100 % 01/09/24 1022   Vitals shown include unfiled device data.    Electronically Signed By: Lars Wilson MD  January 9, 2024  10:24 AM

## 2024-01-09 NOTE — CONSULTS
[facility] MEDICINE CONSULT NOTE   I agree with the documentation and findings as written by IHSAN Gaona and our discussed and formulated assessment and plan. I was present with IHSAN Gaona who participated in the service and documentation of the note. I have also personally verified the history and personally performed the physical exam and medical decision-making. I agree with the assessment and plan of care as documented in the note.     Allie Patiño MD  Internal Medicine  Hospitalist  [facility]  Phone: #[phone]   Medicine Progress Note - Hospitalist Service    Physician requesting consult: Timbo Hart*    Reason for consult: Postoperative medical management of medical co-morbidities as below    Assessment and Plan    Marlen Dumas is a 63 year old old female with a history of CAD, bilateral carotid artery stenosis, primary hypertension, hyperlipidemia, COPD, GERD, history of gastric banding, hypothyroidism, obesity, underwent endarterectomy of the left carotid artery.  NWR270 mL.  Moderate left-sided neck pain. [facility] service was asked to evaluate patient for postoperative medical management as follows below. Please resume the home medications as reconciled and further noted below with ordered hold parameters.  Vital signs have been stable post-operatively including hemodynamically stable blood pressure and heart rate. Thank you for this consult; we will continue to follow this patient until discharge.      Carotid artery stenosis   Carotid duplex: left internal carotid artery consistent with 50 to 70% stenosis. Right internal carotid artery consistent with less than 50% stenosis.  - CTA: 80% stenosis of the proximal left ICA and includes large thrombus only partially adhered to the vessel wall.   - Left carotid endarterectomy performed 1/9   - Stable post-operation. Continue to monitor   -Resume aspirin 81 mg daily    Primary  hypertension   Norvasc 5 mg daily    Dyslipidemia  Lipitor 40 mg daily  Zetia 10 mg daily    Coronary artery disease   - CTA coronary angiogram ((9/2023) showed pLAD, pLCx and OM1 < 40% stenosis, pRPDA 50-60% stenosis caused by soft plaque  -Aggressive risk factor modification  -Resume statin, aspirin.  Not on beta-blocker due to bradycardia    Obesity   -  Modification of lifestyle for weight loss     COPD, stable and asymptomatic  - Continue home medication (albuterol and fluticasone)   Tobacco use, quit plan is discussed  On Chantix    Gastric lap band procedure due to morbid obesity (2008)  Esophageal dilation and thickening  GERD   - Continue PPI (pantoprazole) and sucralfate     Hypothyroidism   - TSH 6.10. Free T4, 1   --Not on thyroid supplement      Procedure(s):  ENDARTERECTOMY, CAROTID  Post-operative Day: Day of Surgery  Code status:Full Code     Type of Anesthesia   General anesthesia    Estimated Blood Loss:  100 mL    Hospital Problem List   No problem-specific Assessment & Plan notes found for this encounter.    Principal Problem:    Left carotid stenosis      -Reviewed the patient's preoperative H and P and updated missing elements.  -Home medication reconciliation has been reviewed.  Medications have been ordered as noted from the home list and changes are documented above     HISTORY     Marlen Dumas is a 63 year old old female with a past medical history of CAD, bilateral carotid artery stenosis, primary hypertension, hyperlipidemia, COPD, GERD, history of gastric banding, hypothyroidism, lumbar radiculopathy, obesity. Was hospitalized for transient right arm weakness and dizziness suspecting of TIA 1 month ago. MRI showed no stroke and benign cardiac workup.  Significant left carotid artery stenosis.  Underwent left carotid endarterectomy today.She was seen post-operation in stable condition. Reported no paresthesia or loss of sensation in extremities and face. No chest pain, shortness of  breath, fatigue, headache or vision changes. Did have neck pain 6/10 which has improved with pain mediation.     She is on Norvasc 5 mg daily for hypertension.  Lipitor 40 mg daily and Zetia 10 mg daily for dyslipidemia.  History of coronary artery disease which is stable and asymptomatic.  On aspirin and statin.  Not on beta-blocker due to bradycardia.  Taking PPI and sucralfate for GERD.  Symptoms are stable.    Preop physical is reviewed.  History is provided by the patient.    Past Medical History     Patient Active Problem List    Diagnosis Date Noted    Left carotid stenosis 01/09/2024     Priority: Medium    Spinal stenosis in cervical region 12/08/2023     Priority: Medium    Lymphocytic esophagitis 12/08/2023     Priority: Medium    CAD (coronary artery disease) 12/08/2023     Priority: Medium     CTA ANGIOGRAM 2023    Indications    Abnormal nuclear stress test [R94.39 (ICD-10-CM)]   Dyspnea on exertion [R06.09 (ICD-10-CM)]   Tobacco abuse [Z72.0 (ICD-10-CM)]   Interpretation Summary    Normal left main  Mild disease in proximal LAD, less than 40% stenosis.  Mild disease in proximal LCx and proximal OM1, less than 40% stenosis.  Mild disease in proximal RCA.  Moderate to stenosis in the proximal right PDA, 50 to 60% stenosis which is caused by soft plaque.  Normal size of the visualized aortic segments.  No thrombus in left atrial appendage.  No pericardial effusion or calcified pericardium.      Bilateral carotid artery stenosis 12/08/2023     Priority: Medium    TIA (transient ischemic attack) 11/30/2023     Priority: Medium    GERD (gastroesophageal reflux disease) 07/24/2023     Priority: Medium    Lumbar radiculopathy 09/30/2021     Priority: Medium    Tobacco abuse      Priority: Medium    Hymenoptera allergy 05/08/2019     Priority: Medium    Normal colonoscopy - 2018 - Average risk 05/05/2019     Priority: Medium    Zinc deficiency 08/24/2016     Priority: Medium    Vitamin D deficiency 08/24/2016      Priority: Medium    Obesity (BMI 30.0-34.9) 2016     Priority: Medium    Primary hypertension      Priority: Medium    COPD (chronic obstructive pulmonary disease) (H)      Priority: Medium    Mixed hyperlipidemia      Priority: Medium    Hx of laparoscopic adjustable gastric banding 10/01/2008     Priority: Medium     Initial weight 295.5 BMI 50.4 Dr. Rowell            Surgical History   She  has a past surgical history that includes Laparoscopic Gastric Banding (); Lumbar Disc Surgery; Foot surgery; and Pr Esophagogastroduodenoscopy Transoral Diagnostic (N/A, 2020).     Past Surgical History:   Procedure Laterality Date    FOOT SURGERY      Sting [place] stacia    LAPAROSCOPIC GASTRIC BANDING      LUMBAR DISC SURGERY      AK ESOPHAGOGASTRODUODENOSCOPY TRANSORAL DIAGNOSTIC N/A 2020    Procedure: ESOPHAGOGASTRODUODENOSCOPY (EGD);  Surgeon: Jared Howard MD;  Location: [facility];  Service: General       Family History    Reviewed, and family history includes Anxiety Disorder in her father; Chronic Obstructive Pulmonary Disease in her father; Coronary Artery Disease (age of onset: 58) in her mother; Hypertension in her brother, father, and mother.    Social History    Reviewed, and she  reports that she quit smoking about 15 months ago. Her smoking use included cigarettes. She has a 18.5 pack-year smoking history. She has never used smokeless tobacco. She reports current alcohol use of about 4.0 standard drinks of alcohol per week. She reports that she does not use drugs.    Social History     Tobacco Use    Smoking status: Former     Packs/day: 0.50     Years: 37.00     Additional pack years: 0.00     Total pack years: 18.50     Types: Cigarettes     Quit date: 2022     Years since quittin.3    Smokeless tobacco: Never    Tobacco comments:     quit 22   Substance Use Topics    Alcohol use: Yes     Alcohol/week: 4.0 standard drinks of alcohol     Types: 4 Standard  drinks or equivalent per week     Comment: 4-5 beer/wine weekly       Allergies     Allergies   Allergen Reactions    Other Environmental Allergy Unknown     Bee sting    Sulfa (Sulfonamide Antibiotics) [Sulfa Antibiotics] Unknown       Prior to Admission Medications      Medications Prior to Admission   Medication Sig Dispense Refill Last Dose    albuterol (PROAIR HFA/PROVENTIL HFA/VENTOLIN HFA) 108 (90 Base) MCG/ACT inhaler Inhale 2 puffs into the lungs every 6 hours as needed for shortness of breath, wheezing or cough   Unknown at prn    amLODIPine (NORVASC) 5 MG tablet TAKE 1 TABLET(5 MG) BY MOUTH DAILY 90 tablet 3 1/9/2024 at AM    aspirin (ASA) 81 MG chewable tablet Take 81 mg by mouth daily   1/9/2024 at AM    atorvastatin (LIPITOR) 40 MG tablet Take 1 tablet (40 mg) by mouth daily 90 tablet 4 1/9/2024 at AM    EPINEPHrine (ANY BX GENERIC EQUIV) 0.3 MG/0.3ML injection 2-pack Inject 0.3 mLs (0.3 mg) into the muscle once as needed for anaphylaxis May repeat one time in 5-15 minutes if response to initial dose is inadequate. 2 each 3 Unknown at prn    ezetimibe (ZETIA) 10 MG tablet Take 1 tablet (10 mg) by mouth daily 90 tablet 3 1/9/2024 at AM    fluticasone (ARNUITY ELLIPTA) 100 MCG/ACT inhaler Inhale 1 puff into the lungs daily 30 each 3 1/9/2024 at AM, not with    nicotine (NICODERM CQ) 21 MG/24HR 24 hr patch Place 1 patch onto the skin every 24 hours 30 patch 3 1/8/2024 at AM, not on    pantoprazole (PROTONIX) 40 MG EC tablet Take 1 tablet (40 mg) by mouth daily 90 tablet 3 1/9/2024 at AM    sucralfate (CARAFATE) 1 GM tablet Take 1 g by mouth daily as needed (heart burn/reflux symptoms)   1/9/2024 at AM    varenicline (CHANTIX) 1 MG tablet Take 1 tablet (1 mg) by mouth 2 times daily 60 tablet 5 1/9/2024 at AM       Review of Systems   A 12 point comprehensive review of systems was negative except as noted above.    OBJECTIVE         Physical Exam   Temp:  [97.5  F (36.4  C)-99.3  F (37.4  C)] 97.5  F (36.4   C)  Pulse:  [54-69] 56  Resp:  [10-21] 15  BP: (121-177)/(57-81) 122/57  MAP:  [83 mmHg-88 mmHg] 85 mmHg  Arterial Line BP: (129-143)/(57-59) 138/58  SpO2:  [95 %-100 %] 96 %  Body mass index is 30.38 kg/m .      GENERAL:  Alert, appears comfortable, in no acute distress, appears stated age, obese   HEAD:  Normocephalic, without obvious abnormality, atraumatic   EYES:  Conjunctiva clear,  EOM's intact   NOSE: Nares normal,  mucosa normal, no drainage   THROAT: Lips, mucosa,  gums normal, mouth moist   NECK: S/P left carotid endarterectomy, incision are healing, no bleeding   BACK:   Symmetric, no curvature, ROM normal   LUNGS:   Clear to auscultation bilaterally, no rales, rhonchi, or wheezing, symmetric chest rise on inhalation, respirations unlabored   CHEST WALL:  No tenderness or deformity   HEART:  Regular rate and rhythm, murmur    ABDOMEN:   Soft, bowel sounds active, no masses, no organomegaly, no rebound or guarding   EXTREMITIES: No edema    SKIN: No rashes in the visualized areas   NEURO: Alert, oriented x3, moves all four extremities freely    PSYCH: Cooperative, behavior is appropriate        Imaging Reviewed Personally By Myself    Radiology Results:   Carotid ultrasound 11/30  1.  Moderate plaque formation, velocities consistent with less than 50% stenosis in the right internal carotid artery.  2.  Moderate plaque formation, velocities consistent with 50-69% stenosis in the left internal carotid artery.  3.  Flow within the vertebral arteries is antegrade.    Labs Reviewed Personally By Myself   TSH 6.16, free T4 1  WBC 6, hemoglobin 14.5  Cr 0.92  Preoperative Labs Reviewed Personally By Myself     Thank you for this consultation.  Appreciate the opportunity to participate in the care of Marlen Dumas, please feel free to contact us for any questions or concerns.    IHSAN Gaona  [facility]  Phone: #[phone]

## 2024-01-09 NOTE — ANESTHESIA PREPROCEDURE EVALUATION
Anesthesia Pre-Procedure Evaluation    Patient: Marlen Dumas   MRN: [id number] : 1960        Procedure : Procedure(s):  ENDARTERECTOMY, CAROTID          Past Medical History:   Diagnosis Date    CAD (coronary artery disease) 2023    COPD (chronic obstructive pulmonary disease) (H)     GERD (gastroesophageal reflux disease) 2023    HLD (hyperlipidemia)     HTN (hypertension)     Hx of laparoscopic adjustable gastric banding 10/01/2008    Initial weight 295.5 BMI 50.4 Dr. Rowell    Hymenoptera allergy     Hypothyroidism     S/P colonoscopy 2019    Tobacco abuse     Vitamin D deficiency 2016      Past Surgical History:   Procedure Laterality Date    FOOT SURGERY      Sting ray stacia    LAPAROSCOPIC GASTRIC BANDING      LUMBAR DISC SURGERY      [place] ESOPHAGOGASTRODUODENOSCOPY TRANSORAL DIAGNOSTIC N/A 2020    Procedure: ESOPHAGOGASTRODUODENOSCOPY (EGD);  Surgeon: Jared Howard MD;  Location: [facility];  Service: General      Allergies   Allergen Reactions    Other Environmental Allergy Unknown     Bee sting    Sulfa (Sulfonamide Antibiotics) [Sulfa Antibiotics] Unknown      Social History     Tobacco Use    Smoking status: Former     Packs/day: 0.50     Years: 37.00     Additional pack years: 0.00     Total pack years: 18.50     Types: Cigarettes     Quit date: 2022     Years since quittin.3    Smokeless tobacco: Never    Tobacco comments:     quit 22   Substance Use Topics    Alcohol use: Yes     Alcohol/week: 4.0 standard drinks of alcohol     Types: 4 Standard drinks or equivalent per week     Comment: 4-5 beer/wine weekly      Wt Readings from Last 1 Encounters:   24 80.3 kg (177 lb)        Anesthesia Evaluation   Pt has had prior anesthetic.     No history of anesthetic complications       ROS/MED HX  ENT/Pulmonary:     (+)                tobacco use, Current use,        moderate,  COPD,              Neurologic:     (+)              TIA,         "          Cardiovascular:     (+) Dyslipidemia hypertension- Peripheral Vascular Disease-- Carotid Stenosis and Symptomatic.  CAD -  - -                                      METS/Exercise Tolerance: >4 METS    Hematologic:  - neg hematologic  ROS     Musculoskeletal:  - neg musculoskeletal ROS     GI/Hepatic: Comment: S/p gastric banding    (+) GERD, Asymptomatic on medication,                  Renal/Genitourinary:  - neg Renal ROS     Endo:     (+)          thyroid problem, hypothyroidism,           Psychiatric/Substance Use:  - neg psychiatric ROS     Infectious Disease:  - neg infectious disease ROS     Malignancy:  - neg malignancy ROS     Other:  - neg other ROS          Physical Exam    Airway  airway exam normal      Mallampati: II   TM distance: > 3 FB   Neck ROM: full   Mouth opening: > 3 cm    Respiratory Devices and Support         Dental  no notable dental history         Cardiovascular   cardiovascular exam normal          Pulmonary   pulmonary exam normal                OUTSIDE LABS:  CBC:   Lab Results   Component Value Date    WBC 6.0 01/09/2024    WBC 4.5 12/01/2023    HGB 14.5 01/09/2024    HGB 14.2 12/01/2023    HCT 45.0 01/09/2024    HCT 44.0 12/01/2023     01/09/2024     12/01/2023     BMP:   Lab Results   Component Value Date     12/01/2023     11/30/2023    POTASSIUM 3.6 01/09/2024    POTASSIUM 3.9 12/01/2023    CHLORIDE 103 12/01/2023    CHLORIDE 100 11/30/2023    CO2 27 12/01/2023    CO2 28 11/30/2023    BUN 13.8 12/01/2023    BUN 14.3 11/30/2023    CR 0.92 01/09/2024    CR 0.92 12/01/2023    GLC 97 01/09/2024    GLC 92 01/09/2024     COAGS:   Lab Results   Component Value Date    PTT 29 11/30/2023    INR 0.97 11/30/2023     POC: No results found for: \"BGM\", \"HCG\", \"HCGS\"  HEPATIC:   Lab Results   Component Value Date    ALBUMIN 4.5 11/30/2023    PROTTOTAL 7.9 11/30/2023    ALT 16 11/30/2023    AST 29 11/30/2023    ALKPHOS 87 11/30/2023    BILITOTAL 0.5 11/30/2023 " "    OTHER:   Lab Results   Component Value Date    A1C 6.0 (H) 11/30/2023    SANDEEP 9.4 12/01/2023    PHOS 3.9 11/30/2023    MAG 1.9 11/30/2023    TSH 6.10 (H) 11/30/2023    T4 1.00 11/30/2023    CRP 0.2 02/21/2018    SED 6 02/21/2018       Anesthesia Plan    ASA Status:  4    NPO Status:  NPO Appropriate    Anesthesia Type: General.     - Airway: ETT   Induction: Intravenous, Propofol.   Maintenance: Balanced.   Techniques and Equipment:     - Lines/Monitors: 2nd IV, Arterial Line     Consents    Anesthesia Plan(s) and associated risks, benefits, and realistic alternatives discussed. Questions answered and patient/representative(s) expressed understanding.     - Discussed:     - Discussed with:  Patient      - Extended Intubation/Ventilatory Support Discussed: No.      - Patient is DNR/DNI Status: No     Use of blood products discussed: No .     Postoperative Care    Pain management: IV analgesics, Multi-modal analgesia.     - Plan for long acting post-op opioid use   PONV prophylaxis: Ondansetron (or other 5HT-3), Dexamethasone or Solumedrol, Droperidol or Haldol     Comments:    Other Comments: 50 mg ketamine IV on induction.             Lars Wilson MD    I have reviewed the pertinent notes and labs in the chart from the past 30 days and (re)examined the patient.  Any updates or changes from those notes are reflected in this note.             # Drug Induced Platelet Defect: home medication list includes an antiplatelet medication  # Obesity: Estimated body mass index is 30.38 kg/m  as calculated from the following:    Height as of this encounter: 1.626 m (5' 4\").    Weight as of this encounter: 80.3 kg (177 lb).      "

## 2024-01-09 NOTE — ANESTHESIA PROCEDURE NOTES
Arterial Line Procedure Note    Pre-Procedure   Staff -        Anesthesiologist:  Lars Wilson MD       Performed By: anesthesiologist       Location: OR       Pre-Anesthestic Checklist: patient identified, IV checked, risks and benefits discussed, informed consent, monitors and equipment checked, pre-op evaluation and at physician/surgeon's request  Timeout:       Correct Patient: Yes        Correct Procedure: Yes        Correct Site: Yes        Correct Position: Yes   Line Placement:   This line was placed Pre Induction starting at 1/9/2024 7:42 AM and ending at 1/9/2024 7:48 AM  Procedure   Procedure: arterial line, new line and elective       Laterality: left       Insertion Site: radial.  Sterile Prep        Standard elements of sterile barrier followed       Skin prep: Chloraprep  Insertion/Injection        Technique: Seldinger Technique and ultrasound guided        1. Ultrasound was used to evaluate the access site.       2. Artery evaluated via ultrasound for patency/adequacy.       3. Using real-time ultrasound the needle/catheter was observed entering the artery/vein.       4. Permanent image was captured and entered into the patient's record.       5. The visualized structures were anatomically normal.       6. There were no apparent abnormal pathologic findings.       Catheter Type/Size: 20 G, 12 cm  Narrative         Secured by: suture       Tegaderm and Biopatch dressing used.       Complications: None apparent,        Arterial waveform: Yes

## 2024-01-10 VITALS
DIASTOLIC BLOOD PRESSURE: 59 MMHG | HEART RATE: 68 BPM | RESPIRATION RATE: 24 BRPM | HEIGHT: 64 IN | TEMPERATURE: 98.5 F | OXYGEN SATURATION: 96 % | WEIGHT: 187.17 LBS | BODY MASS INDEX: 31.95 KG/M2 | SYSTOLIC BLOOD PRESSURE: 128 MMHG

## 2024-01-10 LAB
ANION GAP SERPL CALCULATED.3IONS-SCNC: 8 MMOL/L (ref 7–15)
BUN SERPL-MCNC: 14.8 MG/DL (ref 8–23)
CALCIUM SERPL-MCNC: 9.5 MG/DL (ref 8.8–10.2)
CHLORIDE SERPL-SCNC: 106 MMOL/L (ref 98–107)
CREAT SERPL-MCNC: 0.91 MG/DL (ref 0.51–0.95)
DEPRECATED HCO3 PLAS-SCNC: 25 MMOL/L (ref 22–29)
EGFRCR SERPLBLD CKD-EPI 2021: 70 ML/MIN/1.73M2
ERYTHROCYTE [DISTWIDTH] IN BLOOD BY AUTOMATED COUNT: 13.7 % (ref 10–15)
GLUCOSE SERPL-MCNC: 124 MG/DL (ref 70–99)
HCT VFR BLD AUTO: 37.2 % (ref 35–47)
HGB BLD-MCNC: 12.2 G/DL (ref 11.7–15.7)
MCH RBC QN AUTO: 31.3 PG (ref 26.5–33)
MCHC RBC AUTO-ENTMCNC: 32.8 G/DL (ref 31.5–36.5)
MCV RBC AUTO: 95 FL (ref 78–100)
PATH REPORT.COMMENTS IMP SPEC: NORMAL
PATH REPORT.COMMENTS IMP SPEC: NORMAL
PATH REPORT.FINAL DX SPEC: NORMAL
PATH REPORT.GROSS SPEC: NORMAL
PATH REPORT.MICROSCOPIC SPEC OTHER STN: NORMAL
PATH REPORT.RELEVANT HX SPEC: NORMAL
PHOTO IMAGE: NORMAL
PLATELET # BLD AUTO: 170 10E3/UL (ref 150–450)
POTASSIUM SERPL-SCNC: 4.2 MMOL/L (ref 3.4–5.3)
RBC # BLD AUTO: 3.9 10E6/UL (ref 3.8–5.2)
SODIUM SERPL-SCNC: 139 MMOL/L (ref 135–145)
WBC # BLD AUTO: 9.3 10E3/UL (ref 4–11)

## 2024-01-10 PROCEDURE — 250N000013 HC RX MED GY IP 250 OP 250 PS 637: Performed by: SURGERY

## 2024-01-10 PROCEDURE — 88304 TISSUE EXAM BY PATHOLOGIST: CPT | Mod: 26 | Performed by: PATHOLOGY

## 2024-01-10 PROCEDURE — 99232 SBSQ HOSP IP/OBS MODERATE 35: CPT | Performed by: FAMILY MEDICINE

## 2024-01-10 PROCEDURE — 88311 DECALCIFY TISSUE: CPT | Mod: 26 | Performed by: PATHOLOGY

## 2024-01-10 PROCEDURE — 85027 COMPLETE CBC AUTOMATED: CPT | Performed by: FAMILY MEDICINE

## 2024-01-10 PROCEDURE — 250N000011 HC RX IP 250 OP 636: Performed by: SURGERY

## 2024-01-10 PROCEDURE — 80048 BASIC METABOLIC PNL TOTAL CA: CPT | Performed by: FAMILY MEDICINE

## 2024-01-10 RX ORDER — NICOTINE 21 MG/24HR
1 PATCH, TRANSDERMAL 24 HOURS TRANSDERMAL DAILY
Status: DISCONTINUED | OUTPATIENT
Start: 2024-01-10 | End: 2024-01-10 | Stop reason: HOSPADM

## 2024-01-10 RX ADMIN — ATORVASTATIN CALCIUM 40 MG: 40 TABLET, FILM COATED ORAL at 08:01

## 2024-01-10 RX ADMIN — PANTOPRAZOLE SODIUM 40 MG: 40 TABLET, DELAYED RELEASE ORAL at 08:01

## 2024-01-10 RX ADMIN — FLUTICASONE FUROATE 1 PUFF: 100 POWDER RESPIRATORY (INHALATION) at 08:02

## 2024-01-10 RX ADMIN — VARENICLINE 1 MG: 0.5 TABLET, FILM COATED ORAL at 08:01

## 2024-01-10 RX ADMIN — ASPIRIN 81 MG CHEWABLE TABLET 81 MG: 81 TABLET CHEWABLE at 08:01

## 2024-01-10 RX ADMIN — ACETAMINOPHEN 975 MG: 325 TABLET ORAL at 03:37

## 2024-01-10 RX ADMIN — EZETIMIBE 10 MG: 10 TABLET ORAL at 08:01

## 2024-01-10 RX ADMIN — SENNOSIDES AND DOCUSATE SODIUM 1 TABLET: 8.6; 5 TABLET ORAL at 08:01

## 2024-01-10 RX ADMIN — AMLODIPINE BESYLATE 5 MG: 5 TABLET ORAL at 08:01

## 2024-01-10 RX ADMIN — HEPARIN SODIUM 5000 UNITS: 5000 INJECTION, SOLUTION INTRAVENOUS; SUBCUTANEOUS at 03:38

## 2024-01-10 RX ADMIN — POLYETHYLENE GLYCOL 3350 17 G: 17 POWDER, FOR SOLUTION ORAL at 08:01

## 2024-01-10 ASSESSMENT — ACTIVITIES OF DAILY LIVING (ADL)
ADLS_ACUITY_SCORE: 37
ADLS_ACUITY_SCORE: 35

## 2024-01-10 NOTE — DISCHARGE SUMMARY
"Vascular Surgery Discharge Summary    NAME: Marlen Dumas   MRN: [id number]   : 1960     DATE OF ADMISSION: 2024     PRE/POSTOPERATIVE DIAGNOSES:    Symptomatic left carotid artery stenosis     PROCEDURES PERFORMED:  Left carotid endarterectomy with EEG monitoring  2.   Intraoperative carotid ultrasound    INTRAOPERATIVE FINDINGS:   High-grade stenosis in the carotid bulb and proximal internal carotid artery was noted     POSTOPERATIVE COMPLICATIONS:  None    DATE OF DISCHARGE:    1/10/2024    HOSPITAL COURSE:   Marlen Dumas is a 63 year old female who on 2024 underwent the above-named procedures.  She tolerated the procedure well and postoperatively was transferred to the intensive care unit.  The remainder of her course was essentiallly uncomplicated.  Prior to discharge, her pain was controlled well with tylenol.  She was able to perform ADLs and ambulate independently without difficulty, and had full return of bowel and bladder function.  On 1/10/2024, she was discharged home in stable condition.    DISCHARGE INSTRUCTIONS:  Activity instructions  Your activity upon discharge: Gradually resume your normal activity level prior to your hospital visit. No driving for 1-2 weeks until your neck stiffness/discomfort has subsided and you have full neck range of motion    Diet instructions  Follow this diet upon discharge: regular    After Care Instructions  Wound care and dressings  Instructions to care for your wound at home:  OK to shower and let soap/water over incision  Do not soak or submerge incision in bathtub, pool, etc.  Monitor incision site daily for any signs or symptoms of infection.    FOLLOW UP APPOINTMENTS:    Provider Visit with Ricardo Davila   8:40 AM  Please plan to arrive at the clinic at your \"Arrive By\" time for your appointment. Our late policy will be enforced based on this time.      Post Op with Stacy Castro   " "9:45 AM  Please plan to arrive at the clinic at your \"Arrive By\" time for your appointment. Our late policy will be enforced based on the appointment start time. Our address is: [address] [place] [address] [address] [place] [id number]     DISCHARGE MEDICATIONS:     Review of your medicines        UNREVIEWED medicines. Ask your doctor about these medicines        Dose / Directions   albuterol 108 (90 Base) MCG/ACT inhaler  Commonly known as: PROAIR HFA/PROVENTIL HFA/VENTOLIN HFA      Dose: 2 puff  Inhale 2 puffs into the lungs every 6 hours as needed for shortness of breath, wheezing or cough  Refills: 0     amLODIPine 5 MG tablet  Commonly known as: NORVASC  Used for: Essential hypertension      TAKE 1 TABLET(5 MG) BY MOUTH DAILY  Quantity: 90 tablet  Refills: 3     aspirin 81 MG chewable tablet  Commonly known as: ASA      Dose: 81 mg  Take 81 mg by mouth daily  Refills: 0     atorvastatin 40 MG tablet  Commonly known as: LIPITOR  Used for: Stenosis of left carotid artery      Dose: 40 mg  Take 1 tablet (40 mg) by mouth daily  Quantity: 90 tablet  Refills: 4     EPINEPHrine 0.3 MG/0.3ML injection 2-pack  Commonly known as: ANY BX GENERIC EQUIV      Dose: 0.3 mg  Inject 0.3 mLs (0.3 mg) into the muscle once as needed for anaphylaxis May repeat one time in 5-15 minutes if response to initial dose is inadequate.  Quantity: 2 each  Refills: 3     ezetimibe 10 MG tablet  Commonly known as: ZETIA  Used for: Mixed hyperlipidemia      Dose: 10 mg  Take 1 tablet (10 mg) by mouth daily  Quantity: 90 tablet  Refills: 3     fluticasone 100 MCG/ACT inhaler  Commonly known as: ARNUITY ELLIPTA  Used for: Chronic obstructive pulmonary disease, unspecified COPD type (H)      Dose: 1 puff  Inhale 1 puff into the lungs daily  Quantity: 30 each  Refills: 3     nicotine 21 MG/24HR 24 hr patch  Commonly known as: NICODERM CQ  Used for: Tobacco abuse      Dose: 1 patch  Place 1 patch onto the skin every 24 hours  Quantity: 30 " patch  Refills: 3     pantoprazole 40 MG EC tablet  Commonly known as: PROTONIX  Used for: Gastroesophageal reflux disease with esophagitis without hemorrhage      Dose: 40 mg  Take 1 tablet (40 mg) by mouth daily  Quantity: 90 tablet  Refills: 3     sucralfate 1 GM tablet  Commonly known as: CARAFATE      Dose: 1 g  Take 1 g by mouth daily as needed (heart burn/reflux symptoms)  Refills: 0     varenicline 1 MG tablet  Commonly known as: CHANTIX  Used for: Tobacco abuse  Ask about: Which instructions should I use?      Dose: 1 mg  Take 1 tablet (1 mg) by mouth 2 times daily  Quantity: 60 tablet  Refills: 5            Stacy Castro PA-C  Vascular Surgery

## 2024-01-10 NOTE — PROGRESS NOTES
Care Management Discharge Note    Discharge Date: 01/10/2024       Discharge Disposition: Home    Discharge Services: None    Discharge DME: None    Discharge Transportation: family or friend will provide    Education Provided on the Discharge Plan: Yes (AVS per bedside RN)    Persons Notified of Discharge Plans: patient    Patient/Family in Agreement with the Plan: yes        Additional Information:  CM reviewed chart. No CM needs identified or requested. Family to provide transportation home at discharge.       [facility] referral sent per protocol.     Juanita Rothman RN

## 2024-01-10 NOTE — PROGRESS NOTES
"[facility] MEDICINE PROGRESS NOTE      Identification/Summary: Marlen Dumas is a 63 year old female with a past medical history of  CAD, bilateral carotid artery stenosis, primary hypertension, hyperlipidemia, COPD, GERD, history of gastric banding, hypothyroidism, obesity, underwent endarterectomy of the left carotid artery.  POD 1.  Hemodynamically stable.  Left-sided neck pain is stable.  Will be discharging home today.    Assessment and Plan:  Carotid artery stenosis  History of TIA  Carotid duplex: left internal carotid artery consistent with 50 to 70% stenosis. Right internal carotid artery consistent with less than 50% stenosis.  CTA: 80% stenosis of the proximal left ICA and includes large thrombus only partially adhered to the vessel wall.   Left carotid endarterectomy performed 1/9   Routine postoperative care  Aspirin 81 mg daily.  Follow postop instruction by vascular surgery    Essential hypertension   Norvasc 5 mg daily     Dyslipidemia  Lipitor 40 mg daily  Zetia 10 mg daily     Coronary artery disease   CTA coronary angiogram ((9/2023) showed pLAD, pLCx and OM1 < 40% stenosis, pRPDA 50-60% stenosis caused by soft plaque  Aggressive risk factor modification  Resume statin, aspirin.  Not on beta-blocker due to bradycardia     Obesity   Modification of lifestyle for weight loss      COPD, stable and asymptomatic  Continue home medication (albuterol and fluticasone)   Tobacco use, quit plan is discussed  On Chantix     Gastric lap band procedure due to morbid obesity (2008)  Esophageal dilation and thickening  GERD   Continue PPI (pantoprazole) and sucralfate      Hypothyroidism   TSH 6.10. Free T4, 1   Not on thyroid supplement    Clinically Significant Risk Factors                  # Hypertension: Noted on problem list        # Obesity: Estimated body mass index is 32.13 kg/m  as calculated from the following:    Height as of this encounter: 1.626 m (5' 4\").    Weight as " of this encounter: 84.9 kg (187 lb 2.7 oz)., PRESENT ON ADMISSION              Allie Patiño MD  [facility]  Phone: #[phone]  Securely message with the Vocera Web Console (learn more here)  Text page via [facility] Paging/Directory     Interval History/Subjective:  Resting comfortably.  Left-sided neck pain is stable.  Hemodynamically stable.  Evaluated by vascular surgery.  Will be discharging home today.    Physical Exam/Objective:  Temp:  [97.5  F (36.4  C)-98.5  F (36.9  C)] 98.5  F (36.9  C)  Pulse:  [51-83] 68  Resp:  [9-41] 24  BP: (116-143)/(59-69) 128/59  SpO2:  [85 %-99 %] 96 %  Body mass index is 32.13 kg/m .    GENERAL:  Alert, appears comfortable, in no acute distress, appears stated age   HEAD:  Normocephalic, without obvious abnormality, atraumatic   EYES:  PERRL, conjunctiva  clear,   EOM's intact   NOSE: Nares normal,  mucosa normal, no drainage   THROAT: Lips, mucosa,  gums normal, mouth moist   NECK: S/P left carotid endarterectomy, incisions are healing   BACK:   Symmetric, no curvature, ROM normal   LUNGS:   Clear to auscultation bilaterally, no rales, rhonchi, or wheezing, symmetric chest rise on inhalation, respirations unlabored   CHEST WALL:  No tenderness or deformity   HEART:  Regular rate and rhythm, S1 and S2 normal, no murmur, rub, or gallop    ABDOMEN:   Soft, non-tender, bowel sounds active , no masses, no organomegaly, no rebound or guarding   EXTREMITIES: No edema    SKIN: No rashes in the visualized areas   NEURO: Alert, oriented x3, moves all four extremities freely   PSYCH: Cooperative, behavior is appropriate      Data reviewed today: I personally reviewed all new medications, labs, imaging/diagnostics reports over the past 24 hours. Pertinent findings include:    Imaging:   No results found for this or any previous visit (from the past 24 hour(s)).    Labs:  Most Recent 3 CBC's:  Recent Labs   Lab Test 01/10/24  0540  01/09/24  0609 12/01/23  0616   WBC 9.3 6.0 4.5   HGB 12.2 14.5 14.2   MCV 95 95 95    197 164     Most Recent 3 BMP's:  Recent Labs   Lab Test 01/10/24  0540 01/09/24  0619 01/09/24  0609 12/01/23  0811 12/01/23  0616 11/30/23  1449 11/30/23  0956     --   --   --  139  --  139   POTASSIUM 4.2  --  3.6  --  3.9  --  4.2   CHLORIDE 106  --   --   --  103  --  100   CO2 25  --   --   --  27  --  28   BUN 14.8  --   --   --  13.8  --  14.3   CR 0.91  --  0.92  --  0.92   < > 0.95   ANIONGAP 8  --   --   --  9  --  11   SANDEEP 9.5  --   --   --  9.4  --  10.2   * 97 92   < > 94   < > 98    < > = values in this interval not displayed.       Medications:   Personally Reviewed.  Medications    nitroPRUsside      phenylephrine Stopped (01/09/24 1203)    BETA BLOCKER NOT PRESCRIBED        acetaminophen  975 mg Oral Q8H    amLODIPine  5 mg Oral Daily    aspirin  81 mg Oral Daily    atorvastatin  40 mg Oral Daily    ezetimibe  10 mg Oral Daily    fluticasone  1 puff Inhalation Daily    heparin ANTICOAGULANT  5,000 Units Subcutaneous Q8H    nicotine  1 patch Transdermal Daily    nicotine   Transdermal Q8H    pantoprazole  40 mg Oral Daily    polyethylene glycol  17 g Oral Daily    senna-docusate  1 tablet Oral BID    sodium chloride (PF)  3 mL Intracatheter Q8H    varenicline  1 mg Oral BID      Total time spent 40 min

## 2024-01-10 NOTE — PLAN OF CARE
Problem: Surgery Nonspecified  Goal: Absence of Bleeding  Outcome: Progressing  Goal: Effective Urinary Elimination  Outcome: Progressing     Problem: Carotid Revascularization  Goal: Acceptable Pain Control  Outcome: Progressing  Intervention: Prevent or Manage Pain  Recent Flowsheet Documentation  Taken 1/9/2024 3604 by Marlen Dudley RN  Pain Management Interventions: cold applied   Goal Outcome Evaluation:    Patient states minimal pain this shift. Controlled with scheduled tylenol and ice pack. Up to bedside commode with 1 assist, voiding adequate amounts. Observed to be sleeping between cares. Call light within reach, able to make needs known.

## 2024-01-10 NOTE — PLAN OF CARE
Problem: Carotid Revascularization  Goal: Adequate Tissue Perfusion  Intervention: Optimal Blood Flow  Recent Flowsheet Documentation  Taken 1/9/2024 2215 by Cecilia Lima RN  Head of Bed ([facility]) Positioning: HOB at 30 degrees  Taken 1/9/2024 2000 by Cecilia Lima RN  Head of Bed ([facility]) Positioning: HOB at 30 degrees  Goal: Anesthesia/Sedation Recovery  Intervention: Optimize Anesthesia Recovery  Recent Flowsheet Documentation  Taken 1/9/2024 2000 by Cecilia Lima RN  Safety Promotion/Fall Prevention:   safety round/check completed   room door open   mobility aid in reach   lighting adjusted   increase visualization of patient   increased rounding and observation   clutter free environment maintained  Goal: Acceptable Pain Control  Outcome: Progressing  Intervention: Prevent or Manage Pain  Recent Flowsheet Documentation  Taken 1/9/2024 2020 by Cecilia Lima RN  Pain Management Interventions:   cold applied   medication (see MAR)  Goal: Effective Urinary Elimination  Outcome: Progressing  Goal: Effective Oxygenation and Ventilation  Outcome: Progressing  Intervention: Optimize Oxygenation and Ventilation  Recent Flowsheet Documentation  Taken 1/9/2024 2215 by Cecilia Lima RN  Head of Bed ([facility]) Positioning: HOB at 30 degrees  Taken 1/9/2024 2000 by Cecilia Lima RN  Head of Bed ([facility]) Positioning: HOB at 30 degrees   Goal Outcome Evaluation:    5973-0703: Neuro assessment remains WNL. VSS. L radial art line in place. BP remains within goal range. Pt reports 2/10 incisional pain. Pain controlled w/ scheduled Tylenol and ice. Up to bedside commode w/ Ax1. Frequently rounded. Pt resting between cares. Uses call light appropriately. Will continue to monitor and follow plan of care.    Cecilia Lima RN

## 2024-01-10 NOTE — PROGRESS NOTES
Discharge AVS reviewed with patient.  Questions answered and teachings acknowledged.  Belongings and paperwork sent with via family/friend transport. [facility] [employer] acknowledged (N/A)

## 2024-01-11 ENCOUNTER — PATIENT OUTREACH (OUTPATIENT)
Dept: CARE COORDINATION | Facility: CLINIC | Age: 64
End: 2024-01-11
Payer: MEDICARE

## 2024-01-11 NOTE — PROGRESS NOTES
Contact   Chart Review     Situation: Patient chart reviewed by .    Background: TCM after hospitalization.     Assessment: Planned surgery with no needs identified by in patient case management.  It was a planned surgery with no complications.     Plan/Recommendations: Closing TCM episode.  No outreach.      Janae Burton,   [facility]  [phone]

## 2024-01-22 ENCOUNTER — OFFICE VISIT (OUTPATIENT)
Dept: INTERNAL MEDICINE | Facility: CLINIC | Age: 64
End: 2024-01-22
Payer: MEDICARE

## 2024-01-22 VITALS
WEIGHT: 177 LBS | RESPIRATION RATE: 20 BRPM | BODY MASS INDEX: 30.22 KG/M2 | SYSTOLIC BLOOD PRESSURE: 130 MMHG | TEMPERATURE: 97.7 F | DIASTOLIC BLOOD PRESSURE: 62 MMHG | HEIGHT: 64 IN | OXYGEN SATURATION: 97 % | HEART RATE: 72 BPM

## 2024-01-22 DIAGNOSIS — I10 PRIMARY HYPERTENSION: Chronic | ICD-10-CM

## 2024-01-22 DIAGNOSIS — R73.09 OTHER ABNORMAL GLUCOSE: ICD-10-CM

## 2024-01-22 DIAGNOSIS — Z72.0 TOBACCO ABUSE: Chronic | ICD-10-CM

## 2024-01-22 DIAGNOSIS — Z98.890 HISTORY OF LEFT-SIDED CAROTID ENDARTERECTOMY: ICD-10-CM

## 2024-01-22 DIAGNOSIS — J44.9 CHRONIC OBSTRUCTIVE PULMONARY DISEASE, UNSPECIFIED COPD TYPE (H): ICD-10-CM

## 2024-01-22 DIAGNOSIS — Z09 HOSPITAL DISCHARGE FOLLOW-UP: ICD-10-CM

## 2024-01-22 DIAGNOSIS — E78.2 MIXED HYPERLIPIDEMIA: Primary | ICD-10-CM

## 2024-01-22 LAB
ALBUMIN SERPL BCG-MCNC: 4.1 G/DL (ref 3.5–5.2)
ALP SERPL-CCNC: 65 U/L (ref 40–150)
ALT SERPL W P-5'-P-CCNC: 11 U/L (ref 0–50)
ANION GAP SERPL CALCULATED.3IONS-SCNC: 10 MMOL/L (ref 7–15)
AST SERPL W P-5'-P-CCNC: 20 U/L (ref 0–45)
BILIRUB SERPL-MCNC: 0.4 MG/DL
BUN SERPL-MCNC: 12.4 MG/DL (ref 8–23)
CALCIUM SERPL-MCNC: 9.7 MG/DL (ref 8.8–10.2)
CHLORIDE SERPL-SCNC: 99 MMOL/L (ref 98–107)
CHOLEST SERPL-MCNC: 130 MG/DL
CK SERPL-CCNC: 64 U/L (ref 26–192)
CREAT SERPL-MCNC: 0.99 MG/DL (ref 0.51–0.95)
DEPRECATED HCO3 PLAS-SCNC: 28 MMOL/L (ref 22–29)
EGFRCR SERPLBLD CKD-EPI 2021: 63 ML/MIN/1.73M2
FASTING STATUS PATIENT QL REPORTED: NO
GLUCOSE SERPL-MCNC: 96 MG/DL (ref 70–99)
HBA1C MFR BLD: 5.6 % (ref 0–5.6)
HDLC SERPL-MCNC: 70 MG/DL
LDLC SERPL CALC-MCNC: 49 MG/DL
NONHDLC SERPL-MCNC: 60 MG/DL
POTASSIUM SERPL-SCNC: 4 MMOL/L (ref 3.4–5.3)
PROT SERPL-MCNC: 6.9 G/DL (ref 6.4–8.3)
SODIUM SERPL-SCNC: 137 MMOL/L (ref 135–145)
TRIGL SERPL-MCNC: 57 MG/DL

## 2024-01-22 PROCEDURE — 83036 HEMOGLOBIN GLYCOSYLATED A1C: CPT | Performed by: INTERNAL MEDICINE

## 2024-01-22 PROCEDURE — 80061 LIPID PANEL: CPT | Performed by: INTERNAL MEDICINE

## 2024-01-22 PROCEDURE — 82550 ASSAY OF CK (CPK): CPT | Performed by: INTERNAL MEDICINE

## 2024-01-22 PROCEDURE — 99214 OFFICE O/P EST MOD 30 MIN: CPT | Performed by: INTERNAL MEDICINE

## 2024-01-22 PROCEDURE — 80053 COMPREHEN METABOLIC PANEL: CPT | Performed by: INTERNAL MEDICINE

## 2024-01-22 PROCEDURE — 36415 COLL VENOUS BLD VENIPUNCTURE: CPT | Performed by: INTERNAL MEDICINE

## 2024-01-22 RX ORDER — AZITHROMYCIN 250 MG/1
TABLET, FILM COATED ORAL
Qty: 6 TABLET | Refills: 0 | Status: SHIPPED | OUTPATIENT
Start: 2024-01-22 | End: 2024-01-26

## 2024-01-22 ASSESSMENT — PAIN SCALES - GENERAL: PAINLEVEL: NO PAIN (0)

## 2024-01-22 NOTE — PROGRESS NOTES
[facility]    Comprehensive and complex medical care - Chronic disease management - Shared decision making - Care coordination - Compassionate care    Patient advocacy - Rational deprescribing - Minimally disruptive medicine - Ethical focus - Customized care         Date of Service: 1/22/2024  Primary Provider: Ricardo Davila    Patient Care Team:  Ricardo Davila MD as PCP - General (Internal Medicine)  Ricardo Davila MD as Assigned PCP  Julissa Hager MD as MD (Family Medicine)  Loni Jaquez MD as Assigned Pulmonology Provider  Maureen Pérez NP as Nurse Practitioner  Maureen Pérez NP as Assigned Surgical Provider          Patient's Pharmacy:    [id number] - [place], [place] - [address] [place] [address] AT [facility] &  [address]  [address] [facility] [address]  Phone: [phone] Fax: [phone]     Patient's Contacts:  Name Home Phone Work Phone Mobile Phone Relationship Lgl GUERA Pablo [phone]   Friend      Patient's Insurance:    Payor: MEDICARE / Plan: MEDICARE / Product Type: Medicare /           Active Problem List:  Problem List as of 1/22/2024 Reviewed: 1/22/2024 10:51 AM by Ricardo Davila MD         High    Tobacco abuse    CAD (coronary artery disease)    COPD (chronic obstructive pulmonary disease) (H)       Medium    Primary hypertension    Hx of laparoscopic adjustable gastric banding    GERD (gastroesophageal reflux disease)    TIA (transient ischemic attack)    Spinal stenosis in cervical region    Lymphocytic esophagitis    Bilateral carotid artery stenosis    Left carotid stenosis       Low    Mixed hyperlipidemia    Obesity (BMI 30.0-34.9)    Zinc deficiency    Vitamin D deficiency    Normal colonoscopy - 2018 - Average risk    Hymenoptera allergy    Lumbar radiculopathy    Last Assessment & Plan 9/28/2021 Office Visit Written 9/30/2021  8:48 AM by Vivi Corrales NP     - L5/S1  pattern. She will be referred to PT.  If not making any progress or worsening, consider MRI. I can order this for her, she would need an anxiolytic due to claustrophobia  - Follow up with PCP if worsening or no improvement              Current Outpatient Medications   Medication Instructions    albuterol (PROAIR HFA/PROVENTIL HFA/VENTOLIN HFA) 108 (90 Base) MCG/ACT inhaler 2 puffs, Inhalation, EVERY 6 HOURS PRN    amLODIPine (NORVASC) 5 MG tablet TAKE 1 TABLET(5 MG) BY MOUTH DAILY    aspirin (ASA) 81 mg, Oral, DAILY    atorvastatin (LIPITOR) 40 mg, Oral, DAILY    azithromycin (ZITHROMAX) 250 MG tablet Take 2 tablets (500 mg) by mouth daily for 1 day, THEN 1 tablet (250 mg) daily for 4 days.    EPINEPHrine (ANY BX GENERIC EQUIV) 0.3 mg, Intramuscular, ONCE PRN, May repeat one time in 5-15 minutes if response to initial dose is inadequate.    ezetimibe (ZETIA) 10 mg, Oral, DAILY    fluticasone (ARNUITY ELLIPTA) 100 MCG/ACT inhaler 1 puff, Inhalation, DAILY    nicotine (NICODERM CQ) 21 MG/24HR 24 hr patch 1 patch, Transdermal, EVERY 24 HOURS    pantoprazole (PROTONIX) 40 mg, Oral, DAILY    sucralfate (CARAFATE) 1 g, Oral, DAILY PRN    varenicline (CHANTIX) 1 mg, Oral, 2 TIMES DAILY     Social History     Social History Narrative    Lives with a friend.12/4/2018  Retired after working for [employer].       Subjective:     Marlen Dumas is a 63 year old female who comes in today for:    Chief Complaint   Patient presents with    RECHECK     Cholesterol follow up/ labs           1/22/2024     8:35 AM   Additional Questions   Roomed by tressa     Patient comes in today for follow-up of a number of issues.    She just had left carotid endarterectomy.  This was reviewed with her.  She did well with the surgery and denies any issues here.    We reviewed her blood pressure and this is doing well.  We reviewed her cholesterol and she would like to have blood tests for the cholesterol and liver tests.  We will go ahead and do this.   "She is taking the atorvastatin and the Zetia together.    She is still working on stopping smoking and is down to 1 to 2 cigarettes a day at this time.  This is great.    We reviewed her other issues noted in the assessment but not specifically addressed in the HPI above.     Objective:     Wt Readings from Last 3 Encounters:   01/22/24 80.3 kg (177 lb)   01/10/24 84.9 kg (187 lb 2.7 oz)   01/08/24 80.3 kg (177 lb)     BP Readings from Last 3 Encounters:   01/22/24 130/62   01/10/24 128/59   01/08/24 (!) 148/90     /62 (BP Location: Right arm, Patient Position: Sitting)   Pulse 72   Temp 97.7  F (36.5  C) (Oral)   Resp 20   Ht 1.626 m (5' 4\")   Wt 80.3 kg (177 lb)   LMP  (LMP Unknown)   SpO2 97%   BMI 30.38 kg/m     The patient is comfortable, no acute distress.  Mood good.  Insight good.  Eyes are nonicteric.  Neck is supple without mass.  No cervical adenopathy.  No thyromegaly. Heart regular rate and rhythm.  Lungs clear to auscultation bilaterally.  Respiratory effort is good.  Extremities no edema.  Left neck incision looks good without any particular concerns today.      Diagnostics:     Admission on 01/09/2024, Discharged on 01/10/2024   Component Date Value Ref Range Status    Glucose 01/09/2024 92  70 - 99 mg/dL Final    Potassium 01/09/2024 3.6  3.4 - 5.3 mmol/L Final    Creatinine 01/09/2024 0.92  0.51 - 0.95 mg/dL Final    GFR Estimate 01/09/2024 69  >60 mL/min/1.73m2 Final    WBC Count 01/09/2024 6.0  4.0 - 11.0 10e3/uL Final    RBC Count 01/09/2024 4.73  3.80 - 5.20 10e6/uL Final    Hemoglobin 01/09/2024 14.5  11.7 - 15.7 g/dL Final    Hematocrit 01/09/2024 45.0  35.0 - 47.0 % Final    MCV 01/09/2024 95  78 - 100 fL Final    MCH 01/09/2024 30.7  26.5 - 33.0 pg Final    MCHC 01/09/2024 32.2  31.5 - 36.5 g/dL Final    RDW 01/09/2024 13.5  10.0 - 15.0 % Final    Platelet Count 01/09/2024 197  150 - 450 10e3/uL Final    % Neutrophils 01/09/2024 62  % Final    % Lymphocytes 01/09/2024 23  % " Final    % Monocytes 01/09/2024 9  % Final    % Eosinophils 01/09/2024 5  % Final    % Basophils 01/09/2024 1  % Final    % Immature Granulocytes 01/09/2024 0  % Final    NRBCs per 100 WBC 01/09/2024 0  <1 /100 Final    Absolute Neutrophils 01/09/2024 3.7  1.6 - 8.3 10e3/uL Final    Absolute Lymphocytes 01/09/2024 1.4  0.8 - 5.3 10e3/uL Final    Absolute Monocytes 01/09/2024 0.5  0.0 - 1.3 10e3/uL Final    Absolute Eosinophils 01/09/2024 0.3  0.0 - 0.7 10e3/uL Final    Absolute Basophils 01/09/2024 0.1  0.0 - 0.2 10e3/uL Final    Absolute Immature Granulocytes 01/09/2024 0.0  <=0.4 10e3/uL Final    Absolute NRBCs 01/09/2024 0.0  10e3/uL Final    GLUCOSE BY METER POCT 01/09/2024 97  70 - 99 mg/dL Final    Case Report 01/09/2024    Final                    Value:Surgical Pathology Report                         Case: [id number]                                  Authorizing Provider:  Timbo Hart           Collected:           01/09/2024 08:40 AM                                 MD Luis Fernando                                                                Ordering Location:     [facility]          Received:            01/09/2024 09:31 AM                                 [facility] OR                                                   Pathologist:           Jorge Enriquez MD                                                        Specimen:    Carotid Plaque, Left, Left Carotid Plaque                                                  Final Diagnosis 01/09/2024    Final                    Value:This result contains rich text formatting which cannot be displayed here.    Clinical Information 01/09/2024    Final                    Value:This result contains rich text formatting which cannot be displayed here.    Gross Description 01/09/2024    Final                    Value:This result contains rich text formatting which cannot be displayed here.    Microscopic Description 01/09/2024    Final                     Value:This result contains rich text formatting which cannot be displayed here.    Performing Labs 01/09/2024    Final                    Value:This result contains rich text formatting which cannot be displayed here.    WBC Count 01/10/2024 9.3  4.0 - 11.0 10e3/uL Final    RBC Count 01/10/2024 3.90  3.80 - 5.20 10e6/uL Final    Hemoglobin 01/10/2024 12.2  11.7 - 15.7 g/dL Final    Hematocrit 01/10/2024 37.2  35.0 - 47.0 % Final    MCV 01/10/2024 95  78 - 100 fL Final    MCH 01/10/2024 31.3  26.5 - 33.0 pg Final    MCHC 01/10/2024 32.8  31.5 - 36.5 g/dL Final    RDW 01/10/2024 13.7  10.0 - 15.0 % Final    Platelet Count 01/10/2024 170  150 - 450 10e3/uL Final    Sodium 01/10/2024 139  135 - 145 mmol/L Final    Reference intervals for this test were updated on 09/26/2023 to more accurately reflect our healthy population. There may be differences in the flagging of prior results with similar values performed with this method. Interpretation of those prior results can be made in the context of the updated reference intervals.     Potassium 01/10/2024 4.2  3.4 - 5.3 mmol/L Final    Chloride 01/10/2024 106  98 - 107 mmol/L Final    Carbon Dioxide (CO2) 01/10/2024 25  22 - 29 mmol/L Final    Anion Gap 01/10/2024 8  7 - 15 mmol/L Final    Urea Nitrogen 01/10/2024 14.8  8.0 - 23.0 mg/dL Final    Creatinine 01/10/2024 0.91  0.51 - 0.95 mg/dL Final    GFR Estimate 01/10/2024 70  >60 mL/min/1.73m2 Final    Calcium 01/10/2024 9.5  8.8 - 10.2 mg/dL Final    Glucose 01/10/2024 124 (H)  70 - 99 mg/dL Final       Results for orders placed or performed in visit on 01/22/24   Hemoglobin A1c     Status: Normal   Result Value Ref Range    Hemoglobin A1C 5.6 0.0 - 5.6 %       Assessment:     1. Mixed hyperlipidemia    2. Other abnormal glucose    3. Primary hypertension    4. Chronic obstructive pulmonary disease, unspecified COPD type (H)    5. History of left-sided carotid endarterectomy    6. Hospital discharge follow-up     7. Tobacco abuse        Plan:     Blood work done today.  Consider follow-up again in 6 months and then yearly if doing okay.  Keep working on stopping smoking totally.  Azithromycin (Zithromax) for bronchitis as needed illness while in [place].  Continue current medications otherwise.  Follow up sooner if issues.    Orders Placed This Encounter   Procedures    Lipid panel reflex to direct LDL Non-fasting    Hemoglobin A1c    Comprehensive metabolic panel    CK total           Ricardo Davila MD  General Internal Medicine  [facility]    Return in about 6 months (around 7/22/2024) for follow up visit.     Future Appointments   Date Time Provider Department Center   1/25/2024 10:00 AM Stacy Castro PA-C [facility]

## 2024-01-22 NOTE — PATIENT INSTRUCTIONS
Future Appointments   Date Time Provider Department Center   1/25/2024 10:00 AM Stacy Castro PA-C [facility]

## 2024-01-25 ENCOUNTER — OFFICE VISIT (OUTPATIENT)
Dept: VASCULAR SURGERY | Facility: CLINIC | Age: 64
End: 2024-01-25
Attending: SURGERY
Payer: MEDICARE

## 2024-01-25 VITALS
TEMPERATURE: 97.7 F | DIASTOLIC BLOOD PRESSURE: 83 MMHG | HEART RATE: 77 BPM | SYSTOLIC BLOOD PRESSURE: 138 MMHG | RESPIRATION RATE: 16 BRPM

## 2024-01-25 DIAGNOSIS — I65.22 STENOSIS OF LEFT CAROTID ARTERY: Primary | ICD-10-CM

## 2024-01-25 PROCEDURE — 99024 POSTOP FOLLOW-UP VISIT: CPT | Performed by: PHYSICIAN ASSISTANT

## 2024-01-25 PROCEDURE — G0463 HOSPITAL OUTPT CLINIC VISIT: HCPCS | Performed by: PHYSICIAN ASSISTANT

## 2024-01-25 ASSESSMENT — PAIN SCALES - GENERAL: PAINLEVEL: NO PAIN (0)

## 2024-01-25 NOTE — PATIENT INSTRUCTIONS
This is the plan that was discussed at your appointment.    WE WILL CONTACT YOU TO SCHEDULE YOUR 6 MONTH Follow-up WITH [facility]        I am including additional information on these things and our contact information if you have any questions or concerns.   Please do not hesitate to reach out to us if you felt we did not answer your questions or you are unsure of the treatment plan after your visit today. Our number is [phone].  Thank you for trusting us with your care.         Again thank you for your time.

## 2024-01-25 NOTE — PROGRESS NOTES
[facility]        Patient is here for a 2 week follow up  to discuss post op Left CEA  Wor    Pt is currently taking Aspirin and Statin.    The provider has been notified that the patient has no concerns.     Questions patient would like addressed today are: N/A.    Refills are needed: No    Has homecare services and agency name:  No

## 2024-01-25 NOTE — PROGRESS NOTES
VASCULAR SURGERY PROGRESS NOTE    LOCATION:  [facility]     Marlen Dumas  Medical Record #: [id number]  YOB: 1960  Age: 63 year old     Date of Service: 1/25/2024    PRIMARY CARE PROVIDER: Ricardo Davila    Reason for visit: Follow-up status post left CEA    IMPRESSION: 63-year-old female presenting for follow-up status post left carotid endarterectomy for symptomatic disease. Incision site is healing nicely with no signs of infection today.  Patient is currently asymptomatic and medically optimized.    RECOMMENDATION/RISKS: Continue best medical management with aspirin and statin therapy. Follow up in 6 months with bilateral carotid duplex.     HPI:  Marlen Dumas is a 63 year old female with past medical history significant for hypertension, hyperlipidemia, coronary artery disease, history of tobacco abuse, COPD, and carotid artery stenosis.  Patient underwent recent left endarterectomy for symptomatic disease on 1/9/2024.  She presents today for follow-up.  Ms. Dumas is doing well postoperatively and denies any further vision changes, slurred speech, facial asymmetry, unilateral extremity numbness or weakness, or other signs/symptoms of TIA  or stroke.  Incision site is healing nicely without surrounding redness or drainage.  She denies any fevers or chills.  She does note that the peeling skin glue has been getting caught on her shirt and is hoping some of this can be peeled away today.  Patient is looking forward to an upcoming trip to [place] and wonders when she can swim/submerge her neck incision.  All questions answered no other concerns today.    REVIEW OF SYSTEMS:    A 12 point ROS was reviewed and is negative except for what is listed above in HPI.    PHH:    Past Medical History:   Diagnosis Date    CAD (coronary artery disease) 12/8/2023    COPD (chronic obstructive pulmonary disease) (H)     GERD (gastroesophageal reflux disease) 07/24/2023    HLD  (hyperlipidemia)     HTN (hypertension)     Hx of laparoscopic adjustable gastric banding 10/01/2008    Initial weight 295.5 BMI 50.4 Dr. Rowell    Hymenoptera allergy     Hypothyroidism     S/P colonoscopy 05/05/2019    Tobacco abuse     Vitamin D deficiency 08/24/2016      Past Surgical History:   Procedure Laterality Date    ENDARTERECTOMY CAROTID Left 1/9/2024    Procedure: ENDARTERECTOMY, LEFT CAROTID;  Surgeon: Timbo Hart MD;  Location: [facility]    FOOT SURGERY      Sting ray stacia    LAPAROSCOPIC GASTRIC BANDING  2008    LUMBAR DISC SURGERY      OR ESOPHAGOGASTRODUODENOSCOPY TRANSORAL DIAGNOSTIC N/A 8/14/2020    Procedure: ESOPHAGOGASTRODUODENOSCOPY (EGD);  Surgeon: Jared Howard MD;  [facility];  Service: General     ALLERGIES:  Other environmental allergy and Sulfa (sulfonamide antibiotics) [sulfa antibiotics]    MEDS:    Current Outpatient Medications:     albuterol (PROAIR HFA/PROVENTIL HFA/VENTOLIN HFA) 108 (90 Base) MCG/ACT inhaler, Inhale 2 puffs into the lungs every 6 hours as needed for shortness of breath, wheezing or cough, Disp: , Rfl:     amLODIPine (NORVASC) 5 MG tablet, TAKE 1 TABLET(5 MG) BY MOUTH DAILY, Disp: 90 tablet, Rfl: 3    aspirin (ASA) 81 MG chewable tablet, Take 81 mg by mouth daily, Disp: , Rfl:     atorvastatin (LIPITOR) 40 MG tablet, Take 1 tablet (40 mg) by mouth daily, Disp: 90 tablet, Rfl: 4    azithromycin (ZITHROMAX) 250 MG tablet, Take 2 tablets (500 mg) by mouth daily for 1 day, THEN 1 tablet (250 mg) daily for 4 days., Disp: 6 tablet, Rfl: 0    EPINEPHrine (ANY BX GENERIC EQUIV) 0.3 MG/0.3ML injection 2-pack, Inject 0.3 mLs (0.3 mg) into the muscle once as needed for anaphylaxis May repeat one time in 5-15 minutes if response to initial dose is inadequate., Disp: 2 each, Rfl: 3    ezetimibe (ZETIA) 10 MG tablet, Take 1 tablet (10 mg) by mouth daily, Disp: 90 tablet, Rfl: 3    fluticasone (ARNUITY ELLIPTA) 100 MCG/ACT inhaler,  Inhale 1 puff into the lungs daily, Disp: 30 each, Rfl: 3    nicotine (NICODERM CQ) 21 MG/24HR 24 hr patch, Place 1 patch onto the skin every 24 hours, Disp: 30 patch, Rfl: 3    pantoprazole (PROTONIX) 40 MG EC tablet, Take 1 tablet (40 mg) by mouth daily, Disp: 90 tablet, Rfl: 3    sucralfate (CARAFATE) 1 GM tablet, Take 1 g by mouth daily as needed (heart burn/reflux symptoms), Disp: , Rfl:     varenicline (CHANTIX) 1 MG tablet, Take 1 tablet (1 mg) by mouth 2 times daily, Disp: 60 tablet, Rfl: 5    SOCIAL HABITS:    History   Smoking Status    Every Day    Packs/day: 0.25    Years: 37.00    Types: Cigarettes    Last attempt to quit: 9/12/2022   Smokeless Tobacco    Never     Social History    Substance and Sexual Activity      Alcohol use: Yes        Alcohol/week: 4.0 standard drinks of alcohol        Types: 4 Standard drinks or equivalent per week        Comment: 4-5 beer/wine weekly      History   Drug Use Unknown     FAMILY HISTORY:    Family History   Problem Relation Age of Onset    Coronary Artery Disease Mother 58    Hypertension Mother     Chronic Obstructive Pulmonary Disease Father     Hypertension Father     Anxiety Disorder Father     Hypertension Brother      PE:  LMP  (LMP Unknown)   Wt Readings from Last 1 Encounters:   01/22/24 80.3 kg (177 lb)     There is no height or weight on file to calculate BMI.    EXAM:  GENERAL: well-developed 63 year old female who appears her stated age  CARDIAC: normal   CHEST/LUNG: normal respiratory effort   MUSCULOSKELETAL: grossly normal and both lower extremities are intact, no lower extremity edema  NEUROLOGIC: focally intact, alert and oriented x 3  PSYCH: appropriate affect  VASCULAR: neck incision clean, dry, and intact without surrounding erythema or drainage    DIAGNOSTIC STUDIES:     Images:  Pertinent imaging reviewed.     LABS:      Sodium   Date Value Ref Range Status   01/22/2024 137 135 - 145 mmol/L Final     Comment:     Reference intervals for this  test were updated on 09/26/2023 to more accurately reflect our healthy population. There may be differences in the flagging of prior results with similar values performed with this method. Interpretation of those prior results can be made in the context of the updated reference intervals.    01/10/2024 139 135 - 145 mmol/L Final     Comment:     Reference intervals for this test were updated on 09/26/2023 to more accurately reflect our healthy population. There may be differences in the flagging of prior results with similar values performed with this method. Interpretation of those prior results can be made in the context of the updated reference intervals.    12/01/2023 139 135 - 145 mmol/L Final     Comment:     Reference intervals for this test were updated on 09/26/2023 to more accurately reflect our healthy population. There may be differences in the flagging of prior results with similar values performed with this method. Interpretation of those prior results can be made in the context of the updated reference intervals.      Urea Nitrogen   Date Value Ref Range Status   01/22/2024 12.4 8.0 - 23.0 mg/dL Final   01/10/2024 14.8 8.0 - 23.0 mg/dL Final   12/01/2023 13.8 8.0 - 23.0 mg/dL Final   07/13/2021 9 8 - 22 mg/dL Final   06/13/2019 10 8 - 22 mg/dL Final   05/07/2019 10 8 - 22 mg/dL Final     Hemoglobin   Date Value Ref Range Status   01/10/2024 12.2 11.7 - 15.7 g/dL Final   01/09/2024 14.5 11.7 - 15.7 g/dL Final   12/01/2023 14.2 11.7 - 15.7 g/dL Final     Platelet Count   Date Value Ref Range Status   01/10/2024 170 150 - 450 10e3/uL Final   01/09/2024 197 150 - 450 10e3/uL Final   12/01/2023 164 150 - 450 10e3/uL Final     INR   Date Value Ref Range Status   11/30/2023 0.97 0.85 - 1.15 Final   10/12/2022 0.97 0.85 - 1.15 Final     25 minutes spent on the day of encounter doing chart review, history and exam, documentation, and further activities as noted.     Stacy Castro PA-C  VASCULAR SURGERY

## 2024-04-05 ENCOUNTER — OFFICE VISIT (OUTPATIENT)
Dept: INTERNAL MEDICINE | Facility: CLINIC | Age: 64
End: 2024-04-05
Payer: MEDICARE

## 2024-04-05 VITALS
SYSTOLIC BLOOD PRESSURE: 131 MMHG | BODY MASS INDEX: 31.05 KG/M2 | OXYGEN SATURATION: 99 % | TEMPERATURE: 98.1 F | WEIGHT: 181.9 LBS | HEART RATE: 64 BPM | HEIGHT: 64 IN | RESPIRATION RATE: 16 BRPM | DIASTOLIC BLOOD PRESSURE: 80 MMHG

## 2024-04-05 DIAGNOSIS — Z72.0 TOBACCO ABUSE: Chronic | ICD-10-CM

## 2024-04-05 DIAGNOSIS — Z01.818 PREOPERATIVE EXAMINATION: Primary | ICD-10-CM

## 2024-04-05 DIAGNOSIS — M48.02 SPINAL STENOSIS IN CERVICAL REGION: ICD-10-CM

## 2024-04-05 DIAGNOSIS — K22.9 ESOPHAGEAL ABNORMALITY: ICD-10-CM

## 2024-04-05 DIAGNOSIS — M48.02 CERVICAL STENOSIS OF SPINAL CANAL: ICD-10-CM

## 2024-04-05 DIAGNOSIS — R20.0 RIGHT LEG NUMBNESS: ICD-10-CM

## 2024-04-05 DIAGNOSIS — I10 PRIMARY HYPERTENSION: Chronic | ICD-10-CM

## 2024-04-05 PROCEDURE — G2211 COMPLEX E/M VISIT ADD ON: HCPCS | Performed by: INTERNAL MEDICINE

## 2024-04-05 PROCEDURE — 99215 OFFICE O/P EST HI 40 MIN: CPT | Performed by: INTERNAL MEDICINE

## 2024-04-05 ASSESSMENT — PAIN SCALES - GENERAL: PAINLEVEL: MILD PAIN (3)

## 2024-04-05 NOTE — PROGRESS NOTES
[facility]  [facility]    Care coordination - Customized care -  Comprehensive and complex medical care            Date of Service: 4/5/2024  Primary Provider: Ricardo Davila    Patient Care Team:  Ricardo Davila MD as PCP - General (Internal Medicine)  Ricardo Davila MD as Assigned PCP  Julissa Hager MD as MD (Family Medicine)  Loni Jaquez MD as Assigned Pulmonology Provider  Maureen Pérez NP as Nurse Practitioner  Maureen Pérez NP as Assigned Surgical Provider  Stacy Castro PA-C as Assigned Heart and Vascular Provider            Patient's Pharmacy:    [id number] - [place], [place] - [address] [place] [address] AT [facility] &  [address] [facility] [address]  Phone: [phone] Fax: [phone]     Patient's Contacts:  Name Home Phone Work Phone Mobile Phone Relationship Lgl GUERA Pablo [phone]   Friend        Patient's Insurance:    Payor: MEDICARE / Plan: MEDICARE / Product Type: Medicare /           Preoperative examination    Marlen Dumas is a 63 year old female (1960) who I am asked to see by her surgeon regarding her fitness for upcoming surgery.      Chief Complaint   Patient presents with    Pre-Op Exam     ESOPHAGOGASTRODUODENOSCOPY 90 DEGREE ABLATION    Pharyngitis    Results     MRI results-should she see a specialist?        Surgical Information  Surgery/Procedure: ESOPHAGOGASTRODUODENOSCOPY 90 DEGREE ABLATION   Surgery Location: [facility]  Surgeon: Walker Sanders MD   Surgery Date: 4/22/2024   Time of Surgery: 8:25 AM   Where patient plans to recover: At home with family  Fax number for surgical facility: [phone]       Subjective:     In for preop evaluation related to her ablative procedure for abnormal cells on upper endoscopy (EGD).  She will be having this at [facility].  Found on upper endoscopy (EGD)  previously.    Has continued to work on stopping smoking and this is progressing well.  No major concerns related to this.  The current medications are helping.    Has had some sensitivity on the top of her mouth and the back of her throat.  Has had in the last week and maybe had 3 weeks ago.  Had a similar issue last year and saw otolaryngology (ENT) and they did look back there with a scope.  No gastroesophageal reflux disease (GERD) issues.    Reviewed her cervical spinal stenosis.  When she was in the hospital she was having right arm symptomatology which neurology ordered the MRI scan of the neck for.  There was significant foraminal and central stenosis.  Patient denies any gait imbalance or weakness.  No bowel or bladder issues.  Has had some numbness in the right leg for which she had a MRI scan of her lumbar spine and also had electromyography (EMG) of the leg.    Doing well after her carotid endarterectomy (CEA).    ______________________________________________________________________         4/1/2024     7:58 AM   Pre-op Questionnaire   1. Have you ever had a heart attack or stroke? No   2. Have you ever had surgery on your heart or blood vessels, such as a stent placement, a coronary artery bypass, or surgery on an artery in your head, neck, heart, or legs? YES - carotid endarterectomy.   3. Do you have chest pain with activity? No   4. Do you have a history of  heart failure? No   5. Do you currently have a cold, bronchitis or symptoms of other infection? No   6. Do you have a cough, shortness of breath, or wheezing? No   7. Do you or anyone in your family have previous history of blood clots? No   8. Do you or does anyone in your family have a serious bleeding problem such as prolonged bleeding following surgeries or cuts? No   9. Have you ever had problems with anemia or been told to take iron pills? No   10. Have you had any abnormal blood loss such as black, tarry or bloody stools, or abnormal  vaginal bleeding? No   11. Have you ever had a blood transfusion? No   12. Are you willing to have a blood transfusion if it is medically needed before, during, or after your surgery? Yes   13. Have you or any of your relatives ever had problems with anesthesia? No   14. Do you have sleep apnea, excessive snoring or daytime drowsiness? No   15. Do you have any artifical heart valves or other implanted medical devices like a pacemaker, defibrillator, or continuous glucose monitor? No   16. Do you have artificial joints? No   17. Are you allergic to latex? No     ______________________________________________________________________     We reviewed her other issues noted in the assessment but not specifically addressed in the HPI above.   ______________________________________________________________________     Patient Active Problem List   Diagnosis    Tobacco abuse    COPD (chronic obstructive pulmonary disease) (H)    Primary hypertension    Mixed hyperlipidemia    Hx of laparoscopic adjustable gastric banding    Obesity (BMI 30.0-34.9)    Zinc deficiency    Vitamin D deficiency    Normal colonoscopy - 2018 - Average risk    Hymenoptera allergy    Lumbar radiculopathy    GERD (gastroesophageal reflux disease)    TIA (transient ischemic attack)    Spinal stenosis in cervical region    Lymphocytic esophagitis    CAD (coronary artery disease)    Bilateral carotid artery stenosis       Past Surgical History:   Procedure Laterality Date    ENDARTERECTOMY CAROTID Left 1/9/2024    Procedure: ENDARTERECTOMY, LEFT CAROTID;  Surgeon: Timbo Hart MD;  Location: [facility]    FOOT SURGERY      Sting ray stacia    LAPAROSCOPIC GASTRIC BANDING  2008    LUMBAR DISC SURGERY      HI ESOPHAGOGASTRODUODENOSCOPY TRANSORAL DIAGNOSTIC N/A 8/14/2020    Procedure: ESOPHAGOGASTRODUODENOSCOPY (EGD);  Surgeon: Jared Howard MD;  Location: [facility];  Service: General      Social History     Occupational  History    Not on file   Tobacco Use    Smoking status: Some Days     Packs/day: 0.00     Years: 37.00     Additional pack years: 0.00     Total pack years: 0.00     Types: Cigarettes     Last attempt to quit: 2022     Years since quittin.5     Passive exposure: Current    Smokeless tobacco: Never    Tobacco comments:     quit 22   Vaping Use    Vaping Use: Never used    Passive vaping exposure: Yes   Substance and Sexual Activity    Alcohol use: Yes     Alcohol/week: 4.0 standard drinks of alcohol     Types: 4 Standard drinks or equivalent per week     Comment: 4-5 beer/wine weekly    Drug use: Never    Sexual activity: Never     Birth control/protection: Abstinence, Post-menopausal     Social History     Social History Narrative    Lives with a friend.         Retired after working in [employer] for [employer].        [place] in [employer] [facility] [employer].      Family History   Problem Relation Age of Onset    Coronary Artery Disease Mother 58    Hypertension Mother     Chronic Obstructive Pulmonary Disease Father     Hypertension Father     Anxiety Disorder Father     Hypertension Brother       Family history is noncontributory otherwise.    Allergies: Other environmental allergy and Sulfa (sulfonamide antibiotics) [sulfa antibiotics]     Current medications:  Current Outpatient Medications   Medication Instructions    albuterol (PROAIR HFA/PROVENTIL HFA/VENTOLIN HFA) 108 (90 Base) MCG/ACT inhaler 2 puffs, Inhalation, EVERY 6 HOURS PRN    amLODIPine (NORVASC) 5 MG tablet TAKE 1 TABLET(5 MG) BY MOUTH DAILY    aspirin (ASA) 81 mg, Oral, DAILY    atorvastatin (LIPITOR) 40 mg, Oral, DAILY    EPINEPHrine (ANY BX GENERIC EQUIV) 0.3 mg, Intramuscular, ONCE PRN, May repeat one time in 5-15 minutes if response to initial dose is inadequate.    ezetimibe (ZETIA) 10 mg, Oral, DAILY    fluticasone (ARNUITY ELLIPTA) 100 MCG/ACT inhaler 1 puff, Inhalation, DAILY    nicotine (NICODERM CQ) 21 MG/24HR 24 hr patch 1 patch, Transdermal,  "EVERY 24 HOURS    pantoprazole (PROTONIX) 40 mg, Oral, DAILY    sucralfate (CARAFATE) 1 g, Oral, DAILY PRN    varenicline (CHANTIX) 1 mg, Oral, 2 TIMES DAILY        Objective:     Wt Readings from Last 3 Encounters:   04/05/24 82.5 kg (181 lb 14.4 oz)   01/22/24 80.3 kg (177 lb)   01/10/24 84.9 kg (187 lb 2.7 oz)     BP Readings from Last 3 Encounters:   04/05/24 131/80   01/25/24 138/83   01/22/24 130/62     /80 (BP Location: Right arm, Patient Position: Sitting, Cuff Size: Adult Large)   Pulse 64   Temp 98.1  F (36.7  C) (Oral)   Resp 16   Ht 1.619 m (5' 3.75\")   Wt 82.5 kg (181 lb 14.4 oz)   LMP  (LMP Unknown)   SpO2 99%   BMI 31.47 kg/m     Patient is in no acute distress.  Mood good.  Insight good.  Eyes nonicteric.  Pupils equal.  Ears clear.  Nose clear.  Throat clear.  Neck is supple.  No cervical adenopathy.  No thyromegaly.  Heart is regular rate and rhythm.  Lungs clear to auscultation bilaterally.  Respiratory effort is good.  Abdomen is soft. Extremities no edema.     Diagnostics:     Office Visit on 01/22/2024   Component Date Value Ref Range Status    Cholesterol 01/22/2024 130  <200 mg/dL Final    Triglycerides 01/22/2024 57  <150 mg/dL Final    Direct Measure HDL 01/22/2024 70  >=50 mg/dL Final    LDL Cholesterol Calculated 01/22/2024 49  <=100 mg/dL Final    Non HDL Cholesterol 01/22/2024 60  <130 mg/dL Final    Patient Fasting > 8hrs? 01/22/2024 No   Final    Hemoglobin A1C 01/22/2024 5.6  0.0 - 5.6 % Final    Normal <5.7%   Prediabetes 5.7-6.4%    Diabetes 6.5% or higher     Note: Adopted from ADA consensus guidelines.    Sodium 01/22/2024 137  135 - 145 mmol/L Final    Reference intervals for this test were updated on 09/26/2023 to more accurately reflect our healthy population. There may be differences in the flagging of prior results with similar values performed with this method. Interpretation of those prior results can be made in the context of the updated reference intervals. "     Potassium 01/22/2024 4.0  3.4 - 5.3 mmol/L Final    Carbon Dioxide (CO2) 01/22/2024 28  22 - 29 mmol/L Final    Anion Gap 01/22/2024 10  7 - 15 mmol/L Final    Urea Nitrogen 01/22/2024 12.4  8.0 - 23.0 mg/dL Final    Creatinine 01/22/2024 0.99 (H)  0.51 - 0.95 mg/dL Final    GFR Estimate 01/22/2024 63  >60 mL/min/1.73m2 Final    Calcium 01/22/2024 9.7  8.8 - 10.2 mg/dL Final    Chloride 01/22/2024 99  98 - 107 mmol/L Final    Glucose 01/22/2024 96  70 - 99 mg/dL Final    Alkaline Phosphatase 01/22/2024 65  40 - 150 U/L Final    Reference intervals for this test were updated on 11/14/2023 to more accurately reflect our healthy population. There may be differences in the flagging of prior results with similar values performed with this method. Interpretation of those prior results can be made in the context of the updated reference intervals.    AST 01/22/2024 20  0 - 45 U/L Final    Reference intervals for this test were updated on 6/12/2023 to more accurately reflect our healthy population. There may be differences in the flagging of prior results with similar values performed with this method. Interpretation of those prior results can be made in the context of the updated reference intervals.    ALT 01/22/2024 11  0 - 50 U/L Final    Reference intervals for this test were updated on 6/12/2023 to more accurately reflect our healthy population. There may be differences in the flagging of prior results with similar values performed with this method. Interpretation of those prior results can be made in the context of the updated reference intervals.      Protein Total 01/22/2024 6.9  6.4 - 8.3 g/dL Final    Albumin 01/22/2024 4.1  3.5 - 5.2 g/dL Final    Bilirubin Total 01/22/2024 0.4  <=1.2 mg/dL Final    CK 01/22/2024 64  26 - 192 U/L Final      ECG results from 11/30/23   ECG 12-LEAD WITH MUSE (LHE)     Value    Systolic Blood Pressure     Diastolic Blood Pressure     Ventricular Rate 62    Atrial Rate 62    NY  Interval 158    QRS Duration 100        QTc 442    P Axis 19    R AXIS -22    T Axis 86    Interpretation ECG      Sinus rhythm  Septal infarct , age undetermined  Abnormal ECG  When compared with ECG of 10-AUG-2008 11:17,  Septal infarct is now Present  Confirmed by SEE ED PROVIDER NOTE FOR, ECG INTERPRETATION ([id number]),  Dashawn Tamez ([id number]) on 12/6/2023 10:30:50 AM        Echo result w/o MOPS: Interpretation Summary Left ventricular size, wall motion and function are normal. The ejectionfraction is 60-65%.Normal right ventricle size and systolic function.No hemodynamically significant valvular abnormalities on 2D or color flowimaging.    Impression:     1. Preoperative examination    2. Esophageal abnormality    3. Cervical stenosis of spinal canal    4. Spinal stenosis in cervical region    5. Tobacco abuse    6. Right leg numbness    7. Primary hypertension        Plan:     Okay to proceed with surgery as planned.  Stop aspirin 1 week before surgery.  May take usual medications on the am of surgery with a sip of water.  Continue off of tobacco.  Continue current medications otherwise.  Referral to neurosurgery for her cervical spine disease.  Follow up sooner if issues.    40 minutes or greater was spent today on the patient's care on the day of service.      This includes time for chart preparation, reviewing medical tests done before or during the visit, talking with the patient, review of quality indicators, required documentation, and other elements of care.        Ricardo Davila MD  General Internal Medicine  [facility]    The longitudinal plan of care for the diagnoses and conditions as documented were addressed during this visit. Due to the added complexity in care, I will continue to support Marlen in the subsequent management and with ongoing continuity of care.     Return in about 6 months (around 10/5/2024) for annual wellness visit, visit and blood work.      Future Appointments   Date Time Provider Department Center   4/10/2024  7:00 AM [facility]   7/11/2024 11:30 AM Loni Jaquez MD [facility]   7/25/2024 11:30 AM TIGIST [facility] 2 SISSY [facility]   7/25/2024 12:00 PM [facility]

## 2024-04-07 PROBLEM — I65.22 LEFT CAROTID STENOSIS: Status: RESOLVED | Noted: 2024-01-09 | Resolved: 2024-04-07

## 2024-04-07 NOTE — PATIENT INSTRUCTIONS
"Please follow up if you have any further issues.    You may contact me if you are worsening or if things are not improving.    ______________________________________________________________________     How do I get a hold of Dr. Davila or his team more directly?    One option is to leave me a Live [employer] message about your situation.  Another option is to call our Care Team coordinators.  These are 3 people in our [facility] taking phone calls.  They are available Monday - Friday 7 am - 6 pm.  You can call [phone] and when asked for a voice prompt, say \"CARE TEAM\" to get connected to them.  ______________________________________________________________________     What if I need an appointment with Dr. Davila specifically?    Please remember that you can call [phone] ANYTIME to schedule an appointment.  With the voice prompt, say \"APPOINTMENTS.\"    You can schedule appointments 24 hours a day, 7 days a week.      Sometimes the best time to schedule an appointment is after clinic hours or on weekends when less people are calling in.      You can also schedule appointments through [employer].    ______________________________________________________________________     What if I need care more urgently?    If it is an emergency, do not wait for us to respond as it can sometimes take awhile.  Call 911 and go to the hospital.  A number of more urgent care options are available:  Walk-in Care at [place] or [place].  Open in [place] 10 am - 8 pm Monday-Friday.  9 am - 8 pm Saturday and Sunday.  Open in [place] 10 am - 8 pm Monday-Friday.  9 am - 5 pm Saturday and Sunday.  Earlier times tend to be less crowded.  Other local urgent care.  [facility] (in [facility] and [place]).  For more information, [email or web address].  All locations are open from 8am to 9pm daily.  Emergency room especially in the middle of the night.  If your problem is more orthopedic in nature (joint, spine or bone pain), " "there is orthopedic urgent care as well.  [facility] urgent care.    Available locally in [facility], [place] and [place].  Open 8 am to 8 pm every day.  [facility] urgent care.  Available in [place], [place] and [place].  Open 8 am to 8 pm every day.    ______________________________________________________________________     What if I just want some advice from a nurse?    We do have triage nurses available 24-7.  Call [phone] and when asked for a voice prompt, say \"TRIAGE NURSE\"     ______________________________________________________________________     Preventative Measures:  Health Maintenance   Topic Date Due    ANNUAL REVIEW OF HM ORDERS  Never done    MAMMO SCREENING  Never done    NICOTINE/TOBACCO CESSATION COUNSELING Q 1 YR  07/02/2022    MEDICARE ANNUAL WELLNESS VISIT  Never done    FALL RISK ASSESSMENT  07/24/2024    LUNG CANCER SCREENING  12/07/2024    LIPID  01/22/2025    ADVANCE CARE PLANNING  06/07/2026    GLUCOSE  01/22/2027    COLORECTAL CANCER SCREENING  05/27/2028    DTAP/TDAP/TD IMMUNIZATION (3 - Td or Tdap) 10/09/2033    DEXA  08/15/2034    SPIROMETRY  Completed    PHQ-2 (once per calendar year)  Completed    INFLUENZA VACCINE  Completed    Pneumococcal Vaccine: 65+ Years  Completed    ZOSTER IMMUNIZATION  Completed    RSV VACCINE (Pregnancy & 60+)  Completed    COVID-19 Vaccine  Completed    COPD ACTION PLAN  Addressed    IPV IMMUNIZATION  Aged Out    HPV IMMUNIZATION  Aged Out    MENINGITIS IMMUNIZATION  Aged Out    RSV MONOCLONAL ANTIBODY  Aged Out    HEPATITIS C SCREENING  Discontinued    HIV SCREENING  Discontinued      "

## 2024-04-09 ENCOUNTER — TELEPHONE (OUTPATIENT)
Dept: NEUROSURGERY | Facility: CLINIC | Age: 64
End: 2024-04-09
Payer: MEDICARE

## 2024-04-09 DIAGNOSIS — M48.02 SPINAL STENOSIS IN CERVICAL REGION: Primary | ICD-10-CM

## 2024-04-10 ENCOUNTER — LAB (OUTPATIENT)
Dept: LAB | Facility: CLINIC | Age: 64
End: 2024-04-10
Payer: MEDICARE

## 2024-04-10 ENCOUNTER — HOSPITAL ENCOUNTER (OUTPATIENT)
Dept: GENERAL RADIOLOGY | Facility: HOSPITAL | Age: 64
Discharge: HOME | End: 2024-04-10
Attending: SURGERY | Admitting: SURGERY
Payer: MEDICARE

## 2024-04-10 DIAGNOSIS — I10 PRIMARY HYPERTENSION: Chronic | ICD-10-CM

## 2024-04-10 DIAGNOSIS — M48.02 SPINAL STENOSIS IN CERVICAL REGION: ICD-10-CM

## 2024-04-10 DIAGNOSIS — E78.2 MIXED HYPERLIPIDEMIA: ICD-10-CM

## 2024-04-10 LAB
ANION GAP SERPL CALCULATED.3IONS-SCNC: 9 MMOL/L (ref 7–15)
BUN SERPL-MCNC: 13.3 MG/DL (ref 8–23)
CALCIUM SERPL-MCNC: 9.8 MG/DL (ref 8.8–10.2)
CHLORIDE SERPL-SCNC: 103 MMOL/L (ref 98–107)
CHOLEST SERPL-MCNC: 171 MG/DL
CREAT SERPL-MCNC: 1.03 MG/DL (ref 0.51–0.95)
DEPRECATED HCO3 PLAS-SCNC: 28 MMOL/L (ref 22–29)
EGFRCR SERPLBLD CKD-EPI 2021: 60 ML/MIN/1.73M2
ERYTHROCYTE [DISTWIDTH] IN BLOOD BY AUTOMATED COUNT: 14.1 % (ref 10–15)
FASTING STATUS PATIENT QL REPORTED: YES
GLUCOSE SERPL-MCNC: 95 MG/DL (ref 70–99)
HCT VFR BLD AUTO: 41.6 % (ref 35–47)
HDLC SERPL-MCNC: 98 MG/DL
HGB BLD-MCNC: 13.3 G/DL (ref 11.7–15.7)
LDLC SERPL CALC-MCNC: 65 MG/DL
MCH RBC QN AUTO: 30.2 PG (ref 26.5–33)
MCHC RBC AUTO-ENTMCNC: 32 G/DL (ref 31.5–36.5)
MCV RBC AUTO: 95 FL (ref 78–100)
NONHDLC SERPL-MCNC: 73 MG/DL
PLATELET # BLD AUTO: 178 10E3/UL (ref 150–450)
POTASSIUM SERPL-SCNC: 4.8 MMOL/L (ref 3.4–5.3)
RBC # BLD AUTO: 4.4 10E6/UL (ref 3.8–5.2)
SODIUM SERPL-SCNC: 140 MMOL/L (ref 135–145)
TRIGL SERPL-MCNC: 42 MG/DL
WBC # BLD AUTO: 5 10E3/UL (ref 4–11)

## 2024-04-10 PROCEDURE — 36415 COLL VENOUS BLD VENIPUNCTURE: CPT

## 2024-04-10 PROCEDURE — 80061 LIPID PANEL: CPT

## 2024-04-10 PROCEDURE — 72050 X-RAY EXAM NECK SPINE 4/5VWS: CPT

## 2024-04-10 PROCEDURE — 85027 COMPLETE CBC AUTOMATED: CPT

## 2024-04-10 PROCEDURE — 80048 BASIC METABOLIC PNL TOTAL CA: CPT

## 2024-04-16 ENCOUNTER — PRE VISIT (OUTPATIENT)
Dept: NEUROSURGERY | Facility: CLINIC | Age: 64
End: 2024-04-16

## 2024-04-16 ENCOUNTER — OFFICE VISIT (OUTPATIENT)
Dept: NEUROSURGERY | Facility: CLINIC | Age: 64
End: 2024-04-16
Attending: INTERNAL MEDICINE
Payer: MEDICARE

## 2024-04-16 VITALS
OXYGEN SATURATION: 98 % | SYSTOLIC BLOOD PRESSURE: 159 MMHG | WEIGHT: 181 LBS | HEART RATE: 63 BPM | BODY MASS INDEX: 30.9 KG/M2 | HEIGHT: 64 IN | DIASTOLIC BLOOD PRESSURE: 77 MMHG

## 2024-04-16 DIAGNOSIS — M48.02 CERVICAL STENOSIS OF SPINAL CANAL: Primary | ICD-10-CM

## 2024-04-16 PROCEDURE — 99203 OFFICE O/P NEW LOW 30 MIN: CPT | Performed by: SURGERY

## 2024-04-16 RX ORDER — DIAZEPAM 5 MG
5 TABLET ORAL SEE ADMIN INSTRUCTIONS
Qty: 2 TABLET | Refills: 0 | Status: CANCELLED | OUTPATIENT
Start: 2024-04-16

## 2024-04-16 ASSESSMENT — PAIN SCALES - GENERAL: PAINLEVEL: MILD PAIN (3)

## 2024-04-16 NOTE — PATIENT INSTRUCTIONS
Imaging/procedures ordered:  MRI of lumbar spine  EMG of right arm and right leg    Follow-up after both have been completed.

## 2024-04-16 NOTE — LETTER
4/16/2024         RE: Marlen Dumas  [address]  [facility] [address]        Dear Colleague,    Thank you for referring your patient, Marlen Dumas, to the [facility]. Please see a copy of my visit note below.    NEUROSURGERY CONSULTATION NOTE      Neurosurgery was asked to see this patient by Ricardo Davila MD for evaluation of cervical stenosis.       CONSULTATION ASSESSMENT AND PLAN:    62 yo female who presents with neck pain and headaches as well as right arm and leg numbness.  MRI cervical spine shows moderate to severe central canal stenosis at cervical 4-5 and moderate at cervical 5-6 and mild to moderate cervical 6-7.  Although read as more severe at cervical 4-6 there is still CSF around the spinal cord more so dorsally. There is no abnormal cord signal change.  She also has severe right foraminal narrowing at cervical 4-5, cervical 5-6 and moderate at cervical 6-7.  Recommend MRI lumbar spine rule out tandem stenosis in her lumbar spine.  Will also get an EMG to right arm and leg to rule out any neuropathy.  Could consider anterior cervical decompression and fusion at cervical C4-6 this may help her neck pain and headaches but unclear if would improve arm numbness as she has leg numbness that does not appear attributable to the cervical spine.  Will obtain her EMG and her MRI by her spine and return to clinic to discuss further.    Rachael Helton MD      HISTORY OF PRESENTING ILLNESS:    62 yo female who presents with neck pain and headaches as well as right arm and leg numbness. Headaches and neck pain. Shooting pain through lower cervical spine to base of skull.  Was diagnosed with stroke in December. Was hospitalized and underwent MRI cervical spine as well. She will have intermittent right arm and leg numbness. Can occur with sitting or walking. Becoming more frequent. Diffuse in the extremities on right side only. No symptoms on the left side. Right  side has less mobility then right. No significant weakness.  Some clumsiness of her hands. She will knock over cups etc often. Has an issue with depth perception as well. No imbalance. Some urinary urgency but otherwise no loss.  No bowel loss.       Past Medical History:   Diagnosis Date     CAD (coronary artery disease) 12/08/2023     COPD (chronic obstructive pulmonary disease) (H)      GERD (gastroesophageal reflux disease) 07/24/2023     HLD (hyperlipidemia)      HTN (hypertension)      Hx of laparoscopic adjustable gastric banding 10/01/2008    Initial weight 295.5 BMI 50.4 Dr. Rowell     Hymenoptera allergy      Hypothyroidism      Left carotid stenosis 01/09/2024     S/P colonoscopy 05/05/2019     Tobacco abuse      Vitamin D deficiency 08/24/2016       Past Surgical History:   Procedure Laterality Date     ENDARTERECTOMY CAROTID Left 1/9/2024    Procedure: ENDARTERECTOMY, LEFT CAROTID;  Surgeon: Timbo Hart MD;  Location: [facility]     FOOT SURGERY      Sting ray stacia     LAPAROSCOPIC GASTRIC BANDING  2008     LUMBAR DISC SURGERY       MS ESOPHAGOGASTRODUODENOSCOPY TRANSORAL DIAGNOSTIC N/A 8/14/2020    Procedure: ESOPHAGOGASTRODUODENOSCOPY (EGD);  Surgeon: Jared Howard MD;  Location: [facility];  Service: General       REVIEW OF SYSTEMS:  See ROS form under media     MEDICATIONS:    Current Outpatient Medications   Medication Sig Dispense Refill     albuterol (PROAIR HFA/PROVENTIL HFA/VENTOLIN HFA) 108 (90 Base) MCG/ACT inhaler Inhale 2 puffs into the lungs every 6 hours as needed for shortness of breath, wheezing or cough       amLODIPine (NORVASC) 5 MG tablet TAKE 1 TABLET(5 MG) BY MOUTH DAILY 90 tablet 3     aspirin (ASA) 81 MG chewable tablet Take 81 mg by mouth daily       atorvastatin (LIPITOR) 40 MG tablet Take 1 tablet (40 mg) by mouth daily 90 tablet 4     EPINEPHrine (ANY BX GENERIC EQUIV) 0.3 MG/0.3ML injection 2-pack Inject 0.3 mLs (0.3 mg) into the muscle  once as needed for anaphylaxis May repeat one time in 5-15 minutes if response to initial dose is inadequate. 2 each 3     ezetimibe (ZETIA) 10 MG tablet Take 1 tablet (10 mg) by mouth daily 90 tablet 3     fluticasone (ARNUITY ELLIPTA) 100 MCG/ACT inhaler Inhale 1 puff into the lungs daily 30 each 3     nicotine (NICODERM CQ) 21 MG/24HR 24 hr patch Place 1 patch onto the skin every 24 hours 30 patch 3     pantoprazole (PROTONIX) 40 MG EC tablet Take 1 tablet (40 mg) by mouth daily 90 tablet 3     sucralfate (CARAFATE) 1 GM tablet Take 1 g by mouth daily as needed (heart burn/reflux symptoms)       varenicline (CHANTIX) 1 MG tablet Take 1 tablet (1 mg) by mouth 2 times daily 60 tablet 5         ALLERGIES/SENSITIVITIES:     Allergies   Allergen Reactions     Other Environmental Allergy Unknown     Bee sting     Sulfa (Sulfonamide Antibiotics) [Sulfa Antibiotics] Unknown       PERTINENT SOCIAL HISTORY:   Social History     Socioeconomic History     Marital status: Single     Number of children: 0   Tobacco Use     Smoking status: Some Days     Current packs/day: 0.00     Types: Cigarettes     Last attempt to quit: 1985     Years since quittin.6     Passive exposure: Current     Smokeless tobacco: Never     Tobacco comments:     quit 20   Vaping Use     Vaping status: Never Used     Passive vaping exposure: Yes   Substance and Sexual Activity     Alcohol use: Yes     Alcohol/week: 4.0 standard drinks of alcohol     Types: 4 Standard drinks or equivalent per week     Comment: 4-5 beer/wine weekly     Drug use: Never     Sexual activity: Never     Birth control/protection: Abstinence, Post-menopausal   Other Topics Concern     Parent/sibling w/ CABG, MI or angioplasty before 65F 55M? No   Social History Narrative    Lives with a friend.         Retired after working in [employer] for [employer].        [place] in [place].     Social Determinants of Health     Financial Resource Strain: Low Risk  (2024)  "   Financial Resource Strain      Within the past 12 months, have you or your family members you live with been unable to get utilities (heat, electricity) when it was really needed?: No   Food Insecurity: Low Risk  (1/5/2024)    Food Insecurity      Within the past 12 months, did you worry that your food would run out before you got money to buy more?: No      Within the past 12 months, did the food you bought just not last and you didn t have money to get more?: No   Transportation Needs: Low Risk  (1/5/2024)    Transportation Needs      Within the past 12 months, has lack of transportation kept you from medical appointments, getting your medicines, non-medical meetings or appointments, work, or from getting things that you need?: No   Interpersonal Safety: Low Risk  (10/13/2023)    Interpersonal Safety      Do you feel physically and emotionally safe where you currently live?: Yes      Within the past 12 months, have you been hit, slapped, kicked or otherwise physically hurt by someone?: No      Within the past 12 months, have you been humiliated or emotionally abused in other ways by your partner or ex-partner?: No   Housing Stability: Low Risk  (1/5/2024)    Housing Stability      Do you have housing? : Yes      Are you worried about losing your housing?: No         FAMILY HISTORY:  Family History   Problem Relation Age of Onset     Coronary Artery Disease Mother 58     Hypertension Mother      Chronic Obstructive Pulmonary Disease Father      Hypertension Father      Anxiety Disorder Father      Hypertension Brother         PHYSICAL EXAM:   Constitutional: BP (!) 159/77   Pulse 63   Ht 1.619 m (5' 3.75\")   Wt 82.1 kg (181 lb)   LMP  (LMP Unknown)   SpO2 98%   BMI 31.31 kg/m       Mental Status: A & O in no acute distress.  Affect is appropriate.  Speech is fluent.  Recent and remote memory are intact.  Attention span and concentration are normal.     Motor:  Normal bulk and tone all muscle groups of " upper and lower extremities.    Strength: 5/5 x 4     Sensory: Sensation intact bilaterally to light touch except left superior shin numbness (chronic related to injury)     Coordination;  Tandem gait with difficulty to maintain.  Normal gait and station.     Reflexes; supinator, biceps, triceps, knee/ ankle jerk intact- brisk in biceps otherwise 2+. No kerns's/ clonus.    IMAGING:  I personally reviewed all radiographic images         Cc:   Ricardo Davila             Again, thank you for allowing me to participate in the care of your patient.        Sincerely,        Rachael Helton MD

## 2024-04-16 NOTE — NURSING NOTE
"Marlen Dumas is a 63 year old female who presents for:  Chief Complaint   Patient presents with    Consult     Cervical  Neck pain that travels into head  Right arm numbness and tingling  Right leg numbness  Low back surgery, 15-20 years ago        Initial Vitals:  BP (!) 159/77   Pulse 63   Ht 5' 3.75\" (1.619 m)   Wt 181 lb (82.1 kg)   LMP  (LMP Unknown)   SpO2 98%   BMI 31.31 kg/m   Estimated body mass index is 31.31 kg/m  as calculated from the following:    Height as of this encounter: 5' 3.75\" (1.619 m).    Weight as of this encounter: 181 lb (82.1 kg).. Body surface area is 1.92 meters squared. BP completed using cuff size: regular  Mild Pain (3)    Nursing Comments: Marlen to follow up with Primary Care provider regarding elevated blood pressure.          Neck Disability Index (  [employer]. 1991. All rights reserved.; used with permission)    SECTION 1 - PAIN INTENSITY: The pain is moderate at the moment.  SECTION 2 - PERSONAL CARE: I can look after myself normally without causing extra neck pain.  SECTION 3 - LIFTING: Neck pain prevents me from lifting heavy weights off the floor but I can manage if items are conveniently positioned, ie. on a table.  SECTION 4 - READING: I can't read as much as I want because of moderate neck pain.  SECTION 5 - HEADACHES: I have severe headaches that come frequently.  SECTION 6 - CONCENTRATION: I can concentrate fully with slight difficulty.  SECTION 7 - WORK: I can do most of my usual work, but no more.  SECTION 8 - DRIVING: I can drive my car with only slight neck pain.  SECTION 9 - SLEEPING: My sleep is moderately disturbed for up to 2-3 hours.  SECTION 10 - RECREATION: I am able to engage in all my recreational activities with some neck pain.  Count: 10  Sum: 19  Raw Score: /50: 19  Neck Disability Index Score: (%): 38 %               Shiv Small  "

## 2024-04-16 NOTE — PROGRESS NOTES
NEUROSURGERY CONSULTATION NOTE      Neurosurgery was asked to see this patient by Ricardo Davila MD for evaluation of cervical stenosis.       CONSULTATION ASSESSMENT AND PLAN:    64 yo female who presents with neck pain and headaches as well as right arm and leg numbness.  MRI cervical spine shows moderate to severe central canal stenosis at cervical 4-5 and moderate at cervical 5-6 and mild to moderate cervical 6-7.  Although read as more severe at cervical 4-6 there is still CSF around the spinal cord more so dorsally. There is no abnormal cord signal change.  She also has severe right foraminal narrowing at cervical 4-5, cervical 5-6 and moderate at cervical 6-7.  Recommend MRI lumbar spine rule out tandem stenosis in her lumbar spine.  Will also get an EMG to right arm and leg to rule out any neuropathy.  Could consider anterior cervical decompression and fusion at cervical C4-6 this may help her neck pain and headaches but unclear if would improve arm numbness as she has leg numbness that does not appear attributable to the cervical spine.  Will obtain her EMG and her MRI by her spine and return to clinic to discuss further.    Rachael Helton MD      HISTORY OF PRESENTING ILLNESS:    64 yo female who presents with neck pain and headaches as well as right arm and leg numbness. Headaches and neck pain. Shooting pain through lower cervical spine to base of skull.  Was diagnosed with stroke in December. Was hospitalized and underwent MRI cervical spine as well. She will have intermittent right arm and leg numbness. Can occur with sitting or walking. Becoming more frequent. Diffuse in the extremities on right side only. No symptoms on the left side. Right side has less mobility then right. No significant weakness.  Some clumsiness of her hands. She will knock over cups etc often. Has an issue with depth perception as well. No imbalance. Some urinary urgency but otherwise no loss.  No bowel loss.       Past  Medical History:   Diagnosis Date    CAD (coronary artery disease) 12/08/2023    COPD (chronic obstructive pulmonary disease) (H)     GERD (gastroesophageal reflux disease) 07/24/2023    HLD (hyperlipidemia)     HTN (hypertension)     Hx of laparoscopic adjustable gastric banding 10/01/2008    Initial weight 295.5 BMI 50.4 Dr. Rowell    Hymenoptera allergy     Hypothyroidism     Left carotid stenosis 01/09/2024    S/P colonoscopy 05/05/2019    Tobacco abuse     Vitamin D deficiency 08/24/2016       Past Surgical History:   Procedure Laterality Date    ENDARTERECTOMY CAROTID Left 1/9/2024    Procedure: ENDARTERECTOMY, LEFT CAROTID;  Surgeon: Timbo Hart MD;  Location: [facility]    FOOT SURGERY      Sting ray stacia    LAPAROSCOPIC GASTRIC BANDING  2008    LUMBAR DISC SURGERY      MI ESOPHAGOGASTRODUODENOSCOPY TRANSORAL DIAGNOSTIC N/A 8/14/2020    Procedure: ESOPHAGOGASTRODUODENOSCOPY (EGD);  Surgeon: Jared Howard MD;  Location: [facility];  Service: General       REVIEW OF SYSTEMS:  See ROS form under media     MEDICATIONS:    Current Outpatient Medications   Medication Sig Dispense Refill    albuterol (PROAIR HFA/PROVENTIL HFA/VENTOLIN HFA) 108 (90 Base) MCG/ACT inhaler Inhale 2 puffs into the lungs every 6 hours as needed for shortness of breath, wheezing or cough      amLODIPine (NORVASC) 5 MG tablet TAKE 1 TABLET(5 MG) BY MOUTH DAILY 90 tablet 3    aspirin (ASA) 81 MG chewable tablet Take 81 mg by mouth daily      atorvastatin (LIPITOR) 40 MG tablet Take 1 tablet (40 mg) by mouth daily 90 tablet 4    EPINEPHrine (ANY BX GENERIC EQUIV) 0.3 MG/0.3ML injection 2-pack Inject 0.3 mLs (0.3 mg) into the muscle once as needed for anaphylaxis May repeat one time in 5-15 minutes if response to initial dose is inadequate. 2 each 3    ezetimibe (ZETIA) 10 MG tablet Take 1 tablet (10 mg) by mouth daily 90 tablet 3    fluticasone (ARNUITY ELLIPTA) 100 MCG/ACT inhaler Inhale 1 puff into the  lungs daily 30 each 3    nicotine (NICODERM CQ) 21 MG/24HR 24 hr patch Place 1 patch onto the skin every 24 hours 30 patch 3    pantoprazole (PROTONIX) 40 MG EC tablet Take 1 tablet (40 mg) by mouth daily 90 tablet 3    sucralfate (CARAFATE) 1 GM tablet Take 1 g by mouth daily as needed (heart burn/reflux symptoms)      varenicline (CHANTIX) 1 MG tablet Take 1 tablet (1 mg) by mouth 2 times daily 60 tablet 5         ALLERGIES/SENSITIVITIES:     Allergies   Allergen Reactions    Other Environmental Allergy Unknown     Bee sting    Sulfa (Sulfonamide Antibiotics) [Sulfa Antibiotics] Unknown       PERTINENT SOCIAL HISTORY:   Social History     Socioeconomic History    Marital status: Single    Number of children: 0   Tobacco Use    Smoking status: Some Days     Current packs/day: 0.00     Types: Cigarettes     Last attempt to quit: 1985     Years since quittin.6     Passive exposure: Current    Smokeless tobacco: Never    Tobacco comments:     quit 20   Vaping Use    Vaping status: Never Used    Passive vaping exposure: Yes   Substance and Sexual Activity    Alcohol use: Yes     Alcohol/week: 4.0 standard drinks of alcohol     Types: 4 Standard drinks or equivalent per week     Comment: 4-5 beer/wine weekly    Drug use: Never    Sexual activity: Never     Birth control/protection: Abstinence, Post-menopausal   Other Topics Concern    Parent/sibling w/ CABG, MI or angioplasty before 65F 55M? No   Social History Narrative    Lives with a friend.         Retired after working in [employer] for [employer].        [place] in [place].     Social Determinants of Health     Financial Resource Strain: Low Risk  (2024)    Financial Resource Strain     Within the past 12 months, have you or your family members you live with been unable to get utilities (heat, electricity) when it was really needed?: No   Food Insecurity: Low Risk  (2024)    Food Insecurity     Within the past 12 months, did you worry that  "your food would run out before you got money to buy more?: No     Within the past 12 months, did the food you bought just not last and you didn t have money to get more?: No   Transportation Needs: Low Risk  (1/5/2024)    Transportation Needs     Within the past 12 months, has lack of transportation kept you from medical appointments, getting your medicines, non-medical meetings or appointments, work, or from getting things that you need?: No   Interpersonal Safety: Low Risk  (10/13/2023)    Interpersonal Safety     Do you feel physically and emotionally safe where you currently live?: Yes     Within the past 12 months, have you been hit, slapped, kicked or otherwise physically hurt by someone?: No     Within the past 12 months, have you been humiliated or emotionally abused in other ways by your partner or ex-partner?: No   Housing Stability: Low Risk  (1/5/2024)    Housing Stability     Do you have housing? : Yes     Are you worried about losing your housing?: No         FAMILY HISTORY:  Family History   Problem Relation Age of Onset    Coronary Artery Disease Mother 58    Hypertension Mother     Chronic Obstructive Pulmonary Disease Father     Hypertension Father     Anxiety Disorder Father     Hypertension Brother         PHYSICAL EXAM:   Constitutional: BP (!) 159/77   Pulse 63   Ht 1.619 m (5' 3.75\")   Wt 82.1 kg (181 lb)   LMP  (LMP Unknown)   SpO2 98%   BMI 31.31 kg/m       Mental Status: A & O in no acute distress.  Affect is appropriate.  Speech is fluent.  Recent and remote memory are intact.  Attention span and concentration are normal.     Motor:  Normal bulk and tone all muscle groups of upper and lower extremities.    Strength: 5/5 x 4     Sensory: Sensation intact bilaterally to light touch except left superior shin numbness (chronic related to injury)     Coordination;  Tandem gait with difficulty to maintain.  Normal gait and station.     Reflexes; supinator, biceps, triceps, knee/ ankle jerk " intact- brisk in biceps otherwise 2+. No kerns's/ clonus.    IMAGING:  I personally reviewed all radiographic images         Cc:   Ricardo Davila

## 2024-04-19 RX ORDER — LORAZEPAM 1 MG/1
1 TABLET ORAL SEE ADMIN INSTRUCTIONS
Qty: 2 TABLET | Refills: 0 | Status: SHIPPED | OUTPATIENT
Start: 2024-04-19 | End: 2024-07-24

## 2024-04-25 ENCOUNTER — OFFICE VISIT (OUTPATIENT)
Dept: PHYSICAL MEDICINE AND REHAB | Facility: CLINIC | Age: 64
End: 2024-04-25
Attending: SURGERY
Payer: MEDICARE

## 2024-04-25 DIAGNOSIS — M48.02 CERVICAL STENOSIS OF SPINAL CANAL: ICD-10-CM

## 2024-04-25 PROCEDURE — 95911 NRV CNDJ TEST 9-10 STUDIES: CPT | Performed by: PHYSICAL MEDICINE & REHABILITATION

## 2024-04-25 PROCEDURE — 95886 MUSC TEST DONE W/N TEST COMP: CPT | Performed by: PHYSICAL MEDICINE & REHABILITATION

## 2024-04-25 NOTE — LETTER
4/25/2024         RE: Marlen Dumas  [address]  [facility] [place] [address]        Dear Colleague,    Thank you for referring your patient, Marlen Dumas, to the [facility]UR[facility]ERY. Please see a copy of my visit note below.    [facility]  [address] [place] [address] [address]  [place] [address]  Office: [phone] Fax: [phone]    Electromyography and Nerve Conduction Study Report        Indication: Patient presents at the request of Rachael Helton for right upper and lower extremity EMG.  She has several year history of diffuse right upper and lower extremity numbness and tingling.  This waxes and wanes in intensity but has a low level constantly.  She has some neck pain as well.  On exam she has normal sensation to light touch through the upper and lower extremities bilaterally, 2+ bicep, tricep, brachioradialis patellar and trace Achilles bilaterally with downgoing toes and negative Darya's.  Normal muscle strength throughout the major muscle groups of the bilateral upper extremities.      Pt Exam Discussion (Communication Barriers):  Electromyography and nerve conduction testing, including associated discomfort, risks, benefits, and alternatives was discussed with the patient prior to the procedure.  No learning/ communication barriers; patient verbalized understanding of procedure.  Informed consent was obtained.           Pt Assessment:  Testing was successfully completed; patient tolerated testing well.       Blood Thinners: ASA Skin Temperature: Warmed 31.7 right lower extremity                   EMG/NCS  results:     Nerve Conduction Studies  Motor Sites      Segment Distal Latency Neg. Amp CV F-Latency F-Estimate Comment   Site  (ms) (mV) (m/s) (ms) (ms)    Right Fibular (EDB) Motor   Ankle Ankle-EDB 3.6 2.3       Knee Knee-Ankle 11.4 2.2 47      Right Median (APB) Motor   Wrist Wrist-APB *4.7 5.3       Elbow Elbow-Wrist 9.2 4.7 51       Right Tibial (AH) Motor   Ankle Ankle-AH 3.2 5.1       Knee Knee-Ankle 12.0 *3.0 45      Right Ulnar (ADM) Motor   Wrist  2.5 11.0       Bel Elbow Bel Elbow-Wrist 6.1 10.1 57      Ab Elbow Ab Elbow-Wrist 8.1 10.1 57        Sensory Sites      Onset Lat Peak Lat Amp CV Comment   Site (ms) (ms) ( V) (m/s)    Right Median Anti Sensory   Wrist-Dig II 3.4 *4.3 24 38    Right Median-Ulnar Palmar Sensory        Median   Palm-Wrist 1.98 *2.7 55 40         Ulnar   Palm-Wrist 1.58 1.88 23 51    Right Sural Sensory   B-Ankle 3.1 3.4 6 -    Right Ulnar Anti Sensory   Wrist-Dig V 2.2 3.0 22 50      H-Reflex Sites      M-Lat H Lat H Neg Amp   Site (ms) (ms) (mV)   Left Tibial (Soleus) H-Reflex   Pop Fossa 6.4 33.1 0.44   Right Tibial (Soleus) H-Reflex   Pop Fossa 7.6 32.8 0.68     H-Reflex Sites      Lt. H Lat Rt. H Lat L-R H Lat L-R H Neg Amp   Site (ms) (ms) (ms) Norm (mV)   Tibial (Soleus) H-Reflex   Pop Fossa 33.1 32.8 0.30  < 3.0 0.24       NCS Waveforms:    Motor                Sensory                H-Reflex           Electromyography     Side Muscle Nerve Root Ins Act Fibs Psw Fasc Recrt Dur Amp Poly Comment   Right Deltoid Axillary C5-6 Nml Nml Nml Nml Nml Nml Nml 0    Right Triceps Radial C6-7-8 Nml Nml Nml Nml Nml Nml Nml 0    Right PronatorTeres Median C6-7 Nml Nml Nml Nml Nml Nml Nml 0    Right 1stDorInt Ulnar C8-T1 Nml Nml Nml Nml Nml Nml Nml 0    Right Abd Poll Brev Median C8-T1 Nml Nml Nml Nml Nml Nml Nml 0    Right AntTibialis Dp Br Fibular L4-5 Nml Nml Nml Nml Nml Nml Nml 0    Right Gastroc Tibial S1-2 Nml Nml Nml Nml Nml Nml Nml 0    Right Fibularis Long Sup Br Fibular L5-S1 Nml Nml Nml Nml Nml Nml Nml 0    Right VastusLat Femoral L2-4 Nml Nml Nml Nml Nml Nml Nml 0    Right RectFemoris Femoral L2-4 Nml Nml Nml Nml Nml Nml Nml 0          Comment NCS: Abnormal study:    1.  Prolonged latency right median SNAP and transcarpal study.  2.  Borderline prolonged right median CMAP.  3.  Prolonged right median versus  ulnar transcarpal latency comparison.  4.  Normal right ulnar SNAP, ulnar transcarpal study, and ulnar CMAP.  5.  Normal right sural SNAP, peroneal and tibial CMAP's, and symmetric tibial H reflexes.    Comment EMG: Normal study.  1.  Normal needle EMG right upper extremity.  2.  Normal needle EMG right lower extremity.    Interpretation: Abnormal study: There is electrodiagnostic evidence of:    1.  Right median neuropathy at the wrist consistent with entrapment in the carpal tunnel, mild in severity.    2. There is no electrodiagnostic evidence of cervical radiculopathy, brachial plexopathy, or ulnar neuropathy in the right upper extremity.    3.  There is no electrodiagnostic evidence of lumbosacral radiculopathy, lumbosacral plexopathy, or focal neuropathy in the right lower extremity.    The testing was completed in its entirety by the physician.      It was our pleasure caring for your patient today, if there any questions or concerns please do not hesitate to contact us.    Again, thank you for allowing me to participate in the care of your patient.        Sincerely,        Wesley Rios, DO

## 2024-04-25 NOTE — PATIENT INSTRUCTIONS
Thank you for choosing the [facility] for your EMG testing.    The ordering provider will receive your final EMG results within the next few days.  Please follow up with your provider for the results and further treatment recommendations.

## 2024-04-25 NOTE — PROGRESS NOTES
[facility]  [address] [place] [address] [address]  [place] [address]  Office: [phone] Fax: [phone]    Electromyography and Nerve Conduction Study Report        Indication: Patient presents at the request of Rachael Helton for right upper and lower extremity EMG.  She has several year history of diffuse right upper and lower extremity numbness and tingling.  This waxes and wanes in intensity but has a low level constantly.  She has some neck pain as well.  On exam she has normal sensation to light touch through the upper and lower extremities bilaterally, 2+ bicep, tricep, brachioradialis patellar and trace Achilles bilaterally with downgoing toes and negative Darya's.  Normal muscle strength throughout the major muscle groups of the bilateral upper extremities.      Pt Exam Discussion (Communication Barriers):  Electromyography and nerve conduction testing, including associated discomfort, risks, benefits, and alternatives was discussed with the patient prior to the procedure.  No learning/ communication barriers; patient verbalized understanding of procedure.  Informed consent was obtained.           Pt Assessment:  Testing was successfully completed; patient tolerated testing well.       Blood Thinners: ASA Skin Temperature: Warmed 31.7 right lower extremity                   EMG/NCS  results:     Nerve Conduction Studies  Motor Sites      Segment Distal Latency Neg. Amp CV F-Latency F-Estimate Comment   Site  (ms) (mV) (m/s) (ms) (ms)    Right Fibular (EDB) Motor   Ankle Ankle-EDB 3.6 2.3       Knee Knee-Ankle 11.4 2.2 47      Right Median (APB) Motor   Wrist Wrist-APB *4.7 5.3       Elbow Elbow-Wrist 9.2 4.7 51      Right Tibial (AH) Motor   Ankle Ankle-AH 3.2 5.1       Knee Knee-Ankle 12.0 *3.0 45      Right Ulnar (ADM) Motor   Wrist  2.5 11.0       Bel Elbow Bel Elbow-Wrist 6.1 10.1 57      Ab Elbow Ab Elbow-Wrist 8.1 10.1 57        Sensory Sites      Onset Lat Peak  Lat Amp CV Comment   Site (ms) (ms) ( V) (m/s)    Right Median Anti Sensory   Wrist-Dig II 3.4 *4.3 24 38    Right Median-Ulnar Palmar Sensory        Median   Palm-Wrist 1.98 *2.7 55 40         Ulnar   Palm-Wrist 1.58 1.88 23 51    Right Sural Sensory   B-Ankle 3.1 3.4 6 -    Right Ulnar Anti Sensory   Wrist-Dig V 2.2 3.0 22 50      H-Reflex Sites      M-Lat H Lat H Neg Amp   Site (ms) (ms) (mV)   Left Tibial (Soleus) H-Reflex   Pop Fossa 6.4 33.1 0.44   Right Tibial (Soleus) H-Reflex   Pop Fossa 7.6 32.8 0.68     H-Reflex Sites      Lt. H Lat Rt. H Lat L-R H Lat L-R H Neg Amp   Site (ms) (ms) (ms) Norm (mV)   Tibial (Soleus) H-Reflex   Pop Fossa 33.1 32.8 0.30  < 3.0 0.24       NCS Waveforms:    Motor                Sensory                H-Reflex           Electromyography     Side Muscle Nerve Root Ins Act Fibs Psw Fasc Recrt Dur Amp Poly Comment   Right Deltoid Axillary C5-6 Nml Nml Nml Nml Nml Nml Nml 0    Right Triceps Radial C6-7-8 Nml Nml Nml Nml Nml Nml Nml 0    Right PronatorTeres Median C6-7 Nml Nml Nml Nml Nml Nml Nml 0    Right 1stDorInt Ulnar C8-T1 Nml Nml Nml Nml Nml Nml Nml 0    Right Abd Poll Brev Median C8-T1 Nml Nml Nml Nml Nml Nml Nml 0    Right AntTibialis Dp Br Fibular L4-5 Nml Nml Nml Nml Nml Nml Nml 0    Right Gastroc Tibial S1-2 Nml Nml Nml Nml Nml Nml Nml 0    Right Fibularis Long Sup Br Fibular L5-S1 Nml Nml Nml Nml Nml Nml Nml 0    Right VastusLat Femoral L2-4 Nml Nml Nml Nml Nml Nml Nml 0    Right RectFemoris Femoral L2-4 Nml Nml Nml Nml Nml Nml Nml 0          Comment NCS: Abnormal study:    1.  Prolonged latency right median SNAP and transcarpal study.  2.  Borderline prolonged right median CMAP.  3.  Prolonged right median versus ulnar transcarpal latency comparison.  4.  Normal right ulnar SNAP, ulnar transcarpal study, and ulnar CMAP.  5.  Normal right sural SNAP, peroneal and tibial CMAP's, and symmetric tibial H reflexes.    Comment EMG: Normal study.  1.  Normal needle EMG right  upper extremity.  2.  Normal needle EMG right lower extremity.    Interpretation: Abnormal study: There is electrodiagnostic evidence of:    1.  Right median neuropathy at the wrist consistent with entrapment in the carpal tunnel, mild in severity.    2. There is no electrodiagnostic evidence of cervical radiculopathy, brachial plexopathy, or ulnar neuropathy in the right upper extremity.    3.  There is no electrodiagnostic evidence of lumbosacral radiculopathy, lumbosacral plexopathy, or focal neuropathy in the right lower extremity.    The testing was completed in its entirety by the physician.      It was our pleasure caring for your patient today, if there any questions or concerns please do not hesitate to contact us.

## 2024-04-27 ENCOUNTER — HOSPITAL ENCOUNTER (OUTPATIENT)
Dept: MRI IMAGING | Facility: CLINIC | Age: 64
Discharge: HOME | End: 2024-04-27
Attending: SURGERY | Admitting: SURGERY
Payer: MEDICARE

## 2024-04-27 DIAGNOSIS — M48.02 CERVICAL STENOSIS OF SPINAL CANAL: ICD-10-CM

## 2024-04-27 PROCEDURE — 72148 MRI LUMBAR SPINE W/O DYE: CPT | Mod: MF

## 2024-04-30 ENCOUNTER — PREP FOR PROCEDURE (OUTPATIENT)
Dept: NEUROLOGY | Facility: CLINIC | Age: 64
End: 2024-04-30

## 2024-04-30 ENCOUNTER — OFFICE VISIT (OUTPATIENT)
Dept: NEUROSURGERY | Facility: CLINIC | Age: 64
End: 2024-04-30
Payer: MEDICARE

## 2024-04-30 VITALS — DIASTOLIC BLOOD PRESSURE: 79 MMHG | HEART RATE: 64 BPM | SYSTOLIC BLOOD PRESSURE: 168 MMHG | OXYGEN SATURATION: 97 %

## 2024-04-30 DIAGNOSIS — M54.12 CERVICAL RADICULOPATHY: Primary | ICD-10-CM

## 2024-04-30 DIAGNOSIS — M48.02 CERVICAL STENOSIS OF SPINAL CANAL: Primary | ICD-10-CM

## 2024-04-30 PROCEDURE — 99213 OFFICE O/P EST LOW 20 MIN: CPT | Performed by: SURGERY

## 2024-04-30 ASSESSMENT — PAIN SCALES - GENERAL: PAINLEVEL: MILD PAIN (3)

## 2024-04-30 NOTE — LETTER
4/30/2024         RE: Marlen Dumas  [address]  [facility] [address]        Dear Colleague,    Thank you for referring your patient, Marlen Dumas, to the [facility]UROSURGERY. Please see a copy of my visit note below.    NEUROSURGERY FOLLOW UP  NOTE    Marlen Dumas comes today in follow-up. 64 yo female who presents with neck pain and headaches as well as right arm and leg numbness.  MRI cervical spine shows moderate to severe central canal stenosis at cervical 4-5 and moderate at cervical 5-6 and mild to moderate cervical 6-7.  Although read as more severe at cervical 4-6 there is still CSF around the spinal cord more so dorsally. There is no abnormal cord signal change.  She also has severe right foraminal narrowing at cervical 4-5, cervical 5-6 and moderate at cervical 6-7.  Recommend MRI lumbar spine rule out tandem stenosis in her lumbar spine.  Will also get an EMG to right arm and leg to rule out any neuropathy.  Could consider anterior cervical decompression and fusion this may help her neck pain and headaches but unclear if would improve arm numbness as she has leg numbness that does not appear attributable to the cervical spine.  Will obtain her EMG and her MRI by her spine and return to clinic to discuss further.     We reviewed her EMG which shows a mild right median neuropathy at the wrist without any other lumbar or cervical radiculopathy, plexopathy's or neuropathies.  Also reviewed her MRI of her lumbar spine which shows mild to moderate spinal canal stenosis at lumbar 3-4 with moderate right and mild left lumbar 3-4 and mild bilateral lumbar 4-5 foraminal narrowing.  Based on all of her imaging and testing it appears reasonable proceed with anterior cervical decompression and fusion to improve her central canal stenosis as well as her severe foraminal narrowing.  We discussed Cervical 4-cervical 7 anterior cervical decompression and fusion with plate. Risks of  anterior neck surgery include but are not limited to inadequate symptom relief, nerve or spinal cord damage, durotomy, hematoma, infection, injury to trachea or esophagus, speech disturbance from injury to the recurrent laryngeal nerve, swallowing difficulties, failed fusion.     Hold carpal tunnel release at this time.  Diagnosis of carpal tunnel in the past and noted more median neuropathy symptoms when she got repetitive movements from previous activity which she is no longer doing.  She does not feel her symptoms are similar to those symptoms she had previously.    Her roommate was recently diagnosed with esophageal cancer and she is waiting to hear the next steps in her treatment prior to proceeding with surgery.  Would proceed with surgery after knowing her next steps sometime in July or August.    Rachael Helton MD      CC:     Ricardo Davila  [address] [address]      Again, thank you for allowing me to participate in the care of your patient.        Sincerely,        Rachael Helton MD

## 2024-04-30 NOTE — PROGRESS NOTES
NEUROSURGERY FOLLOW UP  NOTE    Marlen Dumas comes today in follow-up. 62 yo female who presents with neck pain and headaches as well as right arm and leg numbness.  MRI cervical spine shows moderate to severe central canal stenosis at cervical 4-5 and moderate at cervical 5-6 and mild to moderate cervical 6-7.  Although read as more severe at cervical 4-6 there is still CSF around the spinal cord more so dorsally. There is no abnormal cord signal change.  She also has severe right foraminal narrowing at cervical 4-5, cervical 5-6 and moderate at cervical 6-7.  Recommend MRI lumbar spine rule out tandem stenosis in her lumbar spine.  Will also get an EMG to right arm and leg to rule out any neuropathy.  Could consider anterior cervical decompression and fusion this may help her neck pain and headaches but unclear if would improve arm numbness as she has leg numbness that does not appear attributable to the cervical spine.  Will obtain her EMG and her MRI by her spine and return to clinic to discuss further.     We reviewed her EMG which shows a mild right median neuropathy at the wrist without any other lumbar or cervical radiculopathy, plexopathy's or neuropathies.  Also reviewed her MRI of her lumbar spine which shows mild to moderate spinal canal stenosis at lumbar 3-4 with moderate right and mild left lumbar 3-4 and mild bilateral lumbar 4-5 foraminal narrowing.  Based on all of her imaging and testing it appears reasonable proceed with anterior cervical decompression and fusion to improve her central canal stenosis as well as her severe foraminal narrowing.  We discussed Cervical 4-cervical 7 anterior cervical decompression and fusion with plate. Risks of anterior neck surgery include but are not limited to inadequate symptom relief, nerve or spinal cord damage, durotomy, hematoma, infection, injury to trachea or esophagus, speech disturbance from injury to the recurrent laryngeal nerve, swallowing  difficulties, failed fusion.     Hold carpal tunnel release at this time.  Diagnosis of carpal tunnel in the past and noted more median neuropathy symptoms when she got repetitive movements from previous activity which she is no longer doing.  She does not feel her symptoms are similar to those symptoms she had previously.    Her roommate was recently diagnosed with esophageal cancer and she is waiting to hear the next steps in her treatment prior to proceeding with surgery.  Would proceed with surgery after knowing her next steps sometime in July or August.    Rachael Helton MD      CC:     Ricardo Davila  [address] [address]

## 2024-04-30 NOTE — PATIENT INSTRUCTIONS
Recommend cervical-4 to cervical-7 anterior cervical decompression and fusion.    Set up preop clearance exam with primary care provider within 30 days of surgery date, preferably at least a week before.      Please review COMPLETE information about your proposed surgery, pre-operative requirements, post-operative course and expectations - available in a folder provided to you in clinic!    Your surgery scheduler will call you within 3 business days to begin the process of scheduling your surgery and appointments.     Pre-Operative    Pre-operative physical / Lab work with primary care physician within 30 days of surgical date. We prefer the pre-op exam to be done 2 weeks prior to surgery. We also prefer pre-op lab work be done at [facility] or [facility] outpatient lab, 2 weeks prior to surgery.     If all pre-op appointments/test are not completed prior to your surgery date, you will be asked to reschedule your surgery.           As part of preparation for your upcoming procedure your primary care doctor may order a test to rule out a COVID-19 infection. This is no longer a requirement prior to surgery.     Readiness Visits    Prior to surgery, you may have a telephone or in person readiness visit with our RN team to discuss your upcomming surgery, results of your pre-op physical, and lab work.   If you will require a collar/neck brace after surgery, you will be fitted for one at your readiness visit prior to surgery (scheduled by the surgery scheduler).     Shower procedure    Hibiclens shower: Use one packet the night before surgery and one packet the morning of surgery for a whole body shower. Avoid face, hair, and genitals.      Eating/Drinking    Stop all solid foods 8 hours before surgery.  Stop all clear liquids 2 hours prior to arrival time     Clear liquids include water, clear juice, black coffee, or clear tea without milk, Gatorade, clear soda.     Medications - please refer to the pre-operative  medication instructions sheet in your folder    Hold Aspirin, NSAIDs (Advil/Ibuprofen, Indocin, Naproxen,Nuprin,Relafen/Nabumetone, Diclofenac,Meloxicam, Aleve, Celebrex) and all vitamins and supplements x 7-10 days prior to surgical date  You can take Tylenol (Acetaminophen) for pain up until the date of your surgery   Do not exceed 3,000 mg per day   Any other medications prescribed, please discuss with your primary care provider at your pre-operative physical. Please discuss when/if it is safe for you to stop taking blood thinners with your primary care provider.   We will NOT provide pain medications prior to surgery. We will prescribe post-op pain medications for up to 6 weeks after surgery.       FMLA/Short-term disability    If you are currently employed, you will likely need to be off work for 4-6 weeks for post-op recovery and healing.  Please fax any FMLA/short term disability paperwork to [phone], mail it into the clinic, drop it off in person, or send via a [employer] message.   You may also call our clinic with the date in which you'd like to return to work, and we can provide a work letter to your employer  We will support Short-Term Disability up to 12 weeks, beginning the date of your surgery. We do NOT support Long-Term Disability/Social Security Benefits.     Pain Management after surgery    Dealing with pain    As your body heals, you might feel a stabbing, burning, or aching pain. You may also have some numbness.  Everyone feels pain differently, we may ask you to rate your pain using a pain scale. This will let us know how much pain you feel.   Keep in mind that medicine won't take away all of your pain. It helps to try other ways to relax and ease pain.     Things to help with pain    After surgery, we will give you medicine for your pain. These medications work well, but they can make you drowsy, itchy, or sick to your stomach, and constipated. Try to take narcotics with food if they cause  nausea.   For mild to moderate pain, you can take medication such as Tylenol or Ibuprofen. These can be used with narcotics and muscle relaxants. *If you have had a fusion: do NOT use NSAIDs for 6 months after surgery.   Do NOT drive while taking narcotic pain medication  Do NOT drink alcohol while using narcotic pain medication  You can utilize ice as needed (no longer than 20 minutes at one time) you may apply this over your covered incision  Utilize heat for muscle spasms, do not apply heat over your incision  If a muscle relaxer is prescribed, please do NOT take it at the same time as your narcotic pain medication. Take them at least 90 minutes apart.   You may also use pain cream/patches on sore muscles. Do NOT apply these on your incision. Patches may be cut in 1/2 if needed.     *After your surgery, if you will be staying in-patient, a nursing team will be monitoring you closely throughout your stay and communicate your health status to your surgeon and other providers.  You will be seen by Advanced Practice Providers (e.g., nurse practitioners, clinic nurse specialists, and physician assistants) who will check on you regularly to assess the status of your surgical recovery.     Incision Care    Look at your incision site every day. You  may need a mirror, or family member to help you.   Do not submerge your incision in water such as pools, hot tubs, baths for at least 6 weeks or until incision is healed  You may get your incision wet in the shower. Allow water and soap to run over incision, and gently pat dry.   Remove the dressing the day after you are discharged from the hospital. Keep the incision clean and dry at all times. This may require several bandage changes.   Contact us right away if you have:   Fever over 101 degrees farenheit  Green or yellow drainage (pus) from your incision or increased bloody drainage   Redness, swelling, or warmth at your surgery site   Notify the clinic  [phone].    Activity Restrictions    For the first 6 weeks, no lifting,pushing, or pulling > 5-10 pounds, no bending, twisting.  Use the stairs in moderation   Walking: Walking is the best way to start exercise after surgery. Take short frequent walks. You may gradually increase the distance as tolerated. If you feel pain, decrease your activity, but DO NOT stop walking. Walking will help you regain strength.  Avoid prolonged positioning for longer than 30 minutes (change positions frequently while awake)  No contact sports until after follow up visit  No high impact activities such as; running/jogging, snowmobile or 4 mireles riding or any other recreational vehicles until deemed safe by your surgeon/care team.   Please call the clinic if you develop any of the following symptoms:  Swelling and/or warmth in one or both legs  Pain or tenderness in your leg, ankle, foot, or arm   Red or discolored/pale skin     Post-Op Follow Up Appointments    We will call you to schedule these appointments after your discharge from the hospital.   Incision assessment within 2 weeks with a Registered Nurse   6 week post-op with a Nurse Practitioner/Physician Assistant. Your surgeon will be available on this day.

## 2024-04-30 NOTE — NURSING NOTE
"Marlen Dumas is a 63 year old female who presents for:  Chief Complaint   Patient presents with    Follow Up     Review EMG/MRI  Right leg pain and tingling  Right arm tingling        Initial Vitals:  BP (!) 168/79   Pulse 64   LMP  (LMP Unknown)   SpO2 97%  Estimated body mass index is 31.31 kg/m  as calculated from the following:    Height as of 4/16/24: 5' 3.75\" (1.619 m).    Weight as of 4/16/24: 181 lb (82.1 kg).. There is no height or weight on file to calculate BSA. BP completed using cuff size: regular  Mild Pain (3)    Nursing Comments: Marlen to follow up with Primary Care provider regarding elevated blood pressure.      Oswestry Disability Index (BR[employer]O   [employer] 1980, All rights reserved. Used with permission)    Section 1 - Pain intensity: The pain is moderate at the moment.  Section 2 - Personal care (washing, dressing, etc.) : I can look after myself normally without causing additional pain.  Section 3 - Lifting: Pain prevents me from lifting heavy weights off the floor but I can manage if they are conveniently positioned, e.g. on a table.  Section 4 - Walking: Pain prevents me from walking more than a quarter of a mile.  Section 5 - Sitting: Pain prevents me from sitting for more than 1 hour.  Section 6 - Standing: I can stand as long as I want without additional pain.  Section 7 - Sleeping: My sleep is never interrupted by pain.  Section 8 - Sex life (if applicable): My sex life is normal and causes no additional pain.  Section 9 - Social life: My social life is normal and causes me no additional pain.  Section 10 - Traveling: I can travel anywhere without pain.  Sum: 8  Count: 10  Oswestry Score (%): 16 %       Shiv Small  "

## 2024-05-01 ENCOUNTER — TELEPHONE (OUTPATIENT)
Dept: NEUROSURGERY | Facility: CLINIC | Age: 64
End: 2024-05-01
Payer: MEDICARE

## 2024-05-01 NOTE — TELEPHONE ENCOUNTER
Spoke with the patient, and offered to schedule surgery. The patient declined, and will call back to schedule surgery.

## 2024-05-02 ENCOUNTER — NURSE TRIAGE (OUTPATIENT)
Dept: INTERNAL MEDICINE | Facility: CLINIC | Age: 64
End: 2024-05-02
Payer: MEDICARE

## 2024-05-02 ENCOUNTER — MYC MEDICAL ADVICE (OUTPATIENT)
Dept: INTERNAL MEDICINE | Facility: CLINIC | Age: 64
End: 2024-05-02
Payer: MEDICARE

## 2024-05-02 NOTE — TELEPHONE ENCOUNTER
"Reason for Disposition   Tick bite with no complications    Additional Information   Negative: Not a tick bite   Negative: Patient sounds very sick or weak to the triager   Negative: Fever or severe headache occurs, 2 to 14 days following the bite   Negative: Widespread rash occurs, 2 to 14 days following the bite   Negative: Can't remove live tick (after using Care Advice)   Negative: Fever and spreading red area or streak   Negative: Fever and area is very tender to touch   Negative: Red streak or red line and length > 2 inches (5 cm)   Negative: Red or very tender (to touch) area and started over 24 hours after the bite   Negative: Red ring or bull's-eye rash occurs around a deer tick bite   Negative: Probable deer tick that was attached > 36 hours (or tick appears swollen, not flat)   Negative: Patient wants to be seen    Answer Assessment - Initial Assessment Questions  1. ATTACHED:  \"Is the tick still on the skin?\"  (e.g., yes, no, unsure)      No  2. ONSET - TICK STILL ATTACHED:  \"How long do you think the tick has been on your skin?\" (e.g., hours, days, unsure)  Note:  Is there a recent activity (camping, hiking) where the caller may have been exposed?      About a day  3. ONSET - TICK NOT STILL ATTACHED: \"If the tick has been removed, how long do you think the tick was attached before you removed it?\" (e.g., 5 hours, 2 days). \"When was this?\"      About a day  4. LOCATION: \"Where is the tick bite located?\" (e.g., arm, leg)      abdomen  5. TYPE of TICK: \"Is it a wood tick or a deer tick?\" (e.g., deer tick, wood tick; unsure)      Deer tick  6. SIZE of TICK: \"How big is the tick?\" (e.g., size of poppy seed, apple seed, watermelon seed; unsure) Note: Deer ticks can be the size of a poppy seed (nymph) or an apple seed (adult).        A little bigger than a poppyseed  7. ENGORGED: \"Did the tick look flat or engorged (full, swollen)?\" (e.g., flat, engorged; unsure)      Flat  8. OTHER SYMPTOMS: \"Do you have any " "other symptoms?\" (e.g., fever, rash, redness at bite area, red ring around bite)      No  9. PREGNANCY: \"Is there any chance you are pregnant?\" \"When was your last menstrual period?\"      No    Protocols used: Tick Bite-A-OH    "

## 2024-05-03 ENCOUNTER — E-VISIT (OUTPATIENT)
Dept: INTERNAL MEDICINE | Facility: CLINIC | Age: 64
End: 2024-05-03
Payer: MEDICARE
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
## 2024-05-03 PROCEDURE — 99421 OL DIG E/M SVC 5-10 MIN: CPT | Performed by: INTERNAL MEDICINE

## 2024-05-03 RX ORDER — DOXYCYCLINE 100 MG/1
200 CAPSULE ORAL ONCE
Qty: 2 CAPSULE | Refills: 0 | Status: SHIPPED | OUTPATIENT
Start: 2024-05-03 | End: 2024-05-03

## 2024-05-03 NOTE — PATIENT INSTRUCTIONS
Thank you for choosing us for your care. I have placed an order for a prescription so that you can start treatment. View your full visit summary for details by clicking on the link below. Your pharmacist will able to address any questions you may have about the medication.     If you're not feeling better within 5-7 days, please schedule an appointment.  You can schedule an appointment right here in [facility], or call [phone]  If the visit is for the same symptoms as your eVisit, we'll refund the cost of your eVisit if seen within seven days.      Up to 3% of deer ticks in [place] can carry Lyme disease.  If you develop symptoms of lyme disease or other concerning symptoms, then be sure to be seen.

## 2024-05-03 NOTE — TELEPHONE ENCOUNTER
Advise evisit or WALK IN CARE visit.    Ricardo Davila MD  General Internal Medicine  [facility]  5/3/2024, 8:43 AM

## 2024-05-09 ENCOUNTER — TRANSFERRED RECORDS (OUTPATIENT)
Dept: HEALTH INFORMATION MANAGEMENT | Facility: CLINIC | Age: 64
End: 2024-05-09
Payer: MEDICARE

## 2024-05-21 ENCOUNTER — OFFICE VISIT (OUTPATIENT)
Dept: FAMILY MEDICINE | Facility: CLINIC | Age: 64
End: 2024-05-21
Payer: MEDICARE

## 2024-05-21 VITALS
OXYGEN SATURATION: 97 % | HEART RATE: 64 BPM | DIASTOLIC BLOOD PRESSURE: 72 MMHG | RESPIRATION RATE: 16 BRPM | SYSTOLIC BLOOD PRESSURE: 154 MMHG | TEMPERATURE: 98.1 F

## 2024-05-21 DIAGNOSIS — K20.90 ESOPHAGITIS: Primary | ICD-10-CM

## 2024-05-21 PROCEDURE — 99214 OFFICE O/P EST MOD 30 MIN: CPT

## 2024-05-21 NOTE — PATIENT INSTRUCTIONS
Diagnosis: esophagitis    Pain with throat with swallowing   Today we did:  Exam with what we can see doesn't look dangerous

## 2024-05-21 NOTE — PROGRESS NOTES
"URGENT CARE  Assessment & Plan   Assessment:   Marlen Dumas is a 63 year old female who's clinical presentation today is consistent with:   1. Esophagitis  - Adult ENT  Referral; Future  Plan:  there are no signs of abrasions to POO, I discussed with patient a camera needs to be used to view any farther down. Given patient recently had an upper endoscopy, I suspect a mild scratch/abrasion (nothing dangerous or emergent that needs immediate workup, no bloody sputum) I recommend she follow up with ENT for further evaluation and treatment, referral placed, return precautions given and let patient know she should follow-up sooner if her symptoms worsen.   Per [facility]'s note (I independently interpreted another providers tests and procedures)  patient has a history of: lymphocystic esophagitis, esophageal epidermoid metaplasia and gerd, they note she has a scarred down esophagus and it is \"leathery\", we discussed this could be causing her symptoms   No alarm signs or symptoms present     Patient is} agreeable to treatment plan and state they will follow-up if symptoms do not improve and/or if symptoms worsen   see patient's AVS 'monitor for' section for specific patient instructions given and discussed regarding what to watch for and when to follow up    [facility]      ______________________________________________________________________      Subjective     HPI: Marlen Dumas  is a 63 year old  female who presents today for evaluation the following concerns:   Patient presents today endorsing she has had 2+ weeks of a sore throat.  Patient states at the end of April she had an upper endoscopy with [facility],   patient states she is worried that they scratched her throat; as she has had painful swallowing and a mild sore throat ever since then.  Patient also endorses pain when she eats spicy foods or carbonated drinks.  Patient denies any fevers or bloody " sputum.  Patient states she has had this for maybe a year or 2 ago.  Per [facility]'s note patient has a history of: lymphocystic esophagitis, esophageal epidermoid metaplasia and gerd     Review of Systems:  Pertinent review of systems as reflected in HPI, otherwise negative.     Objective    Physical Exam:  Vitals:    05/21/24 1314   BP: (!) 154/72   Pulse: 64   Resp: 16   Temp: 98.1  F (36.7  C)   TempSrc: Oral   SpO2: 97%      General: Alert and oriented, no acute distress, Vital signs reviewed: afebrile,  Psy/mental status: Cooperative, nonanxious  SKIN: Intact, no rashes  EYES: EOMs intact, PERRLA bilaterally   Conjunctiva: Clear bilaterally, no injection or erythema present  NOSE:  mucosa moist               No frontal or maxillary sinus tenderness present bilaterally  MOUTH/THROAT: lips, tongue, & oral mucosa appear normal upon inspection                Posterior oropharynx is without erythema, exudate or tonsillar edema   No abrasions or lacerations noted, no bleeding seen  no dysphonia, no unilateral tonsillar edema, no uvular deviation,   no signs of peritonsillar abscess  NECK: supple, has full range of motion with no meningeal signs              No lymphadenopathy present  LUNG: normal work of breathing, good respiratory effort without retractions, good air  movement, non labored, inspection reveals normal chest expansion w/  inspiration   ______________________________________________________________________    I explained my diagnostic considerations and recommendations to the patient, who voiced understanding and agreement with the treatment plan.   All questions were answered.   We discussed potential side effects, risks and benefits of any prescribed or recommended therapies, as well as expectations for response to treatments.  Please see AVS for any patient instructions & handouts given.   Patient was advised to contact the Nurse Care Line, their Primary Care provider, Urgent Care, or the Emergency  Department if there are new or worsening symptoms, or call 911 for emergencies.

## 2024-06-30 DIAGNOSIS — I10 ESSENTIAL HYPERTENSION: ICD-10-CM

## 2024-06-30 RX ORDER — AMLODIPINE BESYLATE 5 MG/1
TABLET ORAL
Qty: 90 TABLET | Refills: 3 | Status: SHIPPED | OUTPATIENT
Start: 2024-06-30

## 2024-07-07 ENCOUNTER — HOSPITAL ENCOUNTER (OUTPATIENT)
Dept: CT IMAGING | Facility: HOSPITAL | Age: 64
Discharge: HOME | End: 2024-07-07
Attending: INTERNAL MEDICINE | Admitting: INTERNAL MEDICINE
Payer: MEDICARE

## 2024-07-07 DIAGNOSIS — R91.8 PULMONARY NODULES: ICD-10-CM

## 2024-07-07 PROCEDURE — 71250 CT THORAX DX C-: CPT | Mod: ME

## 2024-07-10 NOTE — PROGRESS NOTES
[facility]        Patient is here for a 6 month F/U S/P left CEA. US PRIOR    Pt is currently taking Aspirin, Statin, and Zetia.    BP (!) 148/88   Pulse 56   Temp 97.2  F (36.2  C)   Resp 16   LMP  (LMP Unknown)     The provider has been notified that the patient has no concerns.     Questions patient would like addressed today are: N/A.    Refills are needed: No    Has homecare services and agency name:  No

## 2024-07-10 NOTE — PATIENT INSTRUCTIONS
Raquel Gee,    Thank you for entrusting your care with us today. After your visit today with GREGG Castro this is the plan that was discussed at your appointment.    Follow up in 6 months for repeat carotid ultrasound and clinic visit.    We will call you to get this scheduled.        I am including additional information on these things and our contact information if you have any questions or concerns.   Please do not hesitate to reach out to us if you felt we did not answer your questions or you are unsure of the treatment plan after your visit today. Our number is [phone].Thank you for trusting us with your care.         Again thank you for your time.    Carotid Duplex    Steps for the test are:  You will be asked to lie on a stretcher. It will be okay for you to wear your street clothes. In some situations, you may be asked to change into a gown.    Ultrasound gel, usually warmed for your comfort, will be placed on either side of your neck.    Through the gel, the technician will apply to your neck a small hand-held device that emits sound waves.   When the test is completed, the technician will remove excess gel from your neck. The gel is water-soluble and will not stain your skin or clothes.    How to Prepare:  Eat and take medications as usual.   Wear minimal or no jewelry to ease application and removal of gel.    Risks  There are typically no side effects or complications associated with a carotid duplex ultrasound examination.    What Can I Expect After the Test?  The technician will send the ultrasound images to your vascular surgeon for evaluation. Typically, a report is available in 2-3 days. If anything critical is found, it is standard practice to notify the vascular surgeon immediately.  Reference: https://vascular.org/patient-resources/vascular-tests

## 2024-07-24 ENCOUNTER — OFFICE VISIT (OUTPATIENT)
Dept: PULMONOLOGY | Facility: CLINIC | Age: 64
End: 2024-07-24
Payer: MEDICARE

## 2024-07-24 VITALS
HEART RATE: 68 BPM | DIASTOLIC BLOOD PRESSURE: 79 MMHG | OXYGEN SATURATION: 97 % | BODY MASS INDEX: 30.86 KG/M2 | WEIGHT: 178.4 LBS | SYSTOLIC BLOOD PRESSURE: 132 MMHG

## 2024-07-24 DIAGNOSIS — Z72.0 TOBACCO ABUSE: Primary | ICD-10-CM

## 2024-07-24 DIAGNOSIS — F17.218 NICOTINE DEPENDENCE, CIGARETTES, WITH OTHER NICOTINE-INDUCED DISORDERS: ICD-10-CM

## 2024-07-24 PROCEDURE — G2211 COMPLEX E/M VISIT ADD ON: HCPCS | Performed by: INTERNAL MEDICINE

## 2024-07-24 PROCEDURE — 99214 OFFICE O/P EST MOD 30 MIN: CPT | Performed by: INTERNAL MEDICINE

## 2024-07-24 RX ORDER — OMEPRAZOLE 40 MG/1
1 CAPSULE, DELAYED RELEASE ORAL EVERY 12 HOURS
COMMUNITY
Start: 2024-05-09

## 2024-07-24 NOTE — PROGRESS NOTES
CCx:RUL pulmonary nodules    HPI:Ms. Montez is a 64 year old lady with a past medical history significant for significant ongoing tobacco abuse, bronchiectasis, hyperlipidemia, hypertension, hypothyroidism and a history of morbid obesity status post lap band procedure in 2008 who presents to clinic for the aforementioned chief complaint.    Since her last clinic visit, she states that she has of her lymphocytic esophagitis causing a fibrotic submucosal space.  Given the length of these abnormalities on imaging, gastroenterology had advised that she would require an RFA ablation procedure every 3 months or else might require partial esophageal resection.  She requested to undergo second opinion evaluation at the [facility] and has been seen there.  She is states that the [facility] is of the opinion that these changes are being caused by her Lap-Band having slipped and she is due to have this removed in September.   She thinks that her breathing is good and is very excited that she and her partner will be closing on an apartment in [place].  She has not been using her inhaled bronchodilators with any regularity but she does not have too many symptoms presently.  She has not used her albuterol since last time she saw me a year ago.      Patient supplied answers from flow sheet for:  COPD Assessment Test (CAT)  2009 [employer]. All rights reserved.      9/14/2022     8:00 AM 7/7/2023     1:00 PM 7/24/2024     5:09 AM   COPD assessment test (CAT)   Cough 4 3 1   Phlegm 4 3 0   Chest tightness 2 0 0   Walk up hill 0 1 0   Limited activities 0 0 0   Leaving my home 0 0 0   Sleep 0 0 0   Energy 0 0 0   Total Score 10 7 1      CAT Key:  The CAT consist of 8 items which are each scored 0-5. The total score ranges from 0-40 with higher scores representing a poorer health status. When interpreting CAT scores, the individual s disease severity should be considered.   Low impact  (1-9)  Medium impact  (10-20)  High impact   (21-30)  Very high impact  (31-40)    ROS:  Pertinent positives alluded to in the HPI. Remainder of 10 point ROS is negative.     PMH (Unchanged from previous visit):  Ongoing tobacco abuse  Bronchiectasis  Hyperlipidemia  HTN  Hypothyroidism    PSH (Unchanged from previous visit):  Laparoscopic gastric banding  Lumbar disc surgery    Allergies (Unchanged from previous visit):  Reviewed in epic.    Family HX (Unchanged from previous visit):  Mother: CAD, obesity  Father: COPD, tobacco abuse    Social Hx:  Marital status: Single and lives with a roommate  Number of children: 0  Resides in a house built in the 1970's, no concern for mold.  Occupational history: Retired  for [employer]   service: No  Pets: No  Smoking history: 40+ pack year history, has cut down to 1-2 cigarettes a day  Alcohol use: 4-5 drinks per week  Recreational drug use: No  Hobbies: Gardening, carpentry  Recent Travel: None. Was in [place] in February earlier this year    Current Meds:  Current Outpatient Medications   Medication Sig Dispense Refill    albuterol (PROAIR HFA/PROVENTIL HFA/VENTOLIN HFA) 108 (90 Base) MCG/ACT inhaler Inhale 2 puffs into the lungs every 6 hours as needed for shortness of breath, wheezing or cough      amLODIPine (NORVASC) 5 MG tablet TAKE 1 TABLET(5 MG) BY MOUTH DAILY 90 tablet 3    aspirin (ASA) 81 MG chewable tablet Take 81 mg by mouth daily      atorvastatin (LIPITOR) 40 MG tablet Take 1 tablet (40 mg) by mouth daily 90 tablet 4    EPINEPHrine (ANY BX GENERIC EQUIV) 0.3 MG/0.3ML injection 2-pack Inject 0.3 mLs (0.3 mg) into the muscle once as needed for anaphylaxis May repeat one time in 5-15 minutes if response to initial dose is inadequate. 2 each 3    ezetimibe (ZETIA) 10 MG tablet Take 1 tablet (10 mg) by mouth daily 90 tablet 3    fluticasone (ARNUITY ELLIPTA) 100 MCG/ACT inhaler Inhale 1 puff into the lungs daily 30 each 3    nicotine (NICODERM CQ) 21 MG/24HR 24 hr patch Place  1 patch onto the skin every 24 hours 30 patch 3    omeprazole (PRILOSEC) 40 MG DR capsule Take 1 capsule by mouth every 12 hours      sucralfate (CARAFATE) 1 GM tablet Take 1 g by mouth daily as needed (heart burn/reflux symptoms)      varenicline (CHANTIX) 1 MG tablet Take 1 tablet (1 mg) by mouth 2 times daily 60 tablet 5    LORazepam (ATIVAN) 1 MG tablet Take 1 tablet (1 mg) by mouth See Admin Instructions 2 tablet 0    pantoprazole (PROTONIX) 40 MG EC tablet Take 1 tablet (40 mg) by mouth daily 90 tablet 3     Labs:  Reviewed.     Imaging studies:  CT Chest w/o Contrast 7/7/24:  1.  Multiple nodules again seen in the right upper lobe, all stable from most recent study on 12/7/2023, and either stable or decreased from 5/24/2023. When compared to a more remote PET/CT on 9/20/2022, all nodules have significantly decreased in size, compatible with a benign nature in the absence of known primary malignancy. No further follow-up is recommended per Fleischner Society 2017 guideline, unless otherwise indicated clinically.  2.  Stable position of gastric lap band with unchanged, mildly patulous esophagus.    EGD 4/22/24:  - Several small discolored patches with ill defined margins found between 25-30 cm from the incisors. Due to the underlying lymphocytic      esophagitis likely leading to fibrotic submucosal space and the lack of well defined margins, it was decided not to resect and it was treated with radiofrequency ablation.  - Normal stomach.  - Normal duodenal bulb, first portion of the duodenum and second portion of the duodenum.  - No specimens collected.                                      (Unchanged from previous visit)  CT Chest w/o Contrast 5/24/23:  1.  Continued improvement in right upper lobe peribronchovascular nodular opacities.  2.  Mildly dilated fluid-filled esophagus redemonstrated.  3.  Laparoscopic gastric band unchanged in position.     CT Chest w/o Contrast 12/5/22:  1.  Nodular opacities in the  lungs have slightly decreased in size.  Some of these are cavitary.  2.  Large amount of debris in the distended esophagus.  3.  A gastric laparoscopic band is positioned over the upper gastric body, which is unchanged.    NM PET Scan 9/20/22:  1. Cavitary pulmonary nodule in the right upper lobe with numerous nearby small clustered nodular opacities, unchanged in size and morphology compared to 09/09/2022. Findings are indeterminate. Appearance and morphology is often seen in granulomatous   process such as fungal infection or nodular sarcoidosis, however, underlying neoplasm is not excluded. Recommend chest CT imaging surveillance. Alternatively, the cavitary nodule is amenable to percutaneous CT-guided biopsy.  2. FDG avid thickening of the distal esophagus, indeterminate, could be inflammatory or neoplastic. Please correlate clinically. Consider EGD.    LDCT 9/9/22:  1.  New nodular cavity in the right apex with multiple surrounding satellite nodules most of which measure under a centimeter. Additional new cluster of 4 mm nodules in the left lower lobe and a single new nodule in the right middle lobe adjacent to the   major fissure. While neoplasm is possible, the differential diagnosis granulomatous infection can also produce this pattern.  2.  Gastric band with mild dilation of the proximal stomach above the band and diffuse, mild dilation of the esophagus to the thoracic inlet. This pattern of findings can predispose to aspiration events.       PFT's 2/9/18  FEV1/FVC is 70% and is normal.  FEV1 is (89%) predicted and is normal.  FVC is (99%) predicted and normal.  There was improvement in spirometry after a single inhaled dose of bronchodilator.  TLC is 104% predicted and is normal.  RV is (110%) predicted and is normal.  DLCO is (128%) predicted and is normal when it is corrected for hemoglobin.    Impression:  Full Pulmonary Function Test is abnormal.  PFTs are consistent with  no  obstructive  disease.  Spirometry is consistent with reversibility.  There is no hyperinflation.  There is no air-trapping.  Diffusion capacity when corrected for hemoglobin is normal.      /79 (BP Location: Left arm, Patient Position: Sitting, Cuff Size: Adult Regular)   Pulse 68   Wt 80.9 kg (178 lb 6.4 oz)   LMP  (LMP Unknown)   SpO2 97%   BMI 30.86 kg/m    GEN: NAD  HEENT: PERRL, No JVD  LUNGS: CTA BL  CVS: S1 & S2 no added sounds  ABD: No HSM, BS audible  EXT: No edema, no rashes  NEURO: AO*3 CN2-12 intact    Assessment and Plan:Marlen Dumas is a 62 year old with a past medical history significant for ongoing tobacco abuse, bronchiectasis, hyperlipidemia, hypertension, hypothyroidism and a history of morbid obesity status post lap band procedure who presents to clinic today for establishment of care for a 4B low-dose lung cancer screening CT scan and has previously undergone pulmonary function testing that has revealed reversibility in her spirometry.  Additionally, her CT scan also demonstrates evidence of bronchiectasis. For the present we would recommend;    Asthma/COPD (chronic bronchitis) overlap syndrome: As noted based on her pulmonary function testing and symptom complex.  She is also noted to have bronchiectasis on her imaging with some traction evident in her mid zones.  Symptoms are presently well controlled.  Is presently not using Arnuity Ellipta.  Given her paucity of symptoms, I am okay with this plan, she agrees that if the symptoms recur she would resume her long-acting bronchodilators..  As needed albuterol for rescue up to 4 times a day.  Counseled the patient about the importance of discontinuing tobacco products and congratulating her on cutting down to 1 cigarette a day..  Right upper lobe pulmonary nodules: Most concerning for malignant process especially given the patient's significant history of tobacco abuse.  Underwent a bronchoscopy with a bronchoalveolar lavage which revealed  growth of Mycobacterium abscessus and in the absence of symptoms this was not treated.  She underwent a follow-up CT scan on 5/24/23 with continued improvement further confirming that she likely had an inflammatory process.  Repeat imaging has revealed further decrease in the size of her nodules reinforcing our belief that these are inflammatory.  Will be switched to low-dose lung cancer screening starting last year.  Lower esophageal thickening: Concern for lymphocytic esophagitis for which she underwent radiofrequency ablation with gastroenterology in April of this year and has since been referred to the [facility] where she will undergo removal of her lap band.  HCM:   Counseled her about tobacco cessation for more than 5 and less than 10 minutes.  I also gave her a prescription for nicotine patch.  She is up-to-date with influenza, pneumococcal and COVID vaccinations.  Lung Cancer Screening pre-scan counseling Visit  The patient fits the risk profile of patients who benefit from this screening:  The patient is >55 years old and <80 years old  The patient has 40 pack year history (over 30)  The patient has smoked within the past 15 years  The patient has no medical comorbidity severe enough that it would cause mortality prior to mortality due to the lung cancer attempting to be detected.  Discussion with patient regarding the harms associated with LDCT screening include false-negative and false-positive results, incidental findings, overdiagnosis, and radiation exposure were reviewed at length.   The patient understands that pursuing this screening test may result in a biopsy that was not necessary. It may also produced added stress over a nodule that is likely not cancer.  Of 100 patients who get screening, 25 will have a positive scan. Of those 25, only 1 will have cancer.  Overdiagnosis is estimated at 10% of patients-- they would not have been detected in the patient's lifetime without screening. Less than  1% of patients likely had death related to radiation exposure increase.   Average low-dose CT associated with 0.61 to 1.5 mSv. Annual background radiation exposure in the [place] averages 2.4 mS; mammogram is 0.7mSv.  The benefits are reduction in risk of death from lung cancer. The number needed to treat is 320 (for every 320 patients who undergo screening, 1 patient will have a benefit in mortality from early detection from the screening).  Undergoing this screening implies willingness to pursue further potentially invasive testing to discover potential cancer.  All questions were answered.  The patient was counseled regarding smoking cessation and its risk for lung cancer.  Up-to-date with influenza, pneumococcal and COVID-19 vaccinations.HCM:   Follow-up: 1 year after LDCT.       Loni MARTINEZ [facility]  Pulmonary and Critical Care  [id number]    The longitudinal plan of care for the diagnosis(es)/condition(s) as documented were addressed during this visit. Due to the added complexity in care, I will continue to support Marlen in the subsequent management and with ongoing continuity of care.

## 2024-07-25 ENCOUNTER — OFFICE VISIT (OUTPATIENT)
Dept: VASCULAR SURGERY | Facility: CLINIC | Age: 64
End: 2024-07-25
Attending: PHYSICIAN ASSISTANT
Payer: MEDICARE

## 2024-07-25 ENCOUNTER — ANCILLARY PROCEDURE (OUTPATIENT)
Dept: VASCULAR ULTRASOUND | Facility: CLINIC | Age: 64
End: 2024-07-25
Attending: PHYSICIAN ASSISTANT
Payer: MEDICARE

## 2024-07-25 VITALS
TEMPERATURE: 97.2 F | HEART RATE: 56 BPM | RESPIRATION RATE: 16 BRPM | SYSTOLIC BLOOD PRESSURE: 148 MMHG | DIASTOLIC BLOOD PRESSURE: 88 MMHG

## 2024-07-25 DIAGNOSIS — I65.22 STENOSIS OF LEFT CAROTID ARTERY: Primary | ICD-10-CM

## 2024-07-25 DIAGNOSIS — I65.22 STENOSIS OF LEFT CAROTID ARTERY: ICD-10-CM

## 2024-07-25 PROCEDURE — 93880 EXTRACRANIAL BILAT STUDY: CPT

## 2024-07-25 PROCEDURE — G0463 HOSPITAL OUTPT CLINIC VISIT: HCPCS | Mod: 25 | Performed by: PHYSICIAN ASSISTANT

## 2024-07-25 PROCEDURE — 99213 OFFICE O/P EST LOW 20 MIN: CPT | Performed by: PHYSICIAN ASSISTANT

## 2024-07-25 PROCEDURE — 93880 EXTRACRANIAL BILAT STUDY: CPT | Mod: 26 | Performed by: SURGERY

## 2024-07-25 ASSESSMENT — PAIN SCALES - GENERAL: PAINLEVEL: NO PAIN (0)

## 2024-07-25 NOTE — PROGRESS NOTES
VASCULAR SURGERY PROGRESS NOTE    LOCATION:  [facility]     Marlen Dumsa  Medical Record #: [id number]  YOB: 1960  Age: 64 year old     Date of Service: 7/25/2024    PRIMARY CARE PROVIDER: Ricardo Davila    Reason for visit: Surveillance of carotid disease    IMPRESSION: 64-year-old female presenting for follow-up of carotid artery stenosis status post left endarterectomy in January due to symptomatic disease.  Duplex today demonstrates less than 50% stenosis bilateral ICAs.  Patient remains completely asymptomatic and is medically optimized.    RECOMMENDATION/RISKS: Continue best medical management with aspirin and statin therapy.  Follow-up in 6 months with repeat carotid duplex.    HPI:  Marlen Dumas is a 64 year old female with past medical history significant for hypertension, hyperlipidemia, coronary artery disease, tobacco abuse, COPD, and carotid artery stenosis.  Patient underwent left endarterectomy in January of this year for symptomatic disease.  She was last evaluated in the vascular clinic 6 months ago and was noted to be doing well with no further symptoms and a well-healed incision.    Today, Mrs. Dumas presents for follow-up.  Patient is doing well and denies any vision changes, slurred speech, facial asymmetry, unilateral extremity numbness or weakness, or other signs/symptoms of TIA or stroke.  She is compliant with her aspirin and statin medications.  Patient will be returning to [place] for the winter so would like to do her surveillance imaging prior to that in December.    Ultrasound results were discussed and all questions answered.  No other concerns    REVIEW OF SYSTEMS:    A 12 point ROS was reviewed and is negative except for what is listed above in HPI.    PHH:    Past Medical History:   Diagnosis Date    CAD (coronary artery disease) 12/08/2023    COPD (chronic obstructive pulmonary disease) (H)     GERD (gastroesophageal reflux disease)  07/24/2023    HLD (hyperlipidemia)     HTN (hypertension)     Hx of laparoscopic adjustable gastric banding 10/01/2008    Initial weight 295.5 BMI 50.4 Dr. Rowell    Hymenoptera allergy     Hypothyroidism     Left carotid stenosis 01/09/2024    S/P colonoscopy 05/05/2019    Tobacco abuse     Vitamin D deficiency 08/24/2016      Past Surgical History:   Procedure Laterality Date    ENDARTERECTOMY CAROTID Left 1/9/2024    Procedure: ENDARTERECTOMY, LEFT CAROTID;  Surgeon: Timbo Hart MD;  Location: [facility]    FOOT SURGERY      [facility]    LAPAROSCOPIC GASTRIC BANDING  2008    LUMBAR DISC SURGERY      [place] ESOPHAGOGASTRODUODENOSCOPY TRANSORAL DIAGNOSTIC N/A 8/14/2020    Procedure: ESOPHAGOGASTRODUODENOSCOPY (EGD);  Surgeon: Jared Howard MD;  Location: [facility];  Service: General     ALLERGIES:  Other environmental allergy and Sulfa (sulfonamide antibiotics) [sulfa antibiotics]    MEDS:    Current Outpatient Medications:     albuterol (PROAIR HFA/PROVENTIL HFA/VENTOLIN HFA) 108 (90 Base) MCG/ACT inhaler, Inhale 2 puffs into the lungs every 6 hours as needed for shortness of breath, wheezing or cough, Disp: , Rfl:     amLODIPine (NORVASC) 5 MG tablet, TAKE 1 TABLET(5 MG) BY MOUTH DAILY, Disp: 90 tablet, Rfl: 3    aspirin (ASA) 81 MG chewable tablet, Take 81 mg by mouth daily, Disp: , Rfl:     atorvastatin (LIPITOR) 40 MG tablet, Take 1 tablet (40 mg) by mouth daily, Disp: 90 tablet, Rfl: 4    EPINEPHrine (ANY BX GENERIC EQUIV) 0.3 MG/0.3ML injection 2-pack, Inject 0.3 mLs (0.3 mg) into the muscle once as needed for anaphylaxis May repeat one time in 5-15 minutes if response to initial dose is inadequate., Disp: 2 each, Rfl: 3    ezetimibe (ZETIA) 10 MG tablet, Take 1 tablet (10 mg) by mouth daily, Disp: 90 tablet, Rfl: 3    fluticasone (ARNUITY ELLIPTA) 100 MCG/ACT inhaler, Inhale 1 puff into the lungs daily, Disp: 30 each, Rfl: 3    nicotine (NICODERM CQ) 21 MG/24HR 24 hr  patch, Place 1 patch onto the skin every 24 hours, Disp: 30 patch, Rfl: 3    omeprazole (PRILOSEC) 40 MG DR capsule, Take 1 capsule by mouth every 12 hours, Disp: , Rfl:     sucralfate (CARAFATE) 1 GM tablet, Take 1 g by mouth daily as needed (heart burn/reflux symptoms), Disp: , Rfl:     SOCIAL HABITS:    History   Smoking Status    Some Days    Types: Cigarettes   Smokeless Tobacco    Never     Social History    Substance and Sexual Activity      Alcohol use: Yes        Alcohol/week: 4.0 standard drinks of alcohol        Types: 4 Standard drinks or equivalent per week        Comment: 4-5 beer/wine weekly      History   Drug Use Unknown     FAMILY HISTORY:    Family History   Problem Relation Age of Onset    Coronary Artery Disease Mother 58    Hypertension Mother     Chronic Obstructive Pulmonary Disease Father     Hypertension Father     Anxiety Disorder Father     Hypertension Brother      PE:  LMP  (LMP Unknown)   Wt Readings from Last 1 Encounters:   07/24/24 80.9 kg (178 lb 6.4 oz)     There is no height or weight on file to calculate BMI.    EXAM:  GENERAL: well-developed 64 year old female who appears her stated age  CARDIAC: normal   CHEST/LUNG: normal respiratory effort   MUSCULOSKELETAL: grossly normal and both lower extremities are intact, no lower extremity edema  NEUROLOGIC: focally intact, alert and oriented x 3  PSYCH: appropriate affect    DIAGNOSTIC STUDIES:     Images:    US Carotid Bilateral    I personally reviewed the images and my interpretation is less than 50% stenosis of bilateral ICAs based on velocity criteria.    LABS:      Sodium   Date Value Ref Range Status   04/10/2024 140 135 - 145 mmol/L Final     Comment:     Reference intervals for this test were updated on 09/26/2023 to more accurately reflect our healthy population. There may be differences in the flagging of prior results with similar values performed with this method. Interpretation of those prior results can be made in the  context of the updated reference intervals.    01/22/2024 137 135 - 145 mmol/L Final     Comment:     Reference intervals for this test were updated on 09/26/2023 to more accurately reflect our healthy population. There may be differences in the flagging of prior results with similar values performed with this method. Interpretation of those prior results can be made in the context of the updated reference intervals.    01/10/2024 139 135 - 145 mmol/L Final     Comment:     Reference intervals for this test were updated on 09/26/2023 to more accurately reflect our healthy population. There may be differences in the flagging of prior results with similar values performed with this method. Interpretation of those prior results can be made in the context of the updated reference intervals.      Urea Nitrogen   Date Value Ref Range Status   04/10/2024 13.3 8.0 - 23.0 mg/dL Final   01/22/2024 12.4 8.0 - 23.0 mg/dL Final   01/10/2024 14.8 8.0 - 23.0 mg/dL Final   07/13/2021 9 8 - 22 mg/dL Final   06/13/2019 10 8 - 22 mg/dL Final   05/07/2019 10 8 - 22 mg/dL Final     Hemoglobin   Date Value Ref Range Status   04/10/2024 13.3 11.7 - 15.7 g/dL Final   01/10/2024 12.2 11.7 - 15.7 g/dL Final   01/09/2024 14.5 11.7 - 15.7 g/dL Final     Platelet Count   Date Value Ref Range Status   04/10/2024 178 150 - 450 10e3/uL Final   01/10/2024 170 150 - 450 10e3/uL Final   01/09/2024 197 150 - 450 10e3/uL Final     INR   Date Value Ref Range Status   11/30/2023 0.97 0.85 - 1.15 Final   10/12/2022 0.97 0.85 - 1.15 Final     20 minutes spent on the day of encounter doing chart review, history and exam, documentation, and further activities as noted.     Stacy Castro PA-C  VASCULAR SURGERY

## 2024-07-25 NOTE — Clinical Note
Please call to schedule carotid ultrasound and clinic visit in 6 months. She would like a call and scheduled in December due to traveling to [place]. Thanks

## 2024-07-26 ENCOUNTER — TELEPHONE (OUTPATIENT)
Dept: VASCULAR SURGERY | Facility: CLINIC | Age: 64
End: 2024-07-26
Payer: MEDICARE

## 2024-07-26 NOTE — TELEPHONE ENCOUNTER
[facility] Stacy and ultrasound before.  Sound like she wants to get this done in November/December before leaving for an extended trip.  [phone]      Vale Dobbins RN  [facility]  Please call to schedule carotid ultrasound and clinic visit in 6 months. She would like a call and scheduled in December due to traveling to [place]. Thanks

## 2024-08-19 ENCOUNTER — OFFICE VISIT (OUTPATIENT)
Dept: FAMILY MEDICINE | Facility: CLINIC | Age: 64
End: 2024-08-19
Payer: MEDICARE

## 2024-08-19 VITALS
RESPIRATION RATE: 20 BRPM | HEART RATE: 66 BPM | OXYGEN SATURATION: 100 % | SYSTOLIC BLOOD PRESSURE: 139 MMHG | TEMPERATURE: 97.1 F | DIASTOLIC BLOOD PRESSURE: 85 MMHG

## 2024-08-19 DIAGNOSIS — Z51.89 VISIT FOR WOUND CHECK: Primary | ICD-10-CM

## 2024-08-19 PROCEDURE — 99213 OFFICE O/P EST LOW 20 MIN: CPT | Performed by: PHYSICIAN ASSISTANT

## 2024-08-19 NOTE — PROGRESS NOTES
"Assessment & Plan     Visit for wound check  Reassured pt her wound is healing as expected.  She may continue to keep clean and dry, cover with dirty environments and keep open in clean environments.  May wash with warm soap and water. Follow-up for persistent or worsening symptoms including redness, swelling, discharge.                 TALYA Enciso [facility] PRATIBHA Gee is a 64 year old female who presents to clinic today for the following health issues:  Chief Complaint   Patient presents with    Trauma     Lt big toe x last week. \"While moving heavy chair, one of chair leg fell and scraped down to bone on toe\", noticed drainage from Saturday but has improved today. No pain or tenderness.        HPI  PT was in [facility] last week when she was moving a heavy chair, accidentally hit the top lacerating the L great toe dorsally.      Was seen in a medical clinic and cleaned well, closed with tissue glue and had several dressing changes through the course of the week.     Flew home yesterday, took dressing off.    Generally improving slowly over time. No increased redness, swelling, drainage. No fevers.  TD up to date.          Review of Systems  Constitutional, HEENT, cardiovascular, pulmonary, gi and gu systems are negative, except as otherwise noted.      Objective    /85   Pulse 66   Temp 97.1  F (36.2  C) (Tympanic)   Resp 20   LMP  (LMP Unknown)   SpO2 100%   Physical Exam       Dorsal L great toe with wound well approximated, no discharge. Minimal incisional erythema.  No TTP.           "

## 2024-08-24 ENCOUNTER — HEALTH MAINTENANCE LETTER (OUTPATIENT)
Age: 64
End: 2024-08-24

## 2024-09-06 ENCOUNTER — OFFICE VISIT (OUTPATIENT)
Dept: INTERNAL MEDICINE | Facility: CLINIC | Age: 64
End: 2024-09-06
Payer: MEDICARE

## 2024-09-06 DIAGNOSIS — L97.509 SORE ON TOE (H): ICD-10-CM

## 2024-09-06 DIAGNOSIS — I25.10 CORONARY ARTERY DISEASE INVOLVING NATIVE CORONARY ARTERY OF NATIVE HEART WITHOUT ANGINA PECTORIS: Chronic | ICD-10-CM

## 2024-09-06 DIAGNOSIS — S91.112A LACERATION OF LEFT GREAT TOE WITHOUT FOREIGN BODY PRESENT OR DAMAGE TO NAIL, INITIAL ENCOUNTER: Primary | ICD-10-CM

## 2024-09-06 DIAGNOSIS — I10 PRIMARY HYPERTENSION: Chronic | ICD-10-CM

## 2024-09-06 PROCEDURE — 99214 OFFICE O/P EST MOD 30 MIN: CPT | Performed by: INTERNAL MEDICINE

## 2024-09-06 RX ORDER — VARENICLINE TARTRATE 1 MG/1
1 TABLET, FILM COATED ORAL
COMMUNITY
Start: 2024-09-04

## 2024-09-06 NOTE — PROGRESS NOTES
[facility] - Primary Care Specialists    Comprehensive and complex medical care - Chronic disease management - Shared decision making - Care coordination - Compassionate care    Patient advocacy - Rational deprescribing - Minimally disruptive medicine - Ethical focus - Customized care         Date of Service: 9/6/2024  Primary Provider: Ricardo Davila    Patient Care Team:  Ricardo Davila MD as PCP - General (Internal Medicine)  Ricardo Davila MD as Assigned PCP  Julissa Hager MD as MD (Family Medicine)  Loni Jaquez MD as Assigned Pulmonology Provider  Maureen Pérez NP as Nurse Practitioner  Maureen Pérez NP as Assigned Surgical Provider  Stacy Castro PA-C as Assigned Heart and Vascular Provider  Rachael Helton MD as Assigned Neuroscience Provider          Patient's Pharmacy:    [id number] - [place], [place] - [address] AT [facility] &  [address]  [address] [facility] [address]  Phone: [phone] Fax: [phone]     Patient's Contacts:  Name Home Phone Work Phone Mobile Phone Relationship Lgl GUERA Pablo   [phone] Friend      Patient's Insurance:    Payor: MEDICARE / Plan: MEDICARE / Product Type: Medicare /           Active Problem List:  Problem List as of 9/6/2024 Reviewed: 8/19/2024 10:08 PM by Sandy Mccarthy PA-C         High    Tobacco abuse    CAD (coronary artery disease)    COPD (chronic obstructive pulmonary disease) (H)       Medium    Primary hypertension    Hx of laparoscopic adjustable gastric banding    GERD (gastroesophageal reflux disease)    TIA (transient ischemic attack)    Spinal stenosis in cervical region    Lymphocytic esophagitis    Bilateral carotid artery stenosis       Low    Mixed hyperlipidemia    Obesity (BMI 30.0-34.9)    Zinc deficiency    Vitamin D deficiency    Normal colonoscopy - 2018 - Average risk    Hymenoptera allergy    Lumbar radiculopathy     Last Assessment & Plan 9/28/2021 Office Visit Written 9/30/2021  8:48 AM by Vivi Corrales, GORDON     - L5/S1 pattern. She will be referred to PT.  If not making any progress or worsening, consider MRI. I can order this for her, she would need an anxiolytic due to claustrophobia  - Follow up with PCP if worsening or no improvement              Current Outpatient Medications   Medication Instructions    albuterol (PROAIR HFA/PROVENTIL HFA/VENTOLIN HFA) 108 (90 Base) MCG/ACT inhaler 2 puffs, Inhalation, EVERY 6 HOURS PRN    amLODIPine (NORVASC) 5 MG tablet TAKE 1 TABLET(5 MG) BY MOUTH DAILY    aspirin (ASA) 81 mg, Oral, DAILY    atorvastatin (LIPITOR) 40 mg, Oral, DAILY    EPINEPHrine (ANY BX GENERIC EQUIV) 0.3 mg, Intramuscular, ONCE PRN, May repeat one time in 5-15 minutes if response to initial dose is inadequate.    ezetimibe (ZETIA) 10 mg, Oral, DAILY    fluticasone (ARNUITY ELLIPTA) 100 MCG/ACT inhaler 1 puff, Inhalation, DAILY    nicotine (NICODERM CQ) 21 MG/24HR 24 hr patch 1 patch, Transdermal, EVERY 24 HOURS    omeprazole (PRILOSEC) 40 MG DR capsule 1 capsule, Oral, EVERY 12 HOURS    sucralfate (CARAFATE) 1 g, Oral, DAILY PRN    varenicline (CHANTIX) 1 MG tablet 1 tablet, Oral, 2 TIMES DAILY (Diuretics and Nitrates)     Social History     Social History Narrative    Lives with a friend.         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        [place] in [facility].       Subjective:     Marlen Dumas is a 64 year old female who comes in today for:    Chief Complaint   Patient presents with    Toe Injury     painful          9/6/2024     2:07 PM   Additional Questions   Roomed by [facility] Tevin     Marlen comes in today for issues with her left great toe.  She had a laceration to the left great toe on August 11 when she was in [place].  She cut it on a [email or web address] chair.  It went near the nailbed and she was seen by a provider there.  She had wound glue placed near the nailbed and more laterally.  The wound  "opened up again more laterally but not near the nailbed.  She has had difficulty with it healing.  She has been putting bacitracin on it but not wrapping it.  She was seen in walk-in care for this as well and further recommendations given.    Generally the wound is dry at this point.  She has had no redness.  There is some pain associated with it.  She felt the laceration went fairly deep.  She denies any fevers or chills.  She denies any suddenly worsening pain.    She does smoke.  She does have a history of vascular disease.    We reviewed her other issues noted in the assessment but not specifically addressed in the HPI above.     Objective:     Wt Readings from Last 3 Encounters:   09/06/24 78.3 kg (172 lb 11.2 oz)   07/24/24 80.9 kg (178 lb 6.4 oz)   04/16/24 82.1 kg (181 lb)     BP Readings from Last 3 Encounters:   09/09/24 138/82   08/19/24 139/85   07/25/24 (!) 148/88     /82   Pulse 66   Temp 98.5  F (36.9  C) (Oral)   Resp 12   Ht 1.638 m (5' 4.5\")   Wt 78.3 kg (172 lb 11.2 oz)   LMP  (LMP Unknown)   SpO2 97%   BMI 29.19 kg/m     The patient is comfortable, no acute distress.  Mood good.  Insight good.  The left leg shows no edema.  Her posterior tibialis and dorsalis pedis pulses are palpable.  There is good capillary return.  The feet look similar in color.  There is a separation of the wound on the left great toe without signs of cellulitis.  There is no significant drainage.    Diagnostics:     Lab on 04/10/2024   Component Date Value Ref Range Status    Cholesterol 04/10/2024 171  <200 mg/dL Final    Triglycerides 04/10/2024 42  <150 mg/dL Final    Direct Measure HDL 04/10/2024 98  >=50 mg/dL Final    LDL Cholesterol Calculated 04/10/2024 65  <=100 mg/dL Final    Non HDL Cholesterol 04/10/2024 73  <130 mg/dL Final    Patient Fasting > 8hrs? 04/10/2024 Yes   Final    Sodium 04/10/2024 140  135 - 145 mmol/L Final    Reference intervals for this test were updated on 09/26/2023 to more " accurately reflect our healthy population. There may be differences in the flagging of prior results with similar values performed with this method. Interpretation of those prior results can be made in the context of the updated reference intervals.     Potassium 04/10/2024 4.8  3.4 - 5.3 mmol/L Final    Chloride 04/10/2024 103  98 - 107 mmol/L Final    Carbon Dioxide (CO2) 04/10/2024 28  22 - 29 mmol/L Final    Anion Gap 04/10/2024 9  7 - 15 mmol/L Final    Urea Nitrogen 04/10/2024 13.3  8.0 - 23.0 mg/dL Final    Creatinine 04/10/2024 1.03 (H)  0.51 - 0.95 mg/dL Final    GFR Estimate 04/10/2024 60 (L)  >60 mL/min/1.73m2 Final    Calcium 04/10/2024 9.8  8.8 - 10.2 mg/dL Final    Glucose 04/10/2024 95  70 - 99 mg/dL Final    WBC Count 04/10/2024 5.0  4.0 - 11.0 10e3/uL Final    RBC Count 04/10/2024 4.40  3.80 - 5.20 10e6/uL Final    Hemoglobin 04/10/2024 13.3  11.7 - 15.7 g/dL Final    Hematocrit 04/10/2024 41.6  35.0 - 47.0 % Final    MCV 04/10/2024 95  78 - 100 fL Final    MCH 04/10/2024 30.2  26.5 - 33.0 pg Final    MCHC 04/10/2024 32.0  31.5 - 36.5 g/dL Final    RDW 04/10/2024 14.1  10.0 - 15.0 % Final    Platelet Count 04/10/2024 178  150 - 450 10e3/uL Final       No results found for any visits on 09/06/24.    Assessment:     1. Laceration of left great toe without foreign body present or damage to nail, initial encounter    2. Sore on toe    3. Coronary artery disease involving native coronary artery of native heart without angina pectoris    4. Primary hypertension        Plan:     We discussed covering the toe for part of the day while she is up and around but open to air at night.  She will use bacitracin and tubular gauze to wrap the toe.  She will order this on [employer].  We showed her how to do this with a cage.  She will update me by picture on [employer] and we can go from there and see her back sooner if issues.  Continue current medications otherwise.  Follow up sooner if issues.    30 minutes or greater  was spent today on the patient's care on the day of service.      This includes time for chart preparation, reviewing medical tests done before or during the visit, talking with the patient, review of quality indicators, required documentation, and other elements of care.         Ricardo Davila MD  General Internal Medicine  [facility]    The longitudinal plan of care for the diagnoses and conditions as documented were addressed during this visit. Due to the added complexity in care, I will continue to support Marlen in the subsequent management and with ongoing continuity of care.     Return in about 9 weeks (around 11/8/2024) for follow up visit.     Future Appointments   Date Time Provider Department [place]   11/8/2024 12:30 PM [facility]   12/9/2024 10:00 AM [facility] 3 MD [facility]   12/9/2024 10:30 AM Stacy Castro PA-C [facility]   7/25/2025  8:30 AM Loni Jaquez MD [facility]

## 2024-09-09 VITALS
WEIGHT: 172.7 LBS | TEMPERATURE: 98.5 F | HEART RATE: 66 BPM | SYSTOLIC BLOOD PRESSURE: 138 MMHG | RESPIRATION RATE: 12 BRPM | OXYGEN SATURATION: 97 % | HEIGHT: 65 IN | BODY MASS INDEX: 28.78 KG/M2 | DIASTOLIC BLOOD PRESSURE: 82 MMHG

## 2024-09-12 DIAGNOSIS — E78.2 MIXED HYPERLIPIDEMIA: ICD-10-CM

## 2024-09-12 RX ORDER — EZETIMIBE 10 MG/1
10 TABLET ORAL DAILY
Qty: 90 TABLET | Refills: 2 | Status: SHIPPED | OUTPATIENT
Start: 2024-09-12

## 2024-09-16 ENCOUNTER — MYC MEDICAL ADVICE (OUTPATIENT)
Dept: INTERNAL MEDICINE | Facility: CLINIC | Age: 64
End: 2024-09-16
Payer: MEDICARE

## 2024-10-04 ENCOUNTER — E-VISIT (OUTPATIENT)
Dept: INTERNAL MEDICINE | Facility: CLINIC | Age: 64
End: 2024-10-04
Payer: MEDICARE

## 2024-10-04 DIAGNOSIS — J44.9 CHRONIC OBSTRUCTIVE PULMONARY DISEASE, UNSPECIFIED COPD TYPE (H): Primary | ICD-10-CM

## 2024-10-04 PROCEDURE — 99422 OL DIG E/M SVC 11-20 MIN: CPT | Performed by: INTERNAL MEDICINE

## 2024-10-04 RX ORDER — AZITHROMYCIN 250 MG/1
TABLET, FILM COATED ORAL
Qty: 6 TABLET | Refills: 1 | Status: SHIPPED | OUTPATIENT
Start: 2024-10-04 | End: 2024-10-09

## 2024-10-04 NOTE — PATIENT INSTRUCTIONS
Thank you for choosing us for your care. I have placed an order for a prescription so that you can start treatment when needed.. View your full visit summary for details by clicking on the link below. Your pharmacist will able to address any questions you may have about the medication.

## 2024-11-08 ENCOUNTER — OFFICE VISIT (OUTPATIENT)
Dept: INTERNAL MEDICINE | Facility: CLINIC | Age: 64
End: 2024-11-08
Payer: MEDICARE

## 2024-11-08 VITALS
BODY MASS INDEX: 27.54 KG/M2 | DIASTOLIC BLOOD PRESSURE: 76 MMHG | OXYGEN SATURATION: 96 % | HEART RATE: 81 BPM | SYSTOLIC BLOOD PRESSURE: 128 MMHG | RESPIRATION RATE: 14 BRPM | WEIGHT: 165.3 LBS | HEIGHT: 65 IN | TEMPERATURE: 98 F

## 2024-11-08 DIAGNOSIS — K20.80 LYMPHOCYTIC ESOPHAGITIS: ICD-10-CM

## 2024-11-08 DIAGNOSIS — J44.9 CHRONIC OBSTRUCTIVE PULMONARY DISEASE, UNSPECIFIED COPD TYPE (H): ICD-10-CM

## 2024-11-08 DIAGNOSIS — I65.23 BILATERAL CAROTID ARTERY STENOSIS: ICD-10-CM

## 2024-11-08 DIAGNOSIS — Z98.84 HX OF LAPAROSCOPIC ADJUSTABLE GASTRIC BANDING: ICD-10-CM

## 2024-11-08 DIAGNOSIS — K21.00 GASTROESOPHAGEAL REFLUX DISEASE WITH ESOPHAGITIS WITHOUT HEMORRHAGE: ICD-10-CM

## 2024-11-08 DIAGNOSIS — Z72.0 TOBACCO ABUSE: Chronic | ICD-10-CM

## 2024-11-08 DIAGNOSIS — I25.10 CORONARY ARTERY DISEASE INVOLVING NATIVE CORONARY ARTERY OF NATIVE HEART WITHOUT ANGINA PECTORIS: Chronic | ICD-10-CM

## 2024-11-08 DIAGNOSIS — I10 PRIMARY HYPERTENSION: Chronic | ICD-10-CM

## 2024-11-08 DIAGNOSIS — Z00.00 ENCOUNTER FOR INITIAL ANNUAL WELLNESS VISIT (AWV) IN MEDICARE PATIENT: Primary | ICD-10-CM

## 2024-11-08 DIAGNOSIS — G45.9 TIA (TRANSIENT ISCHEMIC ATTACK): ICD-10-CM

## 2024-11-08 DIAGNOSIS — Z53.20 MAMMOGRAM DECLINED: ICD-10-CM

## 2024-11-08 LAB
ERYTHROCYTE [DISTWIDTH] IN BLOOD BY AUTOMATED COUNT: 14.2 % (ref 10–15)
HCT VFR BLD AUTO: 42.6 % (ref 35–47)
HGB BLD-MCNC: 13.9 G/DL (ref 11.7–15.7)
MCH RBC QN AUTO: 31.3 PG (ref 26.5–33)
MCHC RBC AUTO-ENTMCNC: 32.6 G/DL (ref 31.5–36.5)
MCV RBC AUTO: 96 FL (ref 78–100)
PLATELET # BLD AUTO: 159 10E3/UL (ref 150–450)
RBC # BLD AUTO: 4.44 10E6/UL (ref 3.8–5.2)
WBC # BLD AUTO: 4.9 10E3/UL (ref 4–11)

## 2024-11-08 PROCEDURE — 99214 OFFICE O/P EST MOD 30 MIN: CPT | Mod: 25 | Performed by: INTERNAL MEDICINE

## 2024-11-08 PROCEDURE — G0438 PPPS, INITIAL VISIT: HCPCS | Performed by: INTERNAL MEDICINE

## 2024-11-08 PROCEDURE — 36415 COLL VENOUS BLD VENIPUNCTURE: CPT | Performed by: INTERNAL MEDICINE

## 2024-11-08 PROCEDURE — 80053 COMPREHEN METABOLIC PANEL: CPT | Performed by: INTERNAL MEDICINE

## 2024-11-08 PROCEDURE — 80061 LIPID PANEL: CPT | Performed by: INTERNAL MEDICINE

## 2024-11-08 PROCEDURE — 85027 COMPLETE CBC AUTOMATED: CPT | Performed by: INTERNAL MEDICINE

## 2024-11-08 ASSESSMENT — PAIN SCALES - GENERAL: PAINLEVEL_OUTOF10: NO PAIN (0)

## 2024-11-08 NOTE — PROGRESS NOTES
[facility] - Primary Care Specialists    Comprehensive and complex medical care - Chronic disease management - Shared decision making - Care coordination - Compassionate care    Patient advocacy - Rational deprescribing - Minimally disruptive medicine - Ethical focus - Customized care         Date of Service: 11/8/2024  Primary Provider: iRcardo Davila    Patient Care Team:  Ricrado Davila MD as PCP - General (Internal Medicine)  Ricardo Davila MD as Assigned PCP  Julissa Hager MD as MD (Family Medicine)  Loni Jaquez MD as Assigned Pulmonology Provider  Maureen Pérez NP as Nurse Practitioner  Stacy Castro PA-C as Assigned Heart and Vascular Provider  Rachael Helton MD as Assigned Neuroscience Provider          Patient's Pharmacy:    [id number] - [place], [place] - [address] [place] [address] AT [facility] [address]  [address] [facility] [address]  Phone: [phone] Fax: [phone]     Patient's Contacts:  Name Home Phone Work Phone Mobile Phone Relationship Lgl GUERA Pablo   [phone] Friend      Patient's Insurance:    Payor: MEDICARE / Plan: MEDICARE / Product Type: Medicare /      Subjective:     History of present illness:    Marlen Dumas is an 64 year old here for an annual wellness visit.    The issues she would like to address at today's visit include the following:    Chief Complaint   Patient presents with    Follow Up           11/8/2024    12:13 PM   Additional Questions   Roomed by Tashi MAURO MA     In for annual wellness visit and other issues.    Will be having her lap band removed by Dr. Tavarez at [facility] in the near future.  Then will be wintering in [facility].    Heart is doing okay.    Discussed her smoking and she is working on this.  On varenicline (Chantix) for this.  Not using patches.  7-8 cigs a day.    Blood pressure doing well and will recheck her cholesterol.    We reviewed her  other issues noted in the assessment but not specifically addressed in the HPI above.           Active Problem List:  Problem List as of 11/8/2024 Reviewed: 11/8/2024  9:40 PM by Ricardo Davila MD         High    Tobacco abuse    CAD (coronary artery disease)    COPD (chronic obstructive pulmonary disease) (H)       Medium    Primary hypertension    Hx of laparoscopic adjustable gastric banding    GERD (gastroesophageal reflux disease)    TIA (transient ischemic attack)    Spinal stenosis in cervical region    Lymphocytic esophagitis    Bilateral carotid artery stenosis       Low    Mixed hyperlipidemia    Obesity (BMI 30.0-34.9)    Zinc deficiency    Vitamin D deficiency    Normal colonoscopy - 2018 - Average risk    Hymenoptera allergy    Lumbar radiculopathy    Last Assessment & Plan 9/28/2021 Office Visit Written 9/30/2021  8:48 AM by Vivi Corrales NP     - L5/S1 pattern. She will be referred to PT.  If not making any progress or worsening, consider MRI. I can order this for her, she would need an anxiolytic due to claustrophobia  - Follow up with PCP if worsening or no improvement              Past Medical History:   Diagnosis Date    CAD (coronary artery disease) 12/08/2023    COPD (chronic obstructive pulmonary disease) (H)     GERD (gastroesophageal reflux disease) 07/24/2023    HLD (hyperlipidemia)     HTN (hypertension)     Hx of laparoscopic adjustable gastric banding 10/01/2008    Initial weight 295.5 BMI 50.4 Dr. Rowell    Hymenoptera allergy     Hypothyroidism     Left carotid stenosis 01/09/2024    S/P colonoscopy 05/05/2019    Tobacco abuse     Vitamin D deficiency 08/24/2016      Past Surgical History:   Procedure Laterality Date    ENDARTERECTOMY CAROTID Left 1/9/2024    Procedure: ENDARTERECTOMY, LEFT CAROTID;  Surgeon: Timbo Hart MD;  Location: [facility] OR    FOOT SURGERY      Sting ray stacia    LAPAROSCOPIC GASTRIC BANDING  2008    LUMBAR DISC SURGERY      SC  ESOPHAGOGASTRODUODENOSCOPY TRANSORAL DIAGNOSTIC N/A 2020    Procedure: ESOPHAGOGASTRODUODENOSCOPY (EGD);  Surgeon: Jared Howard MD;  Location: [facility];  Service: General      Family History   Problem Relation Age of Onset    Coronary Artery Disease Mother 58    Hypertension Mother     Chronic Obstructive Pulmonary Disease Father     Hypertension Father     Anxiety Disorder Father     Hypertension Brother       Family history is otherwise noncontributory.    Social History     Occupational History    Not on file   Tobacco Use    Smoking status: Some Days     Current packs/day: 0.00     Types: Cigarettes     Last attempt to quit: 1985     Years since quittin.1     Passive exposure: Current    Smokeless tobacco: Never    Tobacco comments:     Patient is trying hard to quit   Vaping Use    Vaping status: Never Used    Passive vaping exposure: Yes   Substance and Sexual Activity    Alcohol use: Yes     Alcohol/week: 4.0 standard drinks of alcohol     Types: 4 Standard drinks or equivalent per week     Comment: 4-5 beer/wine weekly    Drug use: Never    Sexual activity: Never     Birth control/protection: Abstinence, Post-menopausal      Social History     Social History Narrative    Lives with a friend.         Retired after working in [employer] for [employer] in [facility].      Current Outpatient Medications   Medication Instructions    albuterol (PROAIR HFA/PROVENTIL HFA/VENTOLIN HFA) 108 (90 Base) MCG/ACT inhaler 2 puffs, EVERY 6 HOURS PRN    amLODIPine (NORVASC) 5 MG tablet TAKE 1 TABLET(5 MG) BY MOUTH DAILY    aspirin (ASA) 81 mg, DAILY    atorvastatin (LIPITOR) 40 mg, Oral, DAILY    EPINEPHrine (ANY BX GENERIC EQUIV) 0.3 mg, Intramuscular, ONCE PRN, May repeat one time in 5-15 minutes if response to initial dose is inadequate.    ezetimibe (ZETIA) 10 mg, Oral, DAILY    fluticasone (ARNUITY ELLIPTA) 100 MCG/ACT inhaler 1 puff, Inhalation, DAILY    nicotine (NICODERM CQ) 21  "MG/24HR 24 hr patch 1 patch, Transdermal, EVERY 24 HOURS    omeprazole (PRILOSEC) 40 MG DR capsule 1 capsule, EVERY 12 HOURS    sucralfate (CARAFATE) 1 g, DAILY PRN    varenicline (CHANTIX) 1 MG tablet 1 tablet, 2 TIMES DAILY (Diuretics and Nitrates)      Allergies: Bee pollen, Sulfa (sulfonamide antibiotics) [sulfa antibiotics], and Wasp venom protein     Immunization History   Administered Date(s) Administered    COVID-19 12+ (MODERNA) 10/03/2023, 09/09/2024    COVID-19 Bivalent 12+ (Pfizer) 10/31/2022    COVID-19 MONOVALENT 12+ (Pfizer) 02/22/2021, 03/24/2021, 10/04/2021    COVID-19 Monovalent 12+ (Pfizer 2022) 06/03/2022    Flu, Unspecified 10/12/2020    Influenza (H1N1) 01/02/2010    Influenza (High Dose) Trivalent,PF (Fluzone) 09/09/2024    Influenza (IIV3) PF 01/04/2013, 08/28/2013    Influenza Vaccine 18-64 (Flublok) 08/31/2020, 10/04/2021    Influenza Vaccine 65+ (Fluzone HD) 10/03/2023    Influenza Vaccine >6 months,quad, PF 02/06/2018, 12/16/2019, 11/29/2022    Influenza Vaccine, 6+MO IM (QUADRIVALENT W/PRESERVATIVES) 10/19/2015    Influenza,INJ,MDCK,PF,Quad >6mo(Flucelvax) 09/23/2018    Pneumo Conj 13-V (2010&after) 02/06/2018    Pneumococcal 20 valent Conjugate (Prevnar 20) 10/05/2023    Pneumococcal 23 valent 05/07/2019    RSV Vaccine (Abrysvo) 10/05/2023    TDAP (Adacel,Boostrix) 02/06/2018, 10/09/2023    Twinrix A/B 10/09/2023    Zoster recombinant adjuvanted (SHINGRIX) 11/29/2022, 04/06/2023    Zoster vaccine, live 02/06/2018      Objective:     Wt Readings from Last 3 Encounters:   11/08/24 75 kg (165 lb 4.8 oz)   09/06/24 78.3 kg (172 lb 11.2 oz)   07/24/24 80.9 kg (178 lb 6.4 oz)     BP Readings from Last 3 Encounters:   11/08/24 128/76   09/09/24 138/82   08/19/24 139/85       PHYSICAL EXAM  /76 (BP Location: Right arm, Patient Position: Sitting, Cuff Size: Adult Regular)   Pulse 81   Temp 98  F (36.7  C) (Oral)   Resp 14   Ht 1.638 m (5' 4.5\")   Wt 75 kg (165 lb 4.8 oz)   LMP  (LMP " Unknown)   SpO2 96%   BMI 27.94 kg/m     The patient is comfortable, no acute distress.  Mood good.  Insight is good.  No skin lesions or nodules of concern.  Ears clear.  Eyes are nonicteric.  Pupils equal and reactive.  Throat is clear.  Neck is supple without mass, no thyromegaly. No cervical or epitrochlear adenopathy.  No axillary lymph nodes. Heart regular rate and rhythm.  Lungs clear to auscultation bilaterally.  Respiratory effort good.  Abdomen soft and nontender.  No hepatosplenomegaly.  Extremities show no edema.      Diagnostics:     Lab on 04/10/2024   Component Date Value Ref Range Status    Cholesterol 04/10/2024 171  <200 mg/dL Final    Triglycerides 04/10/2024 42  <150 mg/dL Final    Direct Measure HDL 04/10/2024 98  >=50 mg/dL Final    LDL Cholesterol Calculated 04/10/2024 65  <=100 mg/dL Final    Non HDL Cholesterol 04/10/2024 73  <130 mg/dL Final    Patient Fasting > 8hrs? 04/10/2024 Yes   Final    Sodium 04/10/2024 140  135 - 145 mmol/L Final    Reference intervals for this test were updated on 09/26/2023 to more accurately reflect our healthy population. There may be differences in the flagging of prior results with similar values performed with this method. Interpretation of those prior results can be made in the context of the updated reference intervals.     Potassium 04/10/2024 4.8  3.4 - 5.3 mmol/L Final    Chloride 04/10/2024 103  98 - 107 mmol/L Final    Carbon Dioxide (CO2) 04/10/2024 28  22 - 29 mmol/L Final    Anion Gap 04/10/2024 9  7 - 15 mmol/L Final    Urea Nitrogen 04/10/2024 13.3  8.0 - 23.0 mg/dL Final    Creatinine 04/10/2024 1.03 (H)  0.51 - 0.95 mg/dL Final    GFR Estimate 04/10/2024 60 (L)  >60 mL/min/1.73m2 Final    Calcium 04/10/2024 9.8  8.8 - 10.2 mg/dL Final    Glucose 04/10/2024 95  70 - 99 mg/dL Final    WBC Count 04/10/2024 5.0  4.0 - 11.0 10e3/uL Final    RBC Count 04/10/2024 4.40  3.80 - 5.20 10e6/uL Final    Hemoglobin 04/10/2024 13.3  11.7 - 15.7 g/dL Final     Hematocrit 04/10/2024 41.6  35.0 - 47.0 % Final    MCV 04/10/2024 95  78 - 100 fL Final    MCH 04/10/2024 30.2  26.5 - 33.0 pg Final    MCHC 04/10/2024 32.0  31.5 - 36.5 g/dL Final    RDW 04/10/2024 14.1  10.0 - 15.0 % Final    Platelet Count 04/10/2024 178  150 - 450 10e3/uL Final       Results for orders placed or performed in visit on 11/08/24   CBC with platelets     Status: Normal   Result Value Ref Range    WBC Count 4.9 4.0 - 11.0 10e3/uL    RBC Count 4.44 3.80 - 5.20 10e6/uL    Hemoglobin 13.9 11.7 - 15.7 g/dL    Hematocrit 42.6 35.0 - 47.0 %    MCV 96 78 - 100 fL    MCH 31.3 26.5 - 33.0 pg    MCHC 32.6 31.5 - 36.5 g/dL    RDW 14.2 10.0 - 15.0 %    Platelet Count 159 150 - 450 10e3/uL       Assessment:     1. Encounter for initial annual wellness visit (AWV) in Medicare patient    2. Primary hypertension    3. Coronary artery disease involving native coronary artery of native heart without angina pectoris    4. Tobacco abuse    5. Chronic obstructive pulmonary disease, unspecified COPD type (H)    6. Bilateral carotid artery stenosis    7. Hx of laparoscopic adjustable gastric banding    8. Gastroesophageal reflux disease with esophagitis without hemorrhage    9. Lymphocytic esophagitis    10. TIA (transient ischemic attack)    11. Mammogram declined         Plan:     Check lab work today.     Reviewed [facility] notes.  Continue to work on complete cessation of smoking.  Declined mammogram today.  Continue current medications  Follow up sooner if issues.    Orders Placed This Encounter   Procedures    Comprehensive metabolic panel    Lipid panel reflex to direct LDL Non-fasting    CBC with platelets        A personalized health plan based on the identified health risks was provided to the patient on the AVS.       Ricardo Davila MD  General Internal Medicine  [facility]      Return in about 1 year (around 11/8/2025) for annual wellness visit, visit and blood work.      Future Appointments   Date Time Provider Department Center   12/9/2024 10:00 AM [facility] 3 [facility]   12/9/2024 10:30 AM Stacy Castro PA-C [facility]   7/25/2025  8:30 AM Loni Jaquez MD [facility]         Health Maintenance   Topic Date Due    ANNUAL REVIEW OF HM ORDERS  Never done    MAMMO SCREENING  Never done    NICOTINE/TOBACCO CESSATION COUNSELING Q 1 YR  07/02/2022    BMP  04/10/2025    LIPID  04/10/2025    LUNG CANCER SCREENING  07/07/2025    MEDICARE ANNUAL WELLNESS VISIT  11/08/2025    FALL RISK ASSESSMENT  11/08/2025    GLUCOSE  04/10/2027    COLORECTAL CANCER SCREENING  05/27/2028    ADVANCE CARE PLANNING  11/08/2029    DTAP/TDAP/TD IMMUNIZATION (3 - Td or Tdap) 10/09/2033    DEXA  08/15/2034    SPIROMETRY  Completed    PHQ-2 (once per calendar year)  Completed    INFLUENZA VACCINE  Completed    Pneumococcal Vaccine: 65+ Years  Completed    ZOSTER IMMUNIZATION  Completed    RSV VACCINE  Completed    COVID-19 Vaccine  Completed    COPD ACTION PLAN  Addressed    HPV IMMUNIZATION  Aged Out    MENINGITIS IMMUNIZATION  Aged Out    RSV MONOCLONAL ANTIBODY  Aged Out    HEPATITIS C SCREENING  Discontinued        ______________________________________________________________________     Additional required elements:    I have reviewed Opioid Use Disorder and Substance Use Disorder risk factors and made any needed referrals.  This may not apply to this individual patient, but this documentation is required by Medicare.    Memory screen:      11/8/2024    12:46 PM   MINI COG   Clock Draw Score 2 Normal   3 Item Recall 3 objects recalled   Mini Cog Total Score 5        PHQ-2 Score:         1/8/2024     7:55 AM 7/24/2023     7:32 AM   PHQ-2 ( 1999 Pfizer)   Q1: Little interest or pleasure in doing things 0  0    Q2: Feeling down, depressed or hopeless 0  0    PHQ-2 Score 0 0   Q1: Little interest or pleasure in doing things Not at all Not at all   Q2: Feeling down, depressed or hopeless  Not at all Not at all   PHQ-2 Score 0 0       Patient-reported        Advanced care planning reviewed.        11/8/2024   Fall Risk   Fallen 2 or more times in the past year? No    Trouble with walking or balance? No        Patient-reported          11/8/2024   Substance Use   If I could quit smoking, I would Completely agree   I want to quit somking, worry about health affects Somewhat agree   Willing to make a plan to quit smoking Neutral   Willing to cut down before quitting Somewhat agree          Last vision screening (if done):       No data to display

## 2024-11-08 NOTE — PROGRESS NOTES
Wt Readings from Last 20 Encounters:   11/08/24 75 kg (165 lb 4.8 oz)   09/06/24 78.3 kg (172 lb 11.2 oz)   07/24/24 80.9 kg (178 lb 6.4 oz)   04/16/24 82.1 kg (181 lb)   04/05/24 82.5 kg (181 lb 14.4 oz)   01/22/24 80.3 kg (177 lb)   01/10/24 84.9 kg (187 lb 2.7 oz)   01/08/24 80.3 kg (177 lb)   12/08/23 79 kg (174 lb 3.2 oz)   11/30/23 77.1 kg (170 lb)   10/13/23 78.6 kg (173 lb 3.2 oz)   08/26/23 72.6 kg (160 lb)   07/24/23 80.3 kg (177 lb 1.6 oz)   07/07/23 81.6 kg (180 lb)   04/12/23 79.4 kg (175 lb)   02/26/23 75.8 kg (167 lb)   09/15/22 70.3 kg (155 lb)   09/14/22 70.9 kg (156 lb 6.4 oz)   09/02/22 68 kg (150 lb)   10/04/21 68 kg (150 lb)     BP Readings from Last 20 Encounters:   11/08/24 128/76   09/09/24 138/82   08/19/24 139/85   07/25/24 (!) 148/88   07/24/24 132/79   05/21/24 (!) 154/72   04/30/24 (!) 168/79   04/16/24 (!) 159/77   04/05/24 131/80   01/25/24 138/83   01/22/24 130/62   01/10/24 128/59   01/08/24 (!) 148/90   01/04/24 (!) 155/96   12/08/23 (!) 159/85   12/01/23 130/74   10/13/23 138/62   09/26/23 136/67   08/26/23 (!) 151/82   07/24/23 124/78      Pulse Readings from Last 20 Encounters:   11/08/24 81   09/06/24 66   08/19/24 66   07/25/24 56   07/24/24 68   05/21/24 64   04/30/24 64   04/16/24 63   04/05/24 64   01/25/24 77   01/22/24 72   01/10/24 68   01/08/24 67   01/04/24 66   12/08/23 63   11/30/23 63   10/13/23 66   08/26/23 66   07/24/23 71   07/07/23 83     SpO2 Readings from Last 20 Encounters:   11/08/24 96%   09/06/24 97%   08/19/24 100%   07/24/24 97%   05/21/24 97%   04/30/24 97%   04/16/24 98%   04/05/24 99%   01/22/24 97%   01/10/24 96%   01/08/24 97%   01/04/24 99%   12/08/23 98%   12/01/23 95%   10/13/23 98%   08/26/23 96%   07/24/23 98%   07/07/23 95%   02/26/23 98%   11/02/22 95%

## 2024-11-09 LAB
ALBUMIN SERPL BCG-MCNC: 4.1 G/DL (ref 3.5–5.2)
ALP SERPL-CCNC: 76 U/L (ref 40–150)
ALT SERPL W P-5'-P-CCNC: 21 U/L (ref 0–50)
ANION GAP SERPL CALCULATED.3IONS-SCNC: 12 MMOL/L (ref 7–15)
AST SERPL W P-5'-P-CCNC: 27 U/L (ref 0–45)
BILIRUB SERPL-MCNC: 0.4 MG/DL
BUN SERPL-MCNC: 9.1 MG/DL (ref 8–23)
CALCIUM SERPL-MCNC: 9.4 MG/DL (ref 8.8–10.4)
CHLORIDE SERPL-SCNC: 104 MMOL/L (ref 98–107)
CHOLEST SERPL-MCNC: 164 MG/DL
CREAT SERPL-MCNC: 1 MG/DL (ref 0.51–0.95)
EGFRCR SERPLBLD CKD-EPI 2021: 62 ML/MIN/1.73M2
FASTING STATUS PATIENT QL REPORTED: ABNORMAL
FASTING STATUS PATIENT QL REPORTED: NORMAL
GLUCOSE SERPL-MCNC: 100 MG/DL (ref 70–99)
HCO3 SERPL-SCNC: 24 MMOL/L (ref 22–29)
HDLC SERPL-MCNC: 76 MG/DL
LDLC SERPL CALC-MCNC: 72 MG/DL
NONHDLC SERPL-MCNC: 88 MG/DL
POTASSIUM SERPL-SCNC: 3.8 MMOL/L (ref 3.4–5.3)
PROT SERPL-MCNC: 7.1 G/DL (ref 6.4–8.3)
SODIUM SERPL-SCNC: 140 MMOL/L (ref 135–145)
TRIGL SERPL-MCNC: 82 MG/DL

## 2024-11-09 NOTE — PATIENT INSTRUCTIONS
Preventive Health Recommendations    See your health care provider every year (or as recommended) to:  Review health changes.   Discuss preventive care.    Review your medicines and supplements.  Consider having a mammogram at least every 2 years between the ages of 50 and 75 years old.  Yearly if desired.  Consider colon cancer screening between the ages of 50 and 75 years old if necessary.    Cholesterol and diabetes testing as necessary.  At age 65, consider haveing a bone density scan (DEXA) to check for osteoporosis.    Shots:  COVID vaccination as recommended.  Get a flu shot each year.  Get a tetanus shot every 10 years if you are under the age of 80 years old.  Talk to your doctor about a one time pneumonia vaccine that is recommended at the age of 65 years old.  Talk to your pharmacist about the shingles vaccine.  This is often better covered in the pharmacy through your insurance than if you have it in the clinic.    Lifestyle  Exercise at least 150 minutes a week (30 minutes a day, 5 days a week) if you are able. This will help you control your weight and prevent disease.  Limit alcohol to one drink per day.  No smoking.   Wear sunscreen to prevent skin cancer.   See your dentist twice a year for an exam and cleaning.  See your eye doctor every 1 to 2 years to screen for conditions such as glaucoma, macular degeneration and cataracts.    Personalized Prevention Plan  You are due for the preventive services outlined below.  Your care team is available to assist you in scheduling these services.  If you have already completed any of these items, please share that information with your care team to update in your medical record.    Health Maintenance   Topic Date Due    ANNUAL REVIEW OF HM ORDERS  Never done    MAMMO SCREENING  Never done    NICOTINE/TOBACCO CESSATION COUNSELING Q 1 YR  07/02/2022    BMP  04/10/2025    LIPID  04/10/2025    LUNG CANCER SCREENING  07/07/2025    MEDICARE ANNUAL WELLNESS VISIT   11/08/2025    FALL RISK ASSESSMENT  11/08/2025    GLUCOSE  04/10/2027    COLORECTAL CANCER SCREENING  05/27/2028    ADVANCE CARE PLANNING  11/08/2029    DTAP/TDAP/TD IMMUNIZATION (3 - Td or Tdap) 10/09/2033    DEXA  08/15/2034    SPIROMETRY  Completed    PHQ-2 (once per calendar year)  Completed    INFLUENZA VACCINE  Completed    Pneumococcal Vaccine: 65+ Years  Completed    ZOSTER IMMUNIZATION  Completed    RSV VACCINE  Completed    COVID-19 Vaccine  Completed    COPD ACTION PLAN  Addressed    HPV IMMUNIZATION  Aged Out    MENINGITIS IMMUNIZATION  Aged Out    RSV MONOCLONAL ANTIBODY  Aged Out    HEPATITIS C SCREENING  Discontinued

## 2024-11-25 NOTE — PROGRESS NOTES
[facility]        Patient is here for a 6 month follow up  to discuss carotid surveillance-S/P left CEA in January 2024    Pt is currently taking Aspirin, Statin, and zetia.    BP (!) 169/90   Pulse 76   Temp 97.2  F (36.2  C)   Resp 16   LMP  (LMP Unknown)   BP re check 142/84    The provider has been notified that the patient has no concerns.     Questions patient would like addressed today are: N/A.    Refills are needed: No    Has homecare services and agency name:  No

## 2024-11-25 NOTE — PATIENT INSTRUCTIONS
Raquel Gee,    Thank you for entrusting your care with us today. After your visit today with GREGG Castro this is the plan that was discussed at your appointment.    Follow up in 1 year for repeat carotid ultrasound and clinic visit.    We will call you closer to this date to get you scheduled.      I am including additional information on these things and our contact information if you have any questions or concerns.   Please do not hesitate to reach out to us if you felt we did not answer your questions or you are unsure of the treatment plan after your visit today. Our number is [phone].Thank you for trusting us with your care.         Again thank you for your time.     Carotid Duplex    Steps for the test are:  You will be asked to lie on a stretcher. It will be okay for you to wear your street clothes. In some situations, you may be asked to change into a gown.    Ultrasound gel, usually warmed for your comfort, will be placed on either side of your neck.    Through the gel, the technician will apply to your neck a small hand-held device that emits sound waves.   When the test is completed, the technician will remove excess gel from your neck. The gel is water-soluble and will not stain your skin or clothes.    How to Prepare:  Eat and take medications as usual.   Wear minimal or no jewelry to ease application and removal of gel.    Risks  There are typically no side effects or complications associated with a carotid duplex ultrasound examination.    What Can I Expect After the Test?  The technician will send the ultrasound images to your vascular surgeon for evaluation. Typically, a report is available in 2-3 days. If anything critical is found, it is standard practice to notify the vascular surgeon immediately.  Reference: https://vascular.org/patient-resources/vascular-tests

## 2024-12-09 ENCOUNTER — OFFICE VISIT (OUTPATIENT)
Dept: VASCULAR SURGERY | Facility: CLINIC | Age: 64
End: 2024-12-09
Attending: PHYSICIAN ASSISTANT
Payer: MEDICARE

## 2024-12-09 ENCOUNTER — ANCILLARY PROCEDURE (OUTPATIENT)
Dept: VASCULAR ULTRASOUND | Facility: CLINIC | Age: 64
End: 2024-12-09
Attending: PHYSICIAN ASSISTANT
Payer: MEDICARE

## 2024-12-09 VITALS
TEMPERATURE: 97.2 F | HEART RATE: 76 BPM | SYSTOLIC BLOOD PRESSURE: 169 MMHG | RESPIRATION RATE: 16 BRPM | DIASTOLIC BLOOD PRESSURE: 90 MMHG

## 2024-12-09 DIAGNOSIS — I65.22 STENOSIS OF LEFT CAROTID ARTERY: Primary | ICD-10-CM

## 2024-12-09 DIAGNOSIS — I65.22 STENOSIS OF LEFT CAROTID ARTERY: ICD-10-CM

## 2024-12-09 PROCEDURE — 99213 OFFICE O/P EST LOW 20 MIN: CPT | Performed by: PHYSICIAN ASSISTANT

## 2024-12-09 PROCEDURE — 93880 EXTRACRANIAL BILAT STUDY: CPT

## 2024-12-09 PROCEDURE — G0463 HOSPITAL OUTPT CLINIC VISIT: HCPCS | Performed by: PHYSICIAN ASSISTANT

## 2024-12-09 PROCEDURE — 93880 EXTRACRANIAL BILAT STUDY: CPT | Mod: 26 | Performed by: SURGERY

## 2024-12-09 NOTE — PROGRESS NOTES
VASCULAR SURGERY PROGRESS NOTE    LOCATION:  [facility]     Marlen Dumas  Medical Record #: [id number]  YOB: 1960  Age: 64 year old     Date of Service: 12/9/2024    PRIMARY CARE PROVIDER: Ricardo Davila    Reason for visit: Surveillance of carotid disease     IMPRESSION: 64-year-old female presenting for follow-up of carotid artery stenosis status post left endarterectomy in January due to symptomatic disease. Duplex today demonstrates less than 50% stenosis bilateral ICAs. Patient remains completely asymptomatic and is medically optimized.     RECOMMENDATION/RISKS: Continue best medical management with aspirin and statin therapy. Follow-up in 1 year with repeat carotid duplex.    HPI:  Marlen Dumas is a 64 year old female with past medical history significant for hypertension, hyperlipidemia, coronary artery disease, tobacco abuse, COPD, and carotid artery stenosis.  Patient underwent left endarterectomy in January of this year for symptomatic disease.  She was last evaluated in the vascular clinic 6 months ago and was noted to be doing well with no further symptoms and a well-healed incision.     Today, Mrs. Dumas presents for follow-up. Patient is doing well and denies any vision changes, slurred speech, facial asymmetry, unilateral extremity numbness or weakness, or other signs/symptoms of TIA or stroke. She is compliant with her aspirin and statin medications. She is heading to [place] next month for the winter and is looking forward to her trip.     Ultrasound results were discussed and all questions answered. No other concerns.    REVIEW OF SYSTEMS:    A 12 point ROS was reviewed and is negative except for what is listed above in HPI.    PHH:    Past Medical History:   Diagnosis Date    CAD (coronary artery disease) 12/08/2023    COPD (chronic obstructive pulmonary disease) (H)     GERD (gastroesophageal reflux disease) 07/24/2023    HLD (hyperlipidemia)     HTN  (hypertension)     Hx of laparoscopic adjustable gastric banding 10/01/2008    Initial weight 295.5 BMI 50.4 Dr. Rowell    Hymenoptera allergy     Hypothyroidism     Left carotid stenosis 01/09/2024    S/P colonoscopy 05/05/2019    Tobacco abuse     Vitamin D deficiency 08/24/2016     Past Surgical History:   Procedure Laterality Date    ENDARTERECTOMY CAROTID Left 1/9/2024    Procedure: ENDARTERECTOMY, LEFT CAROTID;  Surgeon: Timbo Hart MD;  Location: [facility]    FOOT SURGERY      [facility]    LAPAROSCOPIC GASTRIC BANDING  2008    LUMBAR DISC SURGERY      MT ESOPHAGOGASTRODUODENOSCOPY TRANSORAL DIAGNOSTIC N/A 8/14/2020    Procedure: ESOPHAGOGASTRODUODENOSCOPY (EGD);  Surgeon: Jared Howard MD;  Location: [facility];  Service: General     ALLERGIES:  Bee pollen, Sulfa (sulfonamide antibiotics) [sulfa antibiotics], and Wasp venom protein    MEDS:    Current Outpatient Medications:     albuterol (PROAIR HFA/PROVENTIL HFA/VENTOLIN HFA) 108 (90 Base) MCG/ACT inhaler, Inhale 2 puffs into the lungs every 6 hours as needed for shortness of breath, wheezing or cough, Disp: , Rfl:     amLODIPine (NORVASC) 5 MG tablet, TAKE 1 TABLET(5 MG) BY MOUTH DAILY, Disp: 90 tablet, Rfl: 3    aspirin (ASA) 81 MG chewable tablet, Take 81 mg by mouth daily, Disp: , Rfl:     atorvastatin (LIPITOR) 40 MG tablet, Take 1 tablet (40 mg) by mouth daily, Disp: 90 tablet, Rfl: 4    EPINEPHrine (ANY BX GENERIC EQUIV) 0.3 MG/0.3ML injection 2-pack, Inject 0.3 mLs (0.3 mg) into the muscle once as needed for anaphylaxis May repeat one time in 5-15 minutes if response to initial dose is inadequate., Disp: 2 each, Rfl: 3    ezetimibe (ZETIA) 10 MG tablet, TAKE 1 TABLET(10 MG) BY MOUTH DAILY, Disp: 90 tablet, Rfl: 2    fluticasone (ARNUITY ELLIPTA) 100 MCG/ACT inhaler, Inhale 1 puff into the lungs daily, Disp: 30 each, Rfl: 3    nicotine (NICODERM CQ) 21 MG/24HR 24 hr patch, Place 1 patch onto the skin every 24  hours, Disp: 30 patch, Rfl: 3    omeprazole (PRILOSEC) 40 MG DR capsule, Take 1 capsule by mouth every 12 hours, Disp: , Rfl:     sucralfate (CARAFATE) 1 GM tablet, Take 1 g by mouth daily as needed (heart burn/reflux symptoms), Disp: , Rfl:     varenicline (CHANTIX) 1 MG tablet, Take 1 tablet by mouth 2 times daily., Disp: , Rfl:     SOCIAL HABITS:    History   Smoking Status    Some Days    Types: Cigarettes   Smokeless Tobacco    Never     Social History    Substance and Sexual Activity      Alcohol use: Yes        Alcohol/week: 4.0 standard drinks of alcohol        Types: 4 Standard drinks or equivalent per week        Comment: 4-5 beer/wine weekly      History   Drug Use Unknown     FAMILY HISTORY:    Family History   Problem Relation Age of Onset    Coronary Artery Disease Mother 58    Hypertension Mother     Chronic Obstructive Pulmonary Disease Father     Hypertension Father     Anxiety Disorder Father     Hypertension Brother      PE:  BP (!) 169/90   Pulse 76   Temp 97.2  F (36.2  C)   Resp 16   LMP  (LMP Unknown)   Wt Readings from Last 1 Encounters:   11/08/24 75 kg (165 lb 4.8 oz)     There is no height or weight on file to calculate BMI.    EXAM:  GENERAL: well-developed 64 year old female who appears her stated age  CARDIAC: normal   CHEST/LUNG: normal respiratory effort   MUSCULOSKELETAL: grossly normal and both lower extremities are intact, no lower extremity edema  NEUROLOGIC: focally intact, alert and oriented x 3  PSYCH: appropriate affect    DIAGNOSTIC STUDIES:     Images:    US Carotid Bilateral    I personally reviewed the images and my interpretation is less than 50% stenosis of bilateral ICAs.    LABS:      Sodium   Date Value Ref Range Status   11/08/2024 140 135 - 145 mmol/L Final   04/10/2024 140 135 - 145 mmol/L Final     Comment:     Reference intervals for this test were updated on 09/26/2023 to more accurately reflect our healthy population. There may be differences in the  flagging of prior results with similar values performed with this method. Interpretation of those prior results can be made in the context of the updated reference intervals.    01/22/2024 137 135 - 145 mmol/L Final     Comment:     Reference intervals for this test were updated on 09/26/2023 to more accurately reflect our healthy population. There may be differences in the flagging of prior results with similar values performed with this method. Interpretation of those prior results can be made in the context of the updated reference intervals.      Urea Nitrogen   Date Value Ref Range Status   11/08/2024 9.1 8.0 - 23.0 mg/dL Final   04/10/2024 13.3 8.0 - 23.0 mg/dL Final   01/22/2024 12.4 8.0 - 23.0 mg/dL Final   07/13/2021 9 8 - 22 mg/dL Final   06/13/2019 10 8 - 22 mg/dL Final   05/07/2019 10 8 - 22 mg/dL Final     Hemoglobin   Date Value Ref Range Status   11/08/2024 13.9 11.7 - 15.7 g/dL Final   04/10/2024 13.3 11.7 - 15.7 g/dL Final   01/10/2024 12.2 11.7 - 15.7 g/dL Final     Platelet Count   Date Value Ref Range Status   11/08/2024 159 150 - 450 10e3/uL Final   04/10/2024 178 150 - 450 10e3/uL Final   01/10/2024 170 150 - 450 10e3/uL Final     INR   Date Value Ref Range Status   11/30/2023 0.97 0.85 - 1.15 Final   10/12/2022 0.97 0.85 - 1.15 Final     25 minutes spent on the day of encounter doing chart review, history and exam, documentation, and further activities as noted.     Stacy Castro PA-C  VASCULAR SURGERY

## 2025-01-02 ENCOUNTER — MYC REFILL (OUTPATIENT)
Dept: INTERNAL MEDICINE | Facility: CLINIC | Age: 65
End: 2025-01-02
Payer: MEDICARE

## 2025-01-02 DIAGNOSIS — K21.00 GASTROESOPHAGEAL REFLUX DISEASE WITH ESOPHAGITIS WITHOUT HEMORRHAGE: Primary | ICD-10-CM

## 2025-01-02 RX ORDER — OMEPRAZOLE 40 MG/1
40 CAPSULE, DELAYED RELEASE ORAL EVERY 12 HOURS
Qty: 180 CAPSULE | Refills: 3 | Status: SHIPPED | OUTPATIENT
Start: 2025-01-02 | End: 2025-12-28

## 2025-04-08 ENCOUNTER — MYC MEDICAL ADVICE (OUTPATIENT)
Dept: INTERNAL MEDICINE | Facility: CLINIC | Age: 65
End: 2025-04-08
Payer: MEDICARE

## 2025-04-14 DIAGNOSIS — E78.2 MIXED HYPERLIPIDEMIA: ICD-10-CM

## 2025-04-16 RX ORDER — EZETIMIBE 10 MG/1
10 TABLET ORAL DAILY
Qty: 90 TABLET | Refills: 2 | OUTPATIENT
Start: 2025-04-16

## 2025-04-22 ENCOUNTER — OFFICE VISIT (OUTPATIENT)
Dept: INTERNAL MEDICINE | Facility: CLINIC | Age: 65
End: 2025-04-22
Payer: MEDICARE

## 2025-04-22 VITALS
TEMPERATURE: 97.5 F | DIASTOLIC BLOOD PRESSURE: 72 MMHG | SYSTOLIC BLOOD PRESSURE: 152 MMHG | BODY MASS INDEX: 29.52 KG/M2 | RESPIRATION RATE: 16 BRPM | HEART RATE: 77 BPM | OXYGEN SATURATION: 98 % | WEIGHT: 177.2 LBS | HEIGHT: 65 IN

## 2025-04-22 DIAGNOSIS — I10 PRIMARY HYPERTENSION: Chronic | ICD-10-CM

## 2025-04-22 DIAGNOSIS — J44.9 CHRONIC OBSTRUCTIVE PULMONARY DISEASE, UNSPECIFIED COPD TYPE (H): ICD-10-CM

## 2025-04-22 DIAGNOSIS — K21.00 GASTROESOPHAGEAL REFLUX DISEASE WITH ESOPHAGITIS WITHOUT HEMORRHAGE: ICD-10-CM

## 2025-04-22 DIAGNOSIS — K13.70 ORAL LESION: Primary | ICD-10-CM

## 2025-04-22 DIAGNOSIS — L82.1 SK (SEBORRHEIC KERATOSIS): ICD-10-CM

## 2025-04-22 PROCEDURE — 90480 ADMN SARSCOV2 VAC 1/ONLY CMP: CPT | Performed by: INTERNAL MEDICINE

## 2025-04-22 PROCEDURE — G2211 COMPLEX E/M VISIT ADD ON: HCPCS | Performed by: INTERNAL MEDICINE

## 2025-04-22 PROCEDURE — 3078F DIAST BP <80 MM HG: CPT | Performed by: INTERNAL MEDICINE

## 2025-04-22 PROCEDURE — 99214 OFFICE O/P EST MOD 30 MIN: CPT | Performed by: INTERNAL MEDICINE

## 2025-04-22 PROCEDURE — 3077F SYST BP >= 140 MM HG: CPT | Performed by: INTERNAL MEDICINE

## 2025-04-22 PROCEDURE — 1126F AMNT PAIN NOTED NONE PRSNT: CPT | Performed by: INTERNAL MEDICINE

## 2025-04-22 PROCEDURE — 91320 SARSCV2 VAC 30MCG TRS-SUC IM: CPT | Performed by: INTERNAL MEDICINE

## 2025-04-22 RX ORDER — OMEPRAZOLE 40 MG/1
40 CAPSULE, DELAYED RELEASE ORAL DAILY
COMMUNITY
Start: 2025-04-22

## 2025-04-22 ASSESSMENT — PAIN SCALES - GENERAL: PAINLEVEL_OUTOF10: NO PAIN (0)

## 2025-04-22 NOTE — PATIENT INSTRUCTIONS
Future Appointments   Date Time Provider Department Center   7/25/2025  8:30 AM Loni Jaquez MD [facility]

## 2025-04-22 NOTE — PROGRESS NOTES
[address] [address]  Phone: [phone]  Fax: [phone]    The secret of the care of the patient is in caring for the patient.  - Dr. Francis Peabody  ______________________________________________________________________     Date of Service: 4/22/2025  Primary Provider: Ricardo Davila    Patient Care Team:  Ricardo Davila MD as PCP - General (Internal Medicine)  Ricardo Davila MD as Assigned PCP  Julissa Hager MD as MD (Family Medicine)  Maureen Pérez NP as Nurse Practitioner  Rachael Helton MD as Assigned Neuroscience Provider  Stacy Castro PA-C as Assigned Heart and Vascular Surgical Provider  Loni Jaquez MD as Assigned Pulmonology Provider     ______________________________________________________________________     Chief Complaint   Patient presents with    Referral     Patient needs a referral to ENT to get a biopsy -  bump in mouth on left lower side.           4/22/2025     8:11 AM   Additional Questions   Roomed by Tashi MAURO MA     History of Present Illness    Marlen Dumas, 64 years    Oral Lesion  - Noticed a bump on the lower mandible for the last two to three months.  - Dentist recommended a biopsy and provided a list of oral surgeons.  - No pain reported, possibly due to nerve damage from a previous carotid artery procedure.    Cysts  - Started developing small cysts on the right wrist and other places over the last six to seven months.  - Cysts can be temporarily reduced by applying pressure.    GERD (Gastroesophageal Reflux Disease)  - Had a lap band removal, which alleviated GERD symptoms and burning sensation.  - Previously prescribed omeprazole twice a day, now taking it once a day due to symptom improvement.  - No longer requires Carafate since the lap band removal.    Mole on Back  - Partner noticed a strange mole on the back.  - Patient requested a quick examination of the  mole.    Blood Pressure  - Blood pressure was high during the visit but reported as 130/65 at a dentist appointment two weeks prior.  - Not regularly monitoring blood pressure at home.  ______________________________________________________________________     Active Problem List:  Problem List as of 4/22/2025 Reviewed: 11/8/2024  9:40 PM by Ricardo Davila MD         High    Tobacco abuse    CAD (coronary artery disease)    COPD (chronic obstructive pulmonary disease) (H)       Medium    Primary hypertension    Hx of laparoscopic adjustable gastric banding    GERD (gastroesophageal reflux disease)    TIA (transient ischemic attack)    Spinal stenosis in cervical region    Lymphocytic esophagitis    Bilateral carotid artery stenosis       Low    Mixed hyperlipidemia    Obesity (BMI 30.0-34.9)    Zinc deficiency    Vitamin D deficiency    Normal colonoscopy - 2018 - Average risk    Hymenoptera allergy    Lumbar radiculopathy    Last Assessment & Plan 9/28/2021 Office Visit Written 9/30/2021  8:48 AM by Vivi Corrales NP     - L5/S1 pattern. She will be referred to PT.  If not making any progress or worsening, consider MRI. I can order this for her, she would need an anxiolytic due to claustrophobia  - Follow up with PCP if worsening or no improvement           Current Outpatient Medications   Medication Instructions    albuterol (PROAIR HFA/PROVENTIL HFA/VENTOLIN HFA) 108 (90 Base) MCG/ACT inhaler 2 puffs, EVERY 6 HOURS PRN    amLODIPine (NORVASC) 5 MG tablet TAKE 1 TABLET(5 MG) BY MOUTH DAILY    aspirin (ASA) 81 mg, DAILY    atorvastatin (LIPITOR) 40 mg, Oral, DAILY    EPINEPHrine (ANY BX GENERIC EQUIV) 0.3 mg, Intramuscular, ONCE PRN, May repeat one time in 5-15 minutes if response to initial dose is inadequate.    ezetimibe (ZETIA) 10 mg, Oral, DAILY    fluticasone (ARNUITY ELLIPTA) 100 MCG/ACT inhaler 1 puff, Inhalation, DAILY    nicotine (NICODERM CQ) 21 MG/24HR 24 hr patch 1 patch, Transdermal, EVERY 24 HOURS     "omeprazole (PRILOSEC) 40 mg, DAILY    varenicline (CHANTIX) 1 MG tablet 1 tablet, 2 TIMES DAILY (Diuretics and Nitrates)     Social History     Social History Narrative    Lives with a friend.         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        [place] in [place].      ______________________________________________________________________     Wt Readings from Last 3 Encounters:   04/22/25 80.4 kg (177 lb 3.2 oz)   11/08/24 75 kg (165 lb 4.8 oz)   09/06/24 78.3 kg (172 lb 11.2 oz)     BP Readings from Last 3 Encounters:   04/22/25 (!) 144/72   12/09/24 (!) 169/90   11/08/24 128/76     BP (!) 144/72   Pulse 77   Temp 97.5  F (36.4  C) (Oral)   Resp 16   Ht 1.638 m (5' 4.5\")   Wt 80.4 kg (177 lb 3.2 oz)   LMP  (LMP Unknown)   SpO2 98%   BMI 29.95 kg/m     The patient is comfortable, no acute distress.  Mood good.  Insight good.  Eyes are nonicteric.  Neck is supple without mass.  No cervical adenopathy.   Heart regular rate and rhythm.  Lungs clear to auscultation bilaterally.  Respiratory effort is good.  Trace ankle edema.  Oral exam shows a yellowish whitish lesion under the gum.  No obvious signs of malignancy.  Seborrheic keratosis (SK) of the back noted.  ______________________________________________________________________     No results found for any visits on 04/22/25.   ______________________________________________________________________     Diagnoses managed today:    1. Oral lesion    2. Chronic obstructive pulmonary disease, unspecified COPD type (H)    3. Gastroesophageal reflux disease with esophagitis without hemorrhage    4. Primary hypertension    5. SK (seborrheic keratosis)       ______________________________________________________________________   Assessment & Plan     Oral lesion:  - Oral lesion identified by the dentist, with a recommendation for further evaluation. No immediate signs of malignancy, but evaluation by a specialist is advised.  - Referral to ENT " specialist Dr. Yelitza Mora at [facility] for evaluation. Patient provided with contact information to schedule an appointment.    Gastroesophageal reflux disease with esophagitis without hemorrhage:  - GERD symptoms have improved post-lap band removal. Omeprazole reduced to once daily.  - Continue omeprazole once daily. No need for Carafate since lap band removal.    Primary hypertension:  - Blood pressure slightly high today but better than previous readings.  - Monitor blood pressure at home using a cuff, recommended Omron brand.    Chronic obstructive pulmonary disease, unspecified COPD type (H):  - Patient's breathing with COPD has been doing okay.  - Continue current management and monitor symptoms.    Seborrheic keratosis (SK):  - Examination of the pigmented lesion on the back suggests seborrheic keratosis.  - No immediate intervention required, but monitor for any changes.        Continue current medications otherwise.  Follow up sooner if issues.    Orders Placed This Encounter   Procedures    COVID-19 12+ (PFIZER)    Adult ENT  Referral      Issues to follow up on:  Follow up otolaryngology (ENT) visit.    Ricardo Davila MD  General Internal Medicine  [facility]    The longitudinal plan of care for the diagnoses and conditions as documented were addressed during this visit. Due to the added complexity in care, I will continue to support Marlen in the subsequent management and with ongoing continuity of care.     Return in about 7 months (around 11/9/2025) for annual wellness visit.     Future Appointments   Date Time Provider Department Center   7/25/2025  8:30 AM Loni Jaquez MD [facility]

## 2025-04-23 ENCOUNTER — PATIENT OUTREACH (OUTPATIENT)
Dept: CARE COORDINATION | Facility: CLINIC | Age: 65
End: 2025-04-23
Payer: MEDICARE

## 2025-05-20 DIAGNOSIS — E78.2 MIXED HYPERLIPIDEMIA: ICD-10-CM

## 2025-05-20 RX ORDER — EZETIMIBE 10 MG/1
10 TABLET ORAL DAILY
Qty: 90 TABLET | Refills: 1 | Status: SHIPPED | OUTPATIENT
Start: 2025-05-20

## 2025-05-21 ENCOUNTER — HOSPITAL ENCOUNTER (OUTPATIENT)
Dept: CT IMAGING | Facility: HOSPITAL | Age: 65
Discharge: HOME | End: 2025-05-21
Attending: INTERNAL MEDICINE
Payer: MEDICARE

## 2025-05-21 ENCOUNTER — HOSPITAL ENCOUNTER (OUTPATIENT)
Dept: CT IMAGING | Facility: HOSPITAL | Age: 65
Discharge: HOME | End: 2025-05-21
Attending: OTOLARYNGOLOGY
Payer: MEDICARE

## 2025-05-21 DIAGNOSIS — F17.218 NICOTINE DEPENDENCE, CIGARETTES, WITH OTHER NICOTINE-INDUCED DISORDERS: ICD-10-CM

## 2025-05-21 DIAGNOSIS — Z72.0 TOBACCO ABUSE: ICD-10-CM

## 2025-05-21 DIAGNOSIS — M95.9 ACQUIRED DEFORMITY OF MUSCULOSKELETAL SYSTEM, UNSPECIFIED: ICD-10-CM

## 2025-05-21 PROCEDURE — 70491 CT SOFT TISSUE NECK W/DYE: CPT

## 2025-05-21 PROCEDURE — 71271 CT THORAX LUNG CANCER SCR C-: CPT

## 2025-05-21 PROCEDURE — 250N000011 HC RX IP 250 OP 636: Performed by: OTOLARYNGOLOGY

## 2025-05-21 PROCEDURE — 250N000009 HC RX 250: Performed by: OTOLARYNGOLOGY

## 2025-05-21 RX ORDER — IOPAMIDOL 755 MG/ML
90 INJECTION, SOLUTION INTRAVASCULAR ONCE
Status: COMPLETED | OUTPATIENT
Start: 2025-05-21 | End: 2025-05-21

## 2025-05-21 RX ADMIN — IOPAMIDOL 90 ML: 755 INJECTION, SOLUTION INTRAVENOUS at 16:00

## 2025-05-21 RX ADMIN — SODIUM CHLORIDE 100 ML: 9 INJECTION, SOLUTION INTRAVENOUS at 16:03

## 2025-05-27 ENCOUNTER — RESULTS FOLLOW-UP (OUTPATIENT)
Dept: MULTI SPECIALTY CLINIC | Facility: CLINIC | Age: 65
End: 2025-05-27

## 2025-05-28 ENCOUNTER — HOSPITAL ENCOUNTER (EMERGENCY)
Facility: HOSPITAL | Age: 65
Discharge: HOME | End: 2025-05-28
Payer: MEDICARE

## 2025-05-28 VITALS
SYSTOLIC BLOOD PRESSURE: 146 MMHG | DIASTOLIC BLOOD PRESSURE: 90 MMHG | HEART RATE: 83 BPM | RESPIRATION RATE: 18 BRPM | OXYGEN SATURATION: 96 % | TEMPERATURE: 98.3 F | WEIGHT: 175 LBS | BODY MASS INDEX: 29.57 KG/M2

## 2025-05-28 DIAGNOSIS — R04.0 EPISTAXIS: ICD-10-CM

## 2025-05-28 PROCEDURE — 99282 EMERGENCY DEPT VISIT SF MDM: CPT

## 2025-05-28 PROCEDURE — 30901 CONTROL OF NOSEBLEED: CPT

## 2025-05-28 ASSESSMENT — COLUMBIA-SUICIDE SEVERITY RATING SCALE - C-SSRS
1. IN THE PAST MONTH, HAVE YOU WISHED YOU WERE DEAD OR WISHED YOU COULD GO TO SLEEP AND NOT WAKE UP?: NO
6. HAVE YOU EVER DONE ANYTHING, STARTED TO DO ANYTHING, OR PREPARED TO DO ANYTHING TO END YOUR LIFE?: NO
2. HAVE YOU ACTUALLY HAD ANY THOUGHTS OF KILLING YOURSELF IN THE PAST MONTH?: NO

## 2025-05-28 ASSESSMENT — ACTIVITIES OF DAILY LIVING (ADL): ADLS_ACUITY_SCORE: 47

## 2025-05-28 NOTE — ED PROVIDER NOTES
Emergency Department Encounter   NAME: Marlen Dumas ; AGE: 65 year old female ; YOB: 1960 ; MRN: [id number] ; PCP: Ricardo Davila   ED PROVIDER: Crys Kim PA-C    Evaluation Date & Time:   5/28/2025  6:03 PM    CHIEF COMPLAINT:  Epistaxis      Impression and Plan   MDM: Marlen Dumas is a 65 year old female who presents to the ED for evaluation of nosebleed that started about an hour prior to arrival.  She reports no history of nosebleeds.  She takes daily aspirin.  Unclear what caused this although does note she has had a stuffy nose and been blowing her nose frequently lately.  On exam patient is wearing nasal clamp.  Vitals remarkable for mildly elevated blood pressure 162/109.  Otherwise vitally normal.  When I remove the nasal clamp really no clear source of blood in the bilateral naris.  She does not seem to be actively bleeding during my exam.  No blood in the posterior oropharynx on my exam however patient is sitting with a cup that is nearly full of the blood that she has been spitting up that is been running down the back of her throat.    Lidocaine with epinephrine utilized and patient reexamined 20 minutes after administration with no recurrent nosebleed.  Reexamined bilateral nares and posterior oropharynx without any recurrent bleeding.  I did consider blood work to rule out patient not being acutely anemic or thrombocytopenic in the setting of being on daily baby aspirin.  However, unlikely that patient developed acute anemia with a mild nosebleed for 1 hour.  She states that she has no history of requiring blood transfusions or anemia or low platelets.  She would like to defer at this time I think is reasonable.  Hypertension resolved to 146/90 prior to discharge.  Discussed care for nosebleeds at home and returning if no outpatient management including pressure, Afrin resolving nosebleed. Patient expresses understanding and discharged in stable condition.     Medical  Decision Making      Discharge. No recommendations on prescription strength medication(s). See documentation for any additional details.    MIPS (CTPE, Dental pain, Villegas, Sinusitis, Asthma/COPD, Head Trauma): Not Applicable  SEPSIS: None  Critical Care: 0     ED COURSE:  6:16 PM I met and introduced myself to the patient. I gathered initial history and performed my physical exam. We discussed plan for initial workup.  I rechecked the patient and discussed results, discharge, follow up, and reasons to return to the ED.     At the conclusion of the encounter I discussed the results of all the tests and the disposition. The questions were answered. The patient or family acknowledged understanding and was agreeable with the care plan.    FINAL IMPRESSION:    ICD-10-CM    1. Epistaxis  R04.0             MEDICATIONS GIVEN IN THE EMERGENCY DEPARTMENT:  Medications - No data to display      NEW PRESCRIPTIONS STARTED AT TODAY'S ED VISIT:  New Prescriptions    No medications on file         HPI   Use of Intrepreter: N/A     Marlen Dumas is a 65 year old female with a pertinent history of COPD, hypertension, hyperlipidemia, obesity, TIA on baby aspirin who presents to the ED by private vehicle for evaluation of epistaxis.     Medical History     Past Medical History:   Diagnosis Date    CAD (coronary artery disease) 12/08/2023    COPD (chronic obstructive pulmonary disease) (H)     GERD (gastroesophageal reflux disease) 07/24/2023    HLD (hyperlipidemia)     HTN (hypertension)     Hx of laparoscopic adjustable gastric banding 10/01/2008    Hymenoptera allergy     Hypothyroidism     Left carotid stenosis 01/09/2024    S/P colonoscopy 05/05/2019    Tobacco abuse     Vitamin D deficiency 08/24/2016       Past Surgical History:   Procedure Laterality Date    ENDARTERECTOMY CAROTID Left 01/09/2024    Procedure: ENDARTERECTOMY, LEFT CAROTID;  Surgeon: Timbo Hart MD;  Location: [facility] Main OR    FOOT SURGERY       Jose palomo    LAPAROSCOPIC GASTRIC BANDING  2008    LAPAROSCOPIC REMOVAL GASTRIC ADJUSTABLE BAND  2024    LUMBAR DISC SURGERY      CO ESOPHAGOGASTRODUODENOSCOPY TRANSORAL DIAGNOSTIC N/A 2020    Procedure: ESOPHAGOGASTRODUODENOSCOPY (EGD);  Surgeon: Jared Howard MD;  Location: [facility];  Service: General       Family History   Problem Relation Age of Onset    Coronary Artery Disease Mother 58    Hypertension Mother     Chronic Obstructive Pulmonary Disease Father     Hypertension Father     Anxiety Disorder Father     Hypertension Brother        Social History     Tobacco Use    Smoking status: Some Days     Current packs/day: 0.00     Types: Cigarettes     Last attempt to quit: 1985     Years since quittin.7     Passive exposure: Current    Smokeless tobacco: Never    Tobacco comments:     Patient is trying hard to quit   Vaping Use    Vaping status: Never Used    Passive vaping exposure: Yes   Substance Use Topics    Alcohol use: Yes     Alcohol/week: 4.0 standard drinks of alcohol     Types: 4 Standard drinks or equivalent per week     Comment: 4-5 beer/wine weekly    Drug use: Never       albuterol (PROAIR HFA/PROVENTIL HFA/VENTOLIN HFA) 108 (90 Base) MCG/ACT inhaler  amLODIPine (NORVASC) 5 MG tablet  aspirin (ASA) 81 MG chewable tablet  atorvastatin (LIPITOR) 40 MG tablet  EPINEPHrine (ANY BX GENERIC EQUIV) 0.3 MG/0.3ML injection 2-pack  ezetimibe (ZETIA) 10 MG tablet  fluticasone (ARNUITY ELLIPTA) 100 MCG/ACT inhaler  nicotine (NICODERM CQ) 21 MG/24HR 24 hr patch  omeprazole (PRILOSEC) 40 MG DR capsule  varenicline (CHANTIX) 1 MG tablet          Physical Exam     First Vitals:  Patient Vitals for the past 24 hrs:   BP Temp Temp src Pulse Resp SpO2 Weight   25 -- 98.3  F (36.8  C) Oral -- -- -- --   25 190 (!) 146/90 -- -- 83 -- 96 % --   25 190 -- -- -- 75 -- 94 % --   25 1845 (!) 148/86 -- -- 80 -- 97 % --   25 1830 (!) 156/92 --  -- 82 -- 95 % --   05/28/25 1819 (!) 165/101 -- -- 99 18 96 % --   05/28/25 1800 (!) 162/109 98  F (36.7  C) Oral 93 18 97 % 79.4 kg (175 lb)         PHYSICAL EXAM:   Physical Exam    Constitutional: alert,  no acute distress,  not ill-appearing, nasal clamp in place  Head: normocephalic, atraumatic  Nose: Nasal clamp in place, nasal clamp removed revealing minimal to no bleeding in the bilateral naris, posterior oropharynx is clear without blood  Eyes: EOM intact   Neck: ROM normal  Cardio: regular rate  Pulmonary: effort normal   Abdominal: flat, no distention  MSK: no obvious swelling or deformity  Skin: no visible rashes or erythema   Neuro: no gross focal deficit, acting per baseline   Psychiatric: mood and behavior within normal limit    Results     LAB:  All pertinent labs reviewed and interpreted  Labs Ordered and Resulted from Time of ED Arrival to Time of ED Departure - No data to display     RADIOLOGY:  No orders to display     PROCEDURES:    PROCEDURE: Epistaxis Management   INDICATIONS: Failure of epistaxis control with non-invasive management techniques.   PROCEDURE PROVIDER: Crys Kim PA-C   SITE: bilateral   MEDICATION:    NOTE: Anterior Source:  The area was evaluated.  Pain meds.  Total procedure with a few small clots and dark red blood.  Lidocaine with epinephrine placed in bilateral layers-about 1.5 cc each.   COMPLICATIONS: Patient tolerated procedure well, without complication     Crys Kim PA-C   Emergency Medicine   [facility] EMERGENCY DEPARTMENT       Crys Kim PA-C  05/28/25 1928

## 2025-05-28 NOTE — ED TRIAGE NOTES
Patient arrives by private car for evaluation of a nose bleed that started about 1700 today.  Denies blood thinners.

## 2025-05-29 NOTE — DISCHARGE INSTRUCTIONS
I have attached some instructions of how to stop the nosebleed.    Come back if any of the above interventions do not work.  I would recommend starting a little bit of Aquaphor in the nares nightly as well as putting a humidifier in the room to prevent them in the future.

## 2025-06-15 ENCOUNTER — HEALTH MAINTENANCE LETTER (OUTPATIENT)
Age: 65
End: 2025-06-15

## 2025-07-01 ENCOUNTER — MYC REFILL (OUTPATIENT)
Dept: VASCULAR SURGERY | Facility: CLINIC | Age: 65
End: 2025-07-01
Payer: MEDICARE

## 2025-07-01 DIAGNOSIS — I65.22 STENOSIS OF LEFT CAROTID ARTERY: ICD-10-CM

## 2025-07-01 RX ORDER — ATORVASTATIN CALCIUM 40 MG/1
40 TABLET, FILM COATED ORAL DAILY
Qty: 90 TABLET | Refills: 4 | Status: SHIPPED | OUTPATIENT
Start: 2025-07-01

## 2025-07-03 DIAGNOSIS — I10 ESSENTIAL HYPERTENSION: ICD-10-CM

## 2025-07-03 RX ORDER — AMLODIPINE BESYLATE 5 MG/1
5 TABLET ORAL DAILY
Qty: 90 TABLET | Refills: 3 | Status: SHIPPED | OUTPATIENT
Start: 2025-07-03

## 2025-08-05 DIAGNOSIS — R91.8 PULMONARY NODULES: Primary | ICD-10-CM

## 2025-09-03 ENCOUNTER — MYC REFILL (OUTPATIENT)
Dept: INTERNAL MEDICINE | Facility: CLINIC | Age: 65
End: 2025-09-03
Payer: MEDICARE

## 2025-09-03 DIAGNOSIS — K21.00 GASTROESOPHAGEAL REFLUX DISEASE WITH ESOPHAGITIS WITHOUT HEMORRHAGE: ICD-10-CM

## 2025-09-03 RX ORDER — OMEPRAZOLE 40 MG/1
40 CAPSULE, DELAYED RELEASE ORAL DAILY
Qty: 90 CAPSULE | Refills: 3 | Status: SHIPPED | OUTPATIENT
Start: 2025-09-03

## (undated) DEVICE — SUTURE VICRYL+ 2-0 27IN SH UND VCP417H

## (undated) DEVICE — SURGICEL HEMOSTAT 4X8" 1952

## (undated) DEVICE — RX SURGIFLO HEMOSTATIC MATRIX 8ML 2991

## (undated) DEVICE — ESU PENCIL SMOKE EVAC W/ROCKER SWITCH 0703-047-000

## (undated) DEVICE — WIPE MICRO-TIP GRAPHIC CONTROL 3300

## (undated) DEVICE — DRAPE STERI U 1015

## (undated) DEVICE — GLOVE BIOGEL PI SZ 8.0 40880

## (undated) DEVICE — LIGACLIP MEDIUM AESCULAP B2180-1

## (undated) DEVICE — PROTECTOR ARM STANDARD ONE STEP

## (undated) DEVICE — CONTAINER URINE SPEC 4OZ STRL 1053

## (undated) DEVICE — DECANTER BAG 2002S

## (undated) DEVICE — TOWEL SURG 16INW X 26INL WHITE STRL XRAY DETECTABLE X8314W

## (undated) DEVICE — CUSTOM PACK CAROTID ENDARTERECTOMY PR

## (undated) DEVICE — DRSG GAUZE 2X2" TRAY 1806

## (undated) DEVICE — SUCTION MANIFOLD NEPTUNE 2 SYS 1 PORT 702-025-000

## (undated) DEVICE — SU SILK 2-0 TIE 18' A185H

## (undated) DEVICE — DRAIN RESERVOIR 100ML JP 0070740

## (undated) DEVICE — LOOP RETRACT-O-TAPE 16GA X 18 QUEST 1012

## (undated) DEVICE — A3 SUPPLIES- SEE NURSING INFO PAGE

## (undated) DEVICE — SUTURE SILK 3-0 TIES 18IN A184H

## (undated) DEVICE — MAT INST MAGNETIC 16X20

## (undated) DEVICE — SU PROLENE 6-0 24" C-1 DA TAPER POINT BLUE 8726H

## (undated) DEVICE — SPONGE KITNER DISSECTING 7102*

## (undated) DEVICE — DRAPE STERI FLUOROSCOPE 35X43"1012 LATEX FREE

## (undated) DEVICE — SOL WATER IRRIG 1000ML BOTTLE 2F7114

## (undated) DEVICE — IOM CASE FLAT FEE

## (undated) DEVICE — PREP CHLORAPREP W/ORANGE TINT 10.5ML 930715

## (undated) DEVICE — SU ETHILON 3-0 PS-2 18" 1669H

## (undated) DEVICE — SUTURE PROLENE 5-0 RB-2 8555H

## (undated) DEVICE — SOL NACL 0.9% IRRIG 1000ML BOTTLE 2F7124

## (undated) DEVICE — SOL NACL 0.9% INJ 250ML BAG 2B1322Q

## (undated) DEVICE — IOM STANDARD SUPPLY FEE

## (undated) DEVICE — SU SILK 4-0 TIE 18' A183H

## (undated) DEVICE — DRSG TELFA 3X4" 1050

## (undated) DEVICE — SYR EAR 3OZ BULB IRR STRL DISP BLU PVC 4173

## (undated) DEVICE — COVER ULTRASOUND PROBE FLEXI-FEEL 4X96" 25-FF496

## (undated) DEVICE — LIGACLIP SMALL AESCULAP J1180-1

## (undated) DEVICE — SUTURE SILK 2-0 TIES 30IN SA85H

## (undated) DEVICE — SU DERMABOND ADVANCED .7ML DNX12

## (undated) DEVICE — ESU ELEC BLADE 2.75" COATED/INSULATED E1455

## (undated) DEVICE — GOWN SURGICAL SMARTGOWN 2XL 89075

## (undated) DEVICE — SUTURE SILK 0 TIES 30IN SA86G

## (undated) DEVICE — VESSEL LOOP BLUE MINI 30-713

## (undated) DEVICE — DRAIN FLAT 10MM FULL PERF SIL 0070440

## (undated) DEVICE — SU VICRYL+ 3-0 27IN SH UND VCP416H

## (undated) DEVICE — SU MONOCRYL+ 4-0 18IN PS2 UND MCP496G

## (undated) RX ORDER — FENTANYL CITRATE 50 UG/ML
INJECTION, SOLUTION INTRAMUSCULAR; INTRAVENOUS
Status: DISPENSED
Start: 2021-10-18

## (undated) RX ORDER — FLUMAZENIL 0.1 MG/ML
INJECTION, SOLUTION INTRAVENOUS
Status: DISPENSED
Start: 2022-10-12

## (undated) RX ORDER — FENTANYL CITRATE 50 UG/ML
INJECTION, SOLUTION INTRAMUSCULAR; INTRAVENOUS
Status: DISPENSED
Start: 2022-10-12

## (undated) RX ORDER — NALOXONE HYDROCHLORIDE 0.4 MG/ML
INJECTION, SOLUTION INTRAMUSCULAR; INTRAVENOUS; SUBCUTANEOUS
Status: DISPENSED
Start: 2022-10-12

## (undated) RX ORDER — ONDANSETRON 2 MG/ML
INJECTION INTRAMUSCULAR; INTRAVENOUS
Status: DISPENSED
Start: 2024-01-09

## (undated) RX ORDER — HEPARIN SODIUM 1000 [USP'U]/ML
INJECTION, SOLUTION INTRAVENOUS; SUBCUTANEOUS
Status: DISPENSED
Start: 2024-01-09

## (undated) RX ORDER — LIDOCAINE HYDROCHLORIDE 10 MG/ML
INJECTION, SOLUTION EPIDURAL; INFILTRATION; INTRACAUDAL; PERINEURAL
Status: DISPENSED
Start: 2024-01-09

## (undated) RX ORDER — PROPOFOL 10 MG/ML
INJECTION, EMULSION INTRAVENOUS
Status: DISPENSED
Start: 2024-01-09

## (undated) RX ORDER — FENTANYL CITRATE 50 UG/ML
INJECTION, SOLUTION INTRAMUSCULAR; INTRAVENOUS
Status: DISPENSED
Start: 2024-01-09

## (undated) RX ORDER — DEXAMETHASONE SODIUM PHOSPHATE 10 MG/ML
INJECTION, EMULSION INTRAMUSCULAR; INTRAVENOUS
Status: DISPENSED
Start: 2024-01-09